# Patient Record
Sex: FEMALE | Race: WHITE | NOT HISPANIC OR LATINO | Employment: FULL TIME | URBAN - METROPOLITAN AREA
[De-identification: names, ages, dates, MRNs, and addresses within clinical notes are randomized per-mention and may not be internally consistent; named-entity substitution may affect disease eponyms.]

---

## 2017-08-15 ENCOUNTER — CONVERSION ENCOUNTER (OUTPATIENT)
Dept: MAMMOGRAPHY | Facility: HOSPITAL | Age: 57
End: 2017-08-15

## 2018-04-06 ENCOUNTER — OFFICE VISIT CONVERTED (OUTPATIENT)
Dept: FAMILY MEDICINE CLINIC | Facility: CLINIC | Age: 58
End: 2018-04-06
Attending: FAMILY MEDICINE

## 2018-04-06 ENCOUNTER — CONVERSION ENCOUNTER (OUTPATIENT)
Dept: FAMILY MEDICINE CLINIC | Facility: CLINIC | Age: 58
End: 2018-04-06

## 2018-05-14 ENCOUNTER — OFFICE VISIT CONVERTED (OUTPATIENT)
Dept: NEUROSURGERY | Facility: CLINIC | Age: 58
End: 2018-05-14
Attending: PHYSICIAN ASSISTANT

## 2018-06-01 ENCOUNTER — OFFICE VISIT CONVERTED (OUTPATIENT)
Dept: FAMILY MEDICINE CLINIC | Facility: CLINIC | Age: 58
End: 2018-06-01
Attending: FAMILY MEDICINE

## 2018-07-06 ENCOUNTER — CONVERSION ENCOUNTER (OUTPATIENT)
Dept: FAMILY MEDICINE CLINIC | Facility: CLINIC | Age: 58
End: 2018-07-06

## 2018-07-06 ENCOUNTER — OFFICE VISIT CONVERTED (OUTPATIENT)
Dept: FAMILY MEDICINE CLINIC | Facility: CLINIC | Age: 58
End: 2018-07-06
Attending: FAMILY MEDICINE

## 2018-10-11 ENCOUNTER — OFFICE VISIT CONVERTED (OUTPATIENT)
Dept: FAMILY MEDICINE CLINIC | Facility: CLINIC | Age: 58
End: 2018-10-11
Attending: FAMILY MEDICINE

## 2019-01-11 ENCOUNTER — OFFICE VISIT CONVERTED (OUTPATIENT)
Dept: FAMILY MEDICINE CLINIC | Facility: CLINIC | Age: 59
End: 2019-01-11
Attending: FAMILY MEDICINE

## 2019-01-11 ENCOUNTER — CONVERSION ENCOUNTER (OUTPATIENT)
Dept: FAMILY MEDICINE CLINIC | Facility: CLINIC | Age: 59
End: 2019-01-11

## 2019-05-23 ENCOUNTER — HOSPITAL ENCOUNTER (OUTPATIENT)
Dept: URGENT CARE | Facility: CLINIC | Age: 59
Discharge: HOME | End: 2019-05-23
Attending: FAMILY MEDICINE

## 2019-05-30 ENCOUNTER — OFFICE VISIT CONVERTED (OUTPATIENT)
Dept: FAMILY MEDICINE CLINIC | Facility: CLINIC | Age: 59
End: 2019-05-30
Attending: NURSE PRACTITIONER

## 2019-06-02 ENCOUNTER — HOSPITAL ENCOUNTER (OUTPATIENT)
Dept: URGENT CARE | Facility: CLINIC | Age: 59
Discharge: HOME | End: 2019-06-02

## 2019-06-19 ENCOUNTER — HOSPITAL ENCOUNTER (OUTPATIENT)
Dept: URGENT CARE | Facility: CLINIC | Age: 59
Discharge: HOME | End: 2019-06-19

## 2019-06-27 ENCOUNTER — HOSPITAL ENCOUNTER (OUTPATIENT)
Dept: GENERAL RADIOLOGY | Facility: HOSPITAL | Age: 59
Discharge: HOME | End: 2019-06-27
Attending: NURSE PRACTITIONER

## 2019-06-27 ENCOUNTER — OFFICE VISIT CONVERTED (OUTPATIENT)
Dept: FAMILY MEDICINE CLINIC | Facility: CLINIC | Age: 59
End: 2019-06-27
Attending: NURSE PRACTITIONER

## 2019-07-25 ENCOUNTER — HOSPITAL ENCOUNTER (OUTPATIENT)
Dept: GENERAL RADIOLOGY | Facility: HOSPITAL | Age: 59
Discharge: HOME | End: 2019-07-25
Attending: NURSE PRACTITIONER

## 2019-10-16 ENCOUNTER — HOSPITAL ENCOUNTER (OUTPATIENT)
Dept: OTHER | Facility: HOSPITAL | Age: 59
Discharge: HOME | End: 2019-10-16
Attending: EMERGENCY MEDICINE

## 2019-11-15 ENCOUNTER — OFFICE VISIT CONVERTED (OUTPATIENT)
Dept: FAMILY MEDICINE CLINIC | Facility: CLINIC | Age: 59
End: 2019-11-15
Attending: FAMILY MEDICINE

## 2021-04-25 ENCOUNTER — HOSPITAL ENCOUNTER (OUTPATIENT)
Dept: URGENT CARE | Facility: CLINIC | Age: 61
Discharge: HOME | End: 2021-04-25
Attending: NURSE PRACTITIONER

## 2021-04-26 LAB — SARS-COV-2 RNA SPEC QL NAA+PROBE: NOT DETECTED

## 2021-04-27 LAB
BACTERIA SPEC AEROBE CULT: NORMAL
BACTERIA UR CULT: NORMAL

## 2021-05-15 VITALS
TEMPERATURE: 97.5 F | OXYGEN SATURATION: 99 % | SYSTOLIC BLOOD PRESSURE: 138 MMHG | HEART RATE: 56 BPM | RESPIRATION RATE: 14 BRPM | WEIGHT: 136 LBS | HEIGHT: 58 IN | DIASTOLIC BLOOD PRESSURE: 82 MMHG | BODY MASS INDEX: 28.55 KG/M2

## 2021-05-15 VITALS
OXYGEN SATURATION: 96 % | HEART RATE: 104 BPM | TEMPERATURE: 98.3 F | HEIGHT: 58 IN | BODY MASS INDEX: 30.91 KG/M2 | SYSTOLIC BLOOD PRESSURE: 116 MMHG | WEIGHT: 147.25 LBS | DIASTOLIC BLOOD PRESSURE: 74 MMHG

## 2021-05-15 VITALS
DIASTOLIC BLOOD PRESSURE: 82 MMHG | SYSTOLIC BLOOD PRESSURE: 138 MMHG | HEART RATE: 76 BPM | TEMPERATURE: 97.5 F | WEIGHT: 149.37 LBS | RESPIRATION RATE: 14 BRPM | BODY MASS INDEX: 31.35 KG/M2 | OXYGEN SATURATION: 97 % | HEIGHT: 58 IN

## 2021-05-16 VITALS
RESPIRATION RATE: 16 BRPM | BODY MASS INDEX: 32.05 KG/M2 | TEMPERATURE: 97.8 F | HEART RATE: 75 BPM | DIASTOLIC BLOOD PRESSURE: 100 MMHG | OXYGEN SATURATION: 98 % | SYSTOLIC BLOOD PRESSURE: 168 MMHG | HEIGHT: 59 IN | WEIGHT: 159 LBS

## 2021-05-16 VITALS — BODY MASS INDEX: 33.17 KG/M2 | WEIGHT: 158 LBS | HEART RATE: 74 BPM | HEIGHT: 58 IN

## 2021-05-16 VITALS
BODY MASS INDEX: 33.82 KG/M2 | SYSTOLIC BLOOD PRESSURE: 142 MMHG | HEIGHT: 58 IN | DIASTOLIC BLOOD PRESSURE: 70 MMHG | RESPIRATION RATE: 14 BRPM | WEIGHT: 161.12 LBS | HEART RATE: 73 BPM | TEMPERATURE: 98.5 F | OXYGEN SATURATION: 95 %

## 2021-05-16 VITALS
HEIGHT: 58 IN | RESPIRATION RATE: 12 BRPM | DIASTOLIC BLOOD PRESSURE: 78 MMHG | HEART RATE: 60 BPM | BODY MASS INDEX: 33.45 KG/M2 | SYSTOLIC BLOOD PRESSURE: 126 MMHG | OXYGEN SATURATION: 98 % | TEMPERATURE: 98.6 F | WEIGHT: 159.37 LBS

## 2021-05-16 VITALS
HEIGHT: 58 IN | HEART RATE: 66 BPM | RESPIRATION RATE: 16 BRPM | SYSTOLIC BLOOD PRESSURE: 148 MMHG | DIASTOLIC BLOOD PRESSURE: 90 MMHG | TEMPERATURE: 97 F | WEIGHT: 156 LBS | BODY MASS INDEX: 32.75 KG/M2 | OXYGEN SATURATION: 98 %

## 2021-05-16 VITALS
WEIGHT: 142.12 LBS | OXYGEN SATURATION: 94 % | SYSTOLIC BLOOD PRESSURE: 178 MMHG | TEMPERATURE: 98.2 F | BODY MASS INDEX: 29.83 KG/M2 | DIASTOLIC BLOOD PRESSURE: 98 MMHG | HEART RATE: 80 BPM | HEIGHT: 58 IN

## 2021-05-18 ENCOUNTER — OFFICE VISIT CONVERTED (OUTPATIENT)
Dept: FAMILY MEDICINE CLINIC | Facility: CLINIC | Age: 61
End: 2021-05-18
Attending: FAMILY MEDICINE

## 2021-05-18 ENCOUNTER — CONVERSION ENCOUNTER (OUTPATIENT)
Dept: FAMILY MEDICINE CLINIC | Facility: CLINIC | Age: 61
End: 2021-05-18

## 2021-05-19 ENCOUNTER — HOSPITAL ENCOUNTER (OUTPATIENT)
Dept: GENERAL RADIOLOGY | Facility: HOSPITAL | Age: 61
Discharge: HOME | End: 2021-05-19
Attending: FAMILY MEDICINE

## 2021-06-05 NOTE — PROGRESS NOTES
Progress Note      Patient Name: Audra Montejo   Patient ID: [id number]   Sex: Female   YOB: 1960    Primary Care Provider: Anayeli Pina MD   Referring Provider: Anayeli Pina MD    Visit Date: May 18, 2021    Provider: Anayeli Pina MD   Location: [facility] [place]   Location Address: [address] [place] [address], [address]  [place], [place]  [id number]   Location Phone: [phone]          Chief Complaint  · Annual physical exam      History Of Present Illness  Audra Montejo is a 61 year old /White female who presents for evaluation and treatment of:      Pt is here for her annual physical exam.    Asthma exacerbation- pt reports she lost her nebulizer in her storage unit. Pt reports she was laid off last march and she didn't have money for about 3 months and lost her unit.  Pt went to [facility] and they gave her antibiotics.  On exam she is wheezing and requires a steroid shot and neb treatment in the office today.     Lumbar radiculopathy- pt reports she is starting to have more symptoms and she would like to get the epidural shots but she doesn't have a .     Trigger finger in both hands- pt reports she wakes up in the mornings and she can't even open her hands.    Tinea Pedis- Pt reports she has a rash on her feet which was itching and blistering.  Pt reports it got better but it is still there.    Right shoulder pain- pt has decread ROM and feels hse may have torn her rotator cuff and has right arm weakness. Pt will need x-ray then MRI to properly access.    Left shoulder is hurting to move around and has a history of bone spurs in her shoulder which she may need a steroid shot.     Urinary incontinence- patient reports that she is experiencing urge incontinence which is affecting her urine while she is at work she tolerated multiple bathroom breaks.  Patient is also having to wear panty protection to keep from wetting herself.  I will be giving her oxybutynin 10  mg once a day to help control her bladder.       Past Medical History  AC joint arthropathy; Allergy, Unspecified; Anxiety; Back pain; Bilateral Carpal Tunnel Syndrome; Bipolar disorder; Depression; Depression; Eczema; Gall Stones; Heart Murmur; Hypertension; Incomplete tear of left rotator cuff; Left Shoulder Bursitis; Left shoulder pain; Loss of appetite; Lumbago/low back pain; Memory loss/Forgetfulness; Migraine Headaches; Need for hepatitis C screening test; Palpitation; Psychiatric Care; Reflux Disease; Ringing in ears; Seasonal allergies; Shortness of breath; Sinus Trouble; unspecified; Stroke; Subacromial impingement, left; Superior glenoid labrum lesion of left shoulder, subsequent encounter; Trouble swallowing         Past Surgical History  Appendectomy; Arthroscopic Shoulder Surgery; Carpal Tunnel Release; Cholecystectomy; Colonoscopy; EGD; Hysterectomy         Allergy List  Lamictal         Family Medical History  Stroke; Heart Disease; Cancer, Unspecified; Diabetes, unspecified type; Hypertension; Rheumatoid arthritis; Blood Diseases; Family history of certain chronic disabling diseases; arthritis; Osteoporosis         Reproductive History   0 Para 0 0 0 0 & Postmenopausal       Social History  Alcohol (Light); Alcohol Use (Current some day); lives alone; Recreational Drug Use (Never); Single.; Tobacco (Current every day); Unemployed.         Immunizations  Name Date Admin   Influenza 11/15/2019   Influenza 10/11/2018   Influenza 10/06/2017   Influenza 10/11/2016   Tdap 2018         Review of Systems  · Constitutional  o Denies  o : fatigue, fever, weight loss, weight gain  · Eyes  o Denies  o : eye discomfort, eye pain  · HENT  o Denies  o : vertigo, nasal congestion  · Cardiovascular  o Denies  o : chest pain, irregular heart beats  · Respiratory  o Admits  o : wheezing  o Denies  o : shortness of breath  · Gastrointestinal  o Denies  o : nausea, vomiting  · Genitourinary  o Admits  o :  "frequency, incontinence  o Denies  o : dysuria  · Integument  o Admits  o : rash, itching  · Neurologic  o Denies  o : muscular weakness, memory difficulties  · Musculoskeletal  o Denies  o : joint swelling, muscle pain  · Psychiatric  o Denies  o : anxiety, depression  · Heme-Lymph  o Denies  o : lightheadedness, easy bleeding  · Allergic-Immunologic  o Admits  o : sinus allergy symptoms  o Denies  o : allergic dermatitis      Vitals  Date Time BP Position Site L\R Cuff Size HR RR TEMP (F) WT  HT  BMI kg/m2 BSA m2 O2 Sat FR L/min FiO2        05/18/2021 03:02 /86 Sitting    76 - R 16 97.6 153lbs 0oz 4'  10\" 31.98 1.69 97 %            Physical Examination  · Constitutional  o Appearance  o : well-nourished, in no acute distress  · Eyes  o Conjunctivae  o : conjunctivae normal  o Sclerae  o : sclerae white  o Pupils and Irises  o : pupils equal, round, and reactive to light and accommodation bilaterally  o Eyelids/Ocular Adnexae  o : eyelid appearance normal, no exudates present  · Ears, Nose, Mouth and Throat  o Ears  o :   § External Ears  § : external auditory canal appearance within normal limits, no discharge present  § Otoscopic Examination  § : tympanic membrane appearance within normal limits bilaterally  o Nose  o :   § External Nose  § : appearance normal  § Nasopharynx  § : no discharge present  o Oral Cavity  o :   § Oral Mucosa  § : oral mucosa normal  § Lips  § : lip appearance normal  o Throat  o :   § Oropharynx  § : no inflammation or lesions present, tonsils within normal limits  · Neck  o Inspection/Palpation  o : normal appearance, no masses or tenderness, trachea midline  o Range of Motion  o : cervical range of motion within normal limits  o Thyroid  o : gland size normal, nontender, no nodules or masses present on palpation  o Jugular Veins  o : JVP normal  · Respiratory  o Respiratory Effort  o : breathing unlabored  o Inspection of Chest  o : normal appearance  o Auscultation of " Lungs  o : normal breath sounds throughout  · Cardiovascular  o Heart  o :   § Auscultation of Heart  § : regular rate and rhythm, no murmurs, gallops or rubs  o Peripheral Vascular System  o :   § Extremities  § : no edema  · Gastrointestinal  o Abdominal Examination  o : abdomen nontender to palpation, tone normal without rigidity or guarding, no masses present, bowel sounds present in all four quadrants  o Liver and spleen  o : no hepatomegaly present, liver nontender to palpation, spleen not palpable  o Hernias  o : no hernias present  · Lymphatic  o Neck  o : no lymphadenopathy present  · Musculoskeletal  o Spine  o :   § Inspection/Palpation  § : no spinal tenderness, scoliosis or kyphosis present  § Stability  § : no subluxations present  § Range of Motion  § : spine range of motion normal  o Right Upper Extremity  o :   § Inspection/Palpation  § : no tenderness to palpation, ROM normal  o Left Upper Extremity  o :   § Inspection/Palpation  § : no tenderness to palpation, ROM normal  o Right Lower Extremity  o :   § Inspection/Palpation  § : no joint or limb tenderness to palpation, ROM normal  o Left Lower Extremity  o :   § Inspection/Palpation  § : no joint or limb tenderness to palpation, ROM normal  · Skin and Subcutaneous Tissue  o General Inspection  o : no rashes or lesions present, no areas of discoloration  o Body Hair  o : scalp palpation normal, hair normal for age, general body hair distribution normal for age  o Digits and Nails  o : no clubbing, cyanosis, deformities or edema present, normal appearing nails  · Neurologic  o Mental Status Examination  o :   § Orientation  § : grossly oriented to person, place and time  o Gait and Station  o : normal gait, able to stand without difficulty  · Psychiatric  o Judgement and Insight  o : judgment and insight intact  o Mood and Affect  o : mood normal, affect appropriate              Assessment  · Annual physical exam     V70.0/Z00.00  · Essential  hypertension     401.9/I10  · Lumbago     724.2/M54.5  · General Medical Exam, Adult (CPE)     V70.0/Z00.00  · Asthma exacerbation     493.92/J45.901  · Trigger finger of all digits of both hands       Trigger finger, right index finger     727.03/M65.321  Trigger thumb, right thumb     727.03/M65.311  Trigger thumb, left thumb     727.03/M65.312  Trigger finger, left index finger     727.03/M65.322  Trigger finger, right middle finger     727.03/M65.331  Trigger finger, left middle finger     727.03/M65.332  Trigger finger, right ring finger     727.03/M65.341  Trigger finger, left ring finger     727.03/M65.342  Trigger finger, right little finger     727.03/M65.351  Trigger finger, left little finger     727.03/M65.352  · Trigger finger     727.03/M65.30  · Shoulder pain, bilateral       Pain in right shoulder     719.41/M25.511  Pain in left shoulder     719.41/M25.512  · Tinea pedis     110.4/B35.3  · Urinary incontinence     788.30/R32      Plan  · Orders  o IM - Injection Fee [facility] ([id number]) - - 05/18/2021  o ACO-39: Current medications updated and reviewed (1159F, ) - - 05/18/2021  o ORTHOPEDIC CONSULTATION (ORTHO) - 727.03/M65.30, 719.41/M25.511, 719.41/M25.512 - 05/18/2021  o Shoulder (Left) 2 or more views X-Ray [facility] ([id number]) - 719.41/M25.512 - 05/18/2021  o Solumedrol 125 () - 493.92/J45.901 - 05/18/2021   Injection - Solu-Medrol 125 mg; Dose: 125 mg; Site: Right Hip; Route: intramuscular; Date: 05/18/2021 16:19:24; Exp: 09/01/2022; Lot: [id number]; Mfg: N/A; TradeName: Solu-Medrol; Location: [facility] [place]; Administered By: Nidhi Ortiz MA; Comment:   o Shoulder (Right) 2 or more views X-Ray [facility] ([id number]) - 719.41/M25.511 - 05/18/2021  · Medications  o ketoconazole 2 % topical cream   SIG: apply to the affected area(s) by topical route once daily for 2 weeks   DISP: (1) Tube with 0 refills  Prescribed on 05/18/2021     o albuterol sulfate 2.5 mg /3 mL  (0.083 %) inhalation solution for nebulization   SIG: inhale 3 milliliters (2.5 mg) by nebulization route 3 times per day as needed for 30 days   DISP: (1) Box with 2 refills  Prescribed on 05/18/2021     o albuterol sulfate 90 mcg/actuation inhalation HFA aerosol inhaler   SIG: inhale 1 - 2 puffs (90 - 180 mcg) by inhalation route every 6 hours as needed for 30 days   DISP: (1) Inhaler with 11 refills  Prescribed on 05/18/2021     o oxybutynin chloride 10 mg oral tablet extended release 24hr   SIG: take 1 tablet (10 mg) by oral route once daily for 90 days   DISP: (90) Tablet with 1 refills  Prescribed on 05/18/2021     o Singulair 10 mg oral tablet   SIG: take 1 tablet (10 mg) by oral route once daily in the evening for 90 days   DISP: (90) Tablet with 1 refills  Prescribed on 05/18/2021     o Medications have been Reconciled  o Transition of Care or Provider Policy  · Instructions  o Reviewed health maintenance flowsheet and updated information. Orders were placed and/or patient's response was documented.  o Patient was educated/instructed on their diagnosis, treatment and medications prior to discharge from the clinic today.  o Minutes spent with patient including greater than 50% in Education/Counseling/Care Coordination.  o Time spent with the patient was minutes, more than 50% face to face. 35 minutes            Electronically Signed by: Anayeli Pina MD -Author on June 4, 2021 01:32:07 PM

## 2021-06-18 RX ORDER — OXYBUTYNIN CHLORIDE 10 MG/1
10 TABLET, EXTENDED RELEASE ORAL DAILY
COMMUNITY
Start: 2021-05-18 | End: 2022-05-13 | Stop reason: SDUPTHER

## 2021-06-18 RX ORDER — ALBUTEROL SULFATE 90 UG/1
1-2 AEROSOL, METERED RESPIRATORY (INHALATION) EVERY 6 HOURS PRN
Status: ON HOLD | COMMUNITY
Start: 2021-05-18 | End: 2022-07-07

## 2021-06-18 RX ORDER — ALBUTEROL SULFATE 2.5 MG/3ML
SOLUTION RESPIRATORY (INHALATION)
Status: ON HOLD | COMMUNITY
Start: 2021-05-18 | End: 2022-07-07

## 2021-06-18 RX ORDER — DONEPEZIL HYDROCHLORIDE 5 MG/1
5 TABLET, FILM COATED ORAL EVERY EVENING
Status: ON HOLD | COMMUNITY
End: 2022-07-07

## 2021-06-18 RX ORDER — MONTELUKAST SODIUM 10 MG/1
10 TABLET ORAL EVERY EVENING
COMMUNITY
Start: 2021-05-18 | End: 2021-11-19

## 2021-06-18 RX ORDER — DOXYCYCLINE HYCLATE 100 MG/1
100 CAPSULE ORAL 2 TIMES DAILY
Status: ON HOLD | COMMUNITY
Start: 2021-04-26 | End: 2022-07-07

## 2021-06-18 RX ORDER — KETOCONAZOLE 20 MG/G
CREAM TOPICAL
Status: ON HOLD | COMMUNITY
Start: 2021-05-18 | End: 2022-07-07

## 2021-06-18 RX ORDER — TRAMADOL HYDROCHLORIDE 50 MG/1
50 TABLET ORAL EVERY 6 HOURS PRN
Status: ON HOLD | COMMUNITY
End: 2022-07-07

## 2021-06-18 RX ORDER — LISINOPRIL 20 MG/1
20 TABLET ORAL 2 TIMES DAILY
COMMUNITY
End: 2022-03-07

## 2021-06-18 RX ORDER — GABAPENTIN 600 MG/1
600 TABLET ORAL 3 TIMES DAILY
Status: ON HOLD | COMMUNITY
End: 2022-07-07

## 2021-06-18 RX ORDER — LAMOTRIGINE 25 MG/1
TABLET ORAL
Status: ON HOLD | COMMUNITY
End: 2022-07-07

## 2021-06-18 RX ORDER — QUETIAPINE FUMARATE 25 MG/1
25 TABLET, FILM COATED ORAL
Status: ON HOLD | COMMUNITY
End: 2022-07-07

## 2021-06-21 ENCOUNTER — OFFICE VISIT (OUTPATIENT)
Dept: FAMILY MEDICINE CLINIC | Facility: CLINIC | Age: 61
End: 2021-06-21

## 2021-06-21 ENCOUNTER — OFFICE VISIT (OUTPATIENT)
Dept: ORTHOPEDIC SURGERY | Facility: CLINIC | Age: 61
End: 2021-06-21

## 2021-06-21 VITALS — HEIGHT: 58 IN | BODY MASS INDEX: 33.29 KG/M2 | WEIGHT: 158.6 LBS | HEART RATE: 76 BPM | OXYGEN SATURATION: 96 %

## 2021-06-21 VITALS
WEIGHT: 155 LBS | DIASTOLIC BLOOD PRESSURE: 78 MMHG | HEART RATE: 66 BPM | SYSTOLIC BLOOD PRESSURE: 138 MMHG | BODY MASS INDEX: 32.54 KG/M2 | HEIGHT: 58 IN | OXYGEN SATURATION: 96 % | RESPIRATION RATE: 16 BRPM | TEMPERATURE: 97.4 F

## 2021-06-21 DIAGNOSIS — I10 ESSENTIAL HYPERTENSION: Primary | ICD-10-CM

## 2021-06-21 DIAGNOSIS — J45.20 MILD INTERMITTENT ASTHMA WITHOUT COMPLICATION: ICD-10-CM

## 2021-06-21 DIAGNOSIS — M25.512 LEFT SHOULDER PAIN, UNSPECIFIED CHRONICITY: ICD-10-CM

## 2021-06-21 DIAGNOSIS — M25.511 RIGHT SHOULDER PAIN, UNSPECIFIED CHRONICITY: Primary | ICD-10-CM

## 2021-06-21 DIAGNOSIS — M25.512 CHRONIC LEFT SHOULDER PAIN: ICD-10-CM

## 2021-06-21 DIAGNOSIS — Z12.31 BREAST CANCER SCREENING BY MAMMOGRAM: ICD-10-CM

## 2021-06-21 DIAGNOSIS — G89.29 CHRONIC LEFT SHOULDER PAIN: ICD-10-CM

## 2021-06-21 LAB
ALBUMIN SERPL-MCNC: 4.4 G/DL (ref 3.5–5.2)
ALBUMIN/GLOB SERPL: 1.7 G/DL
ALP SERPL-CCNC: 131 U/L (ref 39–117)
ALT SERPL W P-5'-P-CCNC: 12 U/L (ref 1–33)
ANION GAP SERPL CALCULATED.3IONS-SCNC: 10.3 MMOL/L (ref 5–15)
AST SERPL-CCNC: 19 U/L (ref 1–32)
BILIRUB SERPL-MCNC: 0.2 MG/DL (ref 0–1.2)
BUN SERPL-MCNC: 10 MG/DL (ref 6–20)
BUN/CREAT SERPL: 11 (ref 7–25)
CALCIUM SPEC-SCNC: 9.5 MG/DL (ref 8.6–10.5)
CHLORIDE SERPL-SCNC: 106 MMOL/L (ref 98–107)
CO2 SERPL-SCNC: 27.7 MMOL/L (ref 22–29)
CREAT SERPL-MCNC: 0.91 MG/DL (ref 0.57–1)
GFR SERPL CREATININE-BSD FRML MDRD: 63 ML/MIN/1.73
GLOBULIN UR ELPH-MCNC: 2.6 GM/DL
GLUCOSE SERPL-MCNC: 93 MG/DL (ref 65–99)
POTASSIUM SERPL-SCNC: 3.7 MMOL/L (ref 3.5–5.2)
PROT SERPL-MCNC: 7 G/DL (ref 6–8.5)
SODIUM SERPL-SCNC: 144 MMOL/L (ref 136–145)

## 2021-06-21 PROCEDURE — 99214 OFFICE O/P EST MOD 30 MIN: CPT | Performed by: FAMILY MEDICINE

## 2021-06-21 PROCEDURE — 80053 COMPREHEN METABOLIC PANEL: CPT | Performed by: FAMILY MEDICINE

## 2021-06-21 PROCEDURE — 99203 OFFICE O/P NEW LOW 30 MIN: CPT | Performed by: ORTHOPAEDIC SURGERY

## 2021-06-21 NOTE — PROGRESS NOTES
Chief Complaint  Chief Complaint   Patient presents with   • Asthma     FOLLOW_UP       HPI:  Audra Montejo presents to [facility]     Asthma -patient reports that she has only had to use her nebulizer machine once last week and that her asthma symptoms are pretty well controlled.  Patient does take her Singulair daily.    Hypertension-patient reports that she is not taking her lisinopril and has been working a lot which she feels is helping to lower her blood pressure.  Her blood pressure today is well controlled at 138/78.  There is no need to restart her lisinopril as long as her blood pressure remains controlled.    Left shoulder pain-patient will be going to see Dr. Ureña later on today for her left shoulder pain.  Patient has history of left shoulder surgery due to biceps tendon injury.    Medical History:  Past History:  Medical History: has a past medical history of AC joint arthropathy (10/13/2016), Acid reflux disease, Allergy, Anxiety, Back pain, Bilateral carpal tunnel syndrome (08/17/2017), Bipolar disorder (CMS/Formerly [facility]), Bursitis of left shoulder (08/09/2016), Depression, Eczema, Gall stones, H/O psychiatric care, Heart murmur, Hypertension, Incomplete tear of left rotator cuff (01/25/2017), Left shoulder pain, Loss of appetite, Lumbago (05/08/2015), Memory loss, Migraine headache, Need for hepatitis C screening test, Palpitation, Ringing in ears, Seasonal allergies, Shortness of breath, Sinus trouble, Stroke (cerebrum) (CMS/[facility]), Subacromial impingement, left (10/13/2016), Superior glenoid labrum lesion of left shoulder (10/13/2016), and Trouble swallowing.   Surgical History: has a past surgical history that includes Appendectomy; Shoulder surgery (Left, 10/03/2016); Carpal tunnel release; Cholecystectomy; Colonoscopy (2014); Esophagogastroduodenoscopy (2014); and Hysterectomy (1999).   Family History: family history includes Arthritis in her mother, sister, and son;  Cancer in her father, sister, and son; Diabetes in her brother, mother, and sister; Heart disease in her brother, father, sister, and son; Hypertension in an other family member; Osteoporosis in her son; Other in her son; Rheum arthritis in an other family member; Stroke in her mother and son.   Social History: reports that she has been smoking. She has a 15.00 pack-year smoking history. She has never used smokeless tobacco. She reports current alcohol use. She reports that she does not use drugs.  Immunization History   Administered Date(s) Administered   • Influenza, Unspecified 11/15/2019   • Tdap 06/01/2018         Allergies: Tape     Medications:  Current Outpatient Medications on File Prior to Visit   Medication Sig Dispense Refill   • albuterol (PROVENTIL) (2.5 MG/3ML) 0.083% nebulizer solution INHALE CONTENTS OF 1 VIAL IN NEBULIZER THREE TIMES DAILY AS NEEDED     • albuterol sulfate  (90 Base) MCG/ACT inhaler Inhale 1-2 puffs Every 6 (Six) Hours As Needed.     • montelukast (SINGULAIR) 10 MG tablet Take 10 mg by mouth Every Evening.     • oxybutynin XL (DITROPAN-XL) 10 MG 24 hr tablet Take 10 mg by mouth Daily.     • donepezil (ARICEPT) 5 MG tablet Take 5 mg by mouth Every Evening.     • doxycycline (VIBRAMYCIN) 100 MG capsule Take 100 mg by mouth 2 (Two) Times a Day.     • gabapentin (NEURONTIN) 600 MG tablet Take 600 mg by mouth 3 (Three) Times a Day.     • ketoconazole (NIZORAL) 2 % cream APPLY TOPICALLY TO THE AFFECTED AREA EVERY DAY FOR 2 WEEKS     • lamoTRIgine (LaMICtal) 25 MG tablet Lamictal 25 mg oral tablet Take 1 Tablet by mouth q day x 1 week   -  Take 2 Tablet by mouth q day x 2nd week x 1 week - Take 3 Tablet by mouth q day x 3rd week x 1 we   Suspended     • lisinopril (PRINIVIL,ZESTRIL) 20 MG tablet Take 20 mg by mouth 2 (Two) Times a Day.     • QUEtiapine (SEROquel) 25 MG tablet Take 25 mg by mouth every night at bedtime.     • traMADol (ULTRAM) 50 MG tablet Take 50 mg by mouth Every  "6 (Six) Hours As Needed.     • TRAZODONE HCL PO        No current facility-administered medications on file prior to visit.        Health Maintenance Due   Topic Date Due   • ANNUAL PHYSICAL  Never done   • Pneumococcal Vaccine 0-64 (1 of 1 - PPSV23) Never done   • COVID-19 Vaccine (1) Never done   • ZOSTER VACCINE (1 of 2) Never done   • MAMMOGRAM  08/15/2019   • HEPATITIS C SCREENING  Never done       Vital Signs:   Vitals:    06/21/21 0911   BP: 138/78   Pulse: 66   Resp: 16   Temp: 97.4 °F (36.3 °C)   SpO2: 96%   Weight: 70.3 kg (155 lb)   Height: 147.3 cm (58\")      Body mass index is 32.4 kg/m².     ROS:  Review of Systems   Constitutional: Negative for fatigue and fever.   HENT: Negative for congestion, ear pain and sinus pressure.    Respiratory: Negative for cough, chest tightness and shortness of breath.    Cardiovascular: Negative for chest pain, palpitations and leg swelling.   Gastrointestinal: Negative for abdominal pain and diarrhea.   Genitourinary: Negative for dysuria and frequency.   Neurological: Negative for speech difficulty, headache and confusion.   Psychiatric/Behavioral: Negative for agitation and behavioral problems.          Physical Exam  Constitutional:       Appearance: Normal appearance.   HENT:      Right Ear: Tympanic membrane normal.      Left Ear: Tympanic membrane normal.      Nose: Nose normal.   Eyes:      Extraocular Movements: Extraocular movements intact.      Conjunctiva/sclera: Conjunctivae normal.      Pupils: Pupils are equal, round, and reactive to light.   Cardiovascular:      Rate and Rhythm: Normal rate and regular rhythm.   Pulmonary:      Effort: Pulmonary effort is normal.      Breath sounds: Normal breath sounds.   Abdominal:      General: Bowel sounds are normal.   Musculoskeletal:      Cervical back: Normal range of motion.   Skin:     General: Skin is warm and dry.   Neurological:      General: No focal deficit present.      Mental Status: She is alert and " oriented to person, place, and time.   Psychiatric:         Mood and Affect: Mood normal.         Behavior: Behavior normal.          Result Review :        Diagnoses and all orders for this visit:    1. Essential hypertension (Primary)  -     Comprehensive Metabolic Panel    2. Mild intermittent asthma without complication    3. Chronic left shoulder pain               Follow Up   No follow-ups on file.  Patient was given instructions and counseling regarding her condition or for health maintenance advice. Please see specific information pulled into the AVS if appropriate.       Anayeli Pina MD

## 2021-06-21 NOTE — PROGRESS NOTES
"Chief Complaint  Pain of the Left Shoulder and Pain of the Right Shoulder     Subjective      Audra Montejo presents to [facility] for an evaluation of bilateral shoulders. She states she has a pinching sensation in her right shoulder that started a year ago after falling and landing on concrete. Patient works at [employer], she does a lot of heavy lifting and it only worsens and aggravates her right shoulder. This has made her decide to come in and get this evaluated.     Dr. Garcia did a scope on her left shoulder. She states surgery has done well but has begun to hurt her as well. She states this began shortly after her left shoulder. She believes she is compensating for her right shoulder. Patient denies any numbness and tingling in bilateral shoulders. She states right shoulder is worse than her left at this time.     Allergies   Allergen Reactions   • Tape Itching        Social History     Socioeconomic History   • Marital status: Single     Spouse name: Not on file   • Number of children: Not on file   • Years of education: Not on file   • Highest education level: Not on file   Tobacco Use   • Smoking status: Current Every Day Smoker     Packs/day: 0.50     Years: 30.00     Pack years: 15.00   • Smokeless tobacco: Never Used   • Tobacco comment: smoked 21-30 years    Vaping Use   • Vaping Use: Never used   Substance and Sexual Activity   • Alcohol use: Yes     Comment: rarely drinks   • Drug use: Never        Review of Systems     Objective   Vital Signs:   Pulse 76   Ht 147.3 cm (58\")   Wt 71.9 kg (158 lb 9.6 oz)   SpO2 96%   BMI 33.15 kg/m²       Physical Exam  Constitutional:       Appearance: Normal appearance. He is well-developed and normal weight.   HENT:      Head: Normocephalic.      Right Ear: Hearing and external ear normal.      Left Ear: Hearing and external ear normal.      Nose: Nose normal.   Eyes:      Conjunctiva/sclera: Conjunctivae normal. "   Cardiovascular:      Rate and Rhythm: Normal rate.   Pulmonary:      Effort: Pulmonary effort is normal.      Breath sounds: No wheezing or rales.   Abdominal:      Palpations: Abdomen is soft.      Tenderness: There is no abdominal tenderness.   Musculoskeletal:      Cervical back: Normal range of motion.   Skin:     Findings: No rash.   Neurological:      Mental Status: He is alert and oriented to person, place, and time.   Psychiatric:         Mood and Affect: Mood and affect normal.         Judgment: Judgment normal.       Ortho Exam      RIGHT SHOULDER: Tender biceps tendon. Tender subacromial bursa. Pain with passive forward elevation. Near full flexion. IR to L5. Full cross body adduction. Good strength. Good tone of deltoid, triceps, wrist extensors and wrist flexors. Full elbow range of motion. Positive Hawkin's. Positive Neer's. No swelling, skin discoloration or atrophy. Sensation grossly intact. Neurovascular intact. Radial pulse 2+, ulnar pulse 2+.     LEFT SHOULDER: No swelling, skin discoloration or atrophy. Radial pulse 2+, ulnar pulse 2+. Good tone of deltoid, triceps, wrist extensors and wrist flexors. Skin intact. Full elbow range of motion. Forward flexion is near full. Full cross body adduction. Positive impingement signs. Sensation grossly intact. Neurovascular intact. Full cervical range of motion.       Procedures        Imaging Results (Most Recent)     None           Result Review :     [facility]            PACS RADIOLOGY REPORT     Patient: NIDIA FELICIANO     Acct: [id number]     Report: #[id number]  UNIT #: [id number]     DOS: 2021 1417     Order #: [id number]  Location: [facility]     : 1960  ORDERING: RUI SANDOVAL  DICTATING: Dottie Sutton #: [id number]     EXAM: SHLT - SHOULDER 2V or more LEFT  REASON FOR EXAM: M25.512,M25.511  REASON FOR VISIT: M25.512,M25.511    CONCLUSION:     1. No evidence of  fracture or defects location.  There is mild widening of the acromioclavicular     joint measuring up to 1.3 cm likely related to age-indeterminate grade 2 sprain.  No evidence of     subluxation.      2. Mild to moderate osteoarthritic degenerative changes of the glenohumeral joint.  Mild     age-related changes of the a.c. joint are also noted.             [facility]            PACS RADIOLOGY REPORT     Patient: NIDIA FELICIANO     Acct: [id number]     Report: #[id number]  UNIT #: [id number]     DOS: 2021 1417     Order #: [id number]  Location: [facility]     : 1960  ORDERING: RUI SANDOVAL  DICTATING: Jonel Dumont #: [id number]     EXAM: SHRT - SHOULDER  2V or more RIGHT  REASON FOR EXAM: M25.512,M25.511  REASON FOR VISIT: M25.512,M25.511    CONCLUSION:     1. Mild degenerative changes at the glenohumeral and acromioclavicular joints.    2. No radiographic findings of acute osseous shoulder abnormality.         Assessment and Plan     DX: Bilateral shoulder pain    Discussed treatment plans and diagnosis with the patient. Discussed medications, therapy, injections and MRI. Patient wishes to proceed with a right shoulder MRI.     Call or return if worsening symptoms.    Follow Up     Follow-up after MRI.       Patient was given instructions and counseling regarding her condition or for health maintenance advice. Please see specific information pulled into the AVS if appropriate.     Scribed for Sancho Arreola MD by Shannan Hawkins.  21   14:49 EDT    I have personally performed the services described in this document as scribed by the above individual and it is both accurate and complete.  Sancho Arreola MD 21  14:49 EDT

## 2021-06-25 ENCOUNTER — OFFICE VISIT (OUTPATIENT)
Dept: FAMILY MEDICINE CLINIC | Facility: CLINIC | Age: 61
End: 2021-06-25

## 2021-06-25 VITALS
HEIGHT: 58 IN | TEMPERATURE: 98.6 F | HEART RATE: 53 BPM | WEIGHT: 155.2 LBS | OXYGEN SATURATION: 98 % | DIASTOLIC BLOOD PRESSURE: 80 MMHG | BODY MASS INDEX: 32.58 KG/M2 | SYSTOLIC BLOOD PRESSURE: 150 MMHG

## 2021-06-25 DIAGNOSIS — I10 ESSENTIAL HYPERTENSION: ICD-10-CM

## 2021-06-25 DIAGNOSIS — R07.9 CHEST PAIN, UNSPECIFIED TYPE: Primary | ICD-10-CM

## 2021-06-25 PROBLEM — R06.02 SHORTNESS OF BREATH: Status: ACTIVE | Noted: 2021-06-25

## 2021-06-25 PROBLEM — R63.0 LOSS OF APPETITE: Status: ACTIVE | Noted: 2021-06-25

## 2021-06-25 PROBLEM — R01.1 HEART MURMUR: Status: ACTIVE | Noted: 2021-06-25

## 2021-06-25 PROBLEM — R13.10 TROUBLE SWALLOWING: Status: ACTIVE | Noted: 2021-06-25

## 2021-06-25 PROBLEM — M75.100 NONTRAUMATIC TEAR OF ROTATOR CUFF: Status: ACTIVE | Noted: 2017-01-25

## 2021-06-25 PROBLEM — K80.20 GALL STONES: Status: ACTIVE | Noted: 2021-06-25

## 2021-06-25 PROBLEM — G43.909 MIGRAINE: Status: ACTIVE | Noted: 2021-06-25

## 2021-06-25 PROBLEM — H93.19 RINGING IN EARS: Status: ACTIVE | Noted: 2021-06-25

## 2021-06-25 PROBLEM — M54.9 BACK PAIN: Status: ACTIVE | Noted: 2021-06-25

## 2021-06-25 PROBLEM — F41.9 ANXIETY: Status: ACTIVE | Noted: 2021-06-25

## 2021-06-25 PROBLEM — J30.2 SEASONAL ALLERGIC RHINITIS: Status: ACTIVE | Noted: 2021-06-25

## 2021-06-25 PROBLEM — R00.2 PALPITATION: Status: ACTIVE | Noted: 2021-06-25

## 2021-06-25 PROBLEM — G56.03 BILATERAL CARPAL TUNNEL SYNDROME: Status: ACTIVE | Noted: 2017-08-17

## 2021-06-25 PROBLEM — F31.9 BIPOLAR DISORDER: Status: ACTIVE | Noted: 2021-06-25

## 2021-06-25 PROBLEM — I63.9 STROKE (HCC): Status: ACTIVE | Noted: 2021-06-25

## 2021-06-25 PROBLEM — L30.9 ECZEMA: Status: ACTIVE | Noted: 2021-06-25

## 2021-06-25 LAB
CK MB SERPL-CCNC: 2.59 NG/ML
CK SERPL-CCNC: 86 U/L (ref 20–180)
TROPONIN T SERPL-MCNC: <0.01 NG/ML (ref 0–0.03)

## 2021-06-25 PROCEDURE — 82550 ASSAY OF CK (CPK): CPT | Performed by: FAMILY MEDICINE

## 2021-06-25 PROCEDURE — 82553 CREATINE MB FRACTION: CPT | Performed by: FAMILY MEDICINE

## 2021-06-25 PROCEDURE — 84484 ASSAY OF TROPONIN QUANT: CPT | Performed by: FAMILY MEDICINE

## 2021-06-25 PROCEDURE — 99214 OFFICE O/P EST MOD 30 MIN: CPT | Performed by: FAMILY MEDICINE

## 2021-06-25 NOTE — ASSESSMENT & PLAN NOTE
Hypertension is worsening.  Continue current medications.  Blood pressure will be reassessed in 4 weeks.

## 2021-06-25 NOTE — ASSESSMENT & PLAN NOTE
Pt was strongly urged that she should go to the hospital if she experiences that pain again.  Pt reports she refuses to go to [place] ER and would rather die at home.  I will be sending her for a stress test and to see Cardiology.  Pt reports she is ok with getting tests done but will not go to the ER.

## 2021-06-25 NOTE — PROGRESS NOTES
"Chief Complaint  Chief Complaint   Patient presents with   • Chest Pain   • Cough   • Heartburn       HPI:  Audra Montejo presents to [facility]    Pt reports she thinks she had a heart attack last month. Pt reports she felt fatigued and she took about 3 steps out of the bathroom she had a pain that hit on the left side of her chest and she had sweating and could not breathe and at some point all her senses just went out of her and she hit the floor.  That pain was a constricting pain and she felt like she was in suspended animation.  At some point there was a \"squeshy gushy thing\" happened and the pain backed down a little more. Each time the \"gooshy\" thing happened the pain got weaker.     Medical History:  Past History:  Medical History: has a past medical history of AC joint arthropathy (10/13/2016), Acid reflux disease, Allergy, Anxiety, Back pain, Bilateral carpal tunnel syndrome (08/17/2017), Bipolar disorder (CMS/[facility]), Bursitis of left shoulder (08/09/2016), Depression, Eczema, Gall stones, H/O psychiatric care, Heart murmur, Hypertension, Incomplete tear of left rotator cuff (01/25/2017), Left shoulder pain, Loss of appetite, Lumbago (05/08/2015), Memory loss, Migraine headache, Need for hepatitis C screening test, Palpitation, Ringing in ears, Seasonal allergies, Shortness of breath, Sinus trouble, Stroke (cerebrum) (CMS/[facility]), Subacromial impingement, left (10/13/2016), Superior glenoid labrum lesion of left shoulder (10/13/2016), and Trouble swallowing.   Surgical History: has a past surgical history that includes Appendectomy; Shoulder surgery (Left, 10/03/2016); Carpal tunnel release; Cholecystectomy; Colonoscopy (2014); Esophagogastroduodenoscopy (2014); and Hysterectomy (1999).   Family History: family history includes Arthritis in her mother, sister, and son; Cancer in her father, sister, and son; Diabetes in her brother, mother, and sister; Heart disease in her " brother, father, sister, and son; Hypertension in an other family member; Osteoporosis in her son; Other in her son; Rheum arthritis in an other family member; Stroke in her mother and son.   Social History: reports that she has been smoking. She has a 15.00 pack-year smoking history. She has never used smokeless tobacco. She reports current alcohol use. She reports that she does not use drugs.  Immunization History   Administered Date(s) Administered   • Influenza, Unspecified 11/15/2019   • Tdap 06/01/2018         Allergies: Tape     Medications:  Current Outpatient Medications on File Prior to Visit   Medication Sig Dispense Refill   • albuterol (PROVENTIL) (2.5 MG/3ML) 0.083% nebulizer solution INHALE CONTENTS OF 1 VIAL IN NEBULIZER THREE TIMES DAILY AS NEEDED     • albuterol sulfate  (90 Base) MCG/ACT inhaler Inhale 1-2 puffs Every 6 (Six) Hours As Needed.     • montelukast (SINGULAIR) 10 MG tablet Take 10 mg by mouth Every Evening.     • oxybutynin XL (DITROPAN-XL) 10 MG 24 hr tablet Take 10 mg by mouth Daily.     • donepezil (ARICEPT) 5 MG tablet Take 5 mg by mouth Every Evening.     • doxycycline (VIBRAMYCIN) 100 MG capsule Take 100 mg by mouth 2 (Two) Times a Day.     • gabapentin (NEURONTIN) 600 MG tablet Take 600 mg by mouth 3 (Three) Times a Day.     • ketoconazole (NIZORAL) 2 % cream APPLY TOPICALLY TO THE AFFECTED AREA EVERY DAY FOR 2 WEEKS     • lamoTRIgine (LaMICtal) 25 MG tablet Lamictal 25 mg oral tablet Take 1 Tablet by mouth q day x 1 week   -  Take 2 Tablet by mouth q day x 2nd week x 1 week - Take 3 Tablet by mouth q day x 3rd week x 1 we   Suspended     • lisinopril (PRINIVIL,ZESTRIL) 20 MG tablet Take 20 mg by mouth 2 (Two) Times a Day.     • QUEtiapine (SEROquel) 25 MG tablet Take 25 mg by mouth every night at bedtime.     • traMADol (ULTRAM) 50 MG tablet Take 50 mg by mouth Every 6 (Six) Hours As Needed.     • TRAZODONE HCL PO        No current facility-administered medications on  "file prior to visit.        Health Maintenance Due   Topic Date Due   • ANNUAL PHYSICAL  Never done   • Pneumococcal Vaccine 0-64 (1 of 1 - PPSV23) Never done   • COVID-19 Vaccine (1) Never done   • ZOSTER VACCINE (1 of 2) Never done   • MAMMOGRAM  08/15/2019   • HEPATITIS C SCREENING  Never done       Vital Signs:   Vitals:    06/25/21 1357   BP: 150/80   BP Location: Left leg   Patient Position: Sitting   Cuff Size: Adult   Pulse: 53   Temp: 98.6 °F (37 °C)   SpO2: 98%   Weight: 70.4 kg (155 lb 3.2 oz)   Height: 147.3 cm (58\")      Body mass index is 32.44 kg/m².     ROS:  Review of Systems   Constitutional: Negative for fatigue and fever.   HENT: Negative for congestion, ear pain and sinus pressure.    Respiratory: Negative for cough, chest tightness and shortness of breath.    Cardiovascular: Negative for chest pain, palpitations and leg swelling.   Gastrointestinal: Negative for abdominal pain and diarrhea.   Genitourinary: Negative for dysuria and frequency.   Neurological: Negative for speech difficulty, headache and confusion.   Psychiatric/Behavioral: Negative for agitation and behavioral problems.          Physical Exam  Constitutional:       Appearance: Normal appearance.   HENT:      Right Ear: Tympanic membrane normal.      Left Ear: Tympanic membrane normal.      Nose: Nose normal.   Eyes:      Extraocular Movements: Extraocular movements intact.      Conjunctiva/sclera: Conjunctivae normal.      Pupils: Pupils are equal, round, and reactive to light.   Cardiovascular:      Rate and Rhythm: Normal rate and regular rhythm.   Pulmonary:      Effort: Pulmonary effort is normal.      Breath sounds: Normal breath sounds.   Abdominal:      General: Bowel sounds are normal.   Musculoskeletal:         General: Normal range of motion.      Cervical back: Normal range of motion.   Skin:     General: Skin is warm and dry.   Neurological:      General: No focal deficit present.      Mental Status: She is alert and " oriented to person, place, and time.   Psychiatric:         Mood and Affect: Mood normal.         Behavior: Behavior normal.          Result Review :        Diagnoses and all orders for this visit:    1. Chest pain, unspecified type (Primary)  Assessment & Plan:  Pt was strongly urged that she should go to the hospital if she experiences that pain again.  Pt reports she refuses to go to [place] ER and would rather die at home.  I will be sending her for a stress test and to see Cardiology.  Pt reports she is ok with getting tests done but will not go to the ER.    Orders:  -     Treadmill Stress Test; Future  -     Ambulatory Referral to Cardiology  -     Troponin  -     CK  -     CK MB    2. Essential hypertension  Assessment & Plan:  Hypertension is worsening.  Continue current medications.  Blood pressure will be reassessed in 4 weeks.          Smoking Cessation:    Audra Montejo  reports that she has been smoking. She has a 15.00 pack-year smoking history. She has never used smokeless tobacco..          Follow Up   No follow-ups on file.  Patient was given instructions and counseling regarding her condition or for health maintenance advice. Please see specific information pulled into the AVS if appropriate.       Anayeli Pina MD

## 2021-07-06 ENCOUNTER — APPOINTMENT (OUTPATIENT)
Dept: MRI IMAGING | Facility: HOSPITAL | Age: 61
End: 2021-07-06

## 2021-07-08 ENCOUNTER — TELEPHONE (OUTPATIENT)
Dept: FAMILY MEDICINE CLINIC | Facility: CLINIC | Age: 61
End: 2021-07-08

## 2021-07-08 ENCOUNTER — APPOINTMENT (OUTPATIENT)
Dept: CARDIOLOGY | Facility: HOSPITAL | Age: 61
End: 2021-07-08

## 2021-07-08 NOTE — TELEPHONE ENCOUNTER
Caller: Audra Montejo    Relationship to patient: Self    Best call back number: [phone]    Patient is needing: PATIENT CALLED IN AND WOULD LIKE HER LAB RESULTS FROM HER LAST VISIT. PLEASE CALL PATIENT AND ADVISE.

## 2021-07-15 VITALS
WEIGHT: 153 LBS | RESPIRATION RATE: 16 BRPM | DIASTOLIC BLOOD PRESSURE: 86 MMHG | SYSTOLIC BLOOD PRESSURE: 164 MMHG | HEART RATE: 76 BPM | TEMPERATURE: 97.6 F | HEIGHT: 58 IN | OXYGEN SATURATION: 97 % | BODY MASS INDEX: 32.12 KG/M2

## 2021-08-25 ENCOUNTER — APPOINTMENT (OUTPATIENT)
Dept: MAMMOGRAPHY | Facility: HOSPITAL | Age: 61
End: 2021-08-25

## 2021-11-19 RX ORDER — MONTELUKAST SODIUM 10 MG/1
TABLET ORAL
Qty: 90 TABLET | Refills: 0 | Status: SHIPPED | OUTPATIENT
Start: 2021-11-19 | End: 2022-05-13 | Stop reason: SDUPTHER

## 2022-02-15 ENCOUNTER — HOSPITAL ENCOUNTER (EMERGENCY)
Facility: HOSPITAL | Age: 62
Discharge: HOME | End: 2022-02-16
Attending: EMERGENCY MEDICINE | Admitting: EMERGENCY MEDICINE

## 2022-02-15 ENCOUNTER — APPOINTMENT (OUTPATIENT)
Dept: GENERAL RADIOLOGY | Facility: HOSPITAL | Age: 62
End: 2022-02-15

## 2022-02-15 DIAGNOSIS — R07.9 CHEST PAIN, UNSPECIFIED TYPE: Primary | ICD-10-CM

## 2022-02-15 LAB
BASOPHILS # BLD AUTO: 0.05 10*3/MM3 (ref 0–0.2)
BASOPHILS NFR BLD AUTO: 0.5 % (ref 0–1.5)
DEPRECATED RDW RBC AUTO: 42.4 FL (ref 37–54)
EOSINOPHIL # BLD AUTO: 0.25 10*3/MM3 (ref 0–0.4)
EOSINOPHIL NFR BLD AUTO: 2.4 % (ref 0.3–6.2)
ERYTHROCYTE [DISTWIDTH] IN BLOOD BY AUTOMATED COUNT: 13.2 % (ref 12.3–15.4)
HCT VFR BLD AUTO: 45.1 % (ref 34–46.6)
HGB BLD-MCNC: 15.5 G/DL (ref 12–15.9)
HOLD SPECIMEN: NORMAL
IMM GRANULOCYTES # BLD AUTO: 0.03 10*3/MM3 (ref 0–0.05)
IMM GRANULOCYTES NFR BLD AUTO: 0.3 % (ref 0–0.5)
LYMPHOCYTES # BLD AUTO: 3.46 10*3/MM3 (ref 0.7–3.1)
LYMPHOCYTES NFR BLD AUTO: 33.3 % (ref 19.6–45.3)
MCH RBC QN AUTO: 30.6 PG (ref 26.6–33)
MCHC RBC AUTO-ENTMCNC: 34.4 G/DL (ref 31.5–35.7)
MCV RBC AUTO: 89 FL (ref 79–97)
MONOCYTES # BLD AUTO: 0.78 10*3/MM3 (ref 0.1–0.9)
MONOCYTES NFR BLD AUTO: 7.5 % (ref 5–12)
NEUTROPHILS NFR BLD AUTO: 5.81 10*3/MM3 (ref 1.7–7)
NEUTROPHILS NFR BLD AUTO: 56 % (ref 42.7–76)
NRBC BLD AUTO-RTO: 0 /100 WBC (ref 0–0.2)
NT-PROBNP SERPL-MCNC: 140.7 PG/ML (ref 0–900)
PLATELET # BLD AUTO: 276 10*3/MM3 (ref 140–450)
PMV BLD AUTO: 11.4 FL (ref 6–12)
RBC # BLD AUTO: 5.07 10*6/MM3 (ref 3.77–5.28)
TROPONIN I SERPL-MCNC: 0.01 NG/ML (ref 0–0.6)
WBC NRBC COR # BLD: 10.38 10*3/MM3 (ref 3.4–10.8)
WHOLE BLOOD HOLD SPECIMEN: NORMAL
WHOLE BLOOD HOLD SPECIMEN: NORMAL

## 2022-02-15 PROCEDURE — 36415 COLL VENOUS BLD VENIPUNCTURE: CPT

## 2022-02-15 PROCEDURE — 85025 COMPLETE CBC W/AUTO DIFF WBC: CPT | Performed by: EMERGENCY MEDICINE

## 2022-02-15 PROCEDURE — 83880 ASSAY OF NATRIURETIC PEPTIDE: CPT | Performed by: EMERGENCY MEDICINE

## 2022-02-15 PROCEDURE — 83735 ASSAY OF MAGNESIUM: CPT

## 2022-02-15 PROCEDURE — 80053 COMPREHEN METABOLIC PANEL: CPT

## 2022-02-15 PROCEDURE — 83690 ASSAY OF LIPASE: CPT

## 2022-02-15 PROCEDURE — 82553 CREATINE MB FRACTION: CPT | Performed by: EMERGENCY MEDICINE

## 2022-02-15 PROCEDURE — 71045 X-RAY EXAM CHEST 1 VIEW: CPT

## 2022-02-15 PROCEDURE — 93010 ELECTROCARDIOGRAM REPORT: CPT | Performed by: INTERNAL MEDICINE

## 2022-02-15 PROCEDURE — 93005 ELECTROCARDIOGRAM TRACING: CPT | Performed by: EMERGENCY MEDICINE

## 2022-02-15 PROCEDURE — 36415 COLL VENOUS BLD VENIPUNCTURE: CPT | Performed by: EMERGENCY MEDICINE

## 2022-02-15 PROCEDURE — 84484 ASSAY OF TROPONIN QUANT: CPT

## 2022-02-15 PROCEDURE — 99283 EMERGENCY DEPT VISIT LOW MDM: CPT

## 2022-02-15 PROCEDURE — 93005 ELECTROCARDIOGRAM TRACING: CPT

## 2022-02-15 PROCEDURE — 82550 ASSAY OF CK (CPK): CPT | Performed by: EMERGENCY MEDICINE

## 2022-02-15 RX ORDER — SODIUM CHLORIDE 0.9 % (FLUSH) 0.9 %
10 SYRINGE (ML) INJECTION AS NEEDED
Status: DISCONTINUED | OUTPATIENT
Start: 2022-02-15 | End: 2022-02-16 | Stop reason: HOSPADM

## 2022-02-15 RX ORDER — ASPIRIN 81 MG/1
324 TABLET, CHEWABLE ORAL ONCE
Status: COMPLETED | OUTPATIENT
Start: 2022-02-15 | End: 2022-02-15

## 2022-02-15 RX ADMIN — ASPIRIN 81 MG CHEWABLE TABLET 324 MG: 81 TABLET CHEWABLE at 23:44

## 2022-02-16 ENCOUNTER — NURSE TRIAGE (OUTPATIENT)
Dept: CALL CENTER | Facility: HOSPITAL | Age: 62
End: 2022-02-16

## 2022-02-16 VITALS
BODY MASS INDEX: 33.09 KG/M2 | HEIGHT: 58 IN | DIASTOLIC BLOOD PRESSURE: 53 MMHG | RESPIRATION RATE: 16 BRPM | HEART RATE: 80 BPM | TEMPERATURE: 98.6 F | OXYGEN SATURATION: 96 % | SYSTOLIC BLOOD PRESSURE: 137 MMHG | WEIGHT: 157.63 LBS

## 2022-02-16 LAB
ALBUMIN SERPL-MCNC: 4.6 G/DL (ref 3.5–5.2)
ALBUMIN/GLOB SERPL: 1.6 G/DL
ALP SERPL-CCNC: 152 U/L (ref 39–117)
ALT SERPL W P-5'-P-CCNC: 17 U/L (ref 1–33)
ANION GAP SERPL CALCULATED.3IONS-SCNC: 9.9 MMOL/L (ref 5–15)
AST SERPL-CCNC: 19 U/L (ref 1–32)
BILIRUB SERPL-MCNC: 0.3 MG/DL (ref 0–1.2)
BUN SERPL-MCNC: 12 MG/DL (ref 6–20)
BUN/CREAT SERPL: 15 (ref 7–25)
CALCIUM SPEC-SCNC: 9.5 MG/DL (ref 8.6–10.5)
CHLORIDE SERPL-SCNC: 105 MMOL/L (ref 98–107)
CK MB SERPL-CCNC: 4.43 NG/ML
CK SERPL-CCNC: 112 U/L (ref 20–180)
CO2 SERPL-SCNC: 26.1 MMOL/L (ref 22–29)
CREAT SERPL-MCNC: 0.8 MG/DL (ref 0.57–1)
GFR SERPL CREATININE-BSD FRML MDRD: 73 ML/MIN/1.73
GLOBULIN UR ELPH-MCNC: 2.8 GM/DL
GLUCOSE SERPL-MCNC: 125 MG/DL (ref 65–99)
LIPASE SERPL-CCNC: 30 U/L (ref 13–60)
MAGNESIUM SERPL-MCNC: 2.1 MG/DL (ref 1.6–2.6)
POTASSIUM SERPL-SCNC: 4.3 MMOL/L (ref 3.5–5.2)
PROT SERPL-MCNC: 7.4 G/DL (ref 6–8.5)
QT INTERVAL: 429 MS
QT INTERVAL: 464 MS
SODIUM SERPL-SCNC: 141 MMOL/L (ref 136–145)
TROPONIN I SERPL-MCNC: 0 NG/ML (ref 0–0.6)

## 2022-02-16 PROCEDURE — 63710000001 ONDANSETRON ODT 4 MG TABLET DISPERSIBLE: Performed by: EMERGENCY MEDICINE

## 2022-02-16 PROCEDURE — 84484 ASSAY OF TROPONIN QUANT: CPT

## 2022-02-16 PROCEDURE — 93005 ELECTROCARDIOGRAM TRACING: CPT | Performed by: EMERGENCY MEDICINE

## 2022-02-16 PROCEDURE — 93010 ELECTROCARDIOGRAM REPORT: CPT | Performed by: INTERNAL MEDICINE

## 2022-02-16 RX ORDER — LIDOCAINE HYDROCHLORIDE 20 MG/ML
15 SOLUTION OROPHARYNGEAL ONCE
Status: COMPLETED | OUTPATIENT
Start: 2022-02-16 | End: 2022-02-16

## 2022-02-16 RX ORDER — ALUMINA, MAGNESIA, AND SIMETHICONE 2400; 2400; 240 MG/30ML; MG/30ML; MG/30ML
15 SUSPENSION ORAL ONCE
Status: COMPLETED | OUTPATIENT
Start: 2022-02-16 | End: 2022-02-16

## 2022-02-16 RX ORDER — ONDANSETRON 4 MG/1
4 TABLET, ORALLY DISINTEGRATING ORAL ONCE
Status: COMPLETED | OUTPATIENT
Start: 2022-02-16 | End: 2022-02-16

## 2022-02-16 RX ADMIN — ONDANSETRON 4 MG: 4 TABLET, ORALLY DISINTEGRATING ORAL at 00:38

## 2022-02-16 RX ADMIN — ALUMINUM HYDROXIDE, MAGNESIUM HYDROXIDE, AND DIMETHICONE 15 ML: 400; 400; 40 SUSPENSION ORAL at 00:38

## 2022-02-16 RX ADMIN — LIDOCAINE HYDROCHLORIDE 15 ML: 20 SOLUTION ORAL; TOPICAL at 00:38

## 2022-02-16 NOTE — ED PROVIDER NOTES
"Time: 12:14 AM EST  Arrived by: Private car  Chief Complaint: Chest pain    History of Present Illness:    Audra Montejo is a 61 y.o. female who presents to the emergency department today with complaints of moderate and constant chest pain. The patient reports that as she arrived at work today and was waiting for her assignment, she developed new pain and pressure to her substernal chest. She advises that it radiates to the left shoulder and left arm and does feel \"burning\" in these locations. Right now, the patient does still have some discomfort in her chest. She notes some associated nausea.    The patient states that she does have a history of myocardial infarction with similar symptoms. She is now concerned for a cardiac origin for her discomfort due to her prior history.    The patient also has a medical history of anxiety, heart murmur, hypertension, acid reflux, and CVA. She is a current smoker and does drink alcohol but denies drug use. There are no other acute complaints at this time.        History provided by:  Patient   used: No    Chest Pain  Pain location:  Substernal area  Pain quality: burning and pressure    Pain quality comment:  \"pain\"  Pain radiates to:  L arm and L shoulder  Pain severity:  Moderate  Onset quality:  Gradual  Duration:  1 day  Timing:  Constant  Progression:  Unchanged  Chronicity:  New  Context comment:  Patient has a history of MI. She developed chest pain as she arrived at work today.  Relieved by:  None tried  Worsened by:  Nothing  Ineffective treatments:  None tried  Associated symptoms: nausea    Associated symptoms: no back pain, no cough, no fever, no shortness of breath and no vomiting    Risk factors: hypertension and smoking    Risk factors: not male    Risk factors comment:  Patient has a history of MI.      Similar Symptoms Previously: Yes; history of MI.  Recently seen: Patient was seen in urgent care on 1/27/2022 with " hypertension.      Patient Care Team  Primary Care Provider: Anayeli Pina MD    Past Medical History:     Allergies   Allergen Reactions   • Tape Itching     Past Medical History:   Diagnosis Date   • 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   • Acid reflux disease    • Allergy     unspecified   • Anxiety    • Back pain    • Bilateral carpal tunnel syndrome 08/17/2017   • Bipolar disorder ([facility])    • Bursitis of left shoulder 08/09/2016   • Depression    • Eczema    • Gall stones    • H/O psychiatric care    • Heart attack ([facility])    • Heart murmur    • Hypertension    • Incomplete tear of left rotator cuff 01/25/2017   • Left shoulder pain    • Loss of appetite    • Lumbago 05/08/2015    low back pain    • Memory loss     forgetfulness   • Migraine headache    • Need for hepatitis C screening test    • Palpitation    • Ringing in ears    • Seasonal allergies    • Shortness of breath    • Sinus trouble     unspecified    • Stroke (cerebrum) ([facility])    • Subacromial impingement, left 10/13/2016   • Superior glenoid labrum lesion of left shoulder 10/13/2016    subsequent encounter   • Trouble swallowing      Past Surgical History:   Procedure Laterality Date   • APPENDECTOMY     • CARPAL TUNNEL RELEASE     • CHOLECYSTECTOMY     • COLONOSCOPY  2014   • ENDOSCOPY  2014   • HYSTERECTOMY  1999   • SHOULDER SURGERY Left 10/03/2016    arthroscopic, left shoulder scope, RTC repair- Dr. Flores     Family History   Problem Relation Age of Onset   • Stroke Mother    • Diabetes Mother    • Arthritis Mother    • Heart disease Father    • Cancer Father    • Heart disease Sister    • Cancer Sister    • Diabetes Sister    • Arthritis Sister    • Heart disease Brother    • Diabetes Brother    • Hypertension Other    • Rheum arthritis Other    • Stroke Son    • Heart disease Son    • Cancer Son    • Other Son         blood disease   • Arthritis Son    • Osteoporosis Son        Home Medications:  Prior to Admission medications    Medication  Sig Start Date End Date Taking? Authorizing Provider   albuterol (PROVENTIL) (2.5 MG/3ML) 0.083% nebulizer solution INHALE CONTENTS OF 1 VIAL IN NEBULIZER THREE TIMES DAILY AS NEEDED 5/18/21   Israel Tarango MD   albuterol sulfate  (90 Base) MCG/ACT inhaler Inhale 1-2 puffs Every 6 (Six) Hours As Needed. 5/18/21   Israel Tarango MD   donepezil (ARICEPT) 5 MG tablet Take 5 mg by mouth Every Evening.    Israel Tarango MD   doxycycline (VIBRAMYCIN) 100 MG capsule Take 100 mg by mouth 2 (Two) Times a Day. 4/26/21   Israel Tarango MD   gabapentin (NEURONTIN) 600 MG tablet Take 600 mg by mouth 3 (Three) Times a Day.    Israel Tarango MD   ketoconazole (NIZORAL) 2 % cream APPLY TOPICALLY TO THE AFFECTED AREA EVERY DAY FOR 2 WEEKS 5/18/21   Israel Tarango MD   lamoTRIgine (LaMICtal) 25 MG tablet Lamictal 25 mg oral tablet Take 1 Tablet by mouth q day x 1 week   -  Take 2 Tablet by mouth q day x 2nd week x 1 week - Take 3 Tablet by mouth q day x 3rd week x 1 we   Suspended    Israel Tarango MD   lisinopril (PRINIVIL,ZESTRIL) 20 MG tablet Take 20 mg by mouth 2 (Two) Times a Day.    Israel Tarango MD   montelukast (SINGULAIR) 10 MG tablet TAKE 1 TABLET(10 MG) BY MOUTH EVERY DAY IN THE EVENING 11/19/21   Anayeli Pina MD   oxybutynin XL (DITROPAN-XL) 10 MG 24 hr tablet Take 10 mg by mouth Daily. 5/18/21   Israel Tarango MD   QUEtiapine (SEROquel) 25 MG tablet Take 25 mg by mouth every night at bedtime.    Israel Tarango MD   traMADol (ULTRAM) 50 MG tablet Take 50 mg by mouth Every 6 (Six) Hours As Needed.    Israel Tarango MD   TRAZODONE HCL PO     Israel Tarango MD        Social History:   Social History     Tobacco Use   • Smoking status: Current Every Day Smoker     Packs/day: 0.50     Years: 30.00     Pack years: 15.00   • Smokeless tobacco: Never Used   • Tobacco comment: smoked 21-30 years    Vaping Use   • Vaping Use: Never  "used   Substance Use Topics   • Alcohol use: Yes     Comment: rarely drinks   • Drug use: Never       Record Review:  I have reviewed the patient's records in [facility].     Review of Systems:  Review of Systems   Constitutional: Negative for chills and fever.   HENT: Negative for nosebleeds.    Eyes: Negative for redness.   Respiratory: Negative for cough and shortness of breath.    Cardiovascular: Positive for chest pain.   Gastrointestinal: Positive for nausea. Negative for diarrhea and vomiting.   Genitourinary: Negative for dysuria and frequency.   Musculoskeletal: Positive for arthralgias (left shoulder/arm pain). Negative for back pain and neck pain.   Skin: Negative for rash.   Neurological: Negative for seizures and syncope.   All other systems reviewed and are negative.       Physical Exam:  /53   Pulse 80   Temp 98.6 °F (37 °C)   Resp 16   Ht 147.3 cm (58\")   Wt 71.5 kg (157 lb 10.1 oz)   SpO2 96%   Breastfeeding No   BMI 32.94 kg/m²     Physical Exam  Vitals and nursing note reviewed.   Constitutional:       General: She is not in acute distress.  HENT:      Head: Normocephalic and atraumatic.      Nose: Nose normal.      Mouth/Throat:      Mouth: Mucous membranes are moist.   Eyes:      General: No scleral icterus.  Cardiovascular:      Rate and Rhythm: Normal rate and regular rhythm.      Heart sounds: Normal heart sounds. No murmur heard.      Pulmonary:      Effort: No respiratory distress.      Breath sounds: Normal breath sounds.   Abdominal:      Palpations: Abdomen is soft.      Tenderness: There is no abdominal tenderness.   Musculoskeletal:         General: No tenderness. Normal range of motion.      Cervical back: Normal range of motion and neck supple.      Right lower leg: No edema.      Left lower leg: No edema.   Skin:     General: Skin is warm and dry.   Neurological:      Mental Status: She is alert. Mental status is at baseline.   Psychiatric:         Behavior: Behavior normal. "                Medications in the Emergency Department:  Medications   aspirin chewable tablet 324 mg (324 mg Oral Given 2/15/22 2344)   aluminum-magnesium hydroxide-simethicone (MAALOX MAX) 400-400-40 MG/5ML suspension 15 mL (15 mL Oral Given 2/16/22 0038)   Lidocaine Viscous HCl (XYLOCAINE) 2 % solution 15 mL (15 mL Mouth/Throat Given 2/16/22 0038)   ondansetron ODT (ZOFRAN-ODT) disintegrating tablet 4 mg (4 mg Oral Given 2/16/22 0038)        Labs  Lab Results (last 24 hours)     Procedure Component Value Units Date/Time    POC Troponin I with Hold Tube [[id number]] Collected: 02/15/22 2329    Specimen: Blood Updated: 02/15/22 2337    Narrative:      The following orders were created for panel order POC Troponin I with Hold Tube.  Procedure                               Abnormality         Status                     ---------                               -----------         ------                     POC Troponin I[[id number]]                                                              HOLD Troponin-I Tube[[id number]]                             Final result                 Please view results for these tests on the individual orders.    CBC & Differential [[id number]]  (Abnormal) Collected: 02/15/22 2329    Specimen: Blood Updated: 02/15/22 2340    Narrative:      The following orders were created for panel order CBC & Differential.  Procedure                               Abnormality         Status                     ---------                               -----------         ------                     CBC Auto Differential[[id number]]        Abnormal            Final result                 Please view results for these tests on the individual orders.    Comprehensive Metabolic Panel [[id number]]  (Abnormal) Collected: 02/15/22 2329    Specimen: Blood Updated: 02/16/22 0001     Glucose 125 mg/dL      BUN 12 mg/dL      Creatinine 0.80 mg/dL      Sodium 141 mmol/L      Potassium 4.3 mmol/L      Chloride 105 mmol/L       CO2 26.1 mmol/L      Calcium 9.5 mg/dL      Total Protein 7.4 g/dL      Albumin 4.60 g/dL      ALT (SGPT) 17 U/L      AST (SGOT) 19 U/L      Alkaline Phosphatase 152 U/L      Total Bilirubin 0.3 mg/dL      eGFR Non African Amer 73 mL/min/1.73      Globulin 2.8 gm/dL      A/G Ratio 1.6 g/dL      BUN/Creatinine Ratio 15.0     Anion Gap 9.9 mmol/L     Narrative:      GFR Normal >60  Chronic Kidney Disease <60  Kidney Failure <15      Lipase [[id number]]  (Normal) Collected: 02/15/22 2329    Specimen: Blood Updated: 02/16/22 0001     Lipase 30 U/L     BNP [[id number]]  (Normal) Collected: 02/15/22 2329    Specimen: Blood Updated: 02/15/22 2357     proBNP 140.7 pg/mL     Narrative:      Among patients with dyspnea, NT-proBNP is highly sensitive for the detection of acute congestive heart failure. In addition NT-proBNP of <300 pg/ml effectively rules out acute congestive heart failure with 99% negative predictive value.    Results may be falsely decreased if patient taking Biotin.      Magnesium [[id number]]  (Normal) Collected: 02/15/22 2329    Specimen: Blood Updated: 02/16/22 0001     Magnesium 2.1 mg/dL     CK Total & CKMB [[id number]]  (Normal) Collected: 02/15/22 2329    Specimen: Blood Updated: 02/16/22 0001     CKMB 4.43 ng/mL      Creatine Kinase 112 U/L     Narrative:      CKMB results may be falsely decreased if patient taking Biotin.    CBC Auto Differential [[id number]]  (Abnormal) Collected: 02/15/22 2329    Specimen: Blood Updated: 02/15/22 2340     WBC 10.38 10*3/mm3      RBC 5.07 10*6/mm3      Hemoglobin 15.5 g/dL      Hematocrit 45.1 %      MCV 89.0 fL      MCH 30.6 pg      MCHC 34.4 g/dL      RDW 13.2 %      RDW-SD 42.4 fl      MPV 11.4 fL      Platelets 276 10*3/mm3      Neutrophil % 56.0 %      Lymphocyte % 33.3 %      Monocyte % 7.5 %      Eosinophil % 2.4 %      Basophil % 0.5 %      Immature Grans % 0.3 %      Neutrophils, Absolute 5.81 10*3/mm3      Lymphocytes, Absolute 3.46 10*3/mm3       Monocytes, Absolute 0.78 10*3/mm3      Eosinophils, Absolute 0.25 10*3/mm3      Basophils, Absolute 0.05 10*3/mm3      Immature Grans, Absolute 0.03 10*3/mm3      nRBC 0.0 /100 WBC     POC Troponin I [[id number]]  (Normal) Collected: 02/15/22 2330    Specimen: Blood Updated: 02/15/22 2342     Troponin I 0.01 ng/mL      Comment: Serial Number: [id number]:  [id number]       POC Troponin I [[id number]]  (Normal) Collected: 02/16/22 0136    Specimen: Blood Updated: 02/16/22 0148     Troponin I 0.00 ng/mL      Comment: Serial Number: [id number]:  [id number]              Imaging:  XR Chest 1 View    Result Date: 2/15/2022  PROCEDURE: XR CHEST 1 VW  COMPARISON: [facility], , CHEST PA/AP & LAT 2V, 4/25/2021, 16:42.  INDICATIONS: Chest Pain  FINDINGS:  LUNGS: Normal.  No significant pulmonary parenchymal abnormalities.  VASCULATURE: Normal.  Unremarkable pulmonary vasculature.  CARDIAC: Normal.  No cardiac silhouette abnormality or cardiomegaly.  MEDIASTINUM: Normal.  No visible mass or adenopathy.  PLEURA: Normal.  No effusion or pleural thickening.  BONES: Normal.  No fracture or visible bony lesion.        No acute disease.  No significant change has occurred.   WILMAR CABRERA MD       Electronically Signed and Approved By: WILMAR CABRERA MD on 2/15/2022 at 23:51               Procedures:  Procedures     EKG:    Rhythm: Normal sinus rhythm.  Rate: 65.  Normal P waves.  Normal QRS.  T wave flattening in aVL.  Normal QT.    EKG Comparison: No comparison.    Interpreted by me.    Progress                        HEART SCORE  History: Moderately suspicious (1)  ECG: Normal (0)  Age: 45-64 years old (1)  Risk factors: 1-2 Risk Factors (1)  Troponin: normal (0)    This patient's HEART score is 3.    According to the HEART Score Study: Score (Risk of adverse cardiac event in the next 14 days)  Scores 0-3: (0.9-1.7%) In the HEART Score study, these patients were discharged home.  Scores 4-6: (12-16.6%)  In the HEART  Score study, these patients were admitted to the hospital.  Scores ?7: (50-65%) In the HEART Score study, these patients were candidates for early invasive measures.   Final disposition is based on individual provider judgement and current clinical situation.      Medical Decision Making:  MDM     Patient has low risk heart score.  Nonischemic ECG.  Chest x-ray shows no acute cardiopulmonary process.  Patient is pain-free in the emergency department.  The patient´s CBC was reviewed and shows no abnormalities of critical concern. Of note, there is no anemia requiring a blood transfusion and the platelet count is acceptable.  The patient´s CMP was reviewed and shows no abnormalities of critical concern. Of note, the patient´s sodium and potassium are acceptable. The patient´s liver enzymes are unremarkable. The patient´s renal function (creatinine) is preserved. The patient has a normal anion gap.  Patient provided with contact information for cardiology for close follow-up.  We discussed return precautions including worsening symptoms or any additional concerns.    I offered the patient admission for continued evaluation and treatment but she declined.  Additionally we offered to initiate H2 blockade possible GI etiology but the patient again declines.  I expressed the importance that she follow-up with her PCP and/or cardiology for continued work-up.      Final diagnoses:   Chest pain, unspecified type        Disposition:  ED Disposition     ED Disposition Condition Comment    Discharge Stable           Dictated Utilizing Dragon Dictation    Documentation assistance provided by Letitia Malagon acting as scribe for Juan White MD. Information recorded by the scribe was done at my direction and has been verified and validated by me.      Letitia Malagon  02/16/22 0028       Letitia Malagon  02/16/22 0032       Letitia Maalgon  02/16/22 0032       Juan White MD  02/16/22 0708

## 2022-02-16 NOTE — TELEPHONE ENCOUNTER
Was in the ED earlier tonight and wanted to ask question about meds given will there. She also wanted to go over and update her home med list. Read over the AVS with the caller and confirmed her home meds and updated the list

## 2022-03-07 ENCOUNTER — OFFICE VISIT (OUTPATIENT)
Dept: FAMILY MEDICINE CLINIC | Facility: CLINIC | Age: 62
End: 2022-03-07

## 2022-03-07 VITALS
TEMPERATURE: 97.4 F | HEIGHT: 58 IN | WEIGHT: 164 LBS | OXYGEN SATURATION: 97 % | DIASTOLIC BLOOD PRESSURE: 88 MMHG | SYSTOLIC BLOOD PRESSURE: 180 MMHG | BODY MASS INDEX: 34.43 KG/M2 | HEART RATE: 77 BPM | RESPIRATION RATE: 16 BRPM

## 2022-03-07 DIAGNOSIS — R19.7 DIARRHEA, UNSPECIFIED TYPE: ICD-10-CM

## 2022-03-07 DIAGNOSIS — I10 ESSENTIAL HYPERTENSION: ICD-10-CM

## 2022-03-07 DIAGNOSIS — Z12.31 ENCOUNTER FOR SCREENING MAMMOGRAM FOR MALIGNANT NEOPLASM OF BREAST: Primary | ICD-10-CM

## 2022-03-07 DIAGNOSIS — R07.9 CHEST PAIN, UNSPECIFIED TYPE: ICD-10-CM

## 2022-03-07 LAB
BILIRUB BLD-MCNC: NEGATIVE MG/DL
CLARITY, POC: CLEAR
COLOR UR: YELLOW
GLUCOSE UR STRIP-MCNC: NEGATIVE MG/DL
KETONES UR QL: NEGATIVE
LEUKOCYTE EST, POC: NEGATIVE
NITRITE UR-MCNC: NEGATIVE MG/ML
PH UR: 5.5 [PH] (ref 5–8)
PROT UR STRIP-MCNC: ABNORMAL MG/DL
RBC # UR STRIP: NEGATIVE /UL
SP GR UR: 1.03 (ref 1–1.03)
UROBILINOGEN UR QL: NORMAL

## 2022-03-07 PROCEDURE — 99214 OFFICE O/P EST MOD 30 MIN: CPT | Performed by: FAMILY MEDICINE

## 2022-03-07 RX ORDER — CIPROFLOXACIN 500 MG/1
500 TABLET, FILM COATED ORAL 2 TIMES DAILY
Qty: 14 TABLET | Refills: 0 | Status: SHIPPED | OUTPATIENT
Start: 2022-03-07 | End: 2022-03-14

## 2022-03-07 RX ORDER — METOPROLOL SUCCINATE 25 MG/1
25 TABLET, EXTENDED RELEASE ORAL DAILY
Qty: 30 TABLET | Refills: 1 | Status: SHIPPED | OUTPATIENT
Start: 2022-03-07 | End: 2022-04-07 | Stop reason: SDUPTHER

## 2022-03-07 RX ORDER — ONDANSETRON 8 MG/1
8 TABLET, ORALLY DISINTEGRATING ORAL EVERY 8 HOURS PRN
Qty: 30 TABLET | Refills: 0 | Status: SHIPPED | OUTPATIENT
Start: 2022-03-07 | End: 2022-03-17

## 2022-03-07 NOTE — PROGRESS NOTES
Chief Complaint  Chief Complaint   Patient presents with   • Hypertension   • Diarrhea   • Headache       HPI:  Audra Montejo presents to [facility]     HTN- Pt bp today is 180/88 which is not well controlled.  Pt is not compliant with their medication. Pt reports the lisinopril makes her feel sluggish so she stopped taking it.       Pt bp was 193/62 at [facility] on 1/27/2022. Pt reports she was feeling so bad she had to go to [facility] and her ear was so swollen she couldn't put her ear plug in.  Pt reports her foot felt like it was hurting and then it felt like she was going to have a headache but then it goes away.     Chest Pain- Pt reports on the night of the 14th she felt left sided chest pain that radiated to her left shoulder and down her left arm to her thumb and went numb.     Pt reports she was eating a [employer] [place] and kept eating it and she reports she only really eats at work and takes food home from work. Pt reports she got very sick.  Pt reports she had diarrhea and it was so bad she was putting toliet tissue in her underwear to keep from having accident in her clothes.     Medical History:  Past History:  Medical History: has a past medical history of AC joint arthropathy (10/13/2016), Acid reflux disease, Allergy, Anxiety, Back pain, Bilateral carpal tunnel syndrome (08/17/2017), Bipolar disorder ([facility]), Bursitis of left shoulder (08/09/2016), Depression, Eczema, Gall stones, H/O psychiatric care, Heart attack ([facility]), Heart murmur, Hypertension, Incomplete tear of left rotator cuff (01/25/2017), Left shoulder pain, Loss of appetite, Lumbago (05/08/2015), Memory loss, Migraine headache, Need for hepatitis C screening test, Palpitation, Ringing in ears, Seasonal allergies, Shortness of breath, Sinus trouble, Stroke (cerebrum) ([facility]), Subacromial impingement, left (10/13/2016), Superior glenoid labrum lesion of left shoulder (10/13/2016), and Trouble  swallowing.   Surgical History: has a past surgical history that includes Appendectomy; Shoulder surgery (Left, 10/03/2016); Carpal tunnel release; Cholecystectomy; Colonoscopy (2014); Esophagogastroduodenoscopy (2014); and Hysterectomy (1999).   Family History: family history includes Arthritis in her mother, sister, and son; Cancer in her father, sister, and son; Diabetes in her brother, mother, and sister; Heart disease in her brother, father, sister, and son; Hypertension in an other family member; Osteoporosis in her son; Other in her son; Rheum arthritis in an other family member; Stroke in her mother and son.   Social History: reports that she has been smoking cigarettes. She has a 15.00 pack-year smoking history. She has never used smokeless tobacco. She reports previous alcohol use. She reports that she does not use drugs.  Immunization History   Administered Date(s) Administered   • Flu Vaccine Quad PF >36MO 10/11/2016   • Influenza, Unspecified 11/15/2019   • Tdap 06/01/2018         Allergies: Tape     Medications:  Current Outpatient Medications on File Prior to Visit   Medication Sig Dispense Refill   • albuterol (PROVENTIL) (2.5 MG/3ML) 0.083% nebulizer solution INHALE CONTENTS OF 1 VIAL IN NEBULIZER THREE TIMES DAILY AS NEEDED     • albuterol sulfate  (90 Base) MCG/ACT inhaler Inhale 1-2 puffs Every 6 (Six) Hours As Needed.     • donepezil (ARICEPT) 5 MG tablet Take 5 mg by mouth Every Evening.     • oxybutynin XL (DITROPAN-XL) 10 MG 24 hr tablet Take 10 mg by mouth Daily.     • doxycycline (VIBRAMYCIN) 100 MG capsule Take 100 mg by mouth 2 (Two) Times a Day.     • gabapentin (NEURONTIN) 600 MG tablet Take 600 mg by mouth 3 (Three) Times a Day.     • ketoconazole (NIZORAL) 2 % cream APPLY TOPICALLY TO THE AFFECTED AREA EVERY DAY FOR 2 WEEKS     • lamoTRIgine (LaMICtal) 25 MG tablet Lamictal 25 mg oral tablet Take 1 Tablet by mouth q day x 1 week   -  Take 2 Tablet by mouth q day x 2nd week x 1  "week - Take 3 Tablet by mouth q day x 3rd week x 1 we   Suspended     • montelukast (SINGULAIR) 10 MG tablet TAKE 1 TABLET(10 MG) BY MOUTH EVERY DAY IN THE EVENING 90 tablet 0   • QUEtiapine (SEROquel) 25 MG tablet Take 25 mg by mouth every night at bedtime.     • traMADol (ULTRAM) 50 MG tablet Take 50 mg by mouth Every 6 (Six) Hours As Needed.     • TRAZODONE HCL PO        No current facility-administered medications on file prior to visit.        Health Maintenance Due   Topic Date Due   • ANNUAL PHYSICAL  Never done   • COVID-19 Vaccine (1) Never done   • Pneumococcal Vaccine 0-64 (1 of 2 - PPSV23) Never done   • ZOSTER VACCINE (1 of 2) Never done   • MAMMOGRAM  08/15/2019   • HEPATITIS C SCREENING  Never done   • INFLUENZA VACCINE  08/01/2021       Vital Signs:   Vitals:    03/07/22 0836   BP: 180/88   BP Location: Left arm   Pulse: 77   Resp: 16   Temp: 97.4 °F (36.3 °C)   SpO2: 97%   Weight: 74.4 kg (164 lb)   Height: 147.3 cm (58\")      Body mass index is 34.28 kg/m².     ROS:  Review of Systems   Constitutional: Negative for fatigue and fever.   HENT: Negative for congestion, ear pain and sinus pressure.    Respiratory: Negative for cough, chest tightness and shortness of breath.    Cardiovascular: Negative for chest pain, palpitations and leg swelling.   Gastrointestinal: Positive for diarrhea, nausea and vomiting. Negative for abdominal pain.   Genitourinary: Negative for dysuria and frequency.   Neurological: Negative for speech difficulty, headache and confusion.   Psychiatric/Behavioral: Negative for agitation and behavioral problems.          Physical Exam  Constitutional:       Appearance: Normal appearance.   HENT:      Right Ear: Tympanic membrane normal.      Left Ear: Tympanic membrane normal.      Nose: Nose normal.   Eyes:      Extraocular Movements: Extraocular movements intact.      Conjunctiva/sclera: Conjunctivae normal.      Pupils: Pupils are equal, round, and reactive to light. "   Cardiovascular:      Rate and Rhythm: Normal rate and regular rhythm.   Pulmonary:      Effort: Pulmonary effort is normal.      Breath sounds: Normal breath sounds.   Abdominal:      General: Bowel sounds are normal.   Musculoskeletal:         General: Normal range of motion.      Cervical back: Normal range of motion.   Skin:     General: Skin is warm and dry.   Neurological:      General: No focal deficit present.      Mental Status: She is alert and oriented to person, place, and time.   Psychiatric:         Mood and Affect: Mood normal.         Behavior: Behavior normal.          Result Review   UC with Aixa Hart APRN (01/27/2022)  ED with Juan White MD (02/15/2022)       Diagnoses and all orders for this visit:    1. Encounter for screening mammogram for malignant neoplasm of breast (Primary)  -     Mammo Screening Digital Tomosynthesis Bilateral With CAD    2. Essential hypertension  -     metoprolol succinate XL (Toprol XL) 25 MG 24 hr tablet; Take 1 tablet by mouth Daily for 30 days.  Dispense: 30 tablet; Refill: 1  -     Ambulatory Referral to Cardiology    3. Diarrhea, unspecified type  -     ciprofloxacin (Cipro) 500 MG tablet; Take 1 tablet by mouth 2 (Two) Times a Day for 7 days.  Dispense: 14 tablet; Refill: 0  -     ondansetron ODT (ZOFRAN-ODT) 8 MG disintegrating tablet; Place 1 tablet on the tongue Every 8 (Eight) Hours As Needed for Nausea or Vomiting for up to 10 days.  Dispense: 30 tablet; Refill: 0  -     POCT urinalysis dipstick, manual    4. Chest pain, unspecified type  Assessment & Plan:  Pt was evaluated in the ER and ruled out MI.    Orders:  -     Ambulatory Referral to Cardiology        Smoking Cessation:    Audra Montejo  reports that she has been smoking cigarettes. She has a 15.00 pack-year smoking history. She has never used smokeless tobacco.          Follow Up   Return in about 4 weeks (around 4/4/2022).  Patient was given instructions and counseling regarding  her condition or for health maintenance advice. Please see specific information pulled into the AVS if appropriate.       Anayeli Pina MD

## 2022-04-07 ENCOUNTER — OFFICE VISIT (OUTPATIENT)
Dept: FAMILY MEDICINE CLINIC | Facility: CLINIC | Age: 62
End: 2022-04-07

## 2022-04-07 VITALS
HEIGHT: 58 IN | OXYGEN SATURATION: 98 % | HEART RATE: 66 BPM | WEIGHT: 162 LBS | SYSTOLIC BLOOD PRESSURE: 220 MMHG | RESPIRATION RATE: 14 BRPM | BODY MASS INDEX: 34 KG/M2 | TEMPERATURE: 97.4 F | DIASTOLIC BLOOD PRESSURE: 98 MMHG

## 2022-04-07 DIAGNOSIS — G89.29 CHRONIC MIDLINE LOW BACK PAIN WITH SCIATICA, SCIATICA LATERALITY UNSPECIFIED: Primary | ICD-10-CM

## 2022-04-07 DIAGNOSIS — I10 POORLY-CONTROLLED HYPERTENSION: ICD-10-CM

## 2022-04-07 DIAGNOSIS — M54.40 CHRONIC MIDLINE LOW BACK PAIN WITH SCIATICA, SCIATICA LATERALITY UNSPECIFIED: Primary | ICD-10-CM

## 2022-04-07 PROCEDURE — 99213 OFFICE O/P EST LOW 20 MIN: CPT | Performed by: FAMILY MEDICINE

## 2022-04-07 RX ORDER — METOPROLOL SUCCINATE 25 MG/1
25 TABLET, EXTENDED RELEASE ORAL DAILY
Qty: 90 TABLET | Refills: 1 | Status: ON HOLD | OUTPATIENT
Start: 2022-04-07 | End: 2022-07-07

## 2022-04-07 NOTE — PROGRESS NOTES
"Chief Complaint  Chief Complaint   Patient presents with   • Hypertension       HPI:  Audra Montejo presents to [facility]     HTN- Pt BP today is 220/98 which is not well controlled.  Pt is not compliant with their medication and will continue their current medication regimen.  Patient reports that due to the cost of the medication being $20 a month with her current insurance she did not want to spend that much money on her blood pressure medicine.  I explained to her that the metoprolol is actually on the [facility] $4 list but she reports that she lives in [place] and does not often go to [place] so she would not be able to get her medication on a regular basis.  I explained to the patient that it is very important that she is able to get her medication and maintain good blood pressure control for 1 prevention of stroke and heart attack.  Patient was given a printed out prescription today along with a good Rx card and encouraged to go to [facility] or any of the pharmacy which might be the best cost.    Patient at first was very adamant that she did not care and her blood pressure was high because she is \"going to die 1 day anyway\".  When I kept addressing her bp she kept saying she does not care.  Pt reoprts she works very hard and if there is something wrong with her she does not want to know about it because she \"can't afford to fix it anyway\".    Chronic Back Pain- pt reports she has been having a flare up again of her back pain and will discuss having treatment for it since it worked well before.     Medical History:  Past History:  Medical History: has a past medical history of AC joint arthropathy (10/13/2016), Acid reflux disease, Allergy, Anxiety, Back pain, Bilateral carpal tunnel syndrome (08/17/2017), Bipolar disorder ([facility]), Bursitis of left shoulder (08/09/2016), Depression, Eczema, Gall stones, H/O psychiatric care, Heart attack ([facility]), Heart murmur, Hypertension, " Incomplete tear of left rotator cuff (01/25/2017), Left shoulder pain, Loss of appetite, Lumbago (05/08/2015), Memory loss, Migraine headache, Need for hepatitis C screening test, Palpitation, Ringing in ears, Seasonal allergies, Shortness of breath, Sinus trouble, Stroke (cerebrum) ([facility]), Subacromial impingement, left (10/13/2016), Superior glenoid labrum lesion of left shoulder (10/13/2016), and Trouble swallowing.   Surgical History: has a past surgical history that includes Appendectomy; Shoulder surgery (Left, 10/03/2016); Carpal tunnel release; Cholecystectomy; Colonoscopy (2014); Esophagogastroduodenoscopy (2014); and Hysterectomy (1999).   Family History: family history includes Arthritis in her mother, sister, and son; Cancer in her father, sister, and son; Diabetes in her brother, mother, and sister; Heart disease in her brother, father, sister, and son; Hypertension in an other family member; Osteoporosis in her son; Other in her son; Rheum arthritis in an other family member; Stroke in her mother and son.   Social History: reports that she has been smoking cigarettes. She has a 15.00 pack-year smoking history. She has never used smokeless tobacco. She reports previous alcohol use. She reports that she does not use drugs.  Immunization History   Administered Date(s) Administered   • Flu Vaccine Quad PF >36MO 10/11/2016   • Influenza, Unspecified 11/15/2019   • Tdap 06/01/2018         Allergies: Tape     Medications:  Current Outpatient Medications on File Prior to Visit   Medication Sig Dispense Refill   • albuterol (PROVENTIL) (2.5 MG/3ML) 0.083% nebulizer solution INHALE CONTENTS OF 1 VIAL IN NEBULIZER THREE TIMES DAILY AS NEEDED     • albuterol sulfate  (90 Base) MCG/ACT inhaler Inhale 1-2 puffs Every 6 (Six) Hours As Needed.     • donepezil (ARICEPT) 5 MG tablet Take 5 mg by mouth Every Evening.     • lamoTRIgine (LaMICtal) 25 MG tablet Lamictal 25 mg oral tablet Take 1 Tablet by mouth q day x  "1 week   -  Take 2 Tablet by mouth q day x 2nd week x 1 week - Take 3 Tablet by mouth q day x 3rd week x 1 we   Suspended     • doxycycline (VIBRAMYCIN) 100 MG capsule Take 100 mg by mouth 2 (Two) Times a Day.     • gabapentin (NEURONTIN) 600 MG tablet Take 600 mg by mouth 3 (Three) Times a Day.     • ketoconazole (NIZORAL) 2 % cream APPLY TOPICALLY TO THE AFFECTED AREA EVERY DAY FOR 2 WEEKS     • montelukast (SINGULAIR) 10 MG tablet TAKE 1 TABLET(10 MG) BY MOUTH EVERY DAY IN THE EVENING 90 tablet 0   • oxybutynin XL (DITROPAN-XL) 10 MG 24 hr tablet Take 10 mg by mouth Daily.     • QUEtiapine (SEROquel) 25 MG tablet Take 25 mg by mouth every night at bedtime.     • traMADol (ULTRAM) 50 MG tablet Take 50 mg by mouth Every 6 (Six) Hours As Needed.     • TRAZODONE HCL PO      • [DISCONTINUED] metoprolol succinate XL (Toprol XL) 25 MG 24 hr tablet Take 1 tablet by mouth Daily for 30 days. 30 tablet 1     No current facility-administered medications on file prior to visit.        Health Maintenance Due   Topic Date Due   • ANNUAL PHYSICAL  Never done   • COVID-19 Vaccine (1) Never done   • Pneumococcal Vaccine 0-64 (1 - PCV) Never done   • ZOSTER VACCINE (1 of 2) Never done   • MAMMOGRAM  08/15/2019   • HEPATITIS C SCREENING  Never done       Vital Signs:   Vitals:    04/07/22 0944   BP: (!) 220/98   BP Location: Left arm   Patient Position: Sitting   Pulse: 66   Resp: 14   Temp: 97.4 °F (36.3 °C)   SpO2: 98%   Weight: 73.5 kg (162 lb)   Height: 147.3 cm (58\")      Body mass index is 33.86 kg/m².     ROS:  Review of Systems   Constitutional: Negative for fatigue and fever.   HENT: Negative for congestion, ear pain and sinus pressure.    Respiratory: Negative for cough, chest tightness and shortness of breath.    Cardiovascular: Negative for chest pain, palpitations and leg swelling.   Gastrointestinal: Negative for abdominal pain and diarrhea.   Genitourinary: Negative for dysuria and frequency.   Neurological: Negative " for speech difficulty, headache and confusion.   Psychiatric/Behavioral: Negative for agitation and behavioral problems.          Physical Exam  Constitutional:       Appearance: Normal appearance.   HENT:      Right Ear: Tympanic membrane normal.      Left Ear: Tympanic membrane normal.      Nose: Nose normal.   Eyes:      Extraocular Movements: Extraocular movements intact.      Conjunctiva/sclera: Conjunctivae normal.      Pupils: Pupils are equal, round, and reactive to light.   Cardiovascular:      Rate and Rhythm: Normal rate and regular rhythm.   Pulmonary:      Effort: Pulmonary effort is normal.      Breath sounds: Normal breath sounds.   Abdominal:      General: Bowel sounds are normal.   Musculoskeletal:         General: Normal range of motion.      Cervical back: Normal range of motion.   Skin:     General: Skin is warm and dry.   Neurological:      General: No focal deficit present.      Mental Status: She is alert and oriented to person, place, and time.   Psychiatric:         Mood and Affect: Mood normal.         Behavior: Behavior normal.          Result Review   Comprehensive Metabolic Panel (02/15/2022 23:29)       Diagnoses and all orders for this visit:    1. Chronic midline low back pain with sciatica, sciatica laterality unspecified (Primary)    2. Poorly-controlled hypertension  -     metoprolol succinate XL (Toprol XL) 25 MG 24 hr tablet; Take 1 tablet by mouth Daily for 90 days.  Dispense: 90 tablet; Refill: 1          Smoking Cessation:    Audra Montejo  reports that she has been smoking cigarettes. She has a 15.00 pack-year smoking history. She has never used smokeless tobacco.          Follow Up   Return in about 4 weeks (around 5/5/2022).  Patient was given instructions and counseling regarding her condition or for health maintenance advice. Please see specific information pulled into the AVS if appropriate.       Anayeli Pina MD

## 2022-05-13 RX ORDER — OXYBUTYNIN CHLORIDE 10 MG/1
10 TABLET, EXTENDED RELEASE ORAL DAILY
Qty: 90 TABLET | Refills: 0 | Status: ON HOLD | OUTPATIENT
Start: 2022-05-13 | End: 2022-07-07

## 2022-05-13 RX ORDER — MONTELUKAST SODIUM 10 MG/1
10 TABLET ORAL NIGHTLY
Qty: 90 TABLET | Refills: 0 | Status: ON HOLD | OUTPATIENT
Start: 2022-05-13 | End: 2022-07-07

## 2022-07-06 ENCOUNTER — APPOINTMENT (OUTPATIENT)
Dept: MRI IMAGING | Facility: HOSPITAL | Age: 62
End: 2022-07-06

## 2022-07-06 ENCOUNTER — APPOINTMENT (OUTPATIENT)
Dept: GENERAL RADIOLOGY | Facility: HOSPITAL | Age: 62
End: 2022-07-06

## 2022-07-06 ENCOUNTER — APPOINTMENT (OUTPATIENT)
Dept: CT IMAGING | Facility: HOSPITAL | Age: 62
End: 2022-07-06

## 2022-07-06 ENCOUNTER — HOSPITAL ENCOUNTER (INPATIENT)
Facility: HOSPITAL | Age: 62
LOS: 2 days | Discharge: LEFT BEFORE DISCHARGE | End: 2022-07-08
Attending: EMERGENCY MEDICINE | Admitting: INTERNAL MEDICINE

## 2022-07-06 DIAGNOSIS — R26.2 DIFFICULTY WALKING: ICD-10-CM

## 2022-07-06 DIAGNOSIS — R13.12 DYSPHAGIA, OROPHARYNGEAL: ICD-10-CM

## 2022-07-06 DIAGNOSIS — I63.9 CEREBROVASCULAR ACCIDENT (CVA), UNSPECIFIED MECHANISM: ICD-10-CM

## 2022-07-06 DIAGNOSIS — I63.031 CEREBROVASCULAR ACCIDENT (CVA) DUE TO THROMBOSIS OF RIGHT CAROTID ARTERY: ICD-10-CM

## 2022-07-06 DIAGNOSIS — Z78.9 DECREASED ACTIVITIES OF DAILY LIVING (ADL): ICD-10-CM

## 2022-07-06 DIAGNOSIS — I63.533: Primary | ICD-10-CM

## 2022-07-06 LAB
ABO GROUP BLD: NORMAL
ABO GROUP BLD: NORMAL
ALBUMIN SERPL-MCNC: 4.7 G/DL (ref 3.5–5.2)
ALBUMIN/GLOB SERPL: 1.4 G/DL
ALP SERPL-CCNC: 158 U/L (ref 39–117)
ALT SERPL W P-5'-P-CCNC: 30 U/L (ref 1–33)
ANION GAP SERPL CALCULATED.3IONS-SCNC: 12.8 MMOL/L (ref 5–15)
AST SERPL-CCNC: 23 U/L (ref 1–32)
BASOPHILS # BLD AUTO: 0.05 10*3/MM3 (ref 0–0.2)
BASOPHILS NFR BLD AUTO: 0.4 % (ref 0–1.5)
BILIRUB SERPL-MCNC: 0.5 MG/DL (ref 0–1.2)
BLD GP AB SCN SERPL QL: NEGATIVE
BUN SERPL-MCNC: 19 MG/DL (ref 8–23)
BUN/CREAT SERPL: 22.1 (ref 7–25)
CALCIUM SPEC-SCNC: 10.5 MG/DL (ref 8.6–10.5)
CHLORIDE SERPL-SCNC: 104 MMOL/L (ref 98–107)
CO2 SERPL-SCNC: 25.2 MMOL/L (ref 22–29)
CREAT SERPL-MCNC: 0.86 MG/DL (ref 0.57–1)
DEPRECATED RDW RBC AUTO: 41.4 FL (ref 37–54)
EGFRCR SERPLBLD CKD-EPI 2021: 77.5 ML/MIN/1.73
EOSINOPHIL # BLD AUTO: 0.03 10*3/MM3 (ref 0–0.4)
EOSINOPHIL NFR BLD AUTO: 0.2 % (ref 0.3–6.2)
ERYTHROCYTE [DISTWIDTH] IN BLOOD BY AUTOMATED COUNT: 12.8 % (ref 12.3–15.4)
GLOBULIN UR ELPH-MCNC: 3.3 GM/DL
GLUCOSE SERPL-MCNC: 108 MG/DL (ref 65–99)
HCT VFR BLD AUTO: 50.6 % (ref 34–46.6)
HGB BLD-MCNC: 17 G/DL (ref 12–15.9)
HOLD SPECIMEN: NORMAL
HOLD SPECIMEN: NORMAL
IMM GRANULOCYTES # BLD AUTO: 0.04 10*3/MM3 (ref 0–0.05)
IMM GRANULOCYTES NFR BLD AUTO: 0.3 % (ref 0–0.5)
INR PPP: 1 (ref 0.86–1.15)
LYMPHOCYTES # BLD AUTO: 3.5 10*3/MM3 (ref 0.7–3.1)
LYMPHOCYTES NFR BLD AUTO: 26.6 % (ref 19.6–45.3)
MCH RBC QN AUTO: 29.8 PG (ref 26.6–33)
MCHC RBC AUTO-ENTMCNC: 33.6 G/DL (ref 31.5–35.7)
MCV RBC AUTO: 88.6 FL (ref 79–97)
MONOCYTES # BLD AUTO: 0.9 10*3/MM3 (ref 0.1–0.9)
MONOCYTES NFR BLD AUTO: 6.8 % (ref 5–12)
NEUTROPHILS NFR BLD AUTO: 65.7 % (ref 42.7–76)
NEUTROPHILS NFR BLD AUTO: 8.66 10*3/MM3 (ref 1.7–7)
NRBC BLD AUTO-RTO: 0 /100 WBC (ref 0–0.2)
PLATELET # BLD AUTO: 292 10*3/MM3 (ref 140–450)
PMV BLD AUTO: 11.7 FL (ref 6–12)
POTASSIUM SERPL-SCNC: 4.4 MMOL/L (ref 3.5–5.2)
PROT SERPL-MCNC: 8 G/DL (ref 6–8.5)
PROTHROMBIN TIME: 13.3 SECONDS (ref 11.8–14.9)
RBC # BLD AUTO: 5.71 10*6/MM3 (ref 3.77–5.28)
RH BLD: NEGATIVE
RH BLD: NEGATIVE
SODIUM SERPL-SCNC: 142 MMOL/L (ref 136–145)
T&S EXPIRATION DATE: NORMAL
WBC NRBC COR # BLD: 13.18 10*3/MM3 (ref 3.4–10.8)
WHOLE BLOOD HOLD COAG: NORMAL
WHOLE BLOOD HOLD SPECIMEN: NORMAL

## 2022-07-06 PROCEDURE — 86901 BLOOD TYPING SEROLOGIC RH(D): CPT

## 2022-07-06 PROCEDURE — 70496 CT ANGIOGRAPHY HEAD: CPT

## 2022-07-06 PROCEDURE — 71045 X-RAY EXAM CHEST 1 VIEW: CPT

## 2022-07-06 PROCEDURE — 86900 BLOOD TYPING SEROLOGIC ABO: CPT | Performed by: EMERGENCY MEDICINE

## 2022-07-06 PROCEDURE — 70450 CT HEAD/BRAIN W/O DYE: CPT

## 2022-07-06 PROCEDURE — 70551 MRI BRAIN STEM W/O DYE: CPT

## 2022-07-06 PROCEDURE — U0004 COV-19 TEST NON-CDC HGH THRU: HCPCS | Performed by: FAMILY MEDICINE

## 2022-07-06 PROCEDURE — 86900 BLOOD TYPING SEROLOGIC ABO: CPT

## 2022-07-06 PROCEDURE — 80053 COMPREHEN METABOLIC PANEL: CPT | Performed by: EMERGENCY MEDICINE

## 2022-07-06 PROCEDURE — 93010 ELECTROCARDIOGRAM REPORT: CPT | Performed by: INTERNAL MEDICINE

## 2022-07-06 PROCEDURE — 99284 EMERGENCY DEPT VISIT MOD MDM: CPT

## 2022-07-06 PROCEDURE — 36415 COLL VENOUS BLD VENIPUNCTURE: CPT | Performed by: EMERGENCY MEDICINE

## 2022-07-06 PROCEDURE — 70498 CT ANGIOGRAPHY NECK: CPT

## 2022-07-06 PROCEDURE — 99222 1ST HOSP IP/OBS MODERATE 55: CPT | Performed by: INTERNAL MEDICINE

## 2022-07-06 PROCEDURE — 86850 RBC ANTIBODY SCREEN: CPT | Performed by: EMERGENCY MEDICINE

## 2022-07-06 PROCEDURE — 86901 BLOOD TYPING SEROLOGIC RH(D): CPT | Performed by: EMERGENCY MEDICINE

## 2022-07-06 PROCEDURE — 25010000002 MORPHINE PER 10 MG: Performed by: EMERGENCY MEDICINE

## 2022-07-06 PROCEDURE — 85025 COMPLETE CBC W/AUTO DIFF WBC: CPT | Performed by: EMERGENCY MEDICINE

## 2022-07-06 PROCEDURE — 0 IOPAMIDOL PER 1 ML: Performed by: EMERGENCY MEDICINE

## 2022-07-06 PROCEDURE — 85610 PROTHROMBIN TIME: CPT | Performed by: EMERGENCY MEDICINE

## 2022-07-06 PROCEDURE — 93005 ELECTROCARDIOGRAM TRACING: CPT | Performed by: EMERGENCY MEDICINE

## 2022-07-06 RX ORDER — QUETIAPINE FUMARATE 25 MG/1
25 TABLET, FILM COATED ORAL DAILY
Status: DISCONTINUED | OUTPATIENT
Start: 2022-07-07 | End: 2022-07-07

## 2022-07-06 RX ORDER — HEPARIN SODIUM 5000 [USP'U]/ML
5000 INJECTION, SOLUTION INTRAVENOUS; SUBCUTANEOUS EVERY 8 HOURS SCHEDULED
Status: DISCONTINUED | OUTPATIENT
Start: 2022-07-06 | End: 2022-07-07

## 2022-07-06 RX ORDER — METOPROLOL SUCCINATE 25 MG/1
25 TABLET, EXTENDED RELEASE ORAL DAILY
Status: DISCONTINUED | OUTPATIENT
Start: 2022-07-07 | End: 2022-07-08

## 2022-07-06 RX ORDER — ASPIRIN 81 MG/1
81 TABLET ORAL DAILY
Status: DISCONTINUED | OUTPATIENT
Start: 2022-07-07 | End: 2022-07-08 | Stop reason: HOSPADM

## 2022-07-06 RX ORDER — TRAMADOL HYDROCHLORIDE 50 MG/1
50 TABLET ORAL EVERY 6 HOURS PRN
Status: DISCONTINUED | OUTPATIENT
Start: 2022-07-06 | End: 2022-07-08 | Stop reason: HOSPADM

## 2022-07-06 RX ORDER — CLOPIDOGREL BISULFATE 75 MG/1
75 TABLET ORAL DAILY
Status: DISCONTINUED | OUTPATIENT
Start: 2022-07-07 | End: 2022-07-08 | Stop reason: HOSPADM

## 2022-07-06 RX ORDER — NICOTINE 21 MG/24HR
1 PATCH, TRANSDERMAL 24 HOURS TRANSDERMAL
Status: DISCONTINUED | OUTPATIENT
Start: 2022-07-06 | End: 2022-07-07

## 2022-07-06 RX ORDER — DONEPEZIL HYDROCHLORIDE 5 MG/1
5 TABLET, FILM COATED ORAL EVERY EVENING
Status: DISCONTINUED | OUTPATIENT
Start: 2022-07-06 | End: 2022-07-08 | Stop reason: HOSPADM

## 2022-07-06 RX ORDER — MONTELUKAST SODIUM 10 MG/1
10 TABLET ORAL NIGHTLY
Status: DISCONTINUED | OUTPATIENT
Start: 2022-07-06 | End: 2022-07-07

## 2022-07-06 RX ORDER — CLOPIDOGREL BISULFATE 75 MG/1
75 TABLET ORAL ONCE
Status: DISCONTINUED | OUTPATIENT
Start: 2022-07-06 | End: 2022-07-08 | Stop reason: HOSPADM

## 2022-07-06 RX ORDER — AMLODIPINE BESYLATE 5 MG/1
5 TABLET ORAL DAILY
Status: DISCONTINUED | OUTPATIENT
Start: 2022-07-07 | End: 2022-07-08 | Stop reason: HOSPADM

## 2022-07-06 RX ORDER — GABAPENTIN 300 MG/1
600 CAPSULE ORAL 2 TIMES DAILY
Status: DISCONTINUED | OUTPATIENT
Start: 2022-07-06 | End: 2022-07-07

## 2022-07-06 RX ORDER — BUTALBITAL, ACETAMINOPHEN AND CAFFEINE 300; 40; 50 MG/1; MG/1; MG/1
1 CAPSULE ORAL EVERY 4 HOURS PRN
Status: DISCONTINUED | OUTPATIENT
Start: 2022-07-06 | End: 2022-07-08 | Stop reason: HOSPADM

## 2022-07-06 RX ORDER — ASPIRIN 81 MG/1
81 TABLET, CHEWABLE ORAL ONCE
Status: COMPLETED | OUTPATIENT
Start: 2022-07-06 | End: 2022-07-07

## 2022-07-06 RX ORDER — SODIUM CHLORIDE 0.9 % (FLUSH) 0.9 %
10 SYRINGE (ML) INJECTION AS NEEDED
Status: DISCONTINUED | OUTPATIENT
Start: 2022-07-06 | End: 2022-07-08 | Stop reason: HOSPADM

## 2022-07-06 RX ORDER — ALBUTEROL SULFATE 90 UG/1
2 AEROSOL, METERED RESPIRATORY (INHALATION) EVERY 6 HOURS PRN
Status: DISCONTINUED | OUTPATIENT
Start: 2022-07-06 | End: 2022-07-08 | Stop reason: HOSPADM

## 2022-07-06 RX ORDER — OXYBUTYNIN CHLORIDE 5 MG/1
10 TABLET, EXTENDED RELEASE ORAL DAILY
Status: DISCONTINUED | OUTPATIENT
Start: 2022-07-07 | End: 2022-07-08 | Stop reason: HOSPADM

## 2022-07-06 RX ORDER — TRAZODONE HYDROCHLORIDE 50 MG/1
50 TABLET ORAL NIGHTLY PRN
Status: DISCONTINUED | OUTPATIENT
Start: 2022-07-06 | End: 2022-07-08 | Stop reason: HOSPADM

## 2022-07-06 RX ADMIN — MORPHINE SULFATE 4 MG: 4 INJECTION INTRAVENOUS at 23:34

## 2022-07-06 RX ADMIN — IOPAMIDOL 100 ML: 755 INJECTION, SOLUTION INTRAVENOUS at 23:27

## 2022-07-06 RX ADMIN — SODIUM CHLORIDE 1000 ML: 9 INJECTION, SOLUTION INTRAVENOUS at 23:33

## 2022-07-07 ENCOUNTER — APPOINTMENT (OUTPATIENT)
Dept: CARDIOLOGY | Facility: HOSPITAL | Age: 62
End: 2022-07-07

## 2022-07-07 ENCOUNTER — TELEPHONE (OUTPATIENT)
Dept: URGENT CARE | Facility: CLINIC | Age: 62
End: 2022-07-07

## 2022-07-07 LAB
CHOLEST SERPL-MCNC: 160 MG/DL (ref 0–200)
HDLC SERPL-MCNC: 58 MG/DL (ref 40–60)
LDLC SERPL CALC-MCNC: 87 MG/DL (ref 0–100)
LDLC/HDLC SERPL: 1.49 {RATIO}
QT INTERVAL: 416 MS
TRIGL SERPL-MCNC: 77 MG/DL (ref 0–150)
VLDLC SERPL-MCNC: 15 MG/DL (ref 5–40)

## 2022-07-07 PROCEDURE — 97165 OT EVAL LOW COMPLEX 30 MIN: CPT

## 2022-07-07 PROCEDURE — 92610 EVALUATE SWALLOWING FUNCTION: CPT

## 2022-07-07 PROCEDURE — 97161 PT EVAL LOW COMPLEX 20 MIN: CPT

## 2022-07-07 PROCEDURE — 25010000002 HEPARIN (PORCINE) PER 1000 UNITS: Performed by: INTERNAL MEDICINE

## 2022-07-07 PROCEDURE — 80061 LIPID PANEL: CPT | Performed by: INTERNAL MEDICINE

## 2022-07-07 PROCEDURE — 99222 1ST HOSP IP/OBS MODERATE 55: CPT | Performed by: PSYCHIATRY & NEUROLOGY

## 2022-07-07 PROCEDURE — 99233 SBSQ HOSP IP/OBS HIGH 50: CPT | Performed by: INTERNAL MEDICINE

## 2022-07-07 RX ORDER — NICOTINE 21 MG/24HR
1 PATCH, TRANSDERMAL 24 HOURS TRANSDERMAL
Status: DISCONTINUED | OUTPATIENT
Start: 2022-07-07 | End: 2022-07-08 | Stop reason: HOSPADM

## 2022-07-07 RX ORDER — KETOROLAC TROMETHAMINE 30 MG/ML
30 INJECTION, SOLUTION INTRAMUSCULAR; INTRAVENOUS EVERY 6 HOURS PRN
Status: DISCONTINUED | OUTPATIENT
Start: 2022-07-07 | End: 2022-07-08 | Stop reason: HOSPADM

## 2022-07-07 RX ORDER — MONTELUKAST SODIUM 10 MG/1
10 TABLET ORAL NIGHTLY
COMMUNITY
End: 2022-11-14

## 2022-07-07 RX ORDER — GABAPENTIN 300 MG/1
600 CAPSULE ORAL 2 TIMES DAILY
Status: DISCONTINUED | OUTPATIENT
Start: 2022-07-07 | End: 2022-07-07

## 2022-07-07 RX ORDER — OXYBUTYNIN CHLORIDE 10 MG/1
10 TABLET, EXTENDED RELEASE ORAL DAILY
COMMUNITY
End: 2022-08-16

## 2022-07-07 RX ORDER — HYDRALAZINE HYDROCHLORIDE 20 MG/ML
10 INJECTION INTRAMUSCULAR; INTRAVENOUS EVERY 4 HOURS PRN
Status: DISCONTINUED | OUTPATIENT
Start: 2022-07-07 | End: 2022-07-08 | Stop reason: HOSPADM

## 2022-07-07 RX ORDER — METOPROLOL SUCCINATE 25 MG/1
25 TABLET, EXTENDED RELEASE ORAL DAILY
COMMUNITY
End: 2022-07-13 | Stop reason: HOSPADM

## 2022-07-07 RX ORDER — ATORVASTATIN CALCIUM 40 MG/1
80 TABLET, FILM COATED ORAL NIGHTLY
Status: DISCONTINUED | OUTPATIENT
Start: 2022-07-07 | End: 2022-07-08 | Stop reason: HOSPADM

## 2022-07-07 RX ORDER — HEPARIN SODIUM 5000 [USP'U]/ML
5000 INJECTION, SOLUTION INTRAVENOUS; SUBCUTANEOUS EVERY 8 HOURS SCHEDULED
Status: DISCONTINUED | OUTPATIENT
Start: 2022-07-07 | End: 2022-07-08 | Stop reason: HOSPADM

## 2022-07-07 RX ORDER — MONTELUKAST SODIUM 10 MG/1
10 TABLET ORAL NIGHTLY
Status: DISCONTINUED | OUTPATIENT
Start: 2022-07-07 | End: 2022-07-07

## 2022-07-07 RX ADMIN — QUETIAPINE FUMARATE 25 MG: 25 TABLET ORAL at 08:09

## 2022-07-07 RX ADMIN — ATORVASTATIN CALCIUM 80 MG: 40 TABLET, FILM COATED ORAL at 21:42

## 2022-07-07 RX ADMIN — ATORVASTATIN CALCIUM 80 MG: 40 TABLET, FILM COATED ORAL at 11:58

## 2022-07-07 RX ADMIN — CLOPIDOGREL BISULFATE 75 MG: 75 TABLET ORAL at 08:09

## 2022-07-07 RX ADMIN — OXYBUTYNIN CHLORIDE 10 MG: 5 TABLET, EXTENDED RELEASE ORAL at 09:37

## 2022-07-07 RX ADMIN — HEPARIN SODIUM 5000 UNITS: 5000 INJECTION, SOLUTION INTRAVENOUS; SUBCUTANEOUS at 21:42

## 2022-07-07 RX ADMIN — DONEPEZIL HYDROCHLORIDE 5 MG: 5 TABLET ORAL at 03:11

## 2022-07-07 RX ADMIN — MONTELUKAST 10 MG: 10 TABLET, FILM COATED ORAL at 03:11

## 2022-07-07 RX ADMIN — DONEPEZIL HYDROCHLORIDE 5 MG: 5 TABLET ORAL at 17:04

## 2022-07-07 RX ADMIN — GABAPENTIN 600 MG: 300 CAPSULE ORAL at 08:09

## 2022-07-07 RX ADMIN — HEPARIN SODIUM 5000 UNITS: 5000 INJECTION, SOLUTION INTRAVENOUS; SUBCUTANEOUS at 03:15

## 2022-07-07 RX ADMIN — NICOTINE 1 PATCH: 14 PATCH, EXTENDED RELEASE TRANSDERMAL at 03:09

## 2022-07-07 RX ADMIN — METOPROLOL SUCCINATE 25 MG: 25 TABLET, EXTENDED RELEASE ORAL at 08:09

## 2022-07-07 RX ADMIN — AMLODIPINE BESYLATE 5 MG: 5 TABLET ORAL at 08:09

## 2022-07-07 RX ADMIN — ASPIRIN 81 MG: 81 TABLET, COATED ORAL at 08:09

## 2022-07-07 RX ADMIN — GABAPENTIN 600 MG: 300 CAPSULE ORAL at 03:09

## 2022-07-07 RX ADMIN — HEPARIN SODIUM 5000 UNITS: 5000 INJECTION, SOLUTION INTRAVENOUS; SUBCUTANEOUS at 14:51

## 2022-07-07 RX ADMIN — ASPIRIN 81 MG CHEWABLE TABLET 81 MG: 81 TABLET CHEWABLE at 03:10

## 2022-07-07 NOTE — THERAPY EVALUATION
Patient Name: Audra Montejo  : 1960    MRN: [id number]                              Today's Date: 2022       Admit Date: 2022    Visit Dx:     ICD-10-CM ICD-9-CM   1. Cerebrovascular accident (CVA), unspecified mechanism ([facility])  I63.9 434.91   2. Difficulty walking  R26.2 719.7   3. Dysphagia, oropharyngeal  R13.12 787.22   4. Cerebrovascular accident (CVA) due to bilateral stenosis of posterior cerebral arteries ([facility])  I63.533 434.91   5. Decreased activities of daily living (ADL)  Z78.9 V49.89     Patient Active Problem List   Diagnosis   • Essential hypertension   • Mild intermittent asthma without complication   • Chronic left shoulder pain   • Anxiety   • Back pain   • Bilateral carpal tunnel syndrome   • Bipolar disorder ([facility])   • Eczema   • Gall stones   • Heart murmur   • Left shoulder pain   • Loss of appetite   • Migraine   • Nontraumatic tear of rotator cuff   • Palpitation   • Ringing in ears   • Seasonal allergic rhinitis   • Shortness of breath   • Stroke ([facility])   • Subacromial impingement, left   • Superior glenoid labrum lesion of left shoulder   • Trouble swallowing   • Chest pain     Past Medical History:   Diagnosis Date   • 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   • Acid reflux disease    • Allergy     unspecified   • Anxiety    • Back pain    • Bilateral carpal tunnel syndrome 2017   • Bipolar disorder ([facility])    • Bursitis of left shoulder 2016   • Depression    • Eczema    • Gall stones    • H/O psychiatric care    • Heart attack ([facility])    • Heart murmur    • Hypertension    • Incomplete tear of left rotator cuff 2017   • Left shoulder pain    • Loss of appetite    • Lumbago 2015    low back pain    • Memory loss     forgetfulness   • Migraine headache    • Need for hepatitis C screening test    • Palpitation    • Ringing in ears    • Seasonal allergies    • Shortness of breath    • Sinus trouble     unspecified    • Stroke (cerebrum) ([facility])    • Subacromial  impingement, left 10/13/2016   • Superior glenoid labrum lesion of left shoulder 10/13/2016    subsequent encounter   • Trouble swallowing      Past Surgical History:   Procedure Laterality Date   • APPENDECTOMY     • CARPAL TUNNEL RELEASE     • CHOLECYSTECTOMY     • COLONOSCOPY  2014   • ENDOSCOPY  2014   • HYSTERECTOMY  1999   • SHOULDER SURGERY Left 10/03/2016    arthroscopic, left shoulder scope, RTC repair- Dr. Flores      General Information     [facility] Name 07/07/22 1421          OT Time and Intention    Document Type evaluation  -     Mode of Treatment individual therapy;occupational therapy  -[facility] Name 07/07/22 142          General Information    Patient Profile Reviewed yes  -     Prior Level of Function --  Independent with ADLs, ambulated without a device, has a step over tub where she stands to shower, standard commode, stands to groom, drives, no home O2, and works in a parts factory.  -     Existing Precautions/Restrictions fall  -     Barriers to Rehab none identified  -[facility] Name 07/07/22 142          Occupational Profile    Reason for Services/Referral (Occupational Profile) Patient is a 62 year old female admitted to [facility] on July 6th, 2022 for intermittent horizontal diplopia, headache, and gait imbalance to the left, she is currently on the 4th floor/room. Occupational therapy consulted due to recent decline in ADLs/functional transfers. No previous occupational therapy services for current condition.  -     Row Name 07/07/22 1421          Living Environment    People in Home alone  -[facility] Name 07/07/22 1421          Home Main Entrance    Number of Stairs, Main Entrance none  -[facility] Name 07/07/22 1421          Stairs Within Home, Primary    Number of Stairs, Within Home, Primary none  -[facility] Name 07/07/22 1421          Cognition    Orientation Status (Cognition) oriented x 4  -           User Key  (r) = Recorded By, (t) = Taken By, (c) =  Cosigned By    Initials Name Provider Type     Mena Alvarez, OT Occupational Therapist                 Mobility/ADL's     Row Name 07/07/22 1424          Bed Mobility    Bed Mobility bed mobility (all) activities  -     All Activities, [place] (Bed Mobility) modified independence  -     Assistive Device (Bed Mobility) bed rails;head of bed elevated  -     Row Name 07/07/22 1424          Transfers    Transfers sit-stand transfer;stand-sit transfer  -     Sit-Stand [place] (Transfers) supervision  -     Stand-Sit [place] (Transfers) supervision  -     Row Name 07/07/22 1424          Sit-Stand Transfer    Assistive Device (Sit-Stand Transfers) walker, front-wheeled  -     Row Name 07/07/22 1424          Stand-Sit Transfer    Assistive Device (Stand-Sit Transfers) walker, front-wheeled  -     Row Name 07/07/22 1424          Functional Mobility    Functional Mobility- Ind. Level supervision required  -     Functional Mobility- Device walker, front-wheeled  -LF     Functional Mobility-Distance (Feet) 10  -     Row Name 07/07/22 1424          Activities of Daily Living    BADL Assessment/Intervention bathing;upper body dressing;lower body dressing;grooming;feeding;toileting  -     Row Name 07/07/22 1424          Bathing Assessment/Intervention    [place] Level (Bathing) bathing skills;upper body;set up;lower body;supervision  -     Row Name 07/07/22 1424          Upper Body Dressing Assessment/Training    [place] Level (Upper Body Dressing) upper body dressing skills;set up  -     Row Name 07/07/22 1424          Lower Body Dressing Assessment/Training    [place] Level (Lower Body Dressing) lower body dressing skills;set up  -     Row Name 07/07/22 1424          Grooming Assessment/Training    [place] Level (Grooming) grooming skills;set up  -     Row Name 07/07/22 1424          Self-Feeding Assessment/Training    [place] Level (Feeding) feeding  skills;set up  -[facility] Name 07/07/22 1424          Toileting Assessment/Training    [place] Level (Toileting) toileting skills;supervision  -           User Key  (r) = Recorded By, (t) = Taken By, (c) = Cosigned By    Initials Name Provider Type     Mena Alvarez OT Occupational Therapist               Obj/Interventions     Row Name 07/07/22 1426          Sensory Assessment (Somatosensory)    Sensory Assessment (Somatosensory) UE sensation intact  BUEs inctact to light touch discrimination 100%  -[facility] Name 07/07/22 1426          Vision Assessment/Intervention    Visual Impairment/Limitations WFL  Peripheral vision, tracking, and visual fields intact. Patient reports mild horizontal diplopia and right eye blurriness at time of evaluation, but appears within functional limits for ADLs.  -[facility] Name 07/07/22 1426          Range of Motion Comprehensive    General Range of Motion bilateral upper extremity ROM WFL  -LF     Row Name 07/07/22 1426          Strength Comprehensive (MMT)    Comment, General Manual Muscle Testing (MMT) Assessment 5/5 BUES  -[facility] Name 07/07/22 1426          Motor Skills    Motor Skills coordination;functional endurance  -LF     Coordination WFL  -     Functional Endurance Good for BADLs  -[facility] Name 07/07/22 1426          Balance    Balance Assessment sitting dynamic balance;standing dynamic balance  -     Dynamic Sitting Balance independent  -LF     Position, Sitting Balance unsupported;sitting edge of bed  -     Dynamic Standing Balance supervision  -     Position/Device Used, Standing Balance supported;walker, front-wheeled  -           User Key  (r) = Recorded By, (t) = Taken By, (c) = Cosigned By    Initials Name Provider Type     Mena Alvarez OT Occupational Therapist               Goals/Plan    No documentation.                Clinical Impression     [facility] Name 07/07/22 1429          Pain Assessment    Additional Documentation Pain  Scale: FACES Pre/Post-Treatment (Group)  -     Row Name 07/07/22 1429          Pain Scale: FACES Pre/Post-Treatment    Pain: FACES Scale, Pretreatment 0-->no hurt  -LF     Posttreatment Pain Rating 0-->no hurt  -     Row Name 07/07/22 1429          Plan of Care Review    Plan of Care Reviewed With patient  -     Progress no change  -     Outcome Evaluation Patient does not present with any significant decline in function and does not require inpatient occupational therapy, therefore they will be discharged from services at this time. She performs all BADLs/functional transfers with supervision  or better using rolling walker. It is recommended she return home when medically able to do so, may also benefit from home health or outpatient physical therapy referral to address minor dynamic balance/functional mobility deficits. Rolling walker also recommend. Patient is aware and agreeable with treatment plan at this time.  -     Row Name 07/07/22 1429          Therapy Assessment/Plan (OT)    Patient/Family Therapy Goal Statement (OT) To d/c home  -     Rehab Potential (OT) other (see comments)  N/A  -     Criteria for Skilled Therapeutic Interventions Met (OT) no problems identified which require skilled intervention  -     Therapy Frequency (OT) evaluation only  -     Row Name 07/07/22 1429          Therapy Plan Review/Discharge Plan (OT)    Equipment Needs Upon Discharge (OT) walker, rolling  -     Anticipated Discharge Disposition (OT) home;home with outpatient therapy services;home with home health  -     Row Name 07/07/22 1429          Vital Signs    O2 Delivery Pre Treatment room air  -LF     O2 Delivery Intra Treatment room air  -LF     O2 Delivery Post Treatment room air  -           User Key  (r) = Recorded By, (t) = Taken By, (c) = Cosigned By    Initials Name Provider Type    Mena Fang, OT Occupational Therapist               Outcome Measures     Row Name 07/07/22 2942           How much help from another is currently needed...    Putting on and taking off regular lower body clothing? 3  -LF     Bathing (including washing, rinsing, and drying) 3  -LF     Toileting (which includes using toilet bed pan or urinal) 3  -LF     Putting on and taking off regular upper body clothing 4  -LF     Taking care of personal grooming (such as brushing teeth) 4  -LF     Eating meals 4  -LF     AM-PAC 6 Clicks Score (OT) 21  -LF     Row Name 07/07/22 0900 07/07/22 0800       How much help from another person do you currently need...    Turning from your back to your side while in flat bed without using bedrails? 4  -DP (r) WM (t) DP (c) 4  -CG    Moving from lying on back to sitting on the side of a flat bed without bedrails? 4  -DP (r) WM (t) DP (c) 4  -CG    Moving to and from a bed to a chair (including a wheelchair)? 3  -DP (r) WM (t) DP (c) 4  -CG    Standing up from a chair using your arms (e.g., wheelchair, bedside chair)? 4  -DP (r) WM (t) DP (c) 4  -CG    Climbing 3-5 steps with a railing? 3  -DP (r) WM (t) DP (c) 4  -CG    To walk in hospital room? 3  -DP (r) WM (t) DP (c) 4  -CG    AM-PAC 6 Clicks Score (PT) 21  -DP (r) WM (t) 24  -CG    Highest level of mobility 6 --> Walked 10 steps or more  -DP (r) WM (t) 8 --> Walked 250 feet or more  -CG    Row Name 07/07/22 1432 07/07/22 0900       Functional Assessment    Outcome Measure Options AM-PAC 6 Clicks Daily Activity (OT);Optimal Instrument  -LF AM-PAC 6 Clicks Basic Mobility (PT)  -DP (r) WM (t) DP (c)    Row Name 07/07/22 1432          Optimal Instrument    Optimal Instrument Optimal - 3  -LF     Bending/Stooping 1  -LF     Standing 1  -LF     Reaching 1  -LF     From the list, choose the 3 activities you would most like to be able to do without any difficulty Bending/stooping;Standing;Reaching  -LF     Total Score Optimal - 3 3  -LF           User Key  (r) = Recorded By, (t) = Taken By, (c) = Cosigned By    Initials Name Provider Type    CG  Gabehart, Cassidy, RN Registered Nurse    Mena Fang, OT Occupational Therapist    Prema Pearson, PT Physical Therapist    WM Jose Angel Roberts, PT Student PT Student                Occupational Therapy Education                 Title: PT OT SLP Therapies (Done)     Topic: Occupational Therapy (Done)     Point: ADL training (Done)     Description:   Instruct learner(s) on proper safety adaptation and remediation techniques during self care or transfers.   Instruct in proper use of assistive devices.              Learning Progress Summary           Patient Acceptance, E,TB, VU by  at 7/7/2022 1432                   Point: Precautions (Done)     Description:   Instruct learner(s) on prescribed precautions during self-care and functional transfers.              Learning Progress Summary           Patient Acceptance, E,TB, VU by  at 7/7/2022 1432                   Point: Body mechanics (Done)     Description:   Instruct learner(s) on proper positioning and spine alignment during self-care, functional mobility activities and/or exercises.              Learning Progress Summary           Patient Acceptance, E,TB, VU by  at 7/7/2022 1432                               User Key     Initials Effective Dates Name Provider Type Discipline     06/16/21 -  Mena Alvarez, OT Occupational Therapist OT              OT Recommendation and Plan  Therapy Frequency (OT): evaluation only  Plan of Care Review  Plan of Care Reviewed With: patient  Progress: no change  Outcome Evaluation: Patient does not present with any significant decline in function and does not require inpatient occupational therapy, therefore they will be discharged from services at this time. She performs all BADLs/functional transfers with supervision  or better using rolling walker. It is recommended she return home when medically able to do so, may also benefit from home health or outpatient physical therapy referral to address minor dynamic  balance/functional mobility deficits. Rolling walker also recommend. Patient is aware and agreeable with treatment plan at this time.     Time Calculation:    Time Calculation- OT     Row Name 07/07/22 1435             Time Calculation- OT    OT Received On 07/07/22  -LF              Untimed Charges    OT Eval/Re-eval Minutes 32  -LF              Total Minutes    Untimed Charges Total Minutes 32  -LF       Total Minutes 32  -LF            User Key  (r) = Recorded By, (t) = Taken By, (c) = Cosigned By    Initials Name Provider Type    LF Mena Alvarez OT Occupational Therapist              Therapy Charges for Today     Code Description Service Date Service Provider Modifiers Qty    15669666414 HC OT EVAL LOW COMPLEXITY 2 7/7/2022 Mena Alvarez OT GO 1               Mena Alvarez OT  7/7/2022

## 2022-07-07 NOTE — PROGRESS NOTES
[facility]   Hospitalist Progress Note  Date: 2022  Patient Name: Audra Montejo  : 1960  MRN: [id number]  Date of admission: 2022      Subjective   Subjective     Chief Complaint: Intermittent horizontal double vision, headache and gait imbalance to the left since     Summary:     Audra Montejo is a right handed, bipolar and hypertensive 62F habitual smoker who had an ischemic CVA in  with consequent mild cognitive impairment. She presents from home with the following complaints and is not a migraineur.     Denies pain, bleeding from any orifice, dysphagia, constitutional/GI/pulmonary sxs.     Upon arrival, her SBP was in the 180's without ACS, sepsis, AMS, fever, or a surgical abdomen. This has improved without specific therapy.      CT imaging suggest a bilateral (R>L) cerebellar (PICA) ischemic CVA. Further imaging is pending and she does not take antiplatelet therapy.     Case d/w Neurology and dual antiplatelet therapy was initiated.  Patient has not taken any medication for few months.  States it is expensive     Interval Followup:   Blood pressure up.  Other vital signs stable.  On room air.  Still has double vision which resolves by closing either eye.  Patient is right-handed.  Gait imbalance is improving.  No headache.  Nausea vomiting better    Review of Systems   All systems were reviewed and negative except for: Summary and interval follow-up    Objective   Objective     Vitals:   Temp:  [97.8 °F (36.6 °C)-98.6 °F (37 °C)] 98.6 °F (37 °C)  Heart Rate:  [47-67] 55  Resp:  [14-20] 18  BP: (135-217)/(57-91) 159/63  Physical Exam    Constitutional: Awake, alert, no acute distress   Eyes: Pupils equal, sclerae anicteric, no conjunctival injection   HENT: NCAT, mucous membranes moist   Neck: Supple, no thyromegaly, no lymphadenopathy, trachea midline   Respiratory: Clear to auscultation bilaterally, nonlabored respirations    Cardiovascular: RRR, no murmurs, rubs, or  gallops, palpable pedal pulses bilaterally   Gastrointestinal: Positive bowel sounds, soft, nontender, nondistended   Musculoskeletal: No bilateral ankle edema, no clubbing or cyanosis to extremities   Psychiatric: Appropriate affect, cooperative   Neurologic: Oriented x 3, mild weakness in left , mildly impaired finger-nose testing bilaterally left more than right, positive dysdiadochokinesia left upper extremity, cranial Nerves grossly intact to confrontation, speech clear   Skin: No rashes     Result Review    Result Review:  I have personally reviewed the results from the time of this admission to 7/7/2022 17:46 EDT and agree with these findings:  [x]  Laboratory  []  Microbiology  [x]  Radiology  [x]  EKG/Telemetry sinus bradycardia rate of 59  []  Cardiology/Vascular   []  Pathology  [x]  Old records  [x]  Other: Medications    Assessment & Plan   Assessment / Plan     Assessment:  Diplopia, headache and gait imbalance  Right more than left cerebellar acute ischemic infarct.  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  Noncompliance has been off medications for many months.  Tobacco abuse disorder.  Hyperlipidemia.  LDL of 87 target less than 70.  History of previous stroke in 2013.  Hypertension uncontrolled.  Polycythemia likely secondary from smoking.   Bipolar disorder       Plan:  MRI noted and discussed with patient.  Discussed with cardiology for ARTHUR.  Await 2D echo.  CT of the head and neck noted.  Discussed with neurology.  Appreciate input.  Continue aspirin and Plavix.  High-dose statin.  Permissive hypertension up to systolic 180.  Resumed home blood pressure medications.  As needed IV hydralazine.  Speech therapy has cleared patient.  PT OT.  Nicotine patch.  Esgic, Toradol as needed for headache.  COVID pending continue isolation.  CMP CBC within normal range    Discussed plan with RN and .  Likely home with outpatient therapy    DVT prophylaxis:  Medical DVT prophylaxis orders are  present.    CODE STATUS:   Level Of Support Discussed With: Patient  Code Status (Patient has no pulse and is not breathing): CPR (Attempt to Resuscitate)  Medical Interventions (Patient has pulse or is breathing): Full Support      Part of this note may be an electronic transcription/translation of spoken language to printed text using the Dragon Dictation System.     Electronically signed by Varun Kincaid MD, 07/07/22, 5:46 PM EDT.

## 2022-07-07 NOTE — CONSULTS
[facility]   Neurology Consult Note    Patient Name: Audra Montejo  : 1960  MRN: [id number]  Primary Care Physician:  Anayeli Pina MD  Referring Physician: No ref. provider found  Date of admission: 2022    Subjective   Subjective     Reason for Consult/ Chief Complaint: Gait abnormalities and double vision.    HPI:  Audra Montejo is a 62 y.o. female evaluated for symptoms of incoordination and gait in which she was veering towards the left side and problems with double vision that started 5 days ago.  She states that she has been able to ambulate since 3 days ago.  She is also complaining of headache.  She states that she is able to to ambulate now using a walker.  MRI of the brain shows acute infarct involving the bilateral cerebellar hemispheres larger on the right than the left.  CT angiogram of the head shows an irregular appearance of the proximal right posterior inferior cerebellar artery (PICA).  There is also moderate to severe short segment stenosis of the proximal right posterior cerebral artery, especially centered at its P1 and P2 segment junctions.    The patient has a history of hypertension, smoker, migraine.  He has a 15 pack year smoking history.  Blood pressures are hypertensive on admission.      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   • Acid reflux disease    • Allergy     unspecified   • Anxiety    • Back pain    • Bilateral carpal tunnel syndrome 2017   • Bipolar disorder (HCC)    • Bursitis of left shoulder 2016   • Depression    • Eczema    • Gall stones    • H/O psychiatric care    • Heart attack (HCC)    • Heart murmur    • Hypertension    • Incomplete tear of left rotator cuff 2017   • Left shoulder pain    • Loss of appetite    • Lumbago 2015    low back pain    • Memory loss     forgetfulness   • Migraine headache    • Need for hepatitis C screening test    • Palpitation    •  Ringing in ears    • Seasonal allergies    • Shortness of breath    • Sinus trouble     unspecified    • Stroke (cerebrum) (HCC)    • Subacromial impingement, left 10/13/2016   • Superior glenoid labrum lesion of left shoulder 10/13/2016    subsequent encounter   • Trouble swallowing        Past Surgical History:   Procedure Laterality Date   • APPENDECTOMY     • CARPAL TUNNEL RELEASE     • CHOLECYSTECTOMY     • COLONOSCOPY  2014   • ENDOSCOPY  2014   • HYSTERECTOMY  1999   • SHOULDER SURGERY Left 10/03/2016    arthroscopic, left shoulder scope, RTC repair- Dr. Flores       Family History: family history includes Arthritis in her mother, sister, and son; Cancer in her father, sister, and son; Diabetes in her brother, mother, and sister; Heart disease in her brother, father, sister, and son; Hypertension in an other family member; Osteoporosis in her son; Other in her son; Rheum arthritis in an other family member; Stroke in her mother and son. Otherwise pertinent FHx was reviewed and not pertinent to current issue.    Social History:  reports that she has been smoking cigarettes. She has a 15.00 pack-year smoking history. She has never used smokeless tobacco. She reports previous alcohol use. She reports that she does not use drugs.    Home Medications:       Allergies:  Allergies   Allergen Reactions   • Tape Itching       Objective    Objective     Vitals:   Temp:  [97.8 °F (36.6 °C)-98.6 °F (37 °C)] 98.2 °F (36.8 °C)  Heart Rate:  [47-67] 54  Resp:  [14-20] 18  BP: (135-217)/(57-91) 135/57    Physical Exam: She is alert, fluent, aphasic, follows commands well.  Visual fields full confrontation, EOMs full all directions gaze, facial strength is full, soft palate elevation and tongue are normal.  There is no weakness of the upper or lower extremities individual muscle testing proximally and distally.  Fine finger movements are slightly impaired in the left hand and rapid altering movements is impaired in the left  hand.  There is no tremor noted or ataxia on finger-to-nose testing.  There is no heel-to-shin ataxia.  Reflexes are symmetric decreased in the biceps triceps, patellar's and absent in the ankles.  Heart is regular rhythm normal rate.      Result Review    Result Review:  I have personally reviewed the results from the time of this admission to 7/7/2022 13:25 EDT and agree with these findings:  [x]  Laboratory  []  Microbiology  [x]  Radiology  [x]  EKG/Telemetry   []  Cardiology/Vascular   []  Pathology  [x]  Old records  []  Other:      Assessment & Plan   Assessment / Plan   Active Hospital Problems:  Active Hospital Problems    Diagnosis    • Stroke (HCC)          Plan: She has posterior artery distribution stroke of undetermined etiology.  Basilar artery looks normal.  I will have cardiology do at transesophageal echo on her.  She was not taking any antiplatelet agents prior to this admission.  She will be on dual antiplatelet agents for the next 3 months.  She will be on high-dose atorvastatin.    Total time spent with the patient and coordinating patient care was 55 minutes.    electronically signed by Juan Diaz MD, 07/07/22, 1:25 PM EDT.

## 2022-07-07 NOTE — ED PROVIDER NOTES
Time: 9:34 PM EDT  Arrived by: private car  Chief Complaint: high blood pressure, double vision, headache   History provided by: patient   History is limited by: N/A     History of Present Illness:  Patient presented emergency department with difficulty ambulating, diplopia.  She also reports dizziness.  She states when she try to get up she falls towards her left.  Symptoms initially started on Saturday.  She states symptoms then improved and reoccurred Sunday.  She has been unable to ambulate at all since Sunday evening.  She also reports acute headache that has sudden onset.  She does have a history of headaches but this headache describes as more severe.  She has a history of previous stroke with minimal deficits.  She said she has some memory issues she reports nausea and vomiting as well. She reports diplopia is worse when objects are further away.  She will report blurry vision        Neurologic Problem  The patient's primary symptoms include a loss of balance and a visual change. This is a new problem. The current episode started in the past 7 days. The neurological problem developed suddenly. The last time the patient was known to be well was 7/2/2022 10:06 PM.  The problem has been waxing and waning since onset. Associated symptoms include dizziness, headaches, vertigo and vomiting. Pertinent negatives include no abdominal pain, back pain, chest pain, fever, nausea or shortness of breath. Past treatments include nothing. The treatment provided no relief. Her past medical history is significant for a CVA.       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   • Acid reflux disease    • Allergy     unspecified   • Anxiety    • Back pain    • Bilateral carpal tunnel syndrome 08/17/2017    • Bipolar disorder ([facility])    • Bursitis of left shoulder 08/09/2016   • Depression    • Eczema    • Gall stones    • H/O psychiatric care    • Heart attack ([facility])    • Heart murmur    • Hypertension    • Incomplete tear of left rotator cuff 01/25/2017   • Left shoulder pain    • Loss of appetite    • Lumbago 05/08/2015    low back pain    • Memory loss     forgetfulness   • Migraine headache    • Need for hepatitis C screening test    • Palpitation    • Ringing in ears    • Seasonal allergies    • Shortness of breath    • Sinus trouble     unspecified    • Stroke (cerebrum) (Former[facility])    • Subacromial impingement, left 10/13/2016   • Superior glenoid labrum lesion of left shoulder 10/13/2016    subsequent encounter   • Trouble swallowing      Past Surgical History:   Procedure Laterality Date   • APPENDECTOMY     • CARPAL TUNNEL RELEASE     • CHOLECYSTECTOMY     • COLONOSCOPY  2014   • ENDOSCOPY  2014   • HYSTERECTOMY  1999   • SHOULDER SURGERY Left 10/03/2016    arthroscopic, left shoulder scope, RTC repair- Dr. Flores     Family History   Problem Relation Age of Onset   • Stroke Mother    • Diabetes Mother    • Arthritis Mother    • Heart disease Father    • Cancer Father    • Heart disease Sister    • Cancer Sister    • Diabetes Sister    • Arthritis Sister    • Heart disease Brother    • Diabetes Brother    • Hypertension Other    • Rheum arthritis Other    • Stroke Son    • Heart disease Son    • Cancer Son    • Other Son         blood disease   • Arthritis Son    • Osteoporosis Son        Home Medications:  Prior to Admission medications    Medication Sig Start Date End Date Taking? Authorizing Provider   albuterol (PROVENTIL) (2.5 MG/3ML) 0.083% nebulizer solution INHALE CONTENTS OF 1 VIAL IN NEBULIZER THREE TIMES DAILY AS NEEDED 5/18/21   ProviderIsrael MD   albuterol sulfate  (90 Base) MCG/ACT inhaler Inhale 1-2 puffs Every 6 (Six) Hours As Needed. 5/18/21   ProviderIsrael MD   amLODIPine  (NORVASC) 5 MG tablet Take 1 tablet by mouth Daily. 7/6/22   Jt Spencer MD   donepezil (ARICEPT) 5 MG tablet Take 5 mg by mouth Every Evening.    Israel Tarango MD   doxycycline (VIBRAMYCIN) 100 MG capsule Take 100 mg by mouth 2 (Two) Times a Day. 4/26/21   Israel Tarango MD   gabapentin (NEURONTIN) 600 MG tablet Take 600 mg by mouth 3 (Three) Times a Day.    Israel Tarango MD   ketoconazole (NIZORAL) 2 % cream APPLY TOPICALLY TO THE AFFECTED AREA EVERY DAY FOR 2 WEEKS 5/18/21   Israel Tarango MD   lamoTRIgine (LaMICtal) 25 MG tablet Lamictal 25 mg oral tablet Take 1 Tablet by mouth q day x 1 week   -  Take 2 Tablet by mouth q day x 2nd week x 1 week - Take 3 Tablet by mouth q day x 3rd week x 1 we   Suspended    Israel Tarango MD   metoprolol succinate XL (Toprol XL) 25 MG 24 hr tablet Take 1 tablet by mouth Daily for 90 days. 4/7/22 7/6/22  Anayeli Pina MD   montelukast (SINGULAIR) 10 MG tablet Take 1 tablet by mouth Every Night. 5/13/22   Anayeli Pina MD   ondansetron ODT (ZOFRAN-ODT) 4 MG disintegrating tablet Place 1 tablet on the tongue 4 (Four) Times a Day As Needed for Nausea. 7/6/22   Jt Spencer MD   oxybutynin XL (DITROPAN-XL) 10 MG 24 hr tablet Take 1 tablet by mouth Daily. 5/13/22   Anayeli Pina MD   QUEtiapine (SEROquel) 25 MG tablet Take 25 mg by mouth every night at bedtime.    Israel Tarango MD   silver sulfadiazine (SILVADENE, SSD) 1 % cream Apply 1 application topically to the appropriate area as directed 2 (Two) Times a Day. 5/23/22   Roz Moulton MD   traMADol (ULTRAM) 50 MG tablet Take 50 mg by mouth Every 6 (Six) Hours As Needed.    Israel Tarango MD   TRAZODONE HCL PO     Israel Tarango MD        Social History:   Social History     Tobacco Use   • Smoking status: Current Every Day Smoker     Packs/day: 0.50     Years: 30.00     Pack years: 15.00     Types: Cigarettes   • Smokeless tobacco:  "Never Used   • Tobacco comment: smoked 21-30 years    Vaping Use   • Vaping Use: Never used   Substance Use Topics   • Alcohol use: Not Currently     Comment: rarely drinks   • Drug use: Never     Recent travel: not applicable     Review of Systems:  Review of Systems   Constitutional: Negative for chills and fever.   HENT: Negative for congestion, ear pain and sore throat.    Eyes: Positive for visual disturbance. Negative for pain.        Diploplia   Respiratory: Negative for cough, chest tightness and shortness of breath.    Cardiovascular: Negative for chest pain.   Gastrointestinal: Positive for vomiting. Negative for abdominal pain, diarrhea and nausea.   Genitourinary: Negative for flank pain and hematuria.   Musculoskeletal: Negative for back pain and joint swelling.   Skin: Negative for pallor.   Neurological: Positive for dizziness, vertigo, headaches and loss of balance. Negative for seizures.   All other systems reviewed and are negative.       Physical Exam:  /81   Pulse 57   Temp 98.6 °F (37 °C) (Oral)   Resp 14   Ht 147.3 cm (58\")   Wt 67.7 kg (149 lb 4 oz)   SpO2 98%   BMI 31.19 kg/m²     Physical Exam  Vitals and nursing note reviewed.   Constitutional:       General: She is not in acute distress.     Appearance: Normal appearance. She is not toxic-appearing.   HENT:      Head: Normocephalic and atraumatic.      Mouth/Throat:      Mouth: Mucous membranes are moist.   Eyes:      General: No scleral icterus.  Cardiovascular:      Rate and Rhythm: Normal rate and regular rhythm.      Pulses: Normal pulses.      Heart sounds: Normal heart sounds.   Pulmonary:      Effort: Pulmonary effort is normal. No respiratory distress.      Breath sounds: Normal breath sounds.   Abdominal:      General: Abdomen is flat.      Palpations: Abdomen is soft.      Tenderness: There is no abdominal tenderness.   Musculoskeletal:         General: Normal range of motion.      Cervical back: Normal range of " motion and neck supple.   Skin:     General: Skin is warm and dry.   Neurological:      Mental Status: She is alert and oriented to person, place, and time.      Cranial Nerves: Cranial nerves are intact.      Sensory: Sensation is intact.      Motor: Motor function is intact.      Coordination: Romberg sign positive. Finger-Nose-Finger Test abnormal.      Gait: Gait abnormal (unsteady ).      Comments:   No facial asymmetry   extraocular movements intact  No facial asymmetry   Unsteady gait, leans towards her left.'  Difficulty with finger to nose                 Medications in the Emergency Department:  Medications   sodium chloride 0.9 % flush 10 mL (has no administration in time range)   morphine injection 4 mg (has no administration in time range)   sodium chloride 0.9 % bolus 1,000 mL (has no administration in time range)   aspirin chewable tablet 81 mg (has no administration in time range)   clopidogrel (PLAVIX) tablet 75 mg (has no administration in time range)   heparin (porcine) 5000 UNIT/ML injection 5,000 Units (has no administration in time range)   butalbital-acetaminophen-caffeine (ORBIVAN) -40 MG capsule 1 capsule (has no administration in time range)   clopidogrel (PLAVIX) tablet 75 mg (has no administration in time range)   aspirin EC tablet 81 mg (has no administration in time range)   albuterol sulfate HFA (PROVENTIL HFA;VENTOLIN HFA;PROAIR HFA) inhaler 2 puff (has no administration in time range)   amLODIPine (NORVASC) tablet 5 mg (has no administration in time range)   donepezil (ARICEPT) tablet 5 mg (has no administration in time range)   gabapentin (NEURONTIN) capsule 600 mg (has no administration in time range)   metoprolol succinate XL (TOPROL-XL) 24 hr tablet 25 mg (has no administration in time range)   montelukast (SINGULAIR) tablet 10 mg (has no administration in time range)   oxybutynin XL (DITROPAN-XL) 24 hr tablet 10 mg (has no administration in time range)   QUEtiapine  (SEROquel) tablet 25 mg (has no administration in time range)   traMADol (ULTRAM) tablet 50 mg (has no administration in time range)   traZODone (DESYREL) tablet 50 mg (has no administration in time range)        Labs  Lab Results (last 24 hours)     Procedure Component Value Units Date/Time    POCT SARS-CoV-2 Antigen ERMA   ([facility]) [[id number]]  (Normal) Collected: 07/06/22 1958    Specimen: Swab Updated: 07/06/22 1959     SARS Antigen Not Detected     Internal Control Passed     Lot Number [id number]     Expiration Date 32,923    COVID-19,APTIMA PANTHER(STEFFEN),[facility]/ [facility]ISHA, NP/OP SWAB IN UTM/VTM/SALINE TRANSPORT MEDIA,24 HR TAT - Swab, Nasopharynx [[id number]] Collected: 07/06/22 2006    Specimen: Swab from Nasopharynx Updated: 07/06/22 2014    CBC & Differential [[id number]]  (Abnormal) Collected: 07/06/22 2058    Specimen: Blood Updated: 07/06/22 2108    Narrative:      The following orders were created for panel order CBC & Differential.  Procedure                               Abnormality         Status                     ---------                               -----------         ------                     CBC Auto Differential[[id number]]        Abnormal            Final result                 Please view results for these tests on the individual orders.    Comprehensive Metabolic Panel [[id number]]  (Abnormal) Collected: 07/06/22 2058    Specimen: Blood Updated: 07/06/22 2125     Glucose 108 mg/dL      BUN 19 mg/dL      Creatinine 0.86 mg/dL      Sodium 142 mmol/L      Potassium 4.4 mmol/L      Chloride 104 mmol/L      CO2 25.2 mmol/L      Calcium 10.5 mg/dL      Total Protein 8.0 g/dL      Albumin 4.70 g/dL      ALT (SGPT) 30 U/L      AST (SGOT) 23 U/L      Alkaline Phosphatase 158 U/L      Total Bilirubin 0.5 mg/dL      Globulin 3.3 gm/dL      A/G Ratio 1.4 g/dL      BUN/Creatinine Ratio 22.1     Anion Gap 12.8 mmol/L      eGFR 77.5 mL/min/1.73      Comment: National Kidney Foundation and  American Society of Nephrology (ASN) Task Force recommended calculation based on the Chronic Kidney Disease Epidemiology Collaboration (CKD-EPI) equation refit without adjustment for race.       Narrative:      GFR Normal >60  Chronic Kidney Disease <60  Kidney Failure <15      Protime-INR [[id number]]  (Normal) Collected: 07/06/22 2058    Specimen: Blood Updated: 07/06/22 2138     Protime 13.3 Seconds      INR 1.00    Narrative:      Suggested Therapeutic Ranges For Oral Anticoagulant Therapy:  Level of Therapy                      INR Target Range  Standard Dose                            2.0-3.0  High Dose                                2.5-3.5  Patients not receiving anticoagulant  Therapy Normal Range                     0.86-1.15    CBC Auto Differential [[id number]]  (Abnormal) Collected: 07/06/22 2058    Specimen: Blood Updated: 07/06/22 2108     WBC 13.18 10*3/mm3      RBC 5.71 10*6/mm3      Hemoglobin 17.0 g/dL      Hematocrit 50.6 %      MCV 88.6 fL      MCH 29.8 pg      MCHC 33.6 g/dL      RDW 12.8 %      RDW-SD 41.4 fl      MPV 11.7 fL      Platelets 292 10*3/mm3      Neutrophil % 65.7 %      Lymphocyte % 26.6 %      Monocyte % 6.8 %      Eosinophil % 0.2 %      Basophil % 0.4 %      Immature Grans % 0.3 %      Neutrophils, Absolute 8.66 10*3/mm3      Lymphocytes, Absolute 3.50 10*3/mm3      Monocytes, Absolute 0.90 10*3/mm3      Eosinophils, Absolute 0.03 10*3/mm3      Basophils, Absolute 0.05 10*3/mm3      Immature Grans, Absolute 0.04 10*3/mm3      nRBC 0.0 /100 WBC            Imaging:  CT Head Without Contrast Stroke Protocol    Result Date: 7/6/2022  PROCEDURE: CT HEAD WO CONTRAST STROKE PROTOCOL  COMPARISON: None.  INDICATIONS: Stroke, follow-up.  Hypertension.  Headache.  PROTOCOL:   Standard CT imaging protocol performed.    RADIATION:   Total DLP: 1,018.2mGy*cm.   MA and/or KV were/was adjusted to minimize radiation dose.    TECHNIQUE: After obtaining the patient's consent, 130 CT images were  obtained without non-ionic intravenous contrast material.  FINDINGS: Abnormal attenuation is seen in the bilateral cerebellar hemispheres, greater on the right the left, and may represent age-indeterminate ischemia/infarction, most likely acute to subacute in nature, possibly involving the right posterior inferior cerebellar artery (PICA).  There is slight deformity of the 4th ventricle with slight effacement and displacement in an anterior, leftward direction, probably related to the right-sided cerebellar infarcts.  No obstructive hydrocephalus is suggested.  There may be mild chronic small vessel ischemia/infarction, including involvement of the brainstem, especially the right aspect of the arnol with a chronic lacunar infarct suspected, measuring about 5 mm.  Arterial calcifications are seen.  No acute intracranial hemorrhage is appreciated.  There may be slight right-to-left midline shift involving the posterior fossa.  In the supratentorial regions, no midline shift or acute intracranial herniation syndrome is suggested.  No other acute or subacute infarcts are suggested.  Minimal if any cerebellar tonsillar ectopia or inferior tonsillar herniation is suggested.  No supratentorial cerebellar herniation is suggested.  There may be mild age-indeterminate sinus disease.  No air-fluid interfaces are appreciated within the imaged paranasal sinuses.  Streak artifact from dental amalgam obscures detail. The imaged middle ear clefts (that is, the bilateral mastoid air cell complexes, bilateral middle ear cavities, and bilateral external auditory canals) are well aerated.  No acute findings are seen within the partially imaged orbits.         1. There are age-indeterminate but probably acute to subacute bilateral cerebellar hemispheric infarcts, greater on the right than the left.  The findings may represent an acute to subacute right posterior inferior cerebellar artery (PICA) infarct.  No acute intracranial hemorrhage  is seen.   2. Mild chronic small vessel ischemia/infarction is suggested.  3. Please see above comments for further detail.     COMMENT:  Part of this note is an electronic transcription of spoken language to printed text. The electronic translation/transcription may permit erroneous, or at times, nonsensical (or even sensical) words or phrases to be inadvertently transcribed or omitted; this  has reviewed the note for such errors (as well as additional errors); however, some may still exist.  ONIEL VALDEZ JR, MD       Electronically Signed and Approved By: ONIEL VALDEZ JR, MD on 7/06/2022 at 21:23                Procedures:  Procedures    Progress  ED Course as of 07/06/22 2252 Wed Jul 06, 2022 2251 ECG 12 Lead  Normal sinus rhythm with rate of 59. QRS normal. VT interval normal. QTc interval is normal. No ST elevation or depression. Non specific T wave abnormalities. Change when compared to leroy. This EKG was interpreted by me.  [LD]      ED Course User Index  [LD] Lacy Morales MD                            Medical Decision Making:  MDM  Number of Diagnoses or Management Options  Diagnosis management comments: Patient presents emergency department with diplopia, unsteady gait and headache.  Symptoms started on Saturday.  CT was ordered that showed age-indeterminate for probably 2 to subacute bilateral cerebral hemispheric infarcts.  Since onset of symptoms was 4 days ago patient is not a candidate for tPA or endovascular intervention.  MRI as well as CTA was ordered.  Discussed patient with hospitalist and neurology and she will be admitted for further care.       Amount and/or Complexity of Data Reviewed  Clinical lab tests: ordered and reviewed  Tests in the radiology section of CPT®: ordered and reviewed    Risk of Complications, Morbidity, and/or Mortality  Presenting problems: moderate  Management options: moderate         Final diagnoses:   Cerebrovascular accident (CVA),  unspecified mechanism (HCC)        Disposition:  ED Disposition     ED Disposition   Decision to Admit    Condition   --    Comment   Level of Care: Telemetry [5]   Diagnosis: Stroke (HCC) [257611]   Admitting Physician: MILENA GREEN [[id number]]   Attending Physician: MILENA GREEN [[id number]]   Isolate for COVID?: No [0]   Certification: I Certify That Inpatient Hospital Services Are Medically Necessary For Greater Than 2 Midnights                    Carlitos Boyd  07/06/22 [id number]       Lacy Morales MD  07/06/22 [id number]

## 2022-07-07 NOTE — NURSING NOTE
Alert and oriented x4, vss, no complaints of pain. NIH 2: mild dysarthria, pt states she sees double vision. Possible ARTHUR in am per MD.

## 2022-07-07 NOTE — THERAPY EVALUATION
Acute Care - Physical Therapy Initial Evaluation  BRITTANY Chen     Patient Name: Audra Montejo  : 1960  MRN: [id number]  Today's Date: 2022     Admit date: 2022     Referring Physician: Varun Kincaid MD     Surgery Date:* No surgery found *              Visit Dx:     ICD-10-CM ICD-9-CM   1. Cerebrovascular accident (CVA), unspecified mechanism (Formerly [facility])  I63.9 434.91   2. Difficulty walking  R26.2 719.7     Patient Active Problem List   Diagnosis   • Essential hypertension   • Mild intermittent asthma without complication   • Chronic left shoulder pain   • Anxiety   • Back pain   • Bilateral carpal tunnel syndrome   • Bipolar disorder (Formerly [facility])   • Eczema   • Gall stones   • Heart murmur   • Left shoulder pain   • Loss of appetite   • Migraine   • Nontraumatic tear of rotator cuff   • Palpitation   • Ringing in ears   • Seasonal allergic rhinitis   • Shortness of breath   • Stroke (Formerly [facility])   • Subacromial impingement, left   • Superior glenoid labrum lesion of left shoulder   • Trouble swallowing   • Chest pain     Past Medical History:   Diagnosis Date   • 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   • Acid reflux disease    • Allergy     unspecified   • Anxiety    • Back pain    • Bilateral carpal tunnel syndrome 2017   • Bipolar disorder (Formerly [facility])    • Bursitis of left shoulder 2016   • Depression    • Eczema    • Gall stones    • H/O psychiatric care    • Heart attack (Formerly [facility])    • Heart murmur    • Hypertension    • Incomplete tear of left rotator cuff 2017   • Left shoulder pain    • Loss of appetite    • Lumbago 2015    low back pain    • Memory loss     forgetfulness   • Migraine headache    • Need for hepatitis C screening test    • Palpitation    • Ringing in ears    • Seasonal allergies    • Shortness of breath    • Sinus trouble     unspecified    • Stroke (cerebrum) (Formerly [facility])    • Subacromial impingement, left 10/13/2016   • Superior glenoid labrum lesion of left shoulder 10/13/2016     subsequent encounter   • Trouble swallowing      Past Surgical History:   Procedure Laterality Date   • APPENDECTOMY     • CARPAL TUNNEL RELEASE     • CHOLECYSTECTOMY     • COLONOSCOPY  2014   • ENDOSCOPY  2014   • HYSTERECTOMY  1999   • SHOULDER SURGERY Left 10/03/2016    arthroscopic, left shoulder scope, RTC repair- Dr. Flores     PT Assessment (last 12 hours)     PT Evaluation and Treatment     Row Name 07/07/22 0900          Physical Therapy Time and Intention    Subjective Information complains of;weakness;fatigue (P)   -WM     Document Type evaluation (P)   -WM     Mode of Treatment individual therapy;physical therapy (P)   -WM     Patient Effort good (P)   -WM     Symptoms Noted During/After Treatment fatigue (P)   -WM     Row Name 07/07/22 0900          General Information    Patient Profile Reviewed yes (P)   -WM     Patient Observations alert;cooperative;agree to therapy (P)   -     General Observations of Patient Pt alert and oriented x4, pleasant and cooperative with PT services on this date. (P)   -WM     Prior Level of Function independent:;all household mobility;community mobility;gait;transfer;bed mobility (P)   -     Equipment Currently Used at Home none (P)   -     Existing Precautions/Restrictions fall (P)   -WM     Barriers to Rehab none identified (P)   -     Row Name 07/07/22 0900          Living Environment    Current Living Arrangements home (P)   -     Home Accessibility wheelchair accessible (P)   -WM     People in Home alone (P)   -     Primary Care Provided by self (P)   -     Row Name 07/07/22 0900          Home Use of Assistive/Adaptive Equipment    Equipment Currently Used at Home glucometer (P)   -     Row Name 07/07/22 0900          Range of Motion (ROM)    Range of Motion bilateral lower extremities;ROM is WFL (P)   -     Row Name 07/07/22 0900          Strength (Manual Muscle Testing)    Strength (Manual Muscle Testing) other (see comments) (P)   R LE = 4-/5, L  LE = 4+/5  -WM     Row Name 07/07/22 0900          Bed Mobility    Bed Mobility bed mobility (all) activities (P)   -WM     All Activities, [place] (Bed Mobility) modified independence (P)   -     Assistive Device (Bed Mobility) bed rails (P)   -     Row Name 07/07/22 0900          Transfers    Transfers sit-stand transfer;stand-sit transfer (P)   -WM     Sit-Stand [place] (Transfers) standby assist;verbal cues (P)   -     Stand-Sit [place] (Transfers) standby assist;verbal cues (P)   -     Row Name 07/07/22 0900          Sit-Stand Transfer    Assistive Device (Sit-Stand Transfers) walker, front-wheeled (P)   -     Row Name 07/07/22 0900          Stand-Sit Transfer    Assistive Device (Stand-Sit Transfers) walker, front-wheeled (P)   -     Row Name 07/07/22 0900          Gait/Stairs (Locomotion)    Gait/Stairs Locomotion gait/ambulation assistive device (P)   -     [place] Level (Gait) contact guard;verbal cues (P)   -     Assistive Device (Gait) walker, front-wheeled (P)   -     Distance in Feet (Gait) 80 (P)   -WM     Pattern (Gait) 4-point;step-to (P)   -WM     Deviations/Abnormal Patterns (Gait) ataxic;kristyn decreased;gait speed decreased;stride length decreased;weight shifting decreased (P)   -WM     Right Sided Gait Deviations forward flexed posture;heel strike decreased;weight shift ability decreased;foot drop/toe drag (P)   -     Row Name 07/07/22 0900          Safety Issues, Functional Mobility    Impairments Affecting Function (Mobility) balance;coordination;endurance/activity tolerance;motor control;strength (P)   -     Row Name 07/07/22 0900          Balance    Balance Assessment standing dynamic balance (P)   -     Dynamic Standing Balance contact guard (P)   -     Position/Device Used, Standing Balance walker, front-wheeled (P)   -     Row Name 07/07/22 0900          Plan of Care Review    Plan of Care Reviewed With patient (P)   -WM     Outcome  Evaluation Pt presents with decreased muscular strength and endurance as well as impaired functional mobility/independence. She requires skilled PT services to address these defecits with PT recommending outpatient PT services upon discharge from acute setting. (P)   -WM     Row Name 07/07/22 0900          Positioning and Restraints    Pre-Treatment Position in bed (P)   -WM     Post Treatment Position bed (P)   -WM     In Bed supine;call light within reach (P)   -WM     Row Name 07/07/22 0900          Therapy Assessment/Plan (PT)    Rehab Potential (PT) good, to achieve stated therapy goals (P)   -WM     Criteria for Skilled Interventions Met (PT) yes;meets criteria;skilled treatment is necessary (P)   -WM     Therapy Frequency (PT) daily (P)   -WM     Predicted Duration of Therapy Intervention (PT) 10 (P)   -WM     Problem List (PT) problems related to;balance;coordination;mobility;motor control;strength (P)   -WM     Activity Limitations Related to Problem List (PT) unable to ambulate safely;unable to transfer safely (P)   -WM     Row Name 07/07/22 0900          PT Evaluation Complexity    History, PT Evaluation Complexity 1-2 personal factors and/or comorbidities (P)   -WM     Examination of Body Systems (PT Eval Complexity) 1-2 elements (P)   -WM     Clinical Presentation (PT Evaluation Complexity) stable (P)   -WM     Clinical Decision Making (PT Evaluation Complexity) low complexity (P)   -WM     Overall Complexity (PT Evaluation Complexity) low complexity (P)   -WM     Row Name 07/07/22 0900          Therapy Plan Review/Discharge Plan (PT)    Therapy Plan Review (PT) evaluation/treatment results reviewed;care plan/treatment goals reviewed;participants included;patient (P)   -WM     Row Name 07/07/22 0900          Physical Therapy Goals    Transfer Goal Selection (PT) transfer, PT goal 1 (P)   -WM     Gait Training Goal Selection (PT) gait training, PT goal 1 (P)   -     Row Name 07/07/22 0900           Transfer Goal 1 (PT)    Activity/Assistive Device (Transfer Goal 1, PT) transfers, all (P)   -WM     [place] Level/Cues Needed (Transfer Goal 1, PT) modified independence (P)   -WM     Time Frame (Transfer Goal 1, PT) long term goal (LTG);10 days (P)   -WM     Row Name 07/07/22 0900          Gait Training Goal 1 (PT)    Activity/Assistive Device (Gait Training Goal 1, PT) gait (walking locomotion);assistive device use;other (see comments) (P)   With most appropriate assistive device either RW or SPC  -WM     [place] Level (Gait Training Goal 1, PT) modified independence;other (see comments) (P)   with improved gait mechanics  -WM     Distance (Gait Training Goal 1, PT) 250 (P)   -WM     Time Frame (Gait Training Goal 1, PT) long term goal (LTG);10 days (P)   -           User Key  (r) = Recorded By, (t) = Taken By, (c) = Cosigned By    Initials Name Provider Type     Jose Angel Roberts, PT Student PT Student                Physical Therapy Education                 Title: PT OT SLP Therapies (Done)     Topic: Physical Therapy (Done)     Point: Mobility training (Done)     Learning Progress Summary           Patient Acceptance, E, VU by  at 7/7/2022 0914                   Point: Home exercise program (Done)     Learning Progress Summary           Patient Acceptance, E, VU by  at 7/7/2022 0914                   Point: Body mechanics (Done)     Learning Progress Summary           Patient Acceptance, E, VU by  at 7/7/2022 0914                   Point: Precautions (Done)     Learning Progress Summary           Patient Acceptance, E, VU by  at 7/7/2022 0914                               User Key     Initials Effective Dates Name Provider Type [facility] 06/06/22 -  Jose Angel Roberts, PT Student PT Student PT              PT Recommendation and Plan  Anticipated Discharge Disposition (PT): (P) home with outpatient therapy services  Planned Therapy Interventions (PT): (P) balance training, gait training,  home exercise program, neuromuscular re-education, motor coordination training, patient/family education, strengthening, stretching, transfer training  Therapy Frequency (PT): (P) daily  Plan of Care Reviewed With: (P) patient  Outcome Evaluation: (P) Pt presents with decreased muscular strength and endurance as well as impaired functional mobility/independence. She requires skilled PT services to address these defecits with PT recommending outpatient PT services upon discharge from acute setting.   Outcome Measures     Row Name 07/07/22 0900             How much help from another person do you currently need...    Turning from your back to your side while in flat bed without using bedrails? 4 (P)   -WM      Moving from lying on back to sitting on the side of a flat bed without bedrails? 4 (P)   -WM      Moving to and from a bed to a chair (including a wheelchair)? 3 (P)   -WM      Standing up from a chair using your arms (e.g., wheelchair, bedside chair)? 4 (P)   -WM      Climbing 3-5 steps with a railing? 3 (P)   -WM      To walk in hospital room? 3 (P)   -WM      AM-PAC 6 Clicks Score (PT) 21 (P)   -WM              Functional Assessment    Outcome Measure Options AM-PAC 6 Clicks Basic Mobility (PT) (P)   -WM            User Key  (r) = Recorded By, (t) = Taken By, (c) = Cosigned By    Initials Name Provider Type    Jose Angel Cueva, PT Student PT Student                 Time Calculation:    PT Charges     Row Name 07/07/22 0902             Time Calculation    PT Received On 07/07/22 (P)   -WM      PT Goal Re-Cert Due Date 07/16/22 (P)   -WM              Untimed Charges    PT Eval/Re-eval Minutes 45 (P)   -WM              Total Minutes    Untimed Charges Total Minutes 45 (P)   -WM       Total Minutes 45 (P)   -WM            User Key  (r) = Recorded By, (t) = Taken By, (c) = Cosigned By    Initials Name Provider Type    Jose Angel Cueva, PT Student PT Student              Therapy Charges for Today     Code  Description Service Date Service Provider Modifiers Qty    [id number] HC PT EVAL LOW COMPLEXITY 3 7/7/2022 Jose Angel Roberts, PT Student GP 1          PT G-Codes  Outcome Measure Options: (P) AM-PAC 6 Clicks Basic Mobility (PT)  AM-PAC 6 Clicks Score (PT): (P) 21    Alejandra Walter, PT Student  7/7/2022

## 2022-07-07 NOTE — PLAN OF CARE
Goal Outcome Evaluation:  Plan of Care Reviewed With: patient        Progress: no change  Outcome Evaluation: Patient does not present with any significant decline in function and does not require inpatient occupational therapy, therefore they will be discharged from services at this time. She performs all BADLs/functional transfers with supervision  or better using rolling walker. It is recommended she return home when medically able to do so, may also benefit from home health or outpatient physical therapy referral to address minor dynamic balance/functional mobility deficits. Rolling walker also recommend. Patient is aware and agreeable with treatment plan at this time.

## 2022-07-07 NOTE — PLAN OF CARE
Goal Outcome Evaluation:  Plan of Care Reviewed With: (P) patient           Outcome Evaluation: (P) Pt presents with decreased muscular strength and endurance as well as impaired functional mobility/independence. She requires skilled PT services to address these defecits with PT recommending outpatient PT services upon discharge from acute setting.

## 2022-07-07 NOTE — PLAN OF CARE
Goal Outcome Evaluation:  Plan of Care Reviewed With: patient           Outcome Evaluation: patient arrived to floor at 0230. Admission and assessment complete as charted.

## 2022-07-07 NOTE — CONSULTS
Cardiology Consult Note  [facility] 4TH FLOOR MEDICAL TELEMETRY UNIT          Patient Identification:  Audra Montejo      [id number]  62 y.o.        female  1960         Reason for Consultation: ARTHUR    PCP: Anayeli Pina MD    History of Present Illness:     Patient is a 62-year-old female came for evaluation of intermittent horizontal diplopia headache gait imbalance to the left since 2 July.  On evaluation she was found to have bilateral stroke right more than the left and CT angiography showed lesion in the PICA.  She also has a high blood pressure    Past History:  Past Medical History:   Diagnosis Date   • 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   • Acid reflux disease    • Allergy     unspecified   • Anxiety    • Back pain    • Bilateral carpal tunnel syndrome 08/17/2017   • Bipolar disorder (HCC)    • Bursitis of left shoulder 08/09/2016   • Depression    • Eczema    • Gall stones    • H/O psychiatric care    • Heart attack (HCC)    • Heart murmur    • Hypertension    • Incomplete tear of left rotator cuff 01/25/2017   • Left shoulder pain    • Loss of appetite    • Lumbago 05/08/2015    low back pain    • Memory loss     forgetfulness   • Migraine headache    • Need for hepatitis C screening test    • Palpitation    • Ringing in ears    • Seasonal allergies    • Shortness of breath    • Sinus trouble     unspecified    • Stroke (cerebrum) ([facility])    • Subacromial impingement, left 10/13/2016   • Superior glenoid labrum lesion of left shoulder 10/13/2016    subsequent encounter   • Trouble swallowing      Past Surgical History:   Procedure Laterality Date   • APPENDECTOMY     • CARPAL TUNNEL RELEASE     • CHOLECYSTECTOMY     • COLONOSCOPY  2014   • ENDOSCOPY  2014   • HYSTERECTOMY  1999   • SHOULDER SURGERY Left 10/03/2016    arthroscopic, left shoulder scope, RTC repair- Dr. Flores     Allergies   Allergen Reactions   • Tape Itching     Social History     Socioeconomic History   • Marital  status: Single   Tobacco Use   • Smoking status: Current Every Day Smoker     Packs/day: 0.50     Years: 30.00     Pack years: 15.00     Types: Cigarettes   • Smokeless tobacco: Never Used   • Tobacco comment: smoked 21-30 years    Vaping Use   • Vaping Use: Never used   Substance and Sexual Activity   • Alcohol use: Not Currently     Comment: rarely drinks   • Drug use: Never   • Sexual activity: Defer     Family History   Problem Relation Age of Onset   • Stroke Mother    • Diabetes Mother    • Arthritis Mother    • Heart disease Father    • Cancer Father    • Heart disease Sister    • Cancer Sister    • Diabetes Sister    • Arthritis Sister    • Heart disease Brother    • Diabetes Brother    • Hypertension Other    • Rheum arthritis Other    • Stroke Son    • Heart disease Son    • Cancer Son    • Other Son         blood disease   • Arthritis Son    • Osteoporosis Son          Medications:  Prior to Admission medications    Medication Sig Start Date End Date Taking? Authorizing Provider   metoprolol succinate XL (TOPROL-XL) 25 MG 24 hr tablet Take 25 mg by mouth Daily.   Yes Israel Tarango MD   montelukast (SINGULAIR) 10 MG tablet Take 10 mg by mouth Every Night.    Israel Tarango MD   oxybutynin XL (DITROPAN-XL) 10 MG 24 hr tablet Take 10 mg by mouth Daily.    Israel Tarango MD   albuterol (PROVENTIL) (2.5 MG/3ML) 0.083% nebulizer solution INHALE CONTENTS OF 1 VIAL IN NEBULIZER THREE TIMES DAILY AS NEEDED 5/18/21 7/7/22  Israel Tarango MD   albuterol sulfate  (90 Base) MCG/ACT inhaler Inhale 1-2 puffs Every 6 (Six) Hours As Needed. 5/18/21 7/7/22  Israel Tarango MD   amLODIPine (NORVASC) 5 MG tablet Take 1 tablet by mouth Daily. 7/6/22 7/7/22  Jt Spencer MD   donepezil (ARICEPT) 5 MG tablet Take 5 mg by mouth Every Evening.  7/7/22  Israel Tarango MD   doxycycline (VIBRAMYCIN) 100 MG capsule Take 100 mg by mouth 2 (Two) Times a Day. 4/26/21 7/7/22   Israel Tarango MD   gabapentin (NEURONTIN) 600 MG tablet Take 600 mg by mouth 3 (Three) Times a Day.  7/7/22  Israel Tarango MD   ketoconazole (NIZORAL) 2 % cream APPLY TOPICALLY TO THE AFFECTED AREA EVERY DAY FOR 2 WEEKS 5/18/21 7/7/22  Israel Tarango MD   lamoTRIgine (LaMICtal) 25 MG tablet Lamictal 25 mg oral tablet Take 1 Tablet by mouth q day x 1 week   -  Take 2 Tablet by mouth q day x 2nd week x 1 week - Take 3 Tablet by mouth q day x 3rd week x 1 we   Suspended  7/7/22  Israel Tarango MD   metoprolol succinate XL (Toprol XL) 25 MG 24 hr tablet Take 1 tablet by mouth Daily for 90 days. 4/7/22 7/7/22  Anayeli Pina MD   montelukast (SINGULAIR) 10 MG tablet Take 1 tablet by mouth Every Night. 5/13/22 7/7/22  Anayeli Pina MD   ondansetron ODT (ZOFRAN-ODT) 4 MG disintegrating tablet Place 1 tablet on the tongue 4 (Four) Times a Day As Needed for Nausea. 7/6/22 7/7/22  Jt Spencer MD   oxybutynin XL (DITROPAN-XL) 10 MG 24 hr tablet Take 1 tablet by mouth Daily. 5/13/22 7/7/22  Anayeli Pina MD   QUEtiapine (SEROquel) 25 MG tablet Take 25 mg by mouth every night at bedtime.  7/7/22  Israel Tarango MD   silver sulfadiazine (SILVADENE, SSD) 1 % cream Apply 1 application topically to the appropriate area as directed 2 (Two) Times a Day. 5/23/22 7/7/22  Roz Moluton MD   traMADol (ULTRAM) 50 MG tablet Take 50 mg by mouth Every 6 (Six) Hours As Needed.  7/7/22  Israel Tarango MD   TRAZODONE HCL PO   7/7/22  Israel Tarango MD      Current medications:  amLODIPine, 5 mg, Oral, Daily  aspirin, 81 mg, Oral, Daily  atorvastatin, 80 mg, Oral, Nightly  clopidogrel, 75 mg, Oral, Once  clopidogrel, 75 mg, Oral, Daily  donepezil, 5 mg, Oral, Q PM  heparin (porcine), 5,000 Units, Subcutaneous, Q8H  metoprolol succinate XL, 25 mg, Oral, Daily  nicotine, 1 patch, Transdermal, Q24H  oxybutynin XL, 10 mg, Oral, Daily      Current IV drips:         Review  "of Systems  Review of Systems   Constitutional: Negative for appetite change, fatigue and fever.   HENT: Negative for congestion.    Respiratory: Negative for apnea, choking, chest tightness, shortness of breath and wheezing.    Cardiovascular: Negative for chest pain, palpitations and leg swelling.   Gastrointestinal: Negative for diarrhea, nausea and vomiting.   Genitourinary: Negative for dysuria, frequency and hematuria.   Musculoskeletal: Positive for arthralgias. Negative for myalgias.   Skin: Negative for pallor.   Neurological: Positive for weakness, light-headedness and headaches. Negative for dizziness, tremors and syncope.   Psychiatric/Behavioral: Negative for agitation and confusion.         Physical Exam    /63 (BP Location: Right arm, Patient Position: Lying)   Pulse 55   Temp 98.6 °F (37 °C) (Oral)   Resp 18   Ht 147.3 cm (58\")   Wt 69.5 kg (153 lb 3.5 oz)   SpO2 99%   BMI 32.02 kg/m²  Body mass index is 32.02 kg/m².   Oxygen saturation   @FLOWAN(10::1)@ SpO2  Min: 96 %  Max: 100 %    General Appearance:   · no acute distress  · Alert and oriented x3  HENT:   · lips not cyanotic  · Atraumatic  Neck:  · thyroid not enlarged  · supple  Respiratory:  · no respiratory distress  · normal breath sounds  · no rales  Cardiovascular:  · no jugular venous distention  · regular rhythm  · apical impulse normal  · S1 normal, S2 normal  · no S3, no S4   · no murmur  · no rub, no thrill  · no carotid bruit  · pedal pulses normal  · lower extremity edema: none    Gastrointestinal:   · bowel sounds normal  · non-tender  · no hepatomegaly, no splenomegaly  Musculoskeletal:  · no clubbing of fingers.   · normocephalic, head atraumatic  Skin:   · warm, dry  · no rashes  Neuro/Psychiatric:  · normal mood and affect  · judgement and insight appropriate      Cardiographics:     ECG  (personally reviewed) EKG: Sinus bradycardia mild ST-T changes   Telemetry:  (personally reviewed)           No results found for " this or any previous visit.      Cardiolite (Tc-99m Sestamibi) stress test   Lab Review:       CBC    CBC 2/15/22 7/6/22   WBC 10.38 13.18 (A)   RBC 5.07 5.71 (A)   Hemoglobin 15.5 17.0 (A)   Hematocrit 45.1 50.6 (A)   MCV 89.0 88.6   MCH 30.6 29.8   MCHC 34.4 33.6   RDW 13.2 12.8   Platelets 276 292   (A) Abnormal value                CMP    CMP 2/15/22 7/6/22   Glucose 125 (A) 108 (A)   BUN 12 19   Creatinine 0.80 0.86   eGFR Non African Am 73    Sodium 141 142   Potassium 4.3 4.4   Chloride 105 104   Calcium 9.5 10.5   Albumin 4.60 4.70   Total Bilirubin 0.3 0.5   Alkaline Phosphatase 152 (A) 158 (A)   AST (SGOT) 19 23   ALT (SGPT) 17 30   (A) Abnormal value               CARDIAC LABS:      Lab 07/06/22 2058   PROTIME 13.3   INR 1.00      No results found for: DIGOXIN   No results found for: TSH  Results from last 7 days   Lab Units 07/07/22  0531   CHOLESTEROL mg/dL 160   HDL CHOL mg/dL 58     Lab Results   Component Value Date    POCTROP 0.00 02/16/2022     No components found for: DDIMERQUAN  Lab Results   Component Value Date    MG 2.1 02/15/2022                 Assessment:  Bilateral cerebellar infarcts right more than the left  Diplopia headache and gait imbalance from the stroke  Moderate to severe stenosis of the  PICA  Hypertension  Tobacco abuse  History of previous stroke      Plan:  Patient needs ARTHUR.  Splane the risks and benefits to the patient she understood and agreed.  We will schedule it for tomorrow      Thank you for allowing me to share in [facility].        Marco Gutierrez MD   7/7/2022    18:05 EDT

## 2022-07-07 NOTE — H&P
[facility]  Date: 2022   Patient Name: Audra Montejo  : 1960  MRN: [id number]    Primary Care Physician:  Anayeli Pina MD  Family Point of Contact:  Date of admission: 2022      Subjective     Chief Complaint: intermittent horizontal diplopia, headache, and gait imbalance (to the left) since .    HPI:  Audra Montejo is a right handed, bipolar and hypertensive 62F habitual smoker who had an ischemic CVA in  with consequent mild cognitive impairment. She presents from home with the following complaints and is not a migraineur.    Denies pain, bleeding from any orifice, dysphagia, constitutional/GI/pulmonary sxs.    Upon arrival, her SBP was in the 180's without ACS, sepsis, AMS, fever, or a surgical abdomen. This has improved without specific therapy.     CT imaging suggest a bilateral (R>L) cerebellar (PICA) ischemic CVA. Further imaging is pending and she does not take antiplatelet therapy.    Case d/w Neurology and dual antiplatelet therapy was initiated.    Personal History     Past Medical History:  Past Medical History:   Diagnosis Date   • 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   • Acid reflux disease    • Allergy     unspecified   • Anxiety    • Back pain    • Bilateral carpal tunnel syndrome 2017   • Bipolar disorder (HCC)    • Bursitis of left shoulder 2016   • Depression    • Eczema    • Gall stones    • H/O psychiatric care    • Heart attack (HCC)    • Heart murmur    • Hypertension    • Incomplete tear of left rotator cuff 2017   • Left shoulder pain    • Loss of appetite    • Lumbago 2015    low back pain    • Memory loss     forgetfulness   • Migraine headache    • Need for hepatitis C screening test    • Palpitation    • Ringing in ears    • Seasonal allergies    • Shortness of breath    • Sinus trouble     unspecified    • Stroke (cerebrum) (HCC)    • Subacromial impingement, left 10/13/2016   • Superior glenoid  labrum lesion of left shoulder 10/13/2016    subsequent encounter   • Trouble swallowing      Obesity without endocrinopathy  Ischemic CVA-induced MCI, 2013  Secondary (presumed) polycythemia    Past Surgical History:  Past Surgical History:   Procedure Laterality Date   • APPENDECTOMY     • CARPAL TUNNEL RELEASE     • CHOLECYSTECTOMY     • COLONOSCOPY  2014   • ENDOSCOPY  2014   • HYSTERECTOMY  1999   • SHOULDER SURGERY Left 10/03/2016    arthroscopic, left shoulder scope, RTC repair- Dr. Mark HERNANDEZ    Family History:   Family History   Problem Relation Age of Onset   • Stroke Mother    • Diabetes Mother    • Arthritis Mother    • Heart disease Father    • Cancer Father    • Heart disease Sister    • Cancer Sister    • Diabetes Sister    • Arthritis Sister    • Heart disease Brother    • Diabetes Brother    • Hypertension Other    • Rheum arthritis Other    • Stroke Son    • Heart disease Son    • Cancer Son    • Other Son         blood disease   • Arthritis Son    • Osteoporosis Son       Non-contributory    Social History:   Single, never  female without chidlren    Habitual smoker (1/2 - 1 ppd since 2007) without other deleterious habits    Home Medications:  QUEtiapine, albuterol, albuterol sulfate HFA, amLODIPine, donepezil, doxycycline, gabapentin, ketoconazole, lamoTRIgine, metoprolol succinate XL, montelukast, ondansetron ODT, oxybutynin XL, silver sulfadiazine, traMADol, and traZODone HCl    Allergies:  Allergies   Allergen Reactions   • Tape Itching     Review of Systems  Please see HPI. All else asked and negative.    Objective     Vitals:   Temp:  [97.8 °F (36.6 °C)-98.6 °F (37 °C)] 98.6 °F (37 °C)  Heart Rate:  [57-64] 57  Resp:  [14-20] 14  BP: (174-194)/(81-91) 174/81    Physical Exam    Constitutional: A&O x3, non-septic but uncomfortable appearing.   Eyes: PERRLA, EOMI, sclerae anicteric, no conjunctival injection   HENT: NC/AT, mucous membranes moist   Neck: Supple, no JVD,  thyromegaly, or lymphadenopathy; trachea midline; no carotid bruits   Respiratory: Clear to auscultation bilaterally without wheezes, rhonchi, or rales   Cardiovascular: bradycardic but regular without murmurs, rubs, or gallops   Gastrointestinal: Soft, NT/ND with NABS x4; no ascites or HSM   Musculoskeletal: No c/c/e; palpable pedal pulses bilaterally   Psychiatric: Appropriate affect, cooperative   Neurologic: Awake and alert; CN II-XII grossly intact; no tremor, moves all four extremities without sensory loss; finger to nose, heel to shin, and bilateral handgrip intact; not ambulated   Skin: No rashes, breakdown, or bleeding from any orifice.  Rectal: deferred    Result Review    Result Review:  I have personally reviewed the results from the time of this admission to 7/6/2022 22:41 EDT and agree with these findings:  [x]  Laboratory  [x]  Microbiology  [x]  Radiology  [x]  EKG/Telemetry   []  Cardiology/Vascular   []  Pathology  []  Old records  []  Other:      Assessment & Plan   Assessment / Plan     Assessment:  Presumed ischemic PICA CVA  Hypertensive smoker with polycythemia  Quiescent asthma    Plan:   Admit to medicine with telemetry for further imaging, dual antiplatelet therapy, specialty input, limited IVF, TTE, PT/OT therapy, fasting lipids, and tobacco cessation  Rx reconciliation +/- a new anti-HTN regimen; add NRT & fioricet prn HA  Cardiac diet   DVT prophylaxis via heparin  Medical DVT prophylaxis orders are present.    CODE STATUS:    Level Of Support Discussed With: Patient  Code Status (Patient has no pulse and is not breathing): CPR (Attempt to Resuscitate)  Medical Interventions (Patient has pulse or is breathing): Full Support    Admission Status:  I believe this patient meets inpatient status.    Electronically signed by Gopi Cardenas DO, 07/06/22, 10:41 PM EDT.

## 2022-07-07 NOTE — THERAPY EVALUATION
Acute Care - Speech Language Pathology   Swallow Initial Evaluation BRITTANY Chen     Patient Name: Audra Montejo  : 1960  MRN: [id number]  Today's Date: 2022               Admit Date: 2022    Visit Dx:     ICD-10-CM ICD-9-CM   1. Cerebrovascular accident (CVA), unspecified mechanism ([facility])  I63.9 434.91   2. Difficulty walking  R26.2 719.7   3. Dysphagia, oropharyngeal  R13.12 787.22     Patient Active Problem List   Diagnosis   • Essential hypertension   • Mild intermittent asthma without complication   • Chronic left shoulder pain   • Anxiety   • Back pain   • Bilateral carpal tunnel syndrome   • Bipolar disorder ([facility])   • Eczema   • Gall stones   • Heart murmur   • Left shoulder pain   • Loss of appetite   • Migraine   • Nontraumatic tear of rotator cuff   • Palpitation   • Ringing in ears   • Seasonal allergic rhinitis   • Shortness of breath   • Stroke ([facility])   • Subacromial impingement, left   • Superior glenoid labrum lesion of left shoulder   • Trouble swallowing   • Chest pain     Past Medical History:   Diagnosis Date   • 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   • Acid reflux disease    • Allergy     unspecified   • Anxiety    • Back pain    • Bilateral carpal tunnel syndrome 2017   • Bipolar disorder ([facility])    • Bursitis of left shoulder 2016   • Depression    • Eczema    • Gall stones    • H/O psychiatric care    • Heart attack ([facility])    • Heart murmur    • Hypertension    • Incomplete tear of left rotator cuff 2017   • Left shoulder pain    • Loss of appetite    • Lumbago 2015    low back pain    • Memory loss     forgetfulness   • Migraine headache    • Need for hepatitis C screening test    • Palpitation    • Ringing in ears    • Seasonal allergies    • Shortness of breath    • Sinus trouble     unspecified    • Stroke (cerebrum) ([facility])    • Subacromial impingement, left 10/13/2016   • Superior glenoid labrum lesion of left shoulder 10/13/2016    subsequent encounter   •  Trouble swallowing      Past Surgical History:   Procedure Laterality Date   • APPENDECTOMY     • CARPAL TUNNEL RELEASE     • CHOLECYSTECTOMY     • COLONOSCOPY  2014   • ENDOSCOPY  2014   • HYSTERECTOMY  1999   • SHOULDER SURGERY Left 10/03/2016    arthroscopic, left shoulder scope, RTC repair- Dr. Flores       PAIN SCALE: None noted    PRECAUTIONS/CONTRAINDICATIONS: Enhanced airborne    SUSPECTED ABUSE/NEGLECT/EXPLOITATION: Did not report    SOCIAL/PSYCHOLOGICAL NEEDS/BARRIERS: History of bipolar disorder depression    PAST SOCIAL HISTORY: Lives at home    PRIOR FUNCTION: Independent    PATIENT GOALS/EXPECTATIONS: Did not report    HISTORY: This patient is a 62-year-old admitted with acute CVA.  MRI:IMPRESSION:  Acute infarcts involve the bilateral cerebellar hemispheres, larger on the right than   the left, as detailed above.  No acute intracranial hemorrhage is seen.        CURRENT DIET LEVEL: Regular diet    OBJECTIVE:    TEST ADMINISTERED: Clinical dysphagia evaluation    COGNITION/SAFETY AWARENESS: Alert and oriented    BEHAVIORAL OBSERVATIONS: Pleasant and cooperative    ORAL MOTOR EXAM: Lingual and labial strength and range of motion appear to be within functional limits.  Some various mild left labial droop noted    VOICE QUALITY: Within normal limits    REFLEX EXAM: Deferred    POSTURE: 90 degrees upright    FEEDING/SWALLOWING FUNCTION: Assessed wit thin liquids, purée consistencies soft and hard solids.    CLINICAL OBSERVATIONS: Oral stage is characterized by good bolus preparation and control.  Timely oral transit.  Pharyngeal phase appears timely with good laryngeal elevation per palpation.  No clinical signs or symptoms of aspiration were noted.  Denies globus sensation after completion of swallow.  Vocal quality remained clear throughout.    DYSPHAGIA CRITERIA: N/A    FUNCTIONAL ASSESSMENT INSTRUMENT: Patient currently scored a level 70 of 7 on Functional Communication Measures for swallowing  indicating a 0% limitation in function.    ASSESSMENT/ PLAN OF CARE:  Pt presents with functional oropharyngeal swallow.  No clinical signs or symptoms of aspiration are noted.  If concern for silent aspiration, available for modified barium swallow.    PROBLEMS:  1.  N/A   LTG 1: N/A   STG 1a: N/A     FREQUENCY/DURATION: N/A    REHAB POTENTIAL:  Pt has good rehab potential.  The following limitations may influence improvement/ length of tx medical status.    RECOMMENDATIONS:   1.   DIET: Regular diet-thin liquids    2.  POSITION: 90 degrees upright for all intake    3.  COMPENSATORY STRATEGIES:   General swallow precautions    YES: Pt/responsible party agrees with plan of care and has been informed of all alternatives, risks and benefits.        EDUCATION  The patient has been educated in the following areas:   Dysphagia (Swallowing Impairment).       Donita Brar, SLP  7/7/2022

## 2022-07-08 ENCOUNTER — APPOINTMENT (OUTPATIENT)
Dept: CARDIOLOGY | Facility: HOSPITAL | Age: 62
End: 2022-07-08

## 2022-07-08 ENCOUNTER — TELEPHONE (OUTPATIENT)
Dept: URGENT CARE | Facility: CLINIC | Age: 62
End: 2022-07-08

## 2022-07-08 ENCOUNTER — TELEPHONE (OUTPATIENT)
Dept: FAMILY MEDICINE CLINIC | Facility: CLINIC | Age: 62
End: 2022-07-08

## 2022-07-08 VITALS
TEMPERATURE: 98.4 F | OXYGEN SATURATION: 93 % | SYSTOLIC BLOOD PRESSURE: 141 MMHG | HEIGHT: 58 IN | HEART RATE: 53 BPM | WEIGHT: 153.22 LBS | BODY MASS INDEX: 32.16 KG/M2 | RESPIRATION RATE: 14 BRPM | DIASTOLIC BLOOD PRESSURE: 67 MMHG

## 2022-07-08 LAB
ALBUMIN SERPL-MCNC: 4 G/DL (ref 3.5–5.2)
ANION GAP SERPL CALCULATED.3IONS-SCNC: 9.5 MMOL/L (ref 5–15)
BH CV ECHO MEAS - EF(MOD-BP): 65 %
BUN SERPL-MCNC: 15 MG/DL (ref 8–23)
BUN/CREAT SERPL: 19.2 (ref 7–25)
CALCIUM SPEC-SCNC: 9.5 MG/DL (ref 8.6–10.5)
CHLORIDE SERPL-SCNC: 105 MMOL/L (ref 98–107)
CO2 SERPL-SCNC: 26.5 MMOL/L (ref 22–29)
CREAT SERPL-MCNC: 0.78 MG/DL (ref 0.57–1)
EGFRCR SERPLBLD CKD-EPI 2021: 87.1 ML/MIN/1.73
GLUCOSE SERPL-MCNC: 90 MG/DL (ref 65–99)
MAXIMAL PREDICTED HEART RATE: 160 BPM
PHOSPHATE SERPL-MCNC: 3.9 MG/DL (ref 2.5–4.5)
POTASSIUM SERPL-SCNC: 4.1 MMOL/L (ref 3.5–5.2)
SODIUM SERPL-SCNC: 141 MMOL/L (ref 136–145)
STRESS TARGET HR: 136 BPM

## 2022-07-08 PROCEDURE — 25010000002 MIDAZOLAM PER 1 MG: Performed by: INTERNAL MEDICINE

## 2022-07-08 PROCEDURE — 0 MEPERIDINE PER 100 MG: Performed by: INTERNAL MEDICINE

## 2022-07-08 PROCEDURE — 93325 DOPPLER ECHO COLOR FLOW MAPG: CPT

## 2022-07-08 PROCEDURE — 80069 RENAL FUNCTION PANEL: CPT | Performed by: INTERNAL MEDICINE

## 2022-07-08 PROCEDURE — B245ZZ4 ULTRASONOGRAPHY OF LEFT HEART, TRANSESOPHAGEAL: ICD-10-PCS | Performed by: INTERNAL MEDICINE

## 2022-07-08 PROCEDURE — 93312 ECHO TRANSESOPHAGEAL: CPT

## 2022-07-08 PROCEDURE — 25010000002 HEPARIN (PORCINE) PER 1000 UNITS: Performed by: INTERNAL MEDICINE

## 2022-07-08 RX ORDER — MEPERIDINE HYDROCHLORIDE 25 MG/ML
INJECTION INTRAMUSCULAR; INTRAVENOUS; SUBCUTANEOUS
Status: COMPLETED | OUTPATIENT
Start: 2022-07-08 | End: 2022-07-08

## 2022-07-08 RX ORDER — MIDAZOLAM HYDROCHLORIDE 1 MG/ML
INJECTION INTRAMUSCULAR; INTRAVENOUS
Status: COMPLETED | OUTPATIENT
Start: 2022-07-08 | End: 2022-07-08

## 2022-07-08 RX ORDER — METOPROLOL SUCCINATE 25 MG/1
12.5 TABLET, EXTENDED RELEASE ORAL DAILY
Status: DISCONTINUED | OUTPATIENT
Start: 2022-07-09 | End: 2022-07-08 | Stop reason: HOSPADM

## 2022-07-08 RX ADMIN — AMLODIPINE BESYLATE 5 MG: 5 TABLET ORAL at 11:13

## 2022-07-08 RX ADMIN — MIDAZOLAM HYDROCHLORIDE 4 MG: 1 INJECTION, SOLUTION INTRAMUSCULAR; INTRAVENOUS at 09:05

## 2022-07-08 RX ADMIN — BENZOCAINE 1 SPRAY: 200 SPRAY DENTAL; ORAL; PERIODONTAL at 08:58

## 2022-07-08 RX ADMIN — OXYBUTYNIN CHLORIDE 10 MG: 5 TABLET, EXTENDED RELEASE ORAL at 11:14

## 2022-07-08 RX ADMIN — ASPIRIN 81 MG: 81 TABLET, COATED ORAL at 11:13

## 2022-07-08 RX ADMIN — MEPERIDINE HYDROCHLORIDE 25 MG: 25 INJECTION INTRAMUSCULAR; INTRAVENOUS; SUBCUTANEOUS at 09:09

## 2022-07-08 RX ADMIN — HEPARIN SODIUM 5000 UNITS: 5000 INJECTION, SOLUTION INTRAVENOUS; SUBCUTANEOUS at 05:11

## 2022-07-08 NOTE — TELEPHONE ENCOUNTER
----- Message from HERMAN Maldonado sent at 7/7/2022  6:18 PM EDT -----  Please notify patient of negative COVID-19 test result.

## 2022-07-08 NOTE — PROGRESS NOTES
CARDIOLOGY  INPATIENT PROGRESS NOTE                [facility] 4TH FLOOR MEDICAL TELEMETRY UNIT    7/8/2022      PATIENT IDENTIFICATION:   Name:  Audra Montejo      MRN:  [id number]     62 y.o.  female                 SUBJECTIVE:   Patient underwent ARTHUR today.  No evidence of thrombi in the left atrium left atrial appendage and the left ventricle.  Bubble study was negative.  No evidence of any shunts between the right and left cardiac chambers    OBJECTIVE:  Vitals:    07/08/22 0909 07/08/22 0914 07/08/22 0915 07/08/22 0920   BP: (!) 181/82 178/76 158/66 141/67   BP Location:       Patient Position:       Pulse: 66 62 (!) 49 53   Resp: 14 14 14 14   Temp:       TempSrc:       SpO2: 99% 99% 95% 93%   Weight:       Height:               Body mass index is 32.02 kg/m².    Intake/Output Summary (Last 24 hours) at 7/8/2022 1031  Last data filed at 7/7/2022 1700  Gross per 24 hour   Intake 0 ml   Output --   Net 0 ml       Telemetry:     Physical Exam  General Appearance:   · no acute distress  · Alert and oriented x3  HENT:   · lips not cyanotic  · Atraumatic  Neck:  · No jvd   · supple  Respiratory:  · no respiratory distress  · normal breath sounds  · no rales  Cardiovascular:    · regular rhythm  · no S3, no S4   · no murmur  · no rub  · lower extremity edema: none    Skin:   · warm, dry  · No rashes      Allergies   Allergen Reactions   • Tape Itching       Scheduled meds:  amLODIPine, 5 mg, Oral, Daily  aspirin, 81 mg, Oral, Daily  atorvastatin, 80 mg, Oral, Nightly  clopidogrel, 75 mg, Oral, Once  clopidogrel, 75 mg, Oral, Daily  donepezil, 5 mg, Oral, Q PM  heparin (porcine), 5,000 Units, Subcutaneous, Q8H  metoprolol succinate XL, 25 mg, Oral, Daily  nicotine, 1 patch, Transdermal, Q24H  oxybutynin XL, 10 mg, Oral, Daily      IV meds:                           Data Review:  CBC    CBC 2/15/22 7/6/22   WBC 10.38 13.18 (A)   RBC 5.07 5.71 (A)   Hemoglobin 15.5 17.0 (A)   Hematocrit 45.1 50.6 (A)   MCV  89.0 88.6   MCH 30.6 29.8   MCHC 34.4 33.6   RDW 13.2 12.8   Platelets 276 292   (A) Abnormal value            CMP    CMP 2/15/22 7/6/22 7/8/22   Glucose 125 (A) 108 (A) 90   BUN 12 19 15   Creatinine 0.80 0.86 0.78   eGFR Non African Am 73     Sodium 141 142 141   Potassium 4.3 4.4 4.1   Chloride 105 104 105   Calcium 9.5 10.5 9.5   Albumin 4.60 4.70 4.00   Total Bilirubin 0.3 0.5    Alkaline Phosphatase 152 (A) 158 (A)    AST (SGOT) 19 23    ALT (SGPT) 17 30    (A) Abnormal value             CARDIAC LABS:      Lab 07/06/22 2058   PROTIME 13.3   INR 1.00        No results found for: DIGOXIN   No results found for: TSH  Results from last 7 days   Lab Units 07/07/22  0531   CHOLESTEROL mg/dL 160   HDL CHOL mg/dL 58     Lab Results   Component Value Date    POCTROP 0.00 02/16/2022     Lab Results   Component Value Date    TROPONINT <0.010 06/25/2021   (  Lab Results   Component Value Date    MG 2.1 02/15/2022     Results for orders placed during the hospital encounter of 07/06/22    Adult Transesophageal Echo (ARTHUR) W/ Cont if Necessary Per Protocol    Interpretation Summary  · Calculated left ventricular EF = 65% Estimated left ventricular EF was in agreement with the calculated left ventricular EF. Left ventricular systolic function is normal.  · Mild mitral regurgitation  · No evidence of thrombi in the left atrium left atrial appendage and the left ventricle  · Bubble study negative           ASSESSMENT:  Bilateral cerebellar infarcts right more than the left  Diplopia headache and gait imbalance from the stroke  Moderate to severe stenosis of the  PICA  Hypertension  Tobacco abuse  History of previous stroke        PLAN:     No evidence of thrombi in the left atrium and left atrial appendage  Blood pressure fairly well controlled  Will sign off.  Call if needed          Marco Gutierrez MD  7/8/2022    10:31 EDT

## 2022-07-08 NOTE — TELEPHONE ENCOUNTER
"Patient called from hospital room. When asked if she was being discharged, she said, \"No, I am telling the  That I'm going home.\"  I offered to make a follow up appointment and she said no, I just want Dr Pina to start taking care of me\" Patient wants a call back from someone, please. [phone]  "

## 2022-07-08 NOTE — PLAN OF CARE
Goal Outcome Evaluation:  Plan of Care Reviewed With: patient        Progress: improving  Outcome Evaluation: patient arrived to floor at 0230. Admission and assessment complete as charted.

## 2022-07-08 NOTE — DISCHARGE SUMMARY
[facility]        HOSPITALIST  DISCHARGE SUMMARY    Patient Name: Audra Montejo  : 1960  MRN: [id number]    Date of Admission: 2022  Date of Discharge:  2022  Primary Care Physician: Anayeli Pina MD    Consults     Date and Time Order Name Status Description    2022 10:51 AM Inpatient Neurology Consult Stroke Completed           Active and Resolved Hospital Problems:  Active Hospital Problems    Diagnosis POA   • Stroke (HCC) [I63.9] Yes      Resolved Hospital Problems   No resolved problems to display.     Diplopia, headache and gait imbalance.  Improving  Right more than left cerebellar acute ischemic infarct.  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  Noncompliance has been off medications for many months.  Tobacco abuse disorder.  Patient smoked while in the hospital  Hyperlipidemia.  LDL of 87 target less than 70.  History of previous stroke in 2013.  Hypertension uncontrolled.  Polycythemia likely secondary from smoking.   Bipolar disorder     Hospital Course     Hospital Course:  Audra Montejo is a 62 y.o. female is a right handed, bipolar and hypertensive 62F habitual smoker who had an ischemic CVA in  with consequent mild cognitive impairment. She presents from home with the following complaints and is not a migraineur.     Denies pain, bleeding from any orifice, dysphagia, constitutional/GI/pulmonary sxs.     Upon arrival, her SBP was in the 180's without ACS, sepsis, AMS, fever, or a surgical abdomen. This has improved without specific therapy.      CT imaging suggest a bilateral (R>L) cerebellar (PICA) ischemic CVA. Further imaging is pending and she does not take antiplatelet therapy.     Case d/w Neurology and dual antiplatelet therapy was initiated.  Patient has not taken any medication for few months.  States it is expensive  ARTHUR negative.  MRI did confirm bilateral cerebellar infarcts right more than left with high-grade stenosis of PICA.    Interval  Followup:   Patient seen by neurology and recommended dual antiplatelet therapy for 3 months.  Was started on high-dose statin and nicotine patch.  Patient signed AMA today to go out for smoking.  Apparently patient did leave hospital last night without telling nursing staff to smoke.          DISCHARGE Follow Up Recommendations for labs and diagnostics: PCP and neurology.  Please note patient left AGAINST MEDICAL ADVICE and no medication was given to the patient as she wanted to smoke cigarettes.      Day of Discharge     Vital Signs:  Temp:  [97.3 °F (36.3 °C)-98.4 °F (36.9 °C)] 98.4 °F (36.9 °C)  Heart Rate:  [39-66] 53  Resp:  [14-18] 14  BP: (130-189)/(59-83) 141/67    Physical Exam: EMS      Discharge Details        Discharge Medications      ASK your doctor about these medications      Instructions Start Date   metoprolol succinate XL 25 MG 24 hr tablet  Commonly known as: TOPROL-XL  Ask about: Which instructions should I use?   25 mg, Oral, Daily      montelukast 10 MG tablet  Commonly known as: SINGULAIR  Ask about: Which instructions should I use?   10 mg, Oral, Nightly      oxybutynin XL 10 MG 24 hr tablet  Commonly known as: DITROPAN-XL  Ask about: Which instructions should I use?   10 mg, Oral, Daily             Allergies   Allergen Reactions   • Tape Itching       Discharge Disposition:  Left Against Medical Advice    Diet:      Discharge Activity:       CODE STATUS:  Code Status and Medical Interventions:   Ordered at: 07/06/22 2236     Level Of Support Discussed With:    Patient     Code Status (Patient has no pulse and is not breathing):    CPR (Attempt to Resuscitate)     Medical Interventions (Patient has pulse or is breathing):    Full Support         Future Appointments   Date Time Provider Department Center   7/21/2022 11:00 AM Jonnie Vieira OT [facility] [place] [facility]   7/26/2022 12:00 PM Gaviota Matthews, PT [facility]       Additional Instructions for the Follow-ups that You Need to Schedule      Ambulatory Referral to Physical Therapy Evaluate and treat   As directed      Specialty needed: Evaluate and treat         Referral to Occupational Therapy   As directed      Specialty needed: Evaluate and treat               Pertinent  and/or Most Recent Results     PROCEDURES:   * Cannot find OR case *     LAB RESULTS:      Lab 07/06/22 2058   WBC 13.18*   HEMOGLOBIN 17.0*   HEMATOCRIT 50.6*   PLATELETS 292   NEUTROS ABS 8.66*   IMMATURE GRANS (ABS) 0.04   LYMPHS ABS 3.50*   MONOS ABS 0.90   EOS ABS 0.03   MCV 88.6   PROTIME 13.3         Lab 07/08/22 0527 07/06/22 2058   SODIUM 141 142   POTASSIUM 4.1 4.4   CHLORIDE 105 104   CO2 26.5 25.2   ANION GAP 9.5 12.8   BUN 15 19   CREATININE 0.78 0.86   EGFR 87.1 77.5   GLUCOSE 90 108*   CALCIUM 9.5 10.5   PHOSPHORUS 3.9  --          Lab 07/08/22 0527 07/06/22 2058   TOTAL PROTEIN  --  8.0   ALBUMIN 4.00 4.70   GLOBULIN  --  3.3   ALT (SGPT)  --  30   AST (SGOT)  --  23   BILIRUBIN  --  0.5   ALK PHOS  --  158*         Lab 07/06/22 2058   PROTIME 13.3   INR 1.00         Lab 07/07/22  0531   CHOLESTEROL 160   LDL CHOL 87   HDL CHOL 58   TRIGLYCERIDES 77         Lab 07/06/22 2217 07/06/22 2058   ABO TYPING A A   RH TYPING Negative Negative   ANTIBODY SCREEN  --  Negative         Brief Urine Lab Results  (Last result in the past 365 days)      Color   Clarity   Blood   Leuk Est   Nitrite   Protein   CREAT   Urine HCG        03/07/22 0956 Yellow   Clear   Negative   Negative   Negative   Trace               Microbiology Results (last 10 days)     Procedure Component Value - Date/Time    COVID-19,APTIMA PANTHER(STEFFEN),[facility], NP/OP SWAB IN UTM/VTM/SALINE TRANSPORT MEDIA,24 HR TAT - Swab, Nasopharynx [[id number]]  (Normal) Collected: 07/06/22 2006    Lab Status: Final result Specimen: Swab from Nasopharynx Updated: 07/07/22 1742     COVID19 Not Detected    Narrative:      Fact sheet for providers: https://www.fda.gov/media/570480/download     Fact sheet for  patients: https://www.[employer].gov/media/885986/download    Test performed by RT PCR.          CT Angiogram Neck    Result Date: 7/7/2022  Impression:  No hemodynamically significant stenoses are seen.  No arterial dissection.       COMMENT:  Part of this note is an electronic transcription of spoken language to printed text. The electronic translation/transcription may permit erroneous, or at times, nonsensical (or even sensical) words or phrases to be inadvertently transcribed or omitted; this  has reviewed the note for such errors (as well as additional errors); however, some may still exist.  ONIEL VALDEZ JR, MD       Electronically Signed and Approved By: ONIEL VALDEZ JR, MD on 7/07/2022 at 1:54              MRI Brain Without Contrast    Result Date: 7/7/2022  Impression:  Acute infarcts involve the bilateral cerebellar hemispheres, larger on the right than the left, as detailed above.  No acute intracranial hemorrhage is seen.     COMMENT:  Part of this note is an electronic transcription of spoken language to printed text. The electronic translation/transcription may permit erroneous, or at times, nonsensical (or even sensical) words or phrases to be inadvertently transcribed or omitted; this  has reviewed the note for such errors (as well as additional errors); however, some may still exist.  ONIEL VALDEZ JR, MD       Electronically Signed and Approved By: ONIEL VALDEZ JR, MD on 7/07/2022 at 0:31              XR Chest 1 View    Result Date: 7/6/2022  Impression:   No acute infiltrate is appreciated.      COMMENT:  Part of this note is an electronic transcription of spoken language to printed text. The electronic translation/transcription may permit erroneous, or at times, nonsensical (or even sensical) words or phrases to be inadvertently transcribed or omitted; this  has reviewed the note for such errors (as well as additional errors); however, some may still exist.   ONIEL VALDEZ JR, MD       Electronically Signed and Approved By: ONIEL VALDEZ JR, MD on 7/06/2022 at 22:59              CT Angiogram Head    Addendum Date: 7/7/2022    ADDENDUM:  No cerebral aneurysms are appreciated.   ONIEL VALDEZ JR, MD       Electronically Signed and Approved By: ONIEL VALDEZ JR, MD on 7/07/2022 at 1:52              Result Date: 7/7/2022  Impression:    1. There is an irregular CTA appearance of the proximal right posterior inferior cerebellar artery (PICA), which may represent atherosclerotic change.  Vasospasm or arteritis would be in the differential diagnosis.  Arterial emboli are possible but probably less likely.  Other types of vasculopathies cannot be excluded.   2. There is also moderate-to-severe short-segment stenosis of the proximal right posterior cerebral artery (PCA), especially centered at its P1 and P2 segment junctions.  This finding may have similar etiologies as to that mentioned above for the findings in the right PICA.   3. Otherwise, no hemodynamically significant stenoses are seen.  No other areas of emergent large vessel occlusion are suggested.   4. Please see above comments for further detail.      COMMENT:  Part of this note is an electronic transcription of spoken language to printed text. The electronic translation/transcription may permit erroneous, or at times, nonsensical (or even sensical) words or phrases to be inadvertently transcribed or omitted; this  has reviewed the note for such errors (as well as additional errors); however, some may still exist.  ONIEL VALDEZ JR, MD       Electronically Signed and Approved By: ONIEL VALEDZ JR, MD on 7/07/2022 at 1:48              CT Head Without Contrast Stroke Protocol    Result Date: 7/6/2022  Impression:    1. There are age-indeterminate but probably acute to subacute bilateral cerebellar hemispheric infarcts, greater on the right than the left.  The findings may represent an acute to subacute  right posterior inferior cerebellar artery (PICA) infarct.  No acute intracranial hemorrhage is seen.   2. Mild chronic small vessel ischemia/infarction is suggested.  3. Please see above comments for further detail.     COMMENT:  Part of this note is an electronic transcription of spoken language to printed text. The electronic translation/transcription may permit erroneous, or at times, nonsensical (or even sensical) words or phrases to be inadvertently transcribed or omitted; this  has reviewed the note for such errors (as well as additional errors); however, some may still exist.  ONIEL VALDEZ JR, MD       Electronically Signed and Approved By: ONIEL VALDEZ JR, MD on 7/06/2022 at 21:23                        Results for orders placed during the hospital encounter of 07/06/22    Adult Transesophageal Echo (ARTHUR) W/ Cont if Necessary Per Protocol    Interpretation Summary  · Calculated left ventricular EF = 65% Estimated left ventricular EF was in agreement with the calculated left ventricular EF. Left ventricular systolic function is normal.  · Mild mitral regurgitation  · No evidence of thrombi in the left atrium left atrial appendage and the left ventricle  · Bubble study negative      Imaging Results (All)     Procedure Component Value Units Date/Time    CT Angiogram Neck [[id number]] Collected: 07/07/22 0151     Updated: 07/07/22 0157    Narrative:      PROCEDURE: CT ANGIOGRAM NECK     COMPARISONS: [facility], MR, MRI BRAIN WO CONTRAST, 7/06/2022, 22:47.     [facility], CT, CT HEAD WO CONTRAST STROKE PROTOCOL, 7/06/2022, 21:13.  [facility], CT, CT ANGIOGRAM HEAD, 7/06/2022, 23:29.     INDICATIONS: cva; hypertension; ha     PROTOCOL:   Standard imaging protocol performed      RADIATION:   Automated exposure control was utilized to minimize radiation dose.      TECHNIQUE: After obtaining the patient's consent, 617 CT/CTA images of the neck were obtained  with   intravenous contrast.  Multi-planar/3-D imaging was created and interpreted to optimize   visualization of vascular anatomy.  Unless otherwise stated in this report, all vascular stenoses   involving the internal carotid arteries, reported for this examination, are derived by dividing the   lesion diameter by the diameter of the normal internal carotid artery more distally (that is, using   NASCET criteria).     FINDINGS:   LEFT  INTERNAL CAROTID: No hemodynamically significant stenosis or dissection.  Mild calcified   atherosclerotic plaque involves the distal internal carotid arteries bilaterally.  EXTERNAL CAROTID: No hemodynamically significant stenosis or dissection.    COMMON CAROTID: Probably no hemodynamically significant stenosis or dissection.  There may be   mild-to-moderate origin narrowing of the left common carotid artery.  VERTEBRAL: No hemodynamically significant stenosis or dissection.  The vertebral arteries are   codominant.  There is thought to be artifact obscuring the origin of the left vertebral artery.     RIGHT  INTERNAL CAROTID: No hemodynamically significant stenosis or dissection.  Mild calcified   atherosclerotic plaque involves the distal internal carotid arteries bilaterally.  EXTERNAL CAROTID: No hemodynamically significant stenosis or dissection.    COMMON CAROTID: No hemodynamically significant stenosis or dissection.    VERTEBRAL: No hemodynamically significant stenosis or dissection.  The vertebral arteries are   codominant.     OTHER: There are degenerative changes of the imaged spine.  There may be mild origin narrowing of   the innominate artery.  There is incidental chronic calcified granulomatous disease of the chest.       Impression:       No hemodynamically significant stenoses are seen.  No arterial dissection.               COMMENT:  Part of this note is an electronic transcription of spoken language to printed text. The   electronic translation/transcription may  permit erroneous, or at times, nonsensical (or even   sensical) words or phrases to be inadvertently transcribed or omitted; this  has   reviewed the note for such errors (as well as additional errors); however, some may still exist.     ONIEL LEVY JR, MD         Electronically Signed and Approved By: ONIEL LEVY JR, MD on 7/07/2022 at 1:54                        CT Angiogram Head [[id number]] Collected: 07/07/22 0148     Updated: 07/07/22 0155    Addenda:        ADDENDUM:      No cerebral aneurysms are appreciated.        ONIEL LEVY JR, MD         Electronically Signed and Approved By: ONIEL LEVY JR, MD on 7/07/2022   at 1:52                      Signed: 07/07/22 0152 by Oniel Levy MD    Narrative:      PROCEDURE: CT ANGIOGRAM HEAD     COMPARISON: CT, CT ANGIOGRAM NECK, 7/06/2022, 23:29.     INDICATIONS: cva; history of hypertension and headache     PROTOCOL:   Standard CTA imaging protocol performed.      RADIATION:   DLP: 445.1mGy*cm    MA and/or KV was adjusted to minimize radiation dose.       CONTRAST: 100 mL Isovue 370 I.V.     TECHNIQUE: After obtaining the patient's consent, 777 CT/CTA images of the head were obtained with   non-ionic contrast, and multi-planar/3-D imaging were created and interpreted to optimize   visualization of vascular anatomy.       FINDINGS: There is an irregular CTA appearance of the proximal posterior inferior cerebellar artery   (PICA), as seen on image 61 of series 402 and adjacent images.  The finding is also seen on image   19 of series 1002.  It may be related to multifocal moderate-to-high-grade stenoses as from   atherosclerosis.  Vasospasm is possible.  Arteritis cannot be excluded.  Arterial emboli would be   in the differential diagnosis.  The left posterior inferior cerebellar artery origin is not well   seen.  It may be hypoplastic or absent.  There may be a dominant right-sided posterior inferior   cerebellar artery (PICA), supplying  portions of the left cerebellar hemisphere and the cerebellar   vermis.  The intradural vertebral arteries and the basilar artery are continuously patent without   hemodynamically significant stenoses.  The vertebral arteries are thought to be codominant.  There   may be persistent fetal origin of the left posterior cerebral artery.  There is a short-segment   moderate-to-high-grade stenosis involving the proximal right posterior cerebral artery at its P1   and P2 segment junctions, such as seen on image 227 of series 401 and image 5 of series 1001.  In   the differential diagnosis would be vasospasm, embolus, arteritis, and/or other vasculopathies.    The A1 segment on the left is thought to be hypoplastic.  Otherwise, no hemodynamically significant   stenoses are seen.  No emergent large vessel occlusion is suggested elsewhere.  Please see the   brain MRI exam report from 7/6/2022 at 2247 hours for further detail regarding important   intracranial findings.       Impression:            1. There is an irregular CTA appearance of the proximal right posterior inferior cerebellar artery   (PICA), which may represent atherosclerotic change.  Vasospasm or arteritis would be in the   differential diagnosis.  Arterial emboli are possible but probably less likely.  Other types of   vasculopathies cannot be excluded.       2. There is also moderate-to-severe short-segment stenosis of the proximal right posterior cerebral   artery (PCA), especially centered at its P1 and P2 segment junctions.  This finding may have   similar etiologies as to that mentioned above for the findings in the right PICA.       3. Otherwise, no hemodynamically significant stenoses are seen.  No other areas of emergent large   vessel occlusion are suggested.       4. Please see above comments for further detail.                COMMENT:  Part of this note is an electronic transcription of spoken language to printed text. The   electronic  translation/transcription may permit erroneous, or at times, nonsensical (or even   sensical) words or phrases to be inadvertently transcribed or omitted; this  has   reviewed the note for such errors (as well as additional errors); however, some may still exist.     ONIEL VALDEZ JR, MD         Electronically Signed and Approved By: ONIEL VALDEZ JR, MD on 7/07/2022 at 1:48                        MRI Brain Without Contrast [[id number]] Collected: 07/07/22 0031     Updated: 07/07/22 0035    Narrative:      PROCEDURE: MRI BRAIN WO CONTRAST     COMPARISONS: [facility], CT, CT ANGIOGRAM HEAD, 7/06/2022, 23:29.     [facility], CT, CT ANGIOGRAM NECK, 7/06/2022, 23:29.     [facility], CT, CT HEAD WO CONTRAST, 7/06/2022, 21:13.     [facility], MR, MRA NECK W/O CONTRAST, 1/03/2017, 12:22.     [facility], MR, BRAIN W/O CONTRAST, 1/03/2017, 12:22.     INDICATIONS: Dizziness, nonspecific.     TECHNIQUE: A variety of imaging planes and parameters were utilized for visualization of suspected   pathology.  Images were performed without contrast.     FINDINGS: The study is abnormal.  Restricted diffusion is seen in the bilateral cerebellar   hemispheres as well as the cerebellar vermis, much greater on the right than the left, most   suggestive of acute infarcts.  The right-sided cerebellar hemispheric acute infarct measures   approximately 4.9 x 2.9 x 5.1 cm in transverse, craniocaudal, and anteroposterior (AP) extent,   respectively, as seen on image 10 of series 12, image 38 of series 6, image 29 of series 3, and   adjacent images.  The findings suggest an acute infarct involving a dominant right-sided posterior   inferior cerebellar artery (PICA).  There is involvement of the cerebellar vermis.  Involvement of   the posterolateral right medulla oblongata is possible.  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   Chronic small vessel ischemia/infarction is also   suspected, including the right paramidline arnol with a 6 mm chronic lacunar infarct identified,   such as seen on image 8 of series 8.  There is also involvement of the subcortical and central   bilateral cerebral white matter as well as the periventricular cerebral white matter.  No definite   MRI evidence of acute intracranial hemorrhage.  There is associated slight deformity of the 4th   ventricle related to the above-mentioned cerebellar cytotoxic edema.  No obstructive hydrocephalus   is suggested.       Impression:       Acute infarcts involve the bilateral cerebellar hemispheres, larger on the right than   the left, as detailed above.  No acute intracranial hemorrhage is seen.              COMMENT:  Part of this note is an electronic transcription of spoken language to printed text. The   electronic translation/transcription may permit erroneous, or at times, nonsensical (or even   sensical) words or phrases to be inadvertently transcribed or omitted; this  has   reviewed the note for such errors (as well as additional errors); however, some may still exist.     ONIEL VALDEZ JR, MD         Electronically Signed and Approved By: ONIEL VALDEZ JR, MD on 7/07/2022 at 0:31                        XR Chest 1 View [[id number]] Collected: 07/06/22 2300     Updated: 07/06/22 2303    Narrative:      PROCEDURE: XR CHEST 1 VW     COMPARISON: [facility], , XR CHEST 1 VW, 2/15/2022, 23:48.     INDICATIONS: Stroke Protocol (Onset > 12 hrs).     FINDINGS:  A single AP upright portable chest radiograph was performed.  Borderline cardiac   enlargement is seen.  No acute infiltrate is appreciated.  No pleural effusion or pneumothorax is   identified.  External artifacts obscure detail.  Chronic calcified granulomatous disease involves   the chest.  The thoracic aorta is atherosclerotic.  No significant interval change is seen since   the prior  study.       Impression:        No acute infiltrate is appreciated.                 COMMENT:  Part of this note is an electronic transcription of spoken language to printed text. The   electronic translation/transcription may permit erroneous, or at times, nonsensical (or even   sensical) words or phrases to be inadvertently transcribed or omitted; this  has   reviewed the note for such errors (as well as additional errors); however, some may still exist.     ONIEL VALDEZ JR, MD         Electronically Signed and Approved By: ONIEL VALDEZ JR, MD on 7/06/2022 at 22:59                        CT Head Without Contrast Stroke Protocol [[id number]] Collected: 07/06/22 2124     Updated: 07/06/22 2127    Narrative:      PROCEDURE: CT HEAD WO CONTRAST STROKE PROTOCOL     COMPARISON: None.     INDICATIONS: Stroke, follow-up.  Hypertension.  Headache.     PROTOCOL:   Standard CT imaging protocol performed.      RADIATION:   Total DLP: 1,018.2mGy*cm.    MA and/or KV were/was adjusted to minimize radiation dose.       TECHNIQUE: After obtaining the patient's consent, 130 CT images were obtained without non-ionic   intravenous contrast material.      FINDINGS: Abnormal attenuation is seen in the bilateral cerebellar hemispheres, greater on the   right the left, and may represent age-indeterminate ischemia/infarction, most likely acute to   subacute in nature, possibly involving the right posterior inferior cerebellar artery (PICA).    There is slight deformity of the 4th ventricle with slight effacement and displacement in an   anterior, leftward direction, probably related to the right-sided cerebellar infarcts.  No   obstructive hydrocephalus is suggested.  There may be mild chronic small vessel   ischemia/infarction, including involvement of the brainstem, especially the right aspect of the   arnol with a chronic lacunar infarct suspected, measuring about 5 mm.  Arterial calcifications are   seen.  No acute  intracranial hemorrhage is appreciated.  There may be slight right-to-left midline   shift involving the posterior fossa.  In the supratentorial regions, no midline shift or acute   intracranial herniation syndrome is suggested.  No other acute or subacute infarcts are suggested.    Minimal if any cerebellar tonsillar ectopia or inferior tonsillar herniation is suggested.  No   supratentorial cerebellar herniation is suggested.  There may be mild age-indeterminate sinus   disease.  No air-fluid interfaces are appreciated within the imaged paranasal sinuses.  Streak   artifact from dental amalgam obscures detail. The imaged middle ear clefts (that is, the bilateral   mastoid air cell complexes, bilateral middle ear cavities, and bilateral external auditory canals)   are well aerated.  No acute findings are seen within the partially imaged orbits.       Impression:            1. There are age-indeterminate but probably acute to subacute bilateral cerebellar hemispheric   infarcts, greater on the right than the left.  The findings may represent an acute to subacute   right posterior inferior cerebellar artery (PICA) infarct.  No acute intracranial hemorrhage is   seen.       2. Mild chronic small vessel ischemia/infarction is suggested.     3. Please see above comments for further detail.             COMMENT:  Part of this note is an electronic transcription of spoken language to printed text. The   electronic translation/transcription may permit erroneous, or at times, nonsensical (or even   sensical) words or phrases to be inadvertently transcribed or omitted; this  has   reviewed the note for such errors (as well as additional errors); however, some may still exist.     ONIEL VALDEZ JR, MD         Electronically Signed and Approved By: ONIEL VALDEZ JR, MD on 7/06/2022 at 21:23                               Labs Pending at Discharge:          Time spent on Discharge including face to face service:  Minutes  Part of this note may be an electronic transcription/translation of spoken language to printed text using the Dragon Dictation System.     TElectronically signed by Varun Kincaid MD, 07/08/22, 4:22 PM EDT.

## 2022-07-10 ENCOUNTER — APPOINTMENT (OUTPATIENT)
Dept: CT IMAGING | Facility: HOSPITAL | Age: 62
End: 2022-07-10

## 2022-07-10 ENCOUNTER — APPOINTMENT (OUTPATIENT)
Dept: GENERAL RADIOLOGY | Facility: HOSPITAL | Age: 62
End: 2022-07-10

## 2022-07-10 ENCOUNTER — HOSPITAL ENCOUNTER (OUTPATIENT)
Facility: HOSPITAL | Age: 62
Setting detail: OBSERVATION
Discharge: HOME HEALTH | End: 2022-07-13
Attending: EMERGENCY MEDICINE | Admitting: FAMILY MEDICINE

## 2022-07-10 DIAGNOSIS — R13.12 DYSPHAGIA, OROPHARYNGEAL: ICD-10-CM

## 2022-07-10 DIAGNOSIS — I63.431 CEREBROVASCULAR ACCIDENT (CVA) DUE TO EMBOLISM OF RIGHT POSTERIOR CEREBRAL ARTERY: ICD-10-CM

## 2022-07-10 DIAGNOSIS — Z78.9 DECREASED ACTIVITIES OF DAILY LIVING (ADL): ICD-10-CM

## 2022-07-10 DIAGNOSIS — R26.2 DIFFICULTY WALKING: ICD-10-CM

## 2022-07-10 DIAGNOSIS — I63.9 CEREBELLAR STROKE: Primary | ICD-10-CM

## 2022-07-10 DIAGNOSIS — R42 DIZZINESS: ICD-10-CM

## 2022-07-10 LAB
ALBUMIN SERPL-MCNC: 4.2 G/DL (ref 3.5–5.2)
ALBUMIN/GLOB SERPL: 1.4 G/DL
ALP SERPL-CCNC: 139 U/L (ref 39–117)
ALT SERPL W P-5'-P-CCNC: 23 U/L (ref 1–33)
ANION GAP SERPL CALCULATED.3IONS-SCNC: 11.1 MMOL/L (ref 5–15)
AST SERPL-CCNC: 20 U/L (ref 1–32)
BASOPHILS # BLD AUTO: 0.04 10*3/MM3 (ref 0–0.2)
BASOPHILS NFR BLD AUTO: 0.3 % (ref 0–1.5)
BILIRUB SERPL-MCNC: 0.2 MG/DL (ref 0–1.2)
BUN SERPL-MCNC: 18 MG/DL (ref 8–23)
BUN/CREAT SERPL: 27.3 (ref 7–25)
CALCIUM SPEC-SCNC: 9.6 MG/DL (ref 8.6–10.5)
CHLORIDE SERPL-SCNC: 110 MMOL/L (ref 98–107)
CHOLEST SERPL-MCNC: 119 MG/DL (ref 0–200)
CO2 SERPL-SCNC: 22.9 MMOL/L (ref 22–29)
CREAT SERPL-MCNC: 0.66 MG/DL (ref 0.57–1)
DEPRECATED RDW RBC AUTO: 41.4 FL (ref 37–54)
EGFRCR SERPLBLD CKD-EPI 2021: 100.6 ML/MIN/1.73
EOSINOPHIL # BLD AUTO: 0.13 10*3/MM3 (ref 0–0.4)
EOSINOPHIL NFR BLD AUTO: 1.1 % (ref 0.3–6.2)
ERYTHROCYTE [DISTWIDTH] IN BLOOD BY AUTOMATED COUNT: 13 % (ref 12.3–15.4)
GLOBULIN UR ELPH-MCNC: 3.1 GM/DL
GLUCOSE BLDC GLUCOMTR-MCNC: 105 MG/DL (ref 70–99)
GLUCOSE SERPL-MCNC: 132 MG/DL (ref 65–99)
HCT VFR BLD AUTO: 47.7 % (ref 34–46.6)
HDLC SERPL-MCNC: 66 MG/DL (ref 40–60)
HGB BLD-MCNC: 16.2 G/DL (ref 12–15.9)
HOLD SPECIMEN: NORMAL
HOLD SPECIMEN: NORMAL
IMM GRANULOCYTES # BLD AUTO: 0.05 10*3/MM3 (ref 0–0.05)
IMM GRANULOCYTES NFR BLD AUTO: 0.4 % (ref 0–0.5)
LDLC SERPL CALC-MCNC: 40 MG/DL (ref 0–100)
LDLC/HDLC SERPL: 0.63 {RATIO}
LYMPHOCYTES # BLD AUTO: 4.08 10*3/MM3 (ref 0.7–3.1)
LYMPHOCYTES NFR BLD AUTO: 34 % (ref 19.6–45.3)
MAGNESIUM SERPL-MCNC: 1.9 MG/DL (ref 1.6–2.4)
MCH RBC QN AUTO: 29.8 PG (ref 26.6–33)
MCHC RBC AUTO-ENTMCNC: 34 G/DL (ref 31.5–35.7)
MCV RBC AUTO: 87.7 FL (ref 79–97)
MONOCYTES # BLD AUTO: 0.7 10*3/MM3 (ref 0.1–0.9)
MONOCYTES NFR BLD AUTO: 5.8 % (ref 5–12)
NEUTROPHILS NFR BLD AUTO: 58.4 % (ref 42.7–76)
NEUTROPHILS NFR BLD AUTO: 6.99 10*3/MM3 (ref 1.7–7)
NRBC BLD AUTO-RTO: 0 /100 WBC (ref 0–0.2)
PLATELET # BLD AUTO: 264 10*3/MM3 (ref 140–450)
PMV BLD AUTO: 12.3 FL (ref 6–12)
POTASSIUM SERPL-SCNC: 4.4 MMOL/L (ref 3.5–5.2)
PROT SERPL-MCNC: 7.3 G/DL (ref 6–8.5)
RBC # BLD AUTO: 5.44 10*6/MM3 (ref 3.77–5.28)
SODIUM SERPL-SCNC: 144 MMOL/L (ref 136–145)
TRIGL SERPL-MCNC: 58 MG/DL (ref 0–150)
TROPONIN T SERPL-MCNC: <0.01 NG/ML (ref 0–0.03)
VLDLC SERPL-MCNC: 13 MG/DL (ref 5–40)
WBC NRBC COR # BLD: 11.99 10*3/MM3 (ref 3.4–10.8)
WHOLE BLOOD HOLD COAG: NORMAL
WHOLE BLOOD HOLD SPECIMEN: NORMAL

## 2022-07-10 PROCEDURE — 80061 LIPID PANEL: CPT | Performed by: STUDENT IN AN ORGANIZED HEALTH CARE EDUCATION/TRAINING PROGRAM

## 2022-07-10 PROCEDURE — 85025 COMPLETE CBC W/AUTO DIFF WBC: CPT

## 2022-07-10 PROCEDURE — 93005 ELECTROCARDIOGRAM TRACING: CPT

## 2022-07-10 PROCEDURE — G0378 HOSPITAL OBSERVATION PER HR: HCPCS

## 2022-07-10 PROCEDURE — 71045 X-RAY EXAM CHEST 1 VIEW: CPT

## 2022-07-10 PROCEDURE — 93005 ELECTROCARDIOGRAM TRACING: CPT | Performed by: EMERGENCY MEDICINE

## 2022-07-10 PROCEDURE — 36415 COLL VENOUS BLD VENIPUNCTURE: CPT

## 2022-07-10 PROCEDURE — 82962 GLUCOSE BLOOD TEST: CPT

## 2022-07-10 PROCEDURE — 99285 EMERGENCY DEPT VISIT HI MDM: CPT

## 2022-07-10 PROCEDURE — 84484 ASSAY OF TROPONIN QUANT: CPT

## 2022-07-10 PROCEDURE — 83036 HEMOGLOBIN GLYCOSYLATED A1C: CPT | Performed by: STUDENT IN AN ORGANIZED HEALTH CARE EDUCATION/TRAINING PROGRAM

## 2022-07-10 PROCEDURE — 70450 CT HEAD/BRAIN W/O DYE: CPT

## 2022-07-10 PROCEDURE — 93010 ELECTROCARDIOGRAM REPORT: CPT | Performed by: INTERNAL MEDICINE

## 2022-07-10 PROCEDURE — 99219 PR INITIAL OBSERVATION CARE/DAY 50 MINUTES: CPT | Performed by: STUDENT IN AN ORGANIZED HEALTH CARE EDUCATION/TRAINING PROGRAM

## 2022-07-10 PROCEDURE — 80053 COMPREHEN METABOLIC PANEL: CPT

## 2022-07-10 PROCEDURE — 83735 ASSAY OF MAGNESIUM: CPT

## 2022-07-10 RX ORDER — KETOCONAZOLE 20 MG/G
1 CREAM TOPICAL DAILY
COMMUNITY
End: 2022-07-10

## 2022-07-10 RX ORDER — SODIUM CHLORIDE 0.9 % (FLUSH) 0.9 %
10 SYRINGE (ML) INJECTION EVERY 12 HOURS SCHEDULED
Status: DISCONTINUED | OUTPATIENT
Start: 2022-07-10 | End: 2022-07-13 | Stop reason: HOSPADM

## 2022-07-10 RX ORDER — ASPIRIN 300 MG/1
300 SUPPOSITORY RECTAL DAILY
Status: DISCONTINUED | OUTPATIENT
Start: 2022-07-11 | End: 2022-07-13 | Stop reason: HOSPADM

## 2022-07-10 RX ORDER — SODIUM CHLORIDE 9 MG/ML
40 INJECTION, SOLUTION INTRAVENOUS AS NEEDED
Status: DISCONTINUED | OUTPATIENT
Start: 2022-07-10 | End: 2022-07-13 | Stop reason: HOSPADM

## 2022-07-10 RX ORDER — ONDANSETRON 4 MG/1
4 TABLET, ORALLY DISINTEGRATING ORAL EVERY 6 HOURS PRN
COMMUNITY
Start: 2022-07-07 | End: 2022-11-02

## 2022-07-10 RX ORDER — AMLODIPINE BESYLATE 5 MG/1
5 TABLET ORAL DAILY
COMMUNITY
Start: 2022-07-07 | End: 2022-07-13 | Stop reason: HOSPADM

## 2022-07-10 RX ORDER — ALBUTEROL SULFATE 2.5 MG/3ML
2.5 SOLUTION RESPIRATORY (INHALATION) 3 TIMES DAILY
COMMUNITY
End: 2022-11-02

## 2022-07-10 RX ORDER — ASPIRIN 81 MG/1
81 TABLET, CHEWABLE ORAL DAILY
Status: DISCONTINUED | OUTPATIENT
Start: 2022-07-11 | End: 2022-07-13 | Stop reason: HOSPADM

## 2022-07-10 RX ORDER — ATORVASTATIN CALCIUM 40 MG/1
80 TABLET, FILM COATED ORAL NIGHTLY
Status: DISCONTINUED | OUTPATIENT
Start: 2022-07-10 | End: 2022-07-13 | Stop reason: HOSPADM

## 2022-07-10 RX ORDER — SODIUM CHLORIDE 0.9 % (FLUSH) 0.9 %
10 SYRINGE (ML) INJECTION AS NEEDED
Status: DISCONTINUED | OUTPATIENT
Start: 2022-07-10 | End: 2022-07-13 | Stop reason: HOSPADM

## 2022-07-10 RX ORDER — SODIUM CHLORIDE 0.9 % (FLUSH) 0.9 %
10 SYRINGE (ML) INJECTION AS NEEDED
Status: DISCONTINUED | OUTPATIENT
Start: 2022-07-10 | End: 2022-07-10

## 2022-07-10 RX ORDER — ALBUTEROL SULFATE 90 UG/1
2 AEROSOL, METERED RESPIRATORY (INHALATION) EVERY 6 HOURS PRN
COMMUNITY
End: 2023-01-16 | Stop reason: SDUPTHER

## 2022-07-10 RX ADMIN — Medication 10 ML: at 23:08

## 2022-07-10 RX ADMIN — ATORVASTATIN CALCIUM 80 MG: 40 TABLET, FILM COATED ORAL at 23:08

## 2022-07-11 ENCOUNTER — APPOINTMENT (OUTPATIENT)
Dept: GENERAL RADIOLOGY | Facility: HOSPITAL | Age: 62
End: 2022-07-11

## 2022-07-11 LAB
ANION GAP SERPL CALCULATED.3IONS-SCNC: 11.5 MMOL/L (ref 5–15)
BASOPHILS # BLD AUTO: 0.04 10*3/MM3 (ref 0–0.2)
BASOPHILS NFR BLD AUTO: 0.3 % (ref 0–1.5)
BILIRUB UR QL STRIP: NEGATIVE
BUN SERPL-MCNC: 16 MG/DL (ref 8–23)
BUN/CREAT SERPL: 26.7 (ref 7–25)
CALCIUM SPEC-SCNC: 9.6 MG/DL (ref 8.6–10.5)
CHLORIDE SERPL-SCNC: 108 MMOL/L (ref 98–107)
CLARITY UR: CLEAR
CO2 SERPL-SCNC: 23.5 MMOL/L (ref 22–29)
COLOR UR: YELLOW
CREAT SERPL-MCNC: 0.6 MG/DL (ref 0.57–1)
DEPRECATED RDW RBC AUTO: 41.3 FL (ref 37–54)
EGFRCR SERPLBLD CKD-EPI 2021: 102.9 ML/MIN/1.73
EOSINOPHIL # BLD AUTO: 0.16 10*3/MM3 (ref 0–0.4)
EOSINOPHIL NFR BLD AUTO: 1.2 % (ref 0.3–6.2)
ERYTHROCYTE [DISTWIDTH] IN BLOOD BY AUTOMATED COUNT: 12.7 % (ref 12.3–15.4)
GLUCOSE BLDC GLUCOMTR-MCNC: 98 MG/DL (ref 70–99)
GLUCOSE SERPL-MCNC: 90 MG/DL (ref 65–99)
GLUCOSE UR STRIP-MCNC: NEGATIVE MG/DL
HBA1C MFR BLD: 5.9 % (ref 4.8–5.6)
HCT VFR BLD AUTO: 46.6 % (ref 34–46.6)
HGB BLD-MCNC: 15.8 G/DL (ref 12–15.9)
HGB UR QL STRIP.AUTO: NEGATIVE
IMM GRANULOCYTES # BLD AUTO: 0.04 10*3/MM3 (ref 0–0.05)
IMM GRANULOCYTES NFR BLD AUTO: 0.3 % (ref 0–0.5)
KETONES UR QL STRIP: ABNORMAL
LEUKOCYTE ESTERASE UR QL STRIP.AUTO: NEGATIVE
LYMPHOCYTES # BLD AUTO: 4.96 10*3/MM3 (ref 0.7–3.1)
LYMPHOCYTES NFR BLD AUTO: 38.6 % (ref 19.6–45.3)
MCH RBC QN AUTO: 29.8 PG (ref 26.6–33)
MCHC RBC AUTO-ENTMCNC: 33.9 G/DL (ref 31.5–35.7)
MCV RBC AUTO: 87.9 FL (ref 79–97)
MONOCYTES # BLD AUTO: 0.85 10*3/MM3 (ref 0.1–0.9)
MONOCYTES NFR BLD AUTO: 6.6 % (ref 5–12)
NEUTROPHILS NFR BLD AUTO: 53 % (ref 42.7–76)
NEUTROPHILS NFR BLD AUTO: 6.81 10*3/MM3 (ref 1.7–7)
NITRITE UR QL STRIP: NEGATIVE
NRBC BLD AUTO-RTO: 0 /100 WBC (ref 0–0.2)
PH UR STRIP.AUTO: <=5 [PH] (ref 5–8)
PLATELET # BLD AUTO: 240 10*3/MM3 (ref 140–450)
PMV BLD AUTO: 12.6 FL (ref 6–12)
POTASSIUM SERPL-SCNC: 3.8 MMOL/L (ref 3.5–5.2)
PROT UR QL STRIP: ABNORMAL
RBC # BLD AUTO: 5.3 10*6/MM3 (ref 3.77–5.28)
SODIUM SERPL-SCNC: 143 MMOL/L (ref 136–145)
SP GR UR STRIP: 1.03 (ref 1–1.03)
UROBILINOGEN UR QL STRIP: ABNORMAL
WBC NRBC COR # BLD: 12.86 10*3/MM3 (ref 3.4–10.8)

## 2022-07-11 PROCEDURE — 96374 THER/PROPH/DIAG INJ IV PUSH: CPT

## 2022-07-11 PROCEDURE — 97165 OT EVAL LOW COMPLEX 30 MIN: CPT

## 2022-07-11 PROCEDURE — 94761 N-INVAS EAR/PLS OXIMETRY MLT: CPT

## 2022-07-11 PROCEDURE — G0378 HOSPITAL OBSERVATION PER HR: HCPCS

## 2022-07-11 PROCEDURE — 74230 X-RAY XM SWLNG FUNCJ C+: CPT

## 2022-07-11 PROCEDURE — 25010000002 HEPARIN (PORCINE) PER 1000 UNITS: Performed by: STUDENT IN AN ORGANIZED HEALTH CARE EDUCATION/TRAINING PROGRAM

## 2022-07-11 PROCEDURE — 94640 AIRWAY INHALATION TREATMENT: CPT

## 2022-07-11 PROCEDURE — 92611 MOTION FLUOROSCOPY/SWALLOW: CPT

## 2022-07-11 PROCEDURE — 82962 GLUCOSE BLOOD TEST: CPT

## 2022-07-11 PROCEDURE — 94799 UNLISTED PULMONARY SVC/PX: CPT

## 2022-07-11 PROCEDURE — 92610 EVALUATE SWALLOWING FUNCTION: CPT

## 2022-07-11 PROCEDURE — 25010000002 HYDRALAZINE PER 20 MG: Performed by: STUDENT IN AN ORGANIZED HEALTH CARE EDUCATION/TRAINING PROGRAM

## 2022-07-11 PROCEDURE — 80048 BASIC METABOLIC PNL TOTAL CA: CPT | Performed by: STUDENT IN AN ORGANIZED HEALTH CARE EDUCATION/TRAINING PROGRAM

## 2022-07-11 PROCEDURE — 99226 PR SBSQ OBSERVATION CARE/DAY 35 MINUTES: CPT | Performed by: FAMILY MEDICINE

## 2022-07-11 PROCEDURE — 25010000002 HYDRALAZINE PER 20 MG: Performed by: FAMILY MEDICINE

## 2022-07-11 PROCEDURE — 97161 PT EVAL LOW COMPLEX 20 MIN: CPT

## 2022-07-11 PROCEDURE — 96372 THER/PROPH/DIAG INJ SC/IM: CPT

## 2022-07-11 PROCEDURE — 85025 COMPLETE CBC W/AUTO DIFF WBC: CPT | Performed by: STUDENT IN AN ORGANIZED HEALTH CARE EDUCATION/TRAINING PROGRAM

## 2022-07-11 PROCEDURE — 81003 URINALYSIS AUTO W/O SCOPE: CPT | Performed by: EMERGENCY MEDICINE

## 2022-07-11 RX ORDER — ALBUTEROL SULFATE 2.5 MG/3ML
2.5 SOLUTION RESPIRATORY (INHALATION)
Status: DISCONTINUED | OUTPATIENT
Start: 2022-07-11 | End: 2022-07-13 | Stop reason: HOSPADM

## 2022-07-11 RX ORDER — ONDANSETRON 2 MG/ML
4 INJECTION INTRAMUSCULAR; INTRAVENOUS EVERY 6 HOURS PRN
Status: DISCONTINUED | OUTPATIENT
Start: 2022-07-11 | End: 2022-07-13 | Stop reason: HOSPADM

## 2022-07-11 RX ORDER — HYDRALAZINE HYDROCHLORIDE 20 MG/ML
10 INJECTION INTRAMUSCULAR; INTRAVENOUS ONCE
Status: COMPLETED | OUTPATIENT
Start: 2022-07-11 | End: 2022-07-11

## 2022-07-11 RX ORDER — ALBUTEROL SULFATE 2.5 MG/3ML
SOLUTION RESPIRATORY (INHALATION)
Status: DISPENSED
Start: 2022-07-11 | End: 2022-07-11

## 2022-07-11 RX ORDER — POLYETHYLENE GLYCOL 3350 17 G
2 POWDER IN PACKET (EA) ORAL
Status: DISCONTINUED | OUTPATIENT
Start: 2022-07-11 | End: 2022-07-13 | Stop reason: HOSPADM

## 2022-07-11 RX ORDER — NITROGLYCERIN 0.4 MG/1
0.4 TABLET SUBLINGUAL
Status: DISCONTINUED | OUTPATIENT
Start: 2022-07-11 | End: 2022-07-13 | Stop reason: HOSPADM

## 2022-07-11 RX ORDER — OXYBUTYNIN CHLORIDE 5 MG/1
10 TABLET, EXTENDED RELEASE ORAL DAILY
Status: DISCONTINUED | OUTPATIENT
Start: 2022-07-11 | End: 2022-07-13 | Stop reason: HOSPADM

## 2022-07-11 RX ORDER — SODIUM CHLORIDE 0.9 % (FLUSH) 0.9 %
10 SYRINGE (ML) INJECTION AS NEEDED
Status: DISCONTINUED | OUTPATIENT
Start: 2022-07-11 | End: 2022-07-13 | Stop reason: HOSPADM

## 2022-07-11 RX ORDER — MONTELUKAST SODIUM 10 MG/1
10 TABLET ORAL NIGHTLY
Status: DISCONTINUED | OUTPATIENT
Start: 2022-07-11 | End: 2022-07-13 | Stop reason: HOSPADM

## 2022-07-11 RX ORDER — ACETAMINOPHEN 325 MG/1
650 TABLET ORAL EVERY 6 HOURS PRN
Status: DISCONTINUED | OUTPATIENT
Start: 2022-07-11 | End: 2022-07-13 | Stop reason: HOSPADM

## 2022-07-11 RX ORDER — AMLODIPINE BESYLATE 5 MG/1
5 TABLET ORAL DAILY
Status: DISCONTINUED | OUTPATIENT
Start: 2022-07-11 | End: 2022-07-13 | Stop reason: HOSPADM

## 2022-07-11 RX ORDER — SODIUM CHLORIDE 0.9 % (FLUSH) 0.9 %
10 SYRINGE (ML) INJECTION EVERY 12 HOURS SCHEDULED
Status: DISCONTINUED | OUTPATIENT
Start: 2022-07-11 | End: 2022-07-13 | Stop reason: HOSPADM

## 2022-07-11 RX ORDER — HEPARIN SODIUM 5000 [USP'U]/ML
5000 INJECTION, SOLUTION INTRAVENOUS; SUBCUTANEOUS EVERY 8 HOURS SCHEDULED
Status: DISCONTINUED | OUTPATIENT
Start: 2022-07-11 | End: 2022-07-13 | Stop reason: HOSPADM

## 2022-07-11 RX ORDER — HYDRALAZINE HYDROCHLORIDE 20 MG/ML
10 INJECTION INTRAMUSCULAR; INTRAVENOUS EVERY 4 HOURS PRN
Status: DISCONTINUED | OUTPATIENT
Start: 2022-07-11 | End: 2022-07-13 | Stop reason: HOSPADM

## 2022-07-11 RX ORDER — SODIUM CHLORIDE 9 MG/ML
40 INJECTION, SOLUTION INTRAVENOUS AS NEEDED
Status: DISCONTINUED | OUTPATIENT
Start: 2022-07-11 | End: 2022-07-13 | Stop reason: HOSPADM

## 2022-07-11 RX ORDER — METOPROLOL SUCCINATE 25 MG/1
25 TABLET, EXTENDED RELEASE ORAL DAILY
Status: DISCONTINUED | OUTPATIENT
Start: 2022-07-11 | End: 2022-07-11

## 2022-07-11 RX ORDER — ONDANSETRON 4 MG/1
4 TABLET, ORALLY DISINTEGRATING ORAL EVERY 8 HOURS PRN
Status: DISCONTINUED | OUTPATIENT
Start: 2022-07-11 | End: 2022-07-11 | Stop reason: SDUPTHER

## 2022-07-11 RX ADMIN — OXYBUTYNIN CHLORIDE 10 MG: 5 TABLET, EXTENDED RELEASE ORAL at 09:27

## 2022-07-11 RX ADMIN — MONTELUKAST 10 MG: 10 TABLET, FILM COATED ORAL at 20:19

## 2022-07-11 RX ADMIN — HEPARIN SODIUM 5000 UNITS: 5000 INJECTION, SOLUTION INTRAVENOUS; SUBCUTANEOUS at 05:32

## 2022-07-11 RX ADMIN — ALBUTEROL SULFATE 2.5 MG: 2.5 SOLUTION RESPIRATORY (INHALATION) at 13:41

## 2022-07-11 RX ADMIN — HEPARIN SODIUM 5000 UNITS: 5000 INJECTION, SOLUTION INTRAVENOUS; SUBCUTANEOUS at 21:25

## 2022-07-11 RX ADMIN — ALBUTEROL SULFATE 2.5 MG: 2.5 SOLUTION RESPIRATORY (INHALATION) at 07:33

## 2022-07-11 RX ADMIN — ACETAMINOPHEN 650 MG: 325 TABLET ORAL at 09:26

## 2022-07-11 RX ADMIN — ASPIRIN 81 MG CHEWABLE TABLET 81 MG: 81 TABLET CHEWABLE at 09:26

## 2022-07-11 RX ADMIN — AMLODIPINE BESYLATE 5 MG: 5 TABLET ORAL at 09:25

## 2022-07-11 RX ADMIN — HEPARIN SODIUM 5000 UNITS: 5000 INJECTION, SOLUTION INTRAVENOUS; SUBCUTANEOUS at 15:00

## 2022-07-11 RX ADMIN — Medication 10 ML: at 20:19

## 2022-07-11 RX ADMIN — Medication 10 ML: at 00:30

## 2022-07-11 RX ADMIN — HYDRALAZINE HYDROCHLORIDE 10 MG: 20 INJECTION, SOLUTION INTRAMUSCULAR; INTRAVENOUS at 09:28

## 2022-07-11 RX ADMIN — ATORVASTATIN CALCIUM 80 MG: 40 TABLET, FILM COATED ORAL at 20:19

## 2022-07-11 RX ADMIN — Medication 10 ML: at 09:27

## 2022-07-11 NOTE — THERAPY EVALUATION
Acute Care - Speech Language Pathology   Swallow Initial Evaluation BRITTANY Chen     Patient Name: Audra Montejo  : 1960  MRN: [id number]  Today's Date: 2022               Admit Date: 7/10/2022    Visit Dx:     ICD-10-CM ICD-9-CM   1. Cerebellar stroke (HCC)  I63.9 434.91   2. Dizziness  R42 780.4   3. Dysphagia, oropharyngeal  R13.12 787.22     Patient Active Problem List   Diagnosis   • Essential hypertension   • Mild intermittent asthma without complication   • Chronic left shoulder pain   • Anxiety   • Back pain   • Bilateral carpal tunnel syndrome   • Bipolar disorder (HCC)   • Eczema   • Gall stones   • Heart murmur   • Left shoulder pain   • Loss of appetite   • Migraine   • Nontraumatic tear of rotator cuff   • Palpitation   • Ringing in ears   • Seasonal allergic rhinitis   • Shortness of breath   • Stroke (HCC)   • Subacromial impingement, left   • Superior glenoid labrum lesion of left shoulder   • Trouble swallowing   • Chest pain   • Cerebellar stroke (HCC)     Past Medical History:   Diagnosis Date   • 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   • Acid reflux disease    • Allergy     unspecified   • Anxiety    • Back pain    • Bilateral carpal tunnel syndrome 2017   • Bipolar disorder (HCC)    • Bursitis of left shoulder 2016   • Depression    • Eczema    • Gall stones    • H/O psychiatric care    • Heart attack (HCC)    • Heart murmur    • Hypertension    • Incomplete tear of left rotator cuff 2017   • Left shoulder pain    • Loss of appetite    • Lumbago 2015    low back pain    • Memory loss     forgetfulness   • Migraine headache    • Need for hepatitis C screening test    • Palpitation    • Ringing in ears    • Seasonal allergies    • Shortness of breath    • Sinus trouble     unspecified    • Stroke (cerebrum) (HCC)    • Subacromial impingement, left 10/13/2016   • Superior glenoid labrum lesion of left shoulder 10/13/2016    subsequent encounter   • Trouble swallowing       Past Surgical History:   Procedure Laterality Date   • APPENDECTOMY     • CARPAL TUNNEL RELEASE     • CHOLECYSTECTOMY     • COLONOSCOPY  2014   • ENDOSCOPY  2014   • HYSTERECTOMY  1999   • SHOULDER SURGERY Left 10/03/2016    arthroscopic, left shoulder scope, RTC repair- Dr. Flores     PAIN SCALE: None noted    PRECAUTIONS/CONTRAINDICATIONS: None noted    SUSPECTED ABUSE/NEGLECT/EXPLOITATION: None noted    SOCIAL/PSYCHOLOGICAL NEEDS/BARRIERS: Bipolar    PAST SOCIAL HISTORY: Lives at home    PRIOR FUNCTION: Independent    PATIENT GOALS/EXPECTATIONS: Did not report    HISTORY: This patient is a 62-year-old female status post recent CVA with bilateral cerebellar infarcts.  Patient left AMA but now readmitted due to difficulty walking.  Patient's repeat CT shows evolving subacute bilateral cerebellar infarcts and lateral infarct of the medulla oblongata.  Chest x-ray was negative.    CURRENT DIET LEVEL: Regular diet    OBJECTIVE:    TEST ADMINISTERED: Clinical dysphagia evaluation    COGNITION/SAFETY AWARENESS: Alert and oriented    BEHAVIORAL OBSERVATIONS: Pleasant and cooperative    ORAL MOTOR EXAM: Mild left labial droop however range of motion appears to be within functional limits.  Speech intelligibility is good.    VOICE QUALITY: Within normal limits    REFLEX EXAM: Deferred    POSTURE: 90 degrees upright    FEEDING/SWALLOWING FUNCTION: Assessed with full range of consistencies with thin liquids from spoon cup and straw, purée consistencies soft and hard solids.    CLINICAL OBSERVATIONS: Oral stage is characterized by good bolus preparation and control.  Timely oral transit.  No oral residue noted after the swallow.  Pharyngeal phase appears timely with good laryngeal elevation per palpation.  No overt clinical signs or symptoms of aspiration were noted.  Vocal quality remained clear to auscultation.  Patient denies globus sensation after completion of swallow.    DYSPHAGIA CRITERIA: N/A    FUNCTIONAL  ASSESSMENT INSTRUMENT: Patient currently scored a level 7 of 7 on Functional Communication Measures for swallowing indicating a 0% limitation in function.    ASSESSMENT/ PLAN OF CARE:  Pt presents with functional oropharyngeal swallow.  No clinical signs or symptoms of aspiration are noted.  Vocal quality remains clear to auscultation.  PROBLEMS:  1.  N/A   LTG 1: N/A   STG 1a: N/A       REHAB POTENTIAL:  Pt has good rehab potential.  The following limitations may influence improvement/ length of tx medical status.    RECOMMENDATIONS:   1.   DIET: Continue with regular diet-thin liquids    2.  POSITION: 90 degrees upright for all intake    3.  COMPENSATORY STRATEGIES: Monitor for any clinical signs of aspiration.    Available for modified barium swallow if MD is suspicious for silent aspiration.    YES: Pt/responsible party agrees with plan of care and has been informed of all alternatives, risks and benefits.        EDUCATION  The patient has been educated in the following areas:   Dysphagia (Swallowing Impairment).            Donita Brar, SLP  7/11/2022

## 2022-07-11 NOTE — THERAPY EVALUATION
Acute Care - Physical Therapy Initial Evaluation   [facility]     Patient Name: Audra Montejo  : 1960  MRN: [id number]  Today's Date: 2022      Visit Dx:     ICD-10-CM ICD-9-CM   1. Cerebellar stroke (HCC)  I63.9 434.91   2. Dizziness  R42 780.4   3. Dysphagia, oropharyngeal  R13.12 787.22   4. Difficulty walking  R26.2 719.7     Patient Active Problem List   Diagnosis   • Essential hypertension   • Mild intermittent asthma without complication   • Chronic left shoulder pain   • Anxiety   • Back pain   • Bilateral carpal tunnel syndrome   • Bipolar disorder (HCC)   • Eczema   • Gall stones   • Heart murmur   • Left shoulder pain   • Loss of appetite   • Migraine   • Nontraumatic tear of rotator cuff   • Palpitation   • Ringing in ears   • Seasonal allergic rhinitis   • Shortness of breath   • Stroke (HCC)   • Subacromial impingement, left   • Superior glenoid labrum lesion of left shoulder   • Trouble swallowing   • Chest pain   • Cerebellar stroke (HCC)     Past Medical History:   Diagnosis Date   • 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   • Acid reflux disease    • Allergy     unspecified   • Anxiety    • Back pain    • Bilateral carpal tunnel syndrome 2017   • Bipolar disorder ([facility])    • Bursitis of left shoulder 2016   • Depression    • Eczema    • Gall stones    • H/O psychiatric care    • Heart attack (HCC)    • Heart murmur    • Hypertension    • Incomplete tear of left rotator cuff 2017   • Left shoulder pain    • Loss of appetite    • Lumbago 2015    low back pain    • Memory loss     forgetfulness   • Migraine headache    • Need for hepatitis C screening test    • Palpitation    • Ringing in ears    • Seasonal allergies    • Shortness of breath    • Sinus trouble     unspecified    • Stroke (cerebrum) (HCC)    • Subacromial impingement, left 10/13/2016   • Superior glenoid labrum lesion of left shoulder 10/13/2016    subsequent encounter   • Trouble swallowing      Past  Surgical History:   Procedure Laterality Date   • APPENDECTOMY     • CARPAL TUNNEL RELEASE     • CHOLECYSTECTOMY     • COLONOSCOPY  2014   • ENDOSCOPY  2014   • HYSTERECTOMY  1999   • SHOULDER SURGERY Left 10/03/2016    arthroscopic, left shoulder scope, RTC repair- Dr. Flores     PT Assessment (last 12 hours)     PT Evaluation and Treatment     Row Name 07/11/22 1400          Physical Therapy Time and Intention    Subjective Information complains of  visual deficits  -DP     Document Type evaluation  -DP     Mode of Treatment individual therapy;physical therapy  -DP     Patient Effort adequate  -DP     Row Name 07/11/22 1400          General Information    Patient Profile Reviewed yes  -DP     Patient Observations alert;cooperative;agree to therapy  -DP     Prior Level of Function independent:;gait;transfer;ADL's;bed mobility  -DP     Equipment Currently Used at Home none  -DP     Existing Precautions/Restrictions fall  -DP     Row Name 07/11/22 1400          Living Environment    Current Living Arrangements home  -DP     Home Accessibility stairs to enter home  -DP     People in Home alone  -DP     Row Name 07/11/22 1400          Home Main Entrance    Number of Stairs, Main Entrance none  -DP     Row Name 07/11/22 1400          Range of Motion (ROM)    Range of Motion bilateral lower extremities;ROM is WFL  -DP     Row Name 07/11/22 1400          Strength (Manual Muscle Testing)    Strength (Manual Muscle Testing) bilateral lower extremities  4+/5  -DP     Row Name 07/11/22 1400          Vision Assessment/Intervention    Visual Impairment/Limitations blurry vision  in right eye  -DP     Row Name 07/11/22 1400          Bed Mobility    Bed Mobility supine-sit  -DP     Supine-Sit [place] (Bed Mobility) standby assist  -DP     Assistive Device (Bed Mobility) head of bed elevated  Pt was not able to tolerate the bed flat- complained of increased dizziness  -DP     Row Name 07/11/22 1400          Transfers     Transfers sit-stand transfer  -DP     Sit-Stand [place] (Transfers) contact guard  -DP     Row Name 07/11/22 1400          Gait/Stairs (Locomotion)    Gait/Stairs Locomotion gait/ambulation assistive device  -DP     [place] Level (Gait) minimum assist (75% patient effort)  -DP     Assistive Device (Gait) walker, front-wheeled  -DP     Distance in Feet (Gait) 14  -DP     Comment, (Gait/Stairs) Pt presents with balance and coordination deficits- tend to sway to the right  -DP     Row Name 07/11/22 1400          Balance    Balance Assessment standing dynamic balance  -DP     Dynamic Standing Balance minimal assist  -DP     Row Name 07/11/22 1400          Motor Skills    Motor Skills coordination  -DP     Coordination minimal impairment  -DP     Row Name 07/11/22 1400          Plan of Care Review    Plan of Care Reviewed With patient  -DP     Outcome Evaluation Pt presents with decreased strength, transfers, balance and coordination. She will benefit from inpatient PT services nad placement in a rehab facility upon DC from the hospital.  -DP     Row Name 07/11/22 1400          Therapy Assessment/Plan (PT)    Rehab Potential (PT) good, to achieve stated therapy goals  -DP     Criteria for Skilled Interventions Met (PT) yes;meets criteria  -DP     Therapy Frequency (PT) daily  -DP     Predicted Duration of Therapy Intervention (PT) 10 days  -DP     Problem List (PT) problems related to;balance;mobility  -DP     Activity Limitations Related to Problem List (PT) unable to transfer safely;unable to ambulate safely  -DP     Row Name 07/11/22 1400          PT Evaluation Complexity    History, PT Evaluation Complexity no personal factors and/or comorbidities  -DP     Examination of Body Systems (PT Eval Complexity) total of 4 or more elements  -DP     Clinical Presentation (PT Evaluation Complexity) stable  -DP     Clinical Decision Making (PT Evaluation Complexity) low complexity  -DP     Overall Complexity (PT  Evaluation Complexity) low complexity  -DP     Row Name 07/11/22 1400          Physical Therapy Goals    Transfer Goal Selection (PT) transfer, PT goal 1  -DP     Gait Training Goal Selection (PT) gait training, PT goal 1  -DP     Row Name 07/11/22 1400          Transfer Goal 1 (PT)    Activity/Assistive Device (Transfer Goal 1, PT) sit-to-stand/stand-to-sit  -DP     [place] Level/Cues Needed (Transfer Goal 1, PT) supervision required  -DP     Time Frame (Transfer Goal 1, PT) 10 days  -DP     Row Name 07/11/22 1400          Gait Training Goal 1 (PT)    Activity/Assistive Device (Gait Training Goal 1, PT) assistive device use;walker, rolling  -DP     [place] Level (Gait Training Goal 1, PT) supervision required  -DP     Distance (Gait Training Goal 1, PT) 200  -DP     Time Frame (Gait Training Goal 1, PT) 10 days  -DP           User Key  (r) = Recorded By, (t) = Taken By, (c) = Cosigned By    Initials Name Provider Type    Prema Pearson, PT Physical Therapist                Physical Therapy Education                 Title: PT OT SLP Therapies (Not Started)     Topic: Physical Therapy (Not Started)     Point: Mobility training (Not Started)     Learner Progress:  Not documented in this visit.          Point: Home exercise program (Not Started)     Learner Progress:  Not documented in this visit.          Point: Body mechanics (Not Started)     Learner Progress:  Not documented in this visit.          Point: Precautions (Not Started)     Learner Progress:  Not documented in this visit.                          PT Recommendation and Plan  Anticipated Discharge Disposition (PT): skilled nursing facility, inpatient rehabilitation facility, sub acute care setting  Planned Therapy Interventions (PT): balance training, bed mobility training, gait training, strengthening, transfer training  Therapy Frequency (PT): daily  Plan of Care Reviewed With: patient  Outcome Evaluation: Pt presents with decreased  strength, transfers, balance and coordination. She will benefit from inpatient PT services nad placement in a rehab facility upon DC from the hospital.   Outcome Measures     Row Name 07/11/22 1400             How much help from another person do you currently need...    Turning from your back to your side while in flat bed without using bedrails? 4  -DP      Moving from lying on back to sitting on the side of a flat bed without bedrails? 3  -DP      Moving to and from a bed to a chair (including a wheelchair)? 3  -DP      Standing up from a chair using your arms (e.g., wheelchair, bedside chair)? 3  -DP      Climbing 3-5 steps with a railing? 3  -DP              Functional Assessment    Outcome Measure Options AM-PAC 6 Clicks Basic Mobility (PT)  -DP            User Key  (r) = Recorded By, (t) = Taken By, (c) = Cosigned By    Initials Name Provider Type    Prema Pearson, PT Physical Therapist                 Time Calculation:    PT Charges     Row Name 07/11/22 1442             Time Calculation    PT Received On 07/11/22  -DP      PT Goal Re-Cert Due Date 07/20/22  -DP              Untimed Charges    PT Eval/Re-eval Minutes 40  -DP              Total Minutes    Untimed Charges Total Minutes 40  -DP       Total Minutes 40  -DP            User Key  (r) = Recorded By, (t) = Taken By, (c) = Cosigned By    Initials Name Provider Type    Prema Pearson, PT Physical Therapist              Therapy Charges for Today     Code Description Service Date Service Provider Modifiers Qty    13107809099 HC PT EVAL LOW COMPLEXITY 3 7/11/2022 Prema Price, PT GP 1          PT G-Codes  Outcome Measure Options: AM-PAC 6 Clicks Basic Mobility (PT)  AM-PAC 6 Clicks Score (PT): 19    Prema Price, PT  7/11/2022

## 2022-07-11 NOTE — PLAN OF CARE
Goal Outcome Evaluation:  Plan of Care Reviewed With: patient        Progress: no change (initial evaluation encounter)  Outcome Evaluation: Patient has experienced decline in function from baseline status, presenting w/ deficits related to coordination, vision and perception, safety awareness, strength, transfers and mobility that impede patient independence with activities of daily living.  Patient would benefit from skilled Occupational Therapy intervention to maxamize patient safety, and promote return to baseline independence. Patient will benefit from inpatient rehabilitation prior to return home.

## 2022-07-11 NOTE — PLAN OF CARE
Goal Outcome Evaluation:           Progress: no change       NIH 0. Pt c/o unchanged blurred vision. HA relieved with tylenol x1. Hydralazine IV once dose given for HTN. Continue to monitor.

## 2022-07-11 NOTE — PROGRESS NOTES
[facility]   Hospitalist Progress Note  Date: 2022  Patient Name: Audra Montejo  : 1960  MRN: [id number]  Date of admission: 7/10/2022      Subjective   Subjective     Chief complaint: Dizziness    Summary:  62 y.o. female with a history of bipolar disorder, CVA and hypertension presented with dizziness. Of note, she was recently hospitalized from  to 2022 and was found to have bilateral cerebellar infarcts right more than left with high-grade stenosis of PICA. She was evaluated by neurology, but she left AMA because she wanted to smoke. She presented again with presents with worsening dizziness, blurry vision mainly in her right eye and nausea difficulty ambulating due to imbalance and falls.  Hospitalized for further monitoring management. CT head reviewed showing evolving subacute bilateral cerebellar hemispheric infarcts and right medulla oblongata.  Uncontrolled hypertension being managed.    Interval follow-up: Patient seen and examined this morning, no acute distress, no acute major overnight events, patient continues to complain of dizziness and intermittent nausea, visual disturbance, headache.  Denies oral intolerance, denies fevers, chills, sweats.  Telemetry reviewed, bradycardic down to the 40s, holding beta-blocker.  Blood pressures remain elevated in the morning, systolics reaching the 190s, diastolics in the 60s, down to the 170s systolics, slowly correcting.  Heart rate slowly improving over the course of the day.  Denies chest pain or palpitations.  CT head reviewed showing evolving subacute bilateral cerebellar hemispheric infarcts and right medulla oblongata.     Review of systems:  All systems reviewed and negative except for dizziness and lightheadedness, generalized fatigue, blurred vision, imbalance  Objective   Objective     Vitals:   Temp:  [97.7 °F (36.5 °C)-98.1 °F (36.7 °C)] 98.1 °F (36.7 °C)  Heart Rate:  [42-69] 67  Resp:  [16-18] 16  BP: (150-192)/(46-85)  162/60  Physical Exam    Constitutional: Awake, alert, no acute distress   Eyes: Pupils equal, sclerae anicteric, no conjunctival injection   HENT: NCAT, mucous membranes moist   Neck: Supple, no palpable masses   Respiratory: Clear to auscultation bilaterally, nonlabored respirations    Cardiovascular: RRR, no murmurs, rubs, or gallops, palpable pedal pulses bilaterally   Gastrointestinal: Positive bowel sounds, soft, nontender, nondistended   Musculoskeletal: No bilateral ankle edema, no clubbing or cyanosis to extremities   Psychiatric: Appropriate affect, cooperative   Neurologic: Oriented x 3, strength symmetric in all extremities, Cranial Nerves grossly intact to confrontation, speech clear   Skin: No rashes       Result Review    Result Review:  I have personally reviewed the results from the time of this admission to 7/11/2022 17:43 EDT and agree with these findings:  [x]  Laboratory   CBC    CBC 7/6/22 7/10/22 7/11/22   WBC 13.18 (A) 11.99 (A) 12.86 (A)   RBC 5.71 (A) 5.44 (A) 5.30 (A)   Hemoglobin 17.0 (A) 16.2 (A) 15.8   Hematocrit 50.6 (A) 47.7 (A) 46.6   MCV 88.6 87.7 87.9   MCH 29.8 29.8 29.8   MCHC 33.6 34.0 33.9   RDW 12.8 13.0 12.7   Platelets 292 264 240   (A) Abnormal value            BMP    BMP 7/8/22 7/10/22 7/11/22   BUN 15 18 16   Creatinine 0.78 0.66 0.60   Sodium 141 144 143   Potassium 4.1 4.4 3.8   Chloride 105 110 (A) 108 (A)   CO2 26.5 22.9 23.5   Calcium 9.5 9.6 9.6   (A) Abnormal value            LIVER FUNCTION TESTS:      Lab 07/10/22  1833 07/08/22  0527 07/06/22  2058   TOTAL PROTEIN 7.3  --  8.0   ALBUMIN 4.20 4.00 4.70   GLOBULIN 3.1  --  3.3   ALT (SGPT) 23  --  30   AST (SGOT) 20  --  23   BILIRUBIN 0.2  --  0.5   ALK PHOS 139*  --  158*       [x]  Microbiology No results found for: ACANTHNAEG, AFBCX, BPERTUSSISCX, BLOODCX  No results found for: BCIDPCR, CXREFLEX, CSFCX, CULTURETIS  No results found for: CULTURES, HSVCX, URCX  No results found for: EYECULTURE, GCCX, HSVCULTURE,  LABHSV  No results found for: LEGIONELLA, MRSACX, MUMPSCX, MYCOPLASCX  No results found for: NOCARDIACX, STOOLCX  No results found for: THROATCX, UNSTIMCULT, URINECX, CULTURE, VZVCULTUR  No results found for: VIRALCULTU, WOUNDCX    [x]  Radiology Adult Transesophageal Echo (ARTHUR) W/ Cont if Necessary Per Protocol    Result Date: 7/8/2022  · Calculated left ventricular EF = 65% Estimated left ventricular EF was in agreement with the calculated left ventricular EF. Left ventricular systolic function is normal. · Mild mitral regurgitation · No evidence of thrombi in the left atrium left atrial appendage and the left ventricle · Bubble study negative      CT Head Without Contrast    Result Date: 7/10/2022  PROCEDURE: CT HEAD WO CONTRAST  COMPARISONS: 7/6/2022.  INDICATIONS: DIZZINESS; NAUSEA; H/O RECENT ACUTE CEREBELLAR HEMISPHERIC INFARCTS.  PROTOCOL:   Standard CT imaging protocol performed.    RADIATION:   Total DLP: 1,017.2 mGy*cm.   MA and/or KV were/was adjusted to minimize radiation dose.    TECHNIQUE: After obtaining the patient's consent, 130 CT images were obtained without non-ionic intravenous contrast material.  FINDINGS: Evolving subacute cerebellar hemispheric infarcts are noted.  Also, an evolving subacute right posterior, lateral infarct of the medulla oblongata is noted.  No acute intracranial hemorrhage is seen.  No definite new acute infarct is appreciated.  There may be chronic lacunar infarcts involving the right arnol, seen previously.  There is suspected mild effacement of the inferior 4th ventricle, related to the cytotoxic edema, greater on the right than the left, within the cerebellar hemispheres.  No acute skull fracture seen.  No acute subfalcine herniation.  There may be a tiny (3 mm) right-sided sublenticular cyst, as seen on image 30 of series 201.  Under-pneumatization of the bilateral mastoid temporal bones is suspected.  No air-fluid levels are seen in the imaged paranasal sinuses.  No  acute orbital findings are appreciated.       Evolving subacute bilateral cerebellar hemispheric infarcts and right medulla oblongata are seen, as discussed.  There is mild effacement of the inferior 4th ventricle.  No obstructive hydrocephalus is suggested.  No acute intracranial hemorrhage.  No definite new acute infarct is appreciated by nonenhanced head CT examination.  Please see above comments for further detail.    COMMENT:  Part of this note is an electronic transcription of spoken language to printed text. The electronic translation/transcription may permit erroneous, or at times, nonsensical (or even sensical) words or phrases to be inadvertently transcribed or omitted; this  has reviewed the note for such errors (as well as additional errors); however, some may still exist.  ONIEL VALDEZ JR, MD       Electronically Signed and Approved By: ONIEL VALDEZ JR, MD on 7/10/2022 at 22:39             CT Angiogram Neck    Result Date: 7/7/2022  PROCEDURE: CT ANGIOGRAM NECK  COMPARISONS: [facility], MR, MRI BRAIN WO CONTRAST, 7/06/2022, 22:47.   [facility], CT, CT HEAD WO CONTRAST STROKE PROTOCOL, 7/06/2022, 21:13.  [facility], CT, CT ANGIOGRAM HEAD, 7/06/2022, 23:29.  INDICATIONS: cva; hypertension; ha  PROTOCOL:   Standard imaging protocol performed    RADIATION:   Automated exposure control was utilized to minimize radiation dose.  TECHNIQUE: After obtaining the patient's consent, 617 CT/CTA images of the neck were obtained with intravenous contrast.  Multi-planar/3-D imaging was created and interpreted to optimize visualization of vascular anatomy.  Unless otherwise stated in this report, all vascular stenoses involving the internal carotid arteries, reported for this examination, are derived by dividing the lesion diameter by the diameter of the normal internal carotid artery more distally (that is, using NASCET criteria).  FINDINGS:  LEFT INTERNAL  CAROTID: No hemodynamically significant stenosis or dissection.  Mild calcified atherosclerotic plaque involves the distal internal carotid arteries bilaterally. EXTERNAL CAROTID: No hemodynamically significant stenosis or dissection.  COMMON CAROTID: Probably no hemodynamically significant stenosis or dissection.  There may be mild-to-moderate origin narrowing of the left common carotid artery. VERTEBRAL: No hemodynamically significant stenosis or dissection.  The vertebral arteries are codominant.  There is thought to be artifact obscuring the origin of the left vertebral artery.  RIGHT INTERNAL CAROTID: No hemodynamically significant stenosis or dissection.  Mild calcified atherosclerotic plaque involves the distal internal carotid arteries bilaterally. EXTERNAL CAROTID: No hemodynamically significant stenosis or dissection.  COMMON CAROTID: No hemodynamically significant stenosis or dissection.  VERTEBRAL: No hemodynamically significant stenosis or dissection.  The vertebral arteries are codominant.  OTHER: There are degenerative changes of the imaged spine.  There may be mild origin narrowing of the innominate artery.  There is incidental chronic calcified granulomatous disease of the chest.       No hemodynamically significant stenoses are seen.  No arterial dissection.       COMMENT:  Part of this note is an electronic transcription of spoken language to printed text. The electronic translation/transcription may permit erroneous, or at times, nonsensical (or even sensical) words or phrases to be inadvertently transcribed or omitted; this  has reviewed the note for such errors (as well as additional errors); however, some may still exist.  ONIEL VALDEZ JR, MD       Electronically Signed and Approved By: ONIEL VALDEZ JR, MD on 7/07/2022 at 1:54              MRI Brain Without Contrast    Result Date: 7/7/2022  PROCEDURE: MRI BRAIN WO CONTRAST  COMPARISONS: [facility], CT, CT  ANGIOGRAM HEAD, 7/06/2022, 23:29.   [facility], CT, CT ANGIOGRAM NECK, 7/06/2022, 23:29.   [facility], CT, CT HEAD WO CONTRAST, 7/06/2022, 21:13.   [facility], MR, MRA NECK W/O CONTRAST, 1/03/2017, 12:22.   [facility], MR, BRAIN W/O CONTRAST, 1/03/2017, 12:22.  INDICATIONS: Dizziness, nonspecific.  TECHNIQUE: A variety of imaging planes and parameters were utilized for visualization of suspected pathology.  Images were performed without contrast.  FINDINGS: The study is abnormal.  Restricted diffusion is seen in the bilateral cerebellar hemispheres as well as the cerebellar vermis, much greater on the right than the left, most suggestive of acute infarcts.  The right-sided cerebellar hemispheric acute infarct measures approximately 4.9 x 2.9 x 5.1 cm in transverse, craniocaudal, and anteroposterior (AP) extent, respectively, as seen on image 10 of series 12, image 38 of series 6, image 29 of series 3, and adjacent images.  The findings suggest an acute infarct involving a dominant right-sided posterior inferior cerebellar artery (PICA).  There is involvement of the cerebellar vermis.  Involvement of the posterolateral right medulla oblongata is possible.  Please correlate with history of lateral medullary syndrome (i.e., Wallenberg syndrome).  Chronic small vessel ischemia/infarction is also suspected, including the right paramidline arnol with a 6 mm chronic lacunar infarct identified, such as seen on image 8 of series 8.  There is also involvement of the subcortical and central bilateral cerebral white matter as well as the periventricular cerebral white matter.  No definite MRI evidence of acute intracranial hemorrhage.  There is associated slight deformity of the 4th ventricle related to the above-mentioned cerebellar cytotoxic edema.  No obstructive hydrocephalus is suggested.       Acute infarcts involve the bilateral cerebellar hemispheres, larger on the right than the  left, as detailed above.  No acute intracranial hemorrhage is seen.     COMMENT:  Part of this note is an electronic transcription of spoken language to printed text. The electronic translation/transcription may permit erroneous, or at times, nonsensical (or even sensical) words or phrases to be inadvertently transcribed or omitted; this  has reviewed the note for such errors (as well as additional errors); however, some may still exist.  ONIEL VALDEZ JR, MD       Electronically Signed and Approved By: ONIEL VALDEZ JR, MD on 7/07/2022 at 0:31              FL Video Swallow With Speech Single Contrast    Result Date: 7/11/2022  PROCEDURE: FL VIDEO SWALLOW W SPEECH SINGLE-CONTRAST  COMPARISON: None  INDICATIONS: rule out silent aspiration/ lateral medullary stroke/2.1 fluoro time/6.8mGy/0 images  TECHNIQUE: Examination was performed in conjunction with the speech pathologist. The patient was given both thin and thick liquid barium, applesauce with barium, contrast and guillermo cracker with Esophotrast. The patient's medication list was reviewed and documented in the medical record.  FINDINGS:  The patient's swallowing function was evaluated in the lateral imaging plane utilizing multiple phases of barium contrast.  No evidence of laryngeal penetration or aspiration during the study.  The oral phase was slow.        1. No evidence of laryngeal penetration or aspiration during the study     AAMIR MCCLOUD MD       Electronically Signed and Approved By: AAMIR MCCLOUD MD on 7/11/2022 at 10:54             XR Chest 1 View    Result Date: 7/10/2022  PROCEDURE: XR CHEST 1 VW  COMPARISON: 7/6/2022.  INDICATIONS: DIZZINESS, NAUSEA, & DIARRHEA.  FINDINGS:  A single AP upright portable chest radiograph was performed.  No cardiac enlargement is seen.  No acute infiltrate is appreciated.  No pleural effusion or pneumothorax is identified.  Chronic calcified granulomatous disease involves the chest.  The  thoracic aorta is atherosclerotic.  No significant interval change is seen since the prior study.        No acute infiltrate is appreciated.  No cardiac enlargement.      COMMENT:  Part of this note is an electronic transcription of spoken language to printed text. The electronic translation/transcription may permit erroneous, or at times, nonsensical (or even sensical) words or phrases to be inadvertently transcribed or omitted; this  has reviewed the note for such errors (as well as additional errors); however, some may still exist.  ONIEL VALDEZ JR, MD       Electronically Signed and Approved By: ONIEL VALDEZ JR, MD on 7/10/2022 at 19:20              XR Chest 1 View    Result Date: 7/6/2022  PROCEDURE: XR CHEST 1 VW  COMPARISON: [facility], CR, XR CHEST 1 VW, 2/15/2022, 23:48.  INDICATIONS: Stroke Protocol (Onset > 12 hrs).  FINDINGS:  A single AP upright portable chest radiograph was performed.  Borderline cardiac enlargement is seen.  No acute infiltrate is appreciated.  No pleural effusion or pneumothorax is identified.  External artifacts obscure detail.  Chronic calcified granulomatous disease involves the chest.  The thoracic aorta is atherosclerotic.  No significant interval change is seen since the prior study.        No acute infiltrate is appreciated.      COMMENT:  Part of this note is an electronic transcription of spoken language to printed text. The electronic translation/transcription may permit erroneous, or at times, nonsensical (or even sensical) words or phrases to be inadvertently transcribed or omitted; this  has reviewed the note for such errors (as well as additional errors); however, some may still exist.  ONIEL VALDEZ JR, MD       Electronically Signed and Approved By: ONIEL VALDEZ JR, MD on 7/06/2022 at 22:59              CT Angiogram Head    Addendum Date: 7/7/2022    ADDENDUM:  No cerebral aneurysms are appreciated.   ONIEL VALDEZ JR, MD        Electronically Signed and Approved By: ONIEL VALDEZ JR, MD on 7/07/2022 at 1:52              Result Date: 7/7/2022  PROCEDURE: CT ANGIOGRAM HEAD  COMPARISON: CT, CT ANGIOGRAM NECK, 7/06/2022, 23:29.  INDICATIONS: cva; history of hypertension and headache  PROTOCOL:   Standard CTA imaging protocol performed.    RADIATION:   DLP: 445.1mGy*cm   MA and/or KV was adjusted to minimize radiation dose.    CONTRAST: 100 mL Isovue 370 I.V.  TECHNIQUE: After obtaining the patient's consent, 777 CT/CTA images of the head were obtained with non-ionic contrast, and multi-planar/3-D imaging were created and interpreted to optimize visualization of vascular anatomy.   FINDINGS: There is an irregular CTA appearance of the proximal posterior inferior cerebellar artery (PICA), as seen on image 61 of series 402 and adjacent images.  The finding is also seen on image 19 of series 1002.  It may be related to multifocal moderate-to-high-grade stenoses as from atherosclerosis.  Vasospasm is possible.  Arteritis cannot be excluded.  Arterial emboli would be in the differential diagnosis.  The left posterior inferior cerebellar artery origin is not well seen.  It may be hypoplastic or absent.  There may be a dominant right-sided posterior inferior cerebellar artery (PICA), supplying portions of the left cerebellar hemisphere and the cerebellar vermis.  The intradural vertebral arteries and the basilar artery are continuously patent without hemodynamically significant stenoses.  The vertebral arteries are thought to be codominant.  There may be persistent fetal origin of the left posterior cerebral artery.  There is a short-segment moderate-to-high-grade stenosis involving the proximal right posterior cerebral artery at its P1 and P2 segment junctions, such as seen on image 227 of series 401 and image 5 of series 1001.  In the differential diagnosis would be vasospasm, embolus, arteritis, and/or other vasculopathies.  The A1 segment on  the left is thought to be hypoplastic.  Otherwise, no hemodynamically significant stenoses are seen.  No emergent large vessel occlusion is suggested elsewhere.  Please see the brain MRI exam report from 7/6/2022 at 2247 hours for further detail regarding important intracranial findings.         1. There is an irregular CTA appearance of the proximal right posterior inferior cerebellar artery (PICA), which may represent atherosclerotic change.  Vasospasm or arteritis would be in the differential diagnosis.  Arterial emboli are possible but probably less likely.  Other types of vasculopathies cannot be excluded.   2. There is also moderate-to-severe short-segment stenosis of the proximal right posterior cerebral artery (PCA), especially centered at its P1 and P2 segment junctions.  This finding may have similar etiologies as to that mentioned above for the findings in the right PICA.   3. Otherwise, no hemodynamically significant stenoses are seen.  No other areas of emergent large vessel occlusion are suggested.   4. Please see above comments for further detail.      COMMENT:  Part of this note is an electronic transcription of spoken language to printed text. The electronic translation/transcription may permit erroneous, or at times, nonsensical (or even sensical) words or phrases to be inadvertently transcribed or omitted; this  has reviewed the note for such errors (as well as additional errors); however, some may still exist.  ONIEL VALDZE JR, MD       Electronically Signed and Approved By: ONIEL VALDEZ JR, MD on 7/07/2022 at 1:48              CT Head Without Contrast Stroke Protocol    Result Date: 7/6/2022  PROCEDURE: CT HEAD WO CONTRAST STROKE PROTOCOL  COMPARISON: None.  INDICATIONS: Stroke, follow-up.  Hypertension.  Headache.  PROTOCOL:   Standard CT imaging protocol performed.    RADIATION:   Total DLP: 1,018.2mGy*cm.   MA and/or KV were/was adjusted to minimize radiation dose.     TECHNIQUE: After obtaining the patient's consent, 130 CT images were obtained without non-ionic intravenous contrast material.  FINDINGS: Abnormal attenuation is seen in the bilateral cerebellar hemispheres, greater on the right the left, and may represent age-indeterminate ischemia/infarction, most likely acute to subacute in nature, possibly involving the right posterior inferior cerebellar artery (PICA).  There is slight deformity of the 4th ventricle with slight effacement and displacement in an anterior, leftward direction, probably related to the right-sided cerebellar infarcts.  No obstructive hydrocephalus is suggested.  There may be mild chronic small vessel ischemia/infarction, including involvement of the brainstem, especially the right aspect of the arnol with a chronic lacunar infarct suspected, measuring about 5 mm.  Arterial calcifications are seen.  No acute intracranial hemorrhage is appreciated.  There may be slight right-to-left midline shift involving the posterior fossa.  In the supratentorial regions, no midline shift or acute intracranial herniation syndrome is suggested.  No other acute or subacute infarcts are suggested.  Minimal if any cerebellar tonsillar ectopia or inferior tonsillar herniation is suggested.  No supratentorial cerebellar herniation is suggested.  There may be mild age-indeterminate sinus disease.  No air-fluid interfaces are appreciated within the imaged paranasal sinuses.  Streak artifact from dental amalgam obscures detail. The imaged middle ear clefts (that is, the bilateral mastoid air cell complexes, bilateral middle ear cavities, and bilateral external auditory canals) are well aerated.  No acute findings are seen within the partially imaged orbits.         1. There are age-indeterminate but probably acute to subacute bilateral cerebellar hemispheric infarcts, greater on the right than the left.  The findings may represent an acute to subacute right posterior  inferior cerebellar artery (PICA) infarct.  No acute intracranial hemorrhage is seen.   2. Mild chronic small vessel ischemia/infarction is suggested.  3. Please see above comments for further detail.     COMMENT:  Part of this note is an electronic transcription of spoken language to printed text. The electronic translation/transcription may permit erroneous, or at times, nonsensical (or even sensical) words or phrases to be inadvertently transcribed or omitted; this  has reviewed the note for such errors (as well as additional errors); however, some may still exist.  ONIEL VALDEZ JR, MD       Electronically Signed and Approved By: ONIEL VALDEZ JR, MD on 7/06/2022 at 21:23                [x]  EKG/Telemetry   []  Cardiology/Vascular   []  Pathology  [x]  Old records  []  Other:    Assessment & Plan   Assessment / Plan     Assessment/Plan:  Assessment:  Subacute CVA involving bilateral cerebellum, right medulla oblongata  Uncontrolled hypertension  Reactive leukocytosis  Nausea and vomiting related to subacute CVA  Current tobacco smoker  Sinus bradycardia  Bipolar disorder  Mild intermittent asthma  Irregularity of PICA  Moderate to severe cervical segment stenosis right PCA    Plan:  Labs and imaging reviewed  Neurology consultation, follow-up recommendations  Continue amlodipine 5 mg daily  Slow correction of blood pressure  Aspirin 80 mg daily  Stroke protocol  Check ESR/CRP  High intensity statin atorvastatin 80 mg nightly  As needed hydralazine  Stroke work-up performed on previous hospitalization  PT/OT and some diet per speech  Hold beta-blocker  Monitor heart rate on telemetry  DVT prophylaxis with heparin  Clinical course to dictate further management  Zofran for nausea  Discussed with nurse at the bedside    DVT prophylaxis:  Medical and mechanical DVT prophylaxis orders are present.    CODE STATUS:   Code Status (Patient has no pulse and is not breathing): CPR (Attempt to  Resuscitate)  Medical Interventions (Patient has pulse or is breathing): Full Support        Electronically signed by Uziel Knutson MD, 07/11/22, 5:43 PM EDT.

## 2022-07-11 NOTE — H&P
[facility]    Patient Identification:  Name:  Audra Montejo  Age:  62 y.o.  Sex:  female  :  1960  MRN:  [id number]   Visit Number:  [id number]  Admit Date: 7/10/2022   Room number:    Primary Care Physician:  Anayeli Pina MD    Date of Admission: 7/10/2022     Subjective     Chief complaint:    Chief Complaint   Patient presents with   • Dizziness   • Nausea   • Diarrhea       History of presenting illness:    Audra Montejo is a 62 y.o. female with a history of bipolar disorder, CVA and hypertension presengint with dizziness. Of note, she was recently hospitalized from  to  and was found to have bilateral cerebellar infarcts right more than left with high-grade stenosis of PICA. She was evaluated by neurology, but she left AMA because she wanted to smoke. She now presents with worsening dizzyness, blurry vision mainly in her right eye and nausea. Pt is also having difficulty walking and has fallen a few times as well. In ED, blood pressure 176/70, heart rate 44, respiratory rate 16 and O2 saturation 90% on room air. Labs on arrival showed a sodium 144, potassium 4.4, BUN 18, creatinine 0.6, WBC 11.9, hemoglobin 16.2 and platelet count 264. At this time, she is complaining of dizzyness and nausea.     ---------------------------------------------------------------------------------------------------------------------   Review of Systems   Constitutional: Positive for activity change. Negative for chills and diaphoresis.   HENT: Negative for sneezing and tinnitus.    Eyes: Positive for visual disturbance.   Respiratory: Negative for choking and chest tightness.    Cardiovascular: Negative for chest pain.   Gastrointestinal: Positive for nausea and vomiting.   Neurological: Positive for dizziness.     ---------------------------------------------------------------------------------------------------------------------   Past Medical History:    Diagnosis Date   • 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   • Acid reflux disease    • Allergy     unspecified   • Anxiety    • Back pain    • Bilateral carpal tunnel syndrome 08/17/2017   • Bipolar disorder (HCC)    • Bursitis of left shoulder 08/09/2016   • Depression    • Eczema    • Gall stones    • H/O psychiatric care    • Heart attack ([facility])    • Heart murmur    • Hypertension    • Incomplete tear of left rotator cuff 01/25/2017   • Left shoulder pain    • Loss of appetite    • Lumbago 05/08/2015    low back pain    • Memory loss     forgetfulness   • Migraine headache    • Need for hepatitis C screening test    • Palpitation    • Ringing in ears    • Seasonal allergies    • Shortness of breath    • Sinus trouble     unspecified    • Stroke (cerebrum) ([facility])    • Subacromial impingement, left 10/13/2016   • Superior glenoid labrum lesion of left shoulder 10/13/2016    subsequent encounter   • Trouble swallowing      Past Surgical History:   Procedure Laterality Date   • APPENDECTOMY     • CARPAL TUNNEL RELEASE     • CHOLECYSTECTOMY     • COLONOSCOPY  2014   • ENDOSCOPY  2014   • HYSTERECTOMY  1999   • SHOULDER SURGERY Left 10/03/2016    arthroscopic, left shoulder scope, RTC repair- Dr. Flores     Family History   Problem Relation Age of Onset   • Stroke Mother    • Diabetes Mother    • Arthritis Mother    • Heart disease Father    • Cancer Father    • Heart disease Sister    • Cancer Sister    • Diabetes Sister    • Arthritis Sister    • Heart disease Brother    • Diabetes Brother    • Hypertension Other    • Rheum arthritis Other    • Stroke Son    • Heart disease Son    • Cancer Son    • Other Son         blood disease   • Arthritis Son    • Osteoporosis Son      Social History     Socioeconomic History   • Marital status: Single   Tobacco Use   • Smoking status: Current Every Day Smoker     Packs/day: 0.50     Years: 30.00     Pack years: 15.00     Types: Cigarettes   • Smokeless tobacco: Never Used   • Tobacco  comment: smoked 21-30 years    Vaping Use   • Vaping Use: Never used   Substance and Sexual Activity   • Alcohol use: Not Currently     Comment: rarely drinks   • Drug use: Never   • Sexual activity: Defer     ---------------------------------------------------------------------------------------------------------------------   Allergies:  Tape  ---------------------------------------------------------------------------------------------------------------------   Medications below are reported home medications pulling from within the system; at this time, these medications have not been reconciled unless otherwise specified and are in the verification process for further verifcation as current home medications.      Prior to Admission Medications     Prescriptions Last Dose Informant Patient Reported? Taking?    metoprolol succinate XL (TOPROL-XL) 25 MG 24 hr tablet 7/9/2022  Yes Yes    Take 25 mg by mouth Daily.    montelukast (SINGULAIR) 10 MG tablet 7/9/2022  Yes Yes    Take 10 mg by mouth Every Night.    oxybutynin XL (DITROPAN-XL) 10 MG 24 hr tablet 7/9/2022  Yes Yes    Take 10 mg by mouth Daily.    albuterol (PROVENTIL) (2.5 MG/3ML) 0.083% nebulizer solution Unknown Self Yes No    Take 2.5 mg by nebulization 3 (Three) Times a Day.    albuterol sulfate  (90 Base) MCG/ACT inhaler Unknown  Yes No    Inhale 2 puffs Every 6 (Six) Hours As Needed for Wheezing.    amLODIPine (NORVASC) 5 MG tablet   Yes No    Take 5 mg by mouth Daily. 14 day supply    ondansetron ODT (ZOFRAN-ODT) 4 MG disintegrating tablet   Yes No    Place 4 mg under the tongue Every 6 (Six) Hours As Needed.        Objective     Vital Signs:  Temp:  [98 °F (36.7 °C)] 98 °F (36.7 °C)  Heart Rate:  [43-55] 43  Resp:  [16] 16  BP: (161-176)/(62-76) 165/76    Mean Arterial Pressure (Non-Invasive) for the past 24 hrs (Last 3 readings):   Noninvasive MAP (mmHg)   07/10/22 2200 101   07/10/22 1939 100     SpO2:  [97 %-100 %] 97 %  on   ;   Device  (Oxygen Therapy): room air  Body mass index is 31.19 kg/m².    Wt Readings from Last 3 Encounters:   07/10/22 67.7 kg (149 lb 4 oz)   07/07/22 69.5 kg (153 lb 3.5 oz)   07/06/22 65.8 kg (145 lb)      ----------------------------------------------------------------------------------------------------------------------  PHYSICAL EXAMINATION:  GENERAL: The patient is well developed and nontoxic.  HEENT: Nonicteric sclerae, PERRLA, EOMI. Oropharynx clear. Moist mucous membranes. Conjunctivae appear well perfused.  CHEST: Chest wall is nontender.  HEART: Regular rate and rhythm without murmurs.  LUNGS: Clear to auscultation bilaterally.  ABDOMEN: Soft, positive bowel sounds, nontender, no organomegaly.  RECTAL: Deferred.  SKIN: No rash, no excessive bruising, petechiae, or purpura.  NEUROLOGIC: Cranial nerves II-XII intact without motor/sensory deficit.    ---------------------------------------------------------------------------------------------------------------------  --------------------------------------------------------------------------------------------------------------------  LABS:    CBC and coagulation:  Results from last 7 days   Lab Units 07/10/22  1833 07/06/22  2058   WBC 10*3/mm3 11.99* 13.18*   HEMOGLOBIN g/dL 16.2* 17.0*   HEMATOCRIT % 47.7* 50.6*   MCV fL 87.7 88.6   MCHC g/dL 34.0 33.6   PLATELETS 10*3/mm3 264 292   INR   --  1.00     Acid/base balance:      Renal and electrolytes:  Results from last 7 days   Lab Units 07/10/22  1833 07/08/22  0527 07/06/22  2058   SODIUM mmol/L 144 141 142   POTASSIUM mmol/L 4.4 4.1 4.4   MAGNESIUM mg/dL 1.9  --   --    CHLORIDE mmol/L 110* 105 104   CO2 mmol/L 22.9 26.5 25.2   BUN mg/dL 18 15 19   CREATININE mg/dL 0.66 0.78 0.86   CALCIUM mg/dL 9.6 9.5 10.5   PHOSPHORUS mg/dL  --  3.9  --    GLUCOSE mg/dL 132* 90 108*     Estimated Creatinine Clearance: 79.1 mL/min (by C-G formula based on SCr of 0.66 mg/dL).    Liver and pancreatic function:  Results from last  7 days   Lab Units 07/10/22  1833 07/08/22  0527 07/06/22  2058   ALBUMIN g/dL 4.20 4.00 4.70   BILIRUBIN mg/dL 0.2  --  0.5   ALK PHOS U/L 139*  --  158*   AST (SGOT) U/L 20  --  23   ALT (SGPT) U/L 23  --  30     Endocrine function:  No results found for: HGBA1C  Point of care bedside glucose levels:  Results from last 7 days   Lab Units 07/10/22  1948   GLUCOSE mg/dL 105*     No results found for: TSH, FREET4  Cardiac:  Results from last 7 days   Lab Units 07/10/22  1833   TROPONIN T ng/mL <0.010     Results from last 7 days   Lab Units 07/07/22  0531   CHOLESTEROL mg/dL 160   TRIGLYCERIDES mg/dL 77   HDL CHOL mg/dL 58   LDL CHOL mg/dL 87     Cultures:  Lab Results   Component Value Date    COLORU Yellow 03/07/2022    CLARITYU Clear 03/07/2022    SPECGRAV 1.030 03/07/2022    PHUR 5.5 03/07/2022    KETONESU Negative 03/07/2022    LEUKOCYTESUR Negative 03/07/2022    BILIRUBINUR Negative 03/07/2022    UROBILINOGEN Normal 03/07/2022     Microbiology Results (last 10 days)     Procedure Component Value - Date/Time    COVID-19,APTIMA PANTHER(STEFFEN),[facility], NP/OP SWAB IN UTM/VTM/SALINE TRANSPORT MEDIA,24 HR TAT - Swab, Nasopharynx [[id number]]  (Normal) Collected: 07/06/22 2006    Lab Status: Final result Specimen: Swab from Nasopharynx Updated: 07/07/22 1742     COVID19 Not Detected    Narrative:      Fact sheet for providers: https://www.fda.gov/media/490934/download     Fact sheet for patients: https://www.fda.gov/media/520237/download    Test performed by RT PCR.          No results found for: PREGTESTUR, PREGSERUM, HCG, HCGQUANT  Pain Management Panel    There is no flowsheet data to display.         I have personally looked at the labs and they are summarized above.  ----------------------------------------------------------------------------------------------------------------------  Detailed radiology reports for the last 24 hours:    Imaging Results (Last 24 Hours)     Procedure Component Value Units  Date/Time    CT Head Without Contrast [[id number]] Resulted: 07/10/22 2210     Updated: 07/10/22 2211    XR Chest 1 View [[id number]] Collected: 07/10/22 1920     Updated: 07/10/22 1923    Narrative:      PROCEDURE: XR CHEST 1 VW     COMPARISON: 7/6/2022.     INDICATIONS: DIZZINESS, NAUSEA, & DIARRHEA.     FINDINGS:  A single AP upright portable chest radiograph was performed.  No cardiac enlargement is   seen.  No acute infiltrate is appreciated.  No pleural effusion or pneumothorax is identified.    Chronic calcified granulomatous disease involves the chest.  The thoracic aorta is atherosclerotic.    No significant interval change is seen since the prior study.       Impression:        No acute infiltrate is appreciated.  No cardiac enlargement.                 COMMENT:  Part of this note is an electronic transcription of spoken language to printed text. The   electronic translation/transcription may permit erroneous, or at times, nonsensical (or even   sensical) words or phrases to be inadvertently transcribed or omitted; this  has   reviewed the note for such errors (as well as additional errors); however, some may still exist.     ONIEL VALDEZ JR, MD         Electronically Signed and Approved By: ONIEL VALDEZ JR, MD on 7/10/2022 at 19:20                            Final impressions for the last 30 days of radiology reports:    CT Angiogram Neck    Result Date: 7/7/2022   No hemodynamically significant stenoses are seen.  No arterial dissection.       COMMENT:  Part of this note is an electronic transcription of spoken language to printed text. The electronic translation/transcription may permit erroneous, or at times, nonsensical (or even sensical) words or phrases to be inadvertently transcribed or omitted; this  has reviewed the note for such errors (as well as additional errors); however, some may still exist.  ONIEL VALDEZ JR, MD       Electronically Signed and Approved By: ONIEL CONWAY  COURTNEY ROLON MD on 7/07/2022 at 1:54              MRI Brain Without Contrast    Result Date: 7/7/2022   Acute infarcts involve the bilateral cerebellar hemispheres, larger on the right than the left, as detailed above.  No acute intracranial hemorrhage is seen.     COMMENT:  Part of this note is an electronic transcription of spoken language to printed text. The electronic translation/transcription may permit erroneous, or at times, nonsensical (or even sensical) words or phrases to be inadvertently transcribed or omitted; this  has reviewed the note for such errors (as well as additional errors); however, some may still exist.  ONIEL VALDEZ JR, MD       Electronically Signed and Approved By: ONIEL VALDEZ JR, MD on 7/07/2022 at 0:31              XR Chest 1 View    Result Date: 7/10/2022    No acute infiltrate is appreciated.  No cardiac enlargement.      COMMENT:  Part of this note is an electronic transcription of spoken language to printed text. The electronic translation/transcription may permit erroneous, or at times, nonsensical (or even sensical) words or phrases to be inadvertently transcribed or omitted; this  has reviewed the note for such errors (as well as additional errors); however, some may still exist.  ONIEL VALDEZ JR, MD       Electronically Signed and Approved By: ONIEL VALDEZ JR, MD on 7/10/2022 at 19:20              XR Chest 1 View    Result Date: 7/6/2022    No acute infiltrate is appreciated.      COMMENT:  Part of this note is an electronic transcription of spoken language to printed text. The electronic translation/transcription may permit erroneous, or at times, nonsensical (or even sensical) words or phrases to be inadvertently transcribed or omitted; this  has reviewed the note for such errors (as well as additional errors); however, some may still exist.  ONIEL VALDEZ JR, MD       Electronically Signed and Approved By: ONIEL VALDEZ JR, MD on  7/06/2022 at 22:59              CT Angiogram Head    Addendum Date: 7/7/2022    ADDENDUM:  No cerebral aneurysms are appreciated.   ONIEL VALDEZ JR, MD       Electronically Signed and Approved By: ONIEL VALDEZ JR, MD on 7/07/2022 at 1:52              Result Date: 7/7/2022     1. There is an irregular CTA appearance of the proximal right posterior inferior cerebellar artery (PICA), which may represent atherosclerotic change.  Vasospasm or arteritis would be in the differential diagnosis.  Arterial emboli are possible but probably less likely.  Other types of vasculopathies cannot be excluded.   2. There is also moderate-to-severe short-segment stenosis of the proximal right posterior cerebral artery (PCA), especially centered at its P1 and P2 segment junctions.  This finding may have similar etiologies as to that mentioned above for the findings in the right PICA.   3. Otherwise, no hemodynamically significant stenoses are seen.  No other areas of emergent large vessel occlusion are suggested.   4. Please see above comments for further detail.      COMMENT:  Part of this note is an electronic transcription of spoken language to printed text. The electronic translation/transcription may permit erroneous, or at times, nonsensical (or even sensical) words or phrases to be inadvertently transcribed or omitted; this  has reviewed the note for such errors (as well as additional errors); however, some may still exist.  ONIEL VALDEZ JR, MD       Electronically Signed and Approved By: ONIEL VALDEZ JR, MD on 7/07/2022 at 1:48              CT Head Without Contrast Stroke Protocol    Result Date: 7/6/2022     1. There are age-indeterminate but probably acute to subacute bilateral cerebellar hemispheric infarcts, greater on the right than the left.  The findings may represent an acute to subacute right posterior inferior cerebellar artery (PICA) infarct.  No acute intracranial hemorrhage is seen.   2. Mild  chronic small vessel ischemia/infarction is suggested.  3. Please see above comments for further detail.     COMMENT:  Part of this note is an electronic transcription of spoken language to printed text. The electronic translation/transcription may permit erroneous, or at times, nonsensical (or even sensical) words or phrases to be inadvertently transcribed or omitted; this  has reviewed the note for such errors (as well as additional errors); however, some may still exist.  ONIEL VALDEZ JR, MD       Electronically Signed and Approved By: ONIEL VALDEZ JR, MD on 7/06/2022 at 21:23                  Assessment & Plan     Audra Montejo is a 62 y.o. female with a history of bipolar disorder, CVA and hypertension presengint with dizziness.     CVA:  -Telemetry  -Neuro checks every 4 hours  -Aspirin and Lipitor daily  -PT/OT  -Speech therapy  - Consult neurology    Leukocytosis:  -Possibly reactive  -Patient is afebrile  -No signs of infection at this time  -Will hold off on starting any antibiotics  -Follow-up blood cultures    Hypertension:  -Currently well controlled  -Hydralazine as needed  -Resume home meds    Nausea/vomiting:  -Zofran as needed       Daryl Hendrickson MD  [facility]  07/10/22  22:32 EDT

## 2022-07-11 NOTE — PLAN OF CARE
Goal Outcome Evaluation:  Plan of Care Reviewed With: patient        Progress: no change  Outcome Evaluation: Pt admitted from ER around 1 am, NIH 0 with main complaints being blurry vision, headache, and unstable gait. Is able to get up to bedside commode with 1-assist, is very unsteady on her feet, grabbing furniture to steady herself. No vomiting. HR remains in the 40s. Will monitor closely. J LUIS Watson RN

## 2022-07-11 NOTE — ED PROVIDER NOTES
Time: 9:42 PM EDT  Arrived by: private car  Chief Complaint: Dizziness  History provided by: Patient  History is limited by: N/A     History of Present Illness:      Patient is a 62 y.o. year old female who presents to the emergency department with dizziness, nausea, and diarrhea. Pt reports the symptoms got better and then worse again. Pt was here on 7/06/22 after a cerebellar stroke and left AMA because she wanted to smoke. Pt is a current everyday smoker. Pt has a hx of heart attack, memory loss, bipolar disorder, stroke.      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   • Acid reflux disease    • Allergy     unspecified   • Anxiety    • Back pain    • Bilateral carpal tunnel syndrome 08/17/2017   • Bipolar disorder (HCC)    • Bursitis of left shoulder 08/09/2016   • Depression    • Eczema    • Gall stones    • H/O psychiatric care    • Heart attack (HCC)    • Heart murmur    • Hypertension    • Incomplete tear of left rotator cuff 01/25/2017   • Left shoulder pain    • Loss of appetite    • Lumbago 05/08/2015    low back pain    • Memory loss     forgetfulness   • Migraine headache    • Need for hepatitis C screening test    • Palpitation    • Ringing in ears    • Seasonal allergies    • Shortness of breath    • Sinus trouble     unspecified    • Stroke (cerebrum) ([facility])    • Subacromial impingement, left 10/13/2016   • Superior glenoid labrum lesion of left shoulder 10/13/2016    subsequent encounter   • Trouble swallowing      Past Surgical History:   Procedure Laterality Date   • APPENDECTOMY     • CARPAL TUNNEL RELEASE     • CHOLECYSTECTOMY     • COLONOSCOPY  2014   • ENDOSCOPY  2014   •  HYSTERECTOMY  1999   • SHOULDER SURGERY Left 10/03/2016    arthroscopic, left shoulder scope, RTC repair- Dr. Flores     Family History   Problem Relation Age of Onset   • Stroke Mother    • Diabetes Mother    • Arthritis Mother    • Heart disease Father    • Cancer Father    • Heart disease Sister    • Cancer Sister    • Diabetes Sister    • Arthritis Sister    • Heart disease Brother    • Diabetes Brother    • Hypertension Other    • Rheum arthritis Other    • Stroke Son    • Heart disease Son    • Cancer Son    • Other Son         blood disease   • Arthritis Son    • Osteoporosis Son        Home Medications:  Prior to Admission medications    Medication Sig Start Date End Date Taking? Authorizing Provider   albuterol (PROVENTIL) (2.5 MG/3ML) 0.083% nebulizer solution Take 2.5 mg by nebulization 3 (Three) Times a Day.    Israel Tarango MD   albuterol sulfate  (90 Base) MCG/ACT inhaler Inhale 2 puffs Every 6 (Six) Hours As Needed for Wheezing.    Israel Tarango MD   ketoconazole (NIZORAL) 2 % cream Apply 1 application topically to the appropriate area as directed Daily.    Israel Tarango MD   metoprolol succinate XL (TOPROL-XL) 25 MG 24 hr tablet Take 25 mg by mouth Daily.    Israel Tarango MD   montelukast (SINGULAIR) 10 MG tablet Take 10 mg by mouth Every Night.    Israel Tarango MD   oxybutynin XL (DITROPAN-XL) 10 MG 24 hr tablet Take 10 mg by mouth Daily.    Israel Tarango MD   silver sulfadiazine (SILVADENE, SSD) 1 % cream Apply 1 application topically to the appropriate area as directed 2 (Two) Times a Day.    Israel Tarango MD        Social History:   Social History     Tobacco Use   • Smoking status: Current Every Day Smoker     Packs/day: 0.50     Years: 30.00     Pack years: 15.00     Types: Cigarettes   • Smokeless tobacco: Never Used   • Tobacco comment: smoked 21-30 years    Vaping Use   • Vaping Use: Never used   Substance Use Topics   • Alcohol  "use: Not Currently     Comment: rarely drinks   • Drug use: Never     Recent travel: not applicable     Review of Systems:  Review of Systems   Constitutional: Negative for chills and fever.   HENT: Negative for congestion, ear pain and sore throat.    Eyes: Negative for pain.   Respiratory: Negative for cough, chest tightness and shortness of breath.    Cardiovascular: Negative for chest pain.   Gastrointestinal: Positive for diarrhea, nausea and vomiting. Negative for abdominal pain.   Endocrine: Negative for cold intolerance.   Genitourinary: Negative for difficulty urinating, dysuria, flank pain, hematuria and urgency.   Musculoskeletal: Negative for joint swelling.   Skin: Negative for pallor.   Neurological: Positive for dizziness and weakness. Negative for seizures and headaches.   Hematological: Negative for adenopathy. Does not bruise/bleed easily.   Psychiatric/Behavioral: Negative for agitation, decreased concentration and suicidal ideas. The patient is not nervous/anxious and is not hyperactive.    All other systems reviewed and are negative.       Physical Exam:  /64 (BP Location: Right arm, Patient Position: Sitting)   Pulse 64   Temp 98.4 °F (36.9 °C) (Oral)   Resp 18   Ht 147.3 cm (58\")   Wt 67 kg (147 lb 11.3 oz)   SpO2 99%   BMI 30.87 kg/m²     Physical Exam  Vitals and nursing note reviewed.   Constitutional:       General: She is not in acute distress.     Appearance: Normal appearance. She is not toxic-appearing.   HENT:      Head: Normocephalic and atraumatic.      Jaw: There is normal jaw occlusion.   Eyes:      General: Lids are normal.      Extraocular Movements: Extraocular movements intact.      Conjunctiva/sclera: Conjunctivae normal.      Pupils: Pupils are equal, round, and reactive to light.   Cardiovascular:      Rate and Rhythm: Normal rate and regular rhythm.      Pulses: Normal pulses.      Heart sounds: Normal heart sounds.   Pulmonary:      Effort: Pulmonary effort " is normal. No respiratory distress.      Breath sounds: Normal breath sounds. No wheezing or rhonchi.   Abdominal:      General: Abdomen is flat.      Palpations: Abdomen is soft.      Tenderness: There is no abdominal tenderness. There is no guarding or rebound.   Musculoskeletal:         General: Normal range of motion.      Cervical back: Normal range of motion and neck supple.      Right lower leg: No edema.      Left lower leg: No edema.   Skin:     General: Skin is warm and dry.   Neurological:      Mental Status: She is alert and oriented to person, place, and time. Mental status is at baseline.   Psychiatric:         Mood and Affect: Mood normal.                Medications in the Emergency Department:  Medications   nitroglycerin (NITROSTAT) SL tablet 0.4 mg (has no administration in time range)   sodium chloride 0.9 % flush 10 mL (has no administration in time range)   sodium chloride 0.9 % flush 10 mL (10 mL Intravenous Not Given 7/13/22 0913)   sodium chloride 0.9 % infusion 40 mL (has no administration in time range)   heparin (porcine) 5000 UNIT/ML injection 5,000 Units (5,000 Units Subcutaneous Given 7/13/22 0536)   sodium chloride 0.9 % flush 10 mL (has no administration in time range)   sodium chloride 0.9 % flush 10 mL (10 mL Intravenous Not Given 7/13/22 0913)   sodium chloride 0.9 % infusion 40 mL (has no administration in time range)   atorvastatin (LIPITOR) tablet 80 mg (80 mg Oral Given 7/12/22 2145)   aspirin chewable tablet 81 mg (81 mg Oral Given 7/13/22 0909)     Or   aspirin suppository 300 mg ( Rectal Not Given:  See Alt 7/13/22 0909)   ondansetron (ZOFRAN) injection 4 mg (has no administration in time range)   hydrALAZINE (APRESOLINE) injection 10 mg (10 mg Intravenous Not Given 7/11/22 0926)   albuterol (PROVENTIL) nebulizer solution 0.083% 2.5 mg/3mL ( Nebulization Canceled Entry 7/13/22 0830)   amLODIPine (NORVASC) tablet 5 mg (5 mg Oral Given 7/13/22 0910)   montelukast (SINGULAIR)  tablet 10 mg (10 mg Oral Given 7/12/22 2146)   oxybutynin XL (DITROPAN-XL) 24 hr tablet 10 mg (10 mg Oral Given 7/13/22 0909)   acetaminophen (TYLENOL) tablet 650 mg (650 mg Oral Given 7/12/22 1522)   nicotine polacrilex (COMMIT) lozenge 2 mg (has no administration in time range)   clopidogrel (PLAVIX) tablet 75 mg (75 mg Oral Given 7/13/22 0909)   polyethylene glycol (MIRALAX) packet 17 g (17 g Oral Not Given 7/13/22 0800)   losartan (COZAAR) tablet 25 mg (25 mg Oral Given 7/13/22 0909)   hydrALAZINE (APRESOLINE) injection 10 mg (10 mg Intravenous Given 7/11/22 0928)        Labs  Lab Results (last 24 hours)     Procedure Component Value Units Date/Time    Basic Metabolic Panel [[id number]]  (Abnormal) Collected: 07/13/22 0441    Specimen: Blood from Arm, Right Updated: 07/13/22 0519     Glucose 102 mg/dL      BUN 10 mg/dL      Creatinine 0.67 mg/dL      Sodium 141 mmol/L      Potassium 3.7 mmol/L      Chloride 105 mmol/L      CO2 25.1 mmol/L      Calcium 9.6 mg/dL      BUN/Creatinine Ratio 14.9     Anion Gap 10.9 mmol/L      eGFR 100.2 mL/min/1.73      Comment: National Kidney Foundation and American Society of Nephrology (ASN) Task Force recommended calculation based on the Chronic Kidney Disease Epidemiology Collaboration (CKD-EPI) equation refit without adjustment for race.       Narrative:      GFR Normal >60  Chronic Kidney Disease <60  Kidney Failure <15      CBC & Differential [[id number]]  (Abnormal) Collected: 07/13/22 0441    Specimen: Blood from Arm, Right Updated: 07/13/22 0453    Narrative:      The following orders were created for panel order CBC & Differential.  Procedure                               Abnormality         Status                     ---------                               -----------         ------                     CBC Auto Differential[[id number]]        Abnormal            Final result                 Please view results for these tests on the individual orders.    Magnesium  [[id number]]  (Normal) Collected: 07/13/22 0441    Specimen: Blood from Arm, Right Updated: 07/13/22 0519     Magnesium 1.9 mg/dL     Phosphorus [[id number]]  (Abnormal) Collected: 07/13/22 0441    Specimen: Blood from Arm, Right Updated: 07/13/22 0519     Phosphorus 4.7 mg/dL     Hepatic Function Panel [[id number]]  (Abnormal) Collected: 07/13/22 0441    Specimen: Blood from Arm, Right Updated: 07/13/22 0519     Total Protein 7.0 g/dL      Albumin 4.20 g/dL      ALT (SGPT) 17 U/L      AST (SGOT) 17 U/L      Alkaline Phosphatase 136 U/L      Total Bilirubin 0.4 mg/dL      Bilirubin, Direct <0.2 mg/dL      Bilirubin, Indirect --     Comment: Unable to calculate       CBC Auto Differential [[id number]]  (Abnormal) Collected: 07/13/22 0441    Specimen: Blood from Arm, Right Updated: 07/13/22 0453     WBC 13.10 10*3/mm3      RBC 5.04 10*6/mm3      Hemoglobin 15.0 g/dL      Hematocrit 43.5 %      MCV 86.3 fL      MCH 29.8 pg      MCHC 34.5 g/dL      RDW 12.9 %      RDW-SD 40.2 fl      MPV 12.3 fL      Platelets 253 10*3/mm3      Neutrophil % 58.7 %      Lymphocyte % 32.6 %      Monocyte % 6.8 %      Eosinophil % 1.1 %      Basophil % 0.5 %      Immature Grans % 0.3 %      Neutrophils, Absolute 7.70 10*3/mm3      Lymphocytes, Absolute 4.27 10*3/mm3      Monocytes, Absolute 0.89 10*3/mm3      Eosinophils, Absolute 0.14 10*3/mm3      Basophils, Absolute 0.06 10*3/mm3      Immature Grans, Absolute 0.04 10*3/mm3      nRBC 0.0 /100 WBC            Imaging:  No Radiology Exams Resulted Within Past 24 Hours    Procedures:  Procedures    Progress                         NIHSS (NIH Stroke Scale/Score) reviewed and/or performed as part of the patient evaluation and treatment planning process.  The result associated with this review/performance is: 0        Medical Decision Making:  MDM  Number of Diagnoses or Management Options  Diagnosis management comments: Dr. Hendrickson was consulted and it was decided that pt would be admitted to the  hospital       Amount and/or Complexity of Data Reviewed  Clinical lab tests: reviewed  Tests in the radiology section of CPT®: reviewed  Tests in the medicine section of CPT®: reviewed         Final diagnoses:   Cerebellar stroke (HCC)   Dizziness        Disposition:  ED Disposition     ED Disposition   Decision to Admit    Condition   --    Comment   Level of Care: Telemetry [5]   Diagnosis: Cerebellar stroke (HCC) [[id number]]               This medical record created using voice recognition software.           Brittany Ortiz  07/10/22 2154       Brittany Ortiz  07/10/22 2159       Brittany Ortiz  07/10/22 2228       Rodger Nguyen,   07/13/22 1319

## 2022-07-11 NOTE — THERAPY EVALUATION
Patient Name: Audra Montejo  : 1960    MRN: [id number]                              Today's Date: 2022       Admit Date: 7/10/2022    Visit Dx:     ICD-10-CM ICD-9-CM   1. Cerebellar stroke ([facility])  I63.9 434.91   2. Dizziness  R42 780.4   3. Dysphagia, oropharyngeal  R13.12 787.22   4. Difficulty walking  R26.2 719.7   5. Decreased activities of daily living (ADL)  Z78.9 V49.89     Patient Active Problem List   Diagnosis   • Essential hypertension   • Mild intermittent asthma without complication   • Chronic left shoulder pain   • Anxiety   • Back pain   • Bilateral carpal tunnel syndrome   • Bipolar disorder ([facility])   • Eczema   • Gall stones   • Heart murmur   • Left shoulder pain   • Loss of appetite   • Migraine   • Nontraumatic tear of rotator cuff   • Palpitation   • Ringing in ears   • Seasonal allergic rhinitis   • Shortness of breath   • Stroke (HCC)   • Subacromial impingement, left   • Superior glenoid labrum lesion of left shoulder   • Trouble swallowing   • Chest pain   • Cerebellar stroke ([facility])     Past Medical History:   Diagnosis Date   • 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   • Acid reflux disease    • Allergy     unspecified   • Anxiety    • Back pain    • Bilateral carpal tunnel syndrome 2017   • Bipolar disorder ([facility])    • Bursitis of left shoulder 2016   • Depression    • Eczema    • Gall stones    • H/O psychiatric care    • Heart attack ([facility])    • Heart murmur    • Hypertension    • Incomplete tear of left rotator cuff 2017   • Left shoulder pain    • Loss of appetite    • Lumbago 2015    low back pain    • Memory loss     forgetfulness   • Migraine headache    • Need for hepatitis C screening test    • Palpitation    • Ringing in ears    • Seasonal allergies    • Shortness of breath    • Sinus trouble     unspecified    • Stroke (cerebrum) ([facility])    • Subacromial impingement, left 10/13/2016   • Superior glenoid labrum lesion of left shoulder 10/13/2016     subsequent encounter   • Trouble swallowing      Past Surgical History:   Procedure Laterality Date   • APPENDECTOMY     • CARPAL TUNNEL RELEASE     • CHOLECYSTECTOMY     • COLONOSCOPY  2014   • ENDOSCOPY  2014   • HYSTERECTOMY  1999   • SHOULDER SURGERY Left 10/03/2016    arthroscopic, left shoulder scope, RTC repair- Dr. Flores      General Information     Row Name 07/11/22 1513          OT Time and Intention    Document Type evaluation  -ES     Mode of Treatment individual therapy;occupational therapy  -ES     Row Name 07/11/22 1513          General Information    Patient Profile Reviewed yes  -ES     Prior Level of Function independent:  Patient reports independent with B and IADLs at baseline. Patient is employed at local [employer] in [employer]. No device for mobility, has a cane at home if needed. Tub/shower combo, standing shower completion. No home o2 use.  -ES     Existing Precautions/Restrictions fall  -ES     Barriers to Rehab none identified  -ES     Row Name 07/11/22 1513          Occupational Profile    Reason for Services/Referral (Occupational Profile) Patient is 62 yr old female admitted to [facility] on 7/10/2022 after leaving [place] 7/8. Patient with bilateral cerebellar infarcts, returns to hospital with reports of increased blurry vision, difficulty with mobility experiencing multiple falls at home. OT evaluation and treatment ordered d/t recent decline in ADLs/transfer ability. No previous OT services for current condition.  -ES     Row Name 07/11/22 1513          Living Environment    People in Home alone  -ES     Row Name 07/11/22 1513          Home Main Entrance    Number of Stairs, Main Entrance none  -ES     Row Name 07/11/22 1513          Stairs Within Home, Primary    Number of Stairs, Within Home, Primary none  -ES     Row Name 07/11/22 1513          Cognition    Orientation Status (Cognition) oriented x 3  Patient pleasant and cooperative, agreeable to therapy evaluation. Patient  reports increased feelings of lethargy and fatigue during evaluation.  -[facility] Name 07/11/22 1513          Safety Issues, Functional Mobility    Impairments Affecting Function (Mobility) balance;coordination;endurance/activity tolerance;strength;motor control;visual/perceptual  -           User Key  (r) = Recorded By, (t) = Taken By, (c) = Cosigned By    Initials Name Provider Type     Kalie Card OT Occupational Therapist                 Mobility/ADL's     Row Name 07/11/22 1520          Bed Mobility    Bed Mobility supine-sit;sit-supine  -ES     Supine-Sit [place] (Bed Mobility) standby assist  -     Sit-Supine [place] (Bed Mobility) standby assist  -ES     Row Name 07/11/22 1520          Transfers    Transfers sit-stand transfer  -     Sit-Stand [place] (Transfers) minimum assist (75% patient effort)  -ES     Row Name 07/11/22 1520          Sit-Stand Transfer    Assistive Device (Sit-Stand Transfers) other (see comments)  handheld assist  -ES     Row Name 07/11/22 1520          Activities of Daily Living    BADL Assessment/Intervention bathing;upper body dressing;lower body dressing;grooming;feeding;toileting  -[facility] Name 07/11/22 1520          Bathing Assessment/Intervention    [place] Level (Bathing) bathing skills;minimum assist (75% patient effort)  -[facility] Name 07/11/22 1520          Upper Body Dressing Assessment/Training    [place] Level (Upper Body Dressing) upper body dressing skills;minimum assist (75% patient effort)  -ES     Row Name 07/11/22 1520          Lower Body Dressing Assessment/Training    [place] Level (Lower Body Dressing) lower body dressing skills;moderate assist (50% patient effort)  -ES     Row Name 07/11/22 1520          Grooming Assessment/Training    [place] Level (Grooming) grooming skills;set up  -ES     Row Name 07/11/22 1520          Self-Feeding Assessment/Training    [place] Level (Feeding) feeding skills;set up   -ES     [facility] Name 07/11/22 1520          Toileting Assessment/Training    [place] Level (Toileting) toileting skills;minimum assist (75% patient effort)  -ES           User Key  (r) = Recorded By, (t) = Taken By, (c) = Cosigned By    Initials Name Provider Type    Kalie Salazar OT Occupational Therapist               Obj/Interventions     [facility] Name 07/11/22 1522          Sensory Assessment (Somatosensory)    Sensory Assessment (Somatosensory) bilateral UE  -ES     Bilateral UE Sensory Assessment general sensation;intact  -ES     [facility] Name 07/11/22 1522          Vision Assessment/Intervention    Visual Impairment/Limitations blurry vision  -ES     Vision Assessment Comment Patient reports blurry R eye. Patient compensates with vision by closing R eye with mobility and transfers. Patient endorses double vision prior admission, replaced by blurry vision this admission.  -[facility] Name 07/11/22 1522          Strength Comprehensive (MMT)    Comment, General Manual Muscle Testing (MMT) Assessment bilateral upper extremities 3+/5. Patient is able to hold against slight resistance.  -[facility] Name 07/11/22 1522          Motor Skills    Motor Skills coordination  -ES     Coordination minimal impairment;upper extremity;finger to nose  digit opposition testing  -[facility] Name 07/11/22 1522          Balance    Balance Assessment sitting dynamic balance;standing dynamic balance  -ES     Dynamic Sitting Balance independent  -ES     Position, Sitting Balance unsupported;sitting edge of bed  -ES     Dynamic Standing Balance minimal assist;1-person assist  -ES     Position/Device Used, Standing Balance supported;walker, front-wheeled  -ES           User Key  (r) = Recorded By, (t) = Taken By, (c) = Cosigned By    Initials Name Provider Type    Kalie Salazar OT Occupational Therapist               Goals/Plan     [facility] Name 07/11/22 1530          Transfer Goal 1 (OT)    Activity/Assistive Device (Transfer Goal 1, OT)  transfers, all  -ES     [place] Level/Cues Needed (Transfer Goal 1, OT) modified independence  -ES     Time Frame (Transfer Goal 1, OT) long term goal (LTG);10 days  -ES     Row Name 07/11/22 1530          Bathing Goal 1 (OT)    Activity/Device (Bathing Goal 1, OT) bathing skills, all  -ES     [place] Level/Cues Needed (Bathing Goal 1, OT) modified independence  -ES     Time Frame (Bathing Goal 1, OT) long term goal (LTG);10 days  -ES     Row Name 07/11/22 1530          Dressing Goal 1 (OT)    Activity/Device (Dressing Goal 1, OT) dressing skills, all  -ES     [place]/Cues Needed (Dressing Goal 1, OT) modified independence  -ES     Time Frame (Dressing Goal 1, OT) long term goal (LTG);10 days  -ES     Row Name 07/11/22 1530          Toileting Goal 1 (OT)    Activity/Device (Toileting Goal 1, OT) toileting skills, all  -ES     [place] Level/Cues Needed (Toileting Goal 1, OT) modified independence  -ES     Time Frame (Toileting Goal 1, OT) long term goal (LTG);10 days  -ES     Row Name 07/11/22 1530          Grooming Goal 1 (OT)    Activity/Device (Grooming Goal 1, OT) grooming skills, all  -ES     [place] (Grooming Goal 1, OT) modified independence  -ES     Time Frame (Grooming Goal 1, OT) long term goal (LTG);10 days  -ES     Row Name 07/11/22 1530          Strength Goal 1 (OT)    Strength Goal 1 (OT) Pt will increase BUE strength 1/2 muscle grade w/ HEP provided handouts and demo for increased UE strength required for ADL transfers and task completion  -ES     Time Frame (Strength Goal 1, OT) long term goal (LTG);10 days  -ES     Row Name 07/11/22 1530          Therapy Assessment/Plan (OT)    Planned Therapy Interventions (OT) activity tolerance training;BADL retraining;functional balance retraining;occupation/activity based interventions;patient/caregiver education/training;strengthening exercise;transfer/mobility retraining  -ES           User Key  (r) = Recorded By, (t) = Taken By, (c) =  Cosigned By    Initials Name Provider Type    Kalie Salazar OT Occupational Therapist               Clinical Impression     Row Name 07/11/22 1527          Plan of Care Review    Plan of Care Reviewed With patient  -ES     Progress no change  initial evaluation encounter  -ES     Outcome Evaluation Patient has experienced decline in function from baseline status, presenting w/ deficits related to coordination, vision and perception, safety awareness, strength, transfers and mobility that impede patient independence with activities of daily living.  Patient would benefit from skilled Occupational Therapy intervention to maxamize patient safety, and promote return to baseline independence. Patient will benefit from inpatient rehabilitation prior to return home.  -ES     Row Name 07/11/22 1527          Therapy Assessment/Plan (OT)    Rehab Potential (OT) good, to achieve stated therapy goals  -ES     Criteria for Skilled Therapeutic Interventions Met (OT) yes;meets criteria;skilled treatment is necessary  -ES     Therapy Frequency (OT) 5 times/wk  -ES     Row Name 07/11/22 1527          Therapy Plan Review/Discharge Plan (OT)    Equipment Needs Upon Discharge (OT) walker, rolling  -ES     Anticipated Discharge Disposition (OT) inpatient rehabilitation facility  -ES     Row Name 07/11/22 1527          Vital Signs    O2 Delivery Pre Treatment room air  -ES     O2 Delivery Intra Treatment room air  -ES     O2 Delivery Post Treatment room air  -ES           User Key  (r) = Recorded By, (t) = Taken By, (c) = Cosigned By    Initials Name Provider Type    Kalie Salazar OT Occupational Therapist               Outcome Measures     Row Name 07/11/22 1531          How much help from another is currently needed...    Putting on and taking off regular lower body clothing? 2  -ES     Bathing (including washing, rinsing, and drying) 2  -ES     Toileting (which includes using toilet bed pan or urinal) 2  -ES     Putting on and  taking off regular upper body clothing 3  -ES     Taking care of personal grooming (such as brushing teeth) 3  -ES     Eating meals 4  -ES     AM-PAC 6 Clicks Score (OT) 16  -ES     Row Name 07/11/22 1400 07/11/22 0742       How much help from another person do you currently need...    Turning from your back to your side while in flat bed without using bedrails? 4  -DP 4  -AR    Moving from lying on back to sitting on the side of a flat bed without bedrails? 3  -DP 4  -AR    Moving to and from a bed to a chair (including a wheelchair)? 3  -DP 3  -AR    Standing up from a chair using your arms (e.g., wheelchair, bedside chair)? 3  -DP 3  -AR    Climbing 3-5 steps with a railing? 3  -DP 2  -AR    To walk in hospital room? -- 3  -AR    AM-PAC 6 Clicks Score (PT) -- 19  -AR    Highest level of mobility -- 6 --> Walked 10 steps or more  -AR    Row Name 07/11/22 1531 07/11/22 1400       Functional Assessment    Outcome Measure Options AM-PAC 6 Clicks Daily Activity (OT);Optimal Instrument  -ES AM-PAC 6 Clicks Basic Mobility (PT)  -DP    Row Name 07/11/22 1531          Optimal Instrument    Optimal Instrument Optimal - 3  -ES     Bending/Stooping 3  -ES     Standing 2  -ES     Reaching 2  -ES     From the list, choose the 3 activities you would most like to be able to do without any difficulty Bending/stooping;Standing;Reaching  -ES     Total Score Optimal - 3 7  -ES           User Key  (r) = Recorded By, (t) = Taken By, (c) = Cosigned By    Initials Name Provider Type    DP Prema Price, PT Physical Therapist    Nadia Mary, RN Registered Nurse    Kalie Salazar OT Occupational Therapist                Occupational Therapy Education                 Title: PT OT SLP Therapies (In Progress)     Topic: Occupational Therapy (Done)     Point: ADL training (Done)     Description:   Instruct learner(s) on proper safety adaptation and remediation techniques during self care or transfers.   Instruct in proper use of  assistive devices.              Learning Progress Summary           Patient Acceptance, E,TB, VU by  at 7/11/2022 1535                   Point: Home exercise program (Done)     Description:   Instruct learner(s) on appropriate technique for monitoring, assisting and/or progressing therapeutic exercises/activities.              Learning Progress Summary           Patient Acceptance, E,TB, VU by ES at 7/11/2022 1535                   Point: Precautions (Done)     Description:   Instruct learner(s) on prescribed precautions during self-care and functional transfers.              Learning Progress Summary           Patient Acceptance, E,TB, VU by  at 7/11/2022 1535                   Point: Body mechanics (Done)     Description:   Instruct learner(s) on proper positioning and spine alignment during self-care, functional mobility activities and/or exercises.              Learning Progress Summary           Patient Acceptance, E,TB, VU by  at 7/11/2022 1535                               User Key     Initials Effective Dates Name Provider Type Discipline     09/13/21 -  Kalie Card, OT Occupational Therapist OT              OT Recommendation and Plan  Planned Therapy Interventions (OT): activity tolerance training, BADL retraining, functional balance retraining, occupation/activity based interventions, patient/caregiver education/training, strengthening exercise, transfer/mobility retraining  Therapy Frequency (OT): 5 times/wk  Plan of Care Review  Plan of Care Reviewed With: patient  Progress: no change (initial evaluation encounter)  Outcome Evaluation: Patient has experienced decline in function from baseline status, presenting w/ deficits related to coordination, vision and perception, safety awareness, strength, transfers and mobility that impede patient independence with activities of daily living.  Patient would benefit from skilled Occupational Therapy intervention to maxamize patient safety, and promote  return to baseline independence. Patient will benefit from inpatient rehabilitation prior to return home.     Time Calculation:    Time Calculation- OT     Row Name 07/11/22 1536             Time Calculation- OT    OT Received On 07/11/22  -ES      OT Goal Re-Cert Due Date 07/20/22  -ES              Untimed Charges    OT Eval/Re-eval Minutes 32  -ES              Total Minutes    Untimed Charges Total Minutes 32  -ES       Total Minutes 32  -ES            User Key  (r) = Recorded By, (t) = Taken By, (c) = Cosigned By    Initials Name Provider Type    Kalie Salazar OT Occupational Therapist              Therapy Charges for Today     Code Description Service Date Service Provider Modifiers Qty    [id number] HC OT EVAL LOW COMPLEXITY 3 7/11/2022 Kalie Card OT GO 1               Kalie Card OT  7/11/2022

## 2022-07-11 NOTE — MBS/VFSS/FEES
Acute Care - Speech Language Pathology   Swallow Modified Barium Swallow BRITTANY Chen     Patient Name: Audra Montejo  : 1960  MRN: [id number]  Today's Date: 2022               Admit Date: 7/10/2022    Visit Dx:     ICD-10-CM ICD-9-CM   1. Cerebellar stroke (HCC)  I63.9 434.91   2. Dizziness  R42 780.4   3. Dysphagia, oropharyngeal  R13.12 787.22     Patient Active Problem List   Diagnosis   • Essential hypertension   • Mild intermittent asthma without complication   • Chronic left shoulder pain   • Anxiety   • Back pain   • Bilateral carpal tunnel syndrome   • Bipolar disorder (HCC)   • Eczema   • Gall stones   • Heart murmur   • Left shoulder pain   • Loss of appetite   • Migraine   • Nontraumatic tear of rotator cuff   • Palpitation   • Ringing in ears   • Seasonal allergic rhinitis   • Shortness of breath   • Stroke (HCC)   • Subacromial impingement, left   • Superior glenoid labrum lesion of left shoulder   • Trouble swallowing   • Chest pain   • Cerebellar stroke (HCC)     Past Medical History:   Diagnosis Date   • 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   • Acid reflux disease    • Allergy     unspecified   • Anxiety    • Back pain    • Bilateral carpal tunnel syndrome 2017   • Bipolar disorder (HCC)    • Bursitis of left shoulder 2016   • Depression    • Eczema    • Gall stones    • H/O psychiatric care    • Heart attack (HCC)    • Heart murmur    • Hypertension    • Incomplete tear of left rotator cuff 2017   • Left shoulder pain    • Loss of appetite    • Lumbago 2015    low back pain    • Memory loss     forgetfulness   • Migraine headache    • Need for hepatitis C screening test    • Palpitation    • Ringing in ears    • Seasonal allergies    • Shortness of breath    • Sinus trouble     unspecified    • Stroke (cerebrum) (HCC)    • Subacromial impingement, left 10/13/2016   • Superior glenoid labrum lesion of left shoulder 10/13/2016    subsequent encounter   • Trouble  swallowing      Past Surgical History:   Procedure Laterality Date   • APPENDECTOMY     • CARPAL TUNNEL RELEASE     • CHOLECYSTECTOMY     • COLONOSCOPY  2014   • ENDOSCOPY  2014   • HYSTERECTOMY  1999   • SHOULDER SURGERY Left 10/03/2016    arthroscopic, left shoulder scope, RTC repair- Dr. Flores     PERTINENT INFORMATION:  This patient is a 62year old female admitted with recent history of bilateral cerebellar infarct in lateral medulla.  Patient seen for clinical bedside evaluation without any overt clinical signs of aspiration noted.  A modified barium swallow study was ordered to rule out silent aspiration..    Patient was referred for an MBSS by Dr. Mai to rule out aspiration as well as to determine appropriate treatment plan for this patient.      PROCEDURE:    Patient was alert and cooperative.  The patient was viewed in lateral projection.  The following Ba consistencies were administered: Full range of consistencies with thin liquids from spoon cup and straw, purée consistencies and regular solids.  The following compensatory swallowing strategies were performed: N/A    RESULTS:    1.  Oral stage is characterized by prolonged bolus preparation and mastication with solids.  Slow oral transit with difficulty initiating.  Pharyngeal phase appears timely with good laryngeal elevation, base of tongue retraction and epiglottic retroflexion.  No penetration or aspiration noted with any trial presented.  No significant pharyngeal residue noted after the swallow.      IMPRESSIONS:    Patient demonstrated moderate oral stage dysphagia characterized by prolonged bolus preparation and mastication with regular solids and delay in initiating oral transit.  No aspiration noted.     FUNCTIONAL DEFICIT: Patient scored level 5 of 7 on Functional Communication Measures for swallowing indicating a 20-39% limitation in function for current status, goal status, and discharge status.      RECOMMENDATIONS:   1.  Regular  diet-thin liquids  2.  Add extra sauce/gravy to each tray  3.  Cut solids into small bite-size pieces  4.  90 degrees upright for all intake        Yes patient/responsible party agrees with the plan of care and has been informed of all alternatives, risks and benefits.    Thank you for this referral.      EDUCATION  The patient has been educated in the following areas:   Dysphagia (Swallowing Impairment).          Donita Brar, SLP  7/11/2022

## 2022-07-11 NOTE — PLAN OF CARE
Goal Outcome Evaluation:  Plan of Care Reviewed With: patient           Outcome Evaluation: Pt presents with decreased strength, transfers, balance and coordination. She will benefit from inpatient PT services nad placement in a rehab facility upon DC from the hospital.

## 2022-07-12 LAB
ALBUMIN SERPL-MCNC: 4 G/DL (ref 3.5–5.2)
ALP SERPL-CCNC: 126 U/L (ref 39–117)
ALT SERPL W P-5'-P-CCNC: 19 U/L (ref 1–33)
ANION GAP SERPL CALCULATED.3IONS-SCNC: 8.1 MMOL/L (ref 5–15)
AST SERPL-CCNC: 18 U/L (ref 1–32)
BASOPHILS # BLD AUTO: 0.05 10*3/MM3 (ref 0–0.2)
BASOPHILS NFR BLD AUTO: 0.4 % (ref 0–1.5)
BILIRUB CONJ SERPL-MCNC: <0.2 MG/DL (ref 0–0.3)
BILIRUB INDIRECT SERPL-MCNC: ABNORMAL MG/DL
BILIRUB SERPL-MCNC: 0.4 MG/DL (ref 0–1.2)
BUN SERPL-MCNC: 12 MG/DL (ref 8–23)
BUN/CREAT SERPL: 20 (ref 7–25)
CALCIUM SPEC-SCNC: 9.6 MG/DL (ref 8.6–10.5)
CHLORIDE SERPL-SCNC: 105 MMOL/L (ref 98–107)
CO2 SERPL-SCNC: 25.9 MMOL/L (ref 22–29)
CREAT SERPL-MCNC: 0.6 MG/DL (ref 0.57–1)
CRP SERPL-MCNC: <0.3 MG/DL (ref 0–0.5)
DEPRECATED RDW RBC AUTO: 41.1 FL (ref 37–54)
EGFRCR SERPLBLD CKD-EPI 2021: 102.9 ML/MIN/1.73
EOSINOPHIL # BLD AUTO: 0.21 10*3/MM3 (ref 0–0.4)
EOSINOPHIL NFR BLD AUTO: 1.5 % (ref 0.3–6.2)
ERYTHROCYTE [DISTWIDTH] IN BLOOD BY AUTOMATED COUNT: 12.7 % (ref 12.3–15.4)
ERYTHROCYTE [SEDIMENTATION RATE] IN BLOOD: 3 MM/HR (ref 0–30)
GLUCOSE SERPL-MCNC: 109 MG/DL (ref 65–99)
HCT VFR BLD AUTO: 44.9 % (ref 34–46.6)
HGB BLD-MCNC: 15.1 G/DL (ref 12–15.9)
IMM GRANULOCYTES # BLD AUTO: 0.06 10*3/MM3 (ref 0–0.05)
IMM GRANULOCYTES NFR BLD AUTO: 0.4 % (ref 0–0.5)
LYMPHOCYTES # BLD AUTO: 4.26 10*3/MM3 (ref 0.7–3.1)
LYMPHOCYTES NFR BLD AUTO: 30.6 % (ref 19.6–45.3)
MAGNESIUM SERPL-MCNC: 1.9 MG/DL (ref 1.6–2.4)
MCH RBC QN AUTO: 29.6 PG (ref 26.6–33)
MCHC RBC AUTO-ENTMCNC: 33.6 G/DL (ref 31.5–35.7)
MCV RBC AUTO: 88 FL (ref 79–97)
MONOCYTES # BLD AUTO: 0.95 10*3/MM3 (ref 0.1–0.9)
MONOCYTES NFR BLD AUTO: 6.8 % (ref 5–12)
NEUTROPHILS NFR BLD AUTO: 60.3 % (ref 42.7–76)
NEUTROPHILS NFR BLD AUTO: 8.4 10*3/MM3 (ref 1.7–7)
NRBC BLD AUTO-RTO: 0 /100 WBC (ref 0–0.2)
PHOSPHATE SERPL-MCNC: 4.4 MG/DL (ref 2.5–4.5)
PLATELET # BLD AUTO: 240 10*3/MM3 (ref 140–450)
PMV BLD AUTO: 12.2 FL (ref 6–12)
POTASSIUM SERPL-SCNC: 3.6 MMOL/L (ref 3.5–5.2)
PROT SERPL-MCNC: 6.8 G/DL (ref 6–8.5)
RBC # BLD AUTO: 5.1 10*6/MM3 (ref 3.77–5.28)
SODIUM SERPL-SCNC: 139 MMOL/L (ref 136–145)
WBC NRBC COR # BLD: 13.93 10*3/MM3 (ref 3.4–10.8)

## 2022-07-12 PROCEDURE — 94799 UNLISTED PULMONARY SVC/PX: CPT

## 2022-07-12 PROCEDURE — 84100 ASSAY OF PHOSPHORUS: CPT | Performed by: FAMILY MEDICINE

## 2022-07-12 PROCEDURE — 85025 COMPLETE CBC W/AUTO DIFF WBC: CPT | Performed by: FAMILY MEDICINE

## 2022-07-12 PROCEDURE — 80076 HEPATIC FUNCTION PANEL: CPT | Performed by: FAMILY MEDICINE

## 2022-07-12 PROCEDURE — G0378 HOSPITAL OBSERVATION PER HR: HCPCS

## 2022-07-12 PROCEDURE — 99226 PR SBSQ OBSERVATION CARE/DAY 35 MINUTES: CPT | Performed by: FAMILY MEDICINE

## 2022-07-12 PROCEDURE — 96372 THER/PROPH/DIAG INJ SC/IM: CPT

## 2022-07-12 PROCEDURE — 25010000002 HEPARIN (PORCINE) PER 1000 UNITS: Performed by: STUDENT IN AN ORGANIZED HEALTH CARE EDUCATION/TRAINING PROGRAM

## 2022-07-12 PROCEDURE — 83735 ASSAY OF MAGNESIUM: CPT | Performed by: FAMILY MEDICINE

## 2022-07-12 PROCEDURE — 85652 RBC SED RATE AUTOMATED: CPT | Performed by: FAMILY MEDICINE

## 2022-07-12 PROCEDURE — 86140 C-REACTIVE PROTEIN: CPT | Performed by: FAMILY MEDICINE

## 2022-07-12 PROCEDURE — 80048 BASIC METABOLIC PNL TOTAL CA: CPT | Performed by: FAMILY MEDICINE

## 2022-07-12 RX ORDER — CLOPIDOGREL BISULFATE 75 MG/1
75 TABLET ORAL DAILY
Status: DISCONTINUED | OUTPATIENT
Start: 2022-07-12 | End: 2022-07-13 | Stop reason: HOSPADM

## 2022-07-12 RX ORDER — POLYETHYLENE GLYCOL 3350 17 G/17G
17 POWDER, FOR SOLUTION ORAL DAILY
Status: DISCONTINUED | OUTPATIENT
Start: 2022-07-12 | End: 2022-07-13 | Stop reason: HOSPADM

## 2022-07-12 RX ADMIN — ACETAMINOPHEN 650 MG: 325 TABLET ORAL at 15:22

## 2022-07-12 RX ADMIN — OXYBUTYNIN CHLORIDE 10 MG: 5 TABLET, EXTENDED RELEASE ORAL at 08:20

## 2022-07-12 RX ADMIN — ALBUTEROL SULFATE 2.5 MG: 2.5 SOLUTION RESPIRATORY (INHALATION) at 06:45

## 2022-07-12 RX ADMIN — ATORVASTATIN CALCIUM 80 MG: 40 TABLET, FILM COATED ORAL at 21:45

## 2022-07-12 RX ADMIN — ALBUTEROL SULFATE 2.5 MG: 2.5 SOLUTION RESPIRATORY (INHALATION) at 14:55

## 2022-07-12 RX ADMIN — ACETAMINOPHEN 650 MG: 325 TABLET ORAL at 08:20

## 2022-07-12 RX ADMIN — CLOPIDOGREL BISULFATE 75 MG: 75 TABLET ORAL at 10:28

## 2022-07-12 RX ADMIN — ALBUTEROL SULFATE 2.5 MG: 2.5 SOLUTION RESPIRATORY (INHALATION) at 22:20

## 2022-07-12 RX ADMIN — HEPARIN SODIUM 5000 UNITS: 5000 INJECTION, SOLUTION INTRAVENOUS; SUBCUTANEOUS at 21:45

## 2022-07-12 RX ADMIN — MONTELUKAST 10 MG: 10 TABLET, FILM COATED ORAL at 21:46

## 2022-07-12 RX ADMIN — AMLODIPINE BESYLATE 5 MG: 5 TABLET ORAL at 08:20

## 2022-07-12 RX ADMIN — HEPARIN SODIUM 5000 UNITS: 5000 INJECTION, SOLUTION INTRAVENOUS; SUBCUTANEOUS at 05:14

## 2022-07-12 RX ADMIN — HEPARIN SODIUM 5000 UNITS: 5000 INJECTION, SOLUTION INTRAVENOUS; SUBCUTANEOUS at 13:19

## 2022-07-12 RX ADMIN — ASPIRIN 81 MG CHEWABLE TABLET 81 MG: 81 TABLET CHEWABLE at 08:20

## 2022-07-12 RX ADMIN — Medication 10 ML: at 21:51

## 2022-07-12 RX ADMIN — Medication 10 ML: at 08:20

## 2022-07-12 RX ADMIN — POLYETHYLENE GLYCOL 3350 17 G: 17 POWDER, FOR SOLUTION ORAL at 10:28

## 2022-07-12 NOTE — PROGRESS NOTES
[facility]   Hospitalist Progress Note  Date: 2022  Patient Name: Audra Montejo  : 1960  MRN: [id number]  Date of admission: 7/10/2022      Subjective   Subjective     Chief complaint: Dizziness     Summary:  62 y.o. female with a history of bipolar disorder, CVA and hypertension presented with dizziness. Of note, she was recently hospitalized from  to 2022 and was found to have bilateral cerebellar infarcts right more than left with high-grade stenosis of PICA. She was evaluated by neurology, but she left AMA because she wanted to smoke. She presented again with presents with worsening dizziness, blurry vision mainly in her right eye and nausea difficulty ambulating due to imbalance and falls.  Hospitalized for further monitoring management. CT head reviewed showing evolving subacute bilateral cerebellar hemispheric infarcts and right medulla oblongata.  Uncontrolled hypertension being managed.  A1c 5.9, prediabetic range.     Interval follow-up:  Patient seen and examined this morning, no acute distress, no acute major overnight events, telemetry reviewed, bradycardic rate in the 40s at times, baseline sinus rhythm in the 60s.  Still has dizziness, lightheadedness intermittently, difficulty ambulating.  Blood pressure trend is better.  Still has balance issues.  Denies chest pain or palpitations.  Started Plavix today.  White blood cell count remains elevated at 13,000.  Sed rate at 3, CRP is 0.3, unlikely she has a vasculitis issue at this time.  Working with speech therapy.    Review of systems:  All systems reviewed and negative except for intermittent dizziness and lightheadedness, generalized fatigue, blurred vision, imbalance, difficulty ambulating    Objective   Objective     Vitals:   Temp:  [97.5 °F (36.4 °C)-97.9 °F (36.6 °C)] 97.7 °F (36.5 °C)  Heart Rate:  [46-73] 51  Resp:  [16-18] 18  BP: (140-154)/(54-74) 140/54  Physical Exam                  Constitutional: Awake,  alert, no acute distress              Eyes: Pupils equal, sclerae anicteric, no conjunctival injection              HENT: NCAT, mucous membranes moist              Neck: Supple, no palpable masses              Respiratory: Clear to auscultation bilaterally, nonlabored respirations               Cardiovascular: RRR, no murmurs, rubs, or gallops, palpable pedal pulses bilaterally              Gastrointestinal: Positive bowel sounds, soft, nontender, nondistended              Musculoskeletal: No bilateral ankle edema, no clubbing or cyanosis to extremities              Psychiatric: Flat affect, cooperative              Neurologic: Oriented x 3, strength symmetric in all extremities, Cranial Nerves grossly intact to confrontation, speech clear              Skin: No rashes       Result Review    Result Review:  I have personally reviewed the results from the time of this admission to 7/12/2022 17:37 EDT and agree with these findings:  [x]  Laboratory   CBC    CBC 7/10/22 7/11/22 7/12/22   WBC 11.99 (A) 12.86 (A) 13.93 (A)   RBC 5.44 (A) 5.30 (A) 5.10   Hemoglobin 16.2 (A) 15.8 15.1   Hematocrit 47.7 (A) 46.6 44.9   MCV 87.7 87.9 88.0   MCH 29.8 29.8 29.6   MCHC 34.0 33.9 33.6   RDW 13.0 12.7 12.7   Platelets 264 240 240   (A) Abnormal value            BMP    BMP 7/10/22 7/11/22 7/12/22   BUN 18 16 12   Creatinine 0.66 0.60 0.60   Sodium 144 143 139   Potassium 4.4 3.8 3.6   Chloride 110 (A) 108 (A) 105   CO2 22.9 23.5 25.9   Calcium 9.6 9.6 9.6   (A) Abnormal value       Comments are available for some flowsheets but are not being displayed.           LIVER FUNCTION TESTS:      Lab 07/12/22  0443 07/10/22  1833 07/08/22  0527 07/06/22  2058   TOTAL PROTEIN 6.8 7.3  --  8.0   ALBUMIN 4.00 4.20 4.00 4.70   GLOBULIN  --  3.1  --  3.3   ALT (SGPT) 19 23  --  30   AST (SGOT) 18 20  --  23   BILIRUBIN 0.4 0.2  --  0.5   BILIRUBIN DIRECT <0.2  --   --   --    ALK PHOS 126* 139*  --  158*       [x]  Microbiology No results found  for: ACANTHNAEG, AFBCX, BPERTUSSISCX, BLOODCX  No results found for: BCIDPCR, CXREFLEX, CSFCX, CULTURETIS  No results found for: CULTURES, HSVCX, URCX  No results found for: EYECULTURE, GCCX, HSVCULTURE, LABHSV  No results found for: LEGIONELLA, MRSACX, MUMPSCX, MYCOPLASCX  No results found for: NOCARDIACX, STOOLCX  No results found for: THROATCX, UNSTIMCULT, URINECX, CULTURE, VZVCULTUR  No results found for: VIRALCULTU, WOUNDCX    [x]  Radiology Adult Transesophageal Echo (ARTHUR) W/ Cont if Necessary Per Protocol    Result Date: 7/8/2022  · Calculated left ventricular EF = 65% Estimated left ventricular EF was in agreement with the calculated left ventricular EF. Left ventricular systolic function is normal. · Mild mitral regurgitation · No evidence of thrombi in the left atrium left atrial appendage and the left ventricle · Bubble study negative      CT Head Without Contrast    Result Date: 7/10/2022  PROCEDURE: CT HEAD WO CONTRAST  COMPARISONS: 7/6/2022.  INDICATIONS: DIZZINESS; NAUSEA; H/O RECENT ACUTE CEREBELLAR HEMISPHERIC INFARCTS.  PROTOCOL:   Standard CT imaging protocol performed.    RADIATION:   Total DLP: 1,017.2 mGy*cm.   MA and/or KV were/was adjusted to minimize radiation dose.    TECHNIQUE: After obtaining the patient's consent, 130 CT images were obtained without non-ionic intravenous contrast material.  FINDINGS: Evolving subacute cerebellar hemispheric infarcts are noted.  Also, an evolving subacute right posterior, lateral infarct of the medulla oblongata is noted.  No acute intracranial hemorrhage is seen.  No definite new acute infarct is appreciated.  There may be chronic lacunar infarcts involving the right arnol, seen previously.  There is suspected mild effacement of the inferior 4th ventricle, related to the cytotoxic edema, greater on the right than the left, within the cerebellar hemispheres.  No acute skull fracture seen.  No acute subfalcine herniation.  There may be a tiny (3 mm)  right-sided sublenticular cyst, as seen on image 30 of series 201.  Under-pneumatization of the bilateral mastoid temporal bones is suspected.  No air-fluid levels are seen in the imaged paranasal sinuses.  No acute orbital findings are appreciated.       Evolving subacute bilateral cerebellar hemispheric infarcts and right medulla oblongata are seen, as discussed.  There is mild effacement of the inferior 4th ventricle.  No obstructive hydrocephalus is suggested.  No acute intracranial hemorrhage.  No definite new acute infarct is appreciated by nonenhanced head CT examination.  Please see above comments for further detail.    COMMENT:  Part of this note is an electronic transcription of spoken language to printed text. The electronic translation/transcription may permit erroneous, or at times, nonsensical (or even sensical) words or phrases to be inadvertently transcribed or omitted; this  has reviewed the note for such errors (as well as additional errors); however, some may still exist.  ONIEL VALDEZ JR, MD       Electronically Signed and Approved By: ONIEL VALDEZ JR, MD on 7/10/2022 at 22:39             CT Angiogram Neck    Result Date: 7/7/2022  PROCEDURE: CT ANGIOGRAM NECK  COMPARISONS: [facility], MR, MRI BRAIN WO CONTRAST, 7/06/2022, 22:47.   [facility], CT, CT HEAD WO CONTRAST STROKE PROTOCOL, 7/06/2022, 21:13.  [facility], CT, CT ANGIOGRAM HEAD, 7/06/2022, 23:29.  INDICATIONS: cva; hypertension; ha  PROTOCOL:   Standard imaging protocol performed    RADIATION:   Automated exposure control was utilized to minimize radiation dose.  TECHNIQUE: After obtaining the patient's consent, 617 CT/CTA images of the neck were obtained with intravenous contrast.  Multi-planar/3-D imaging was created and interpreted to optimize visualization of vascular anatomy.  Unless otherwise stated in this report, all vascular stenoses involving the internal carotid arteries,  reported for this examination, are derived by dividing the lesion diameter by the diameter of the normal internal carotid artery more distally (that is, using NASCET criteria).  FINDINGS:  LEFT INTERNAL CAROTID: No hemodynamically significant stenosis or dissection.  Mild calcified atherosclerotic plaque involves the distal internal carotid arteries bilaterally. EXTERNAL CAROTID: No hemodynamically significant stenosis or dissection.  COMMON CAROTID: Probably no hemodynamically significant stenosis or dissection.  There may be mild-to-moderate origin narrowing of the left common carotid artery. VERTEBRAL: No hemodynamically significant stenosis or dissection.  The vertebral arteries are codominant.  There is thought to be artifact obscuring the origin of the left vertebral artery.  RIGHT INTERNAL CAROTID: No hemodynamically significant stenosis or dissection.  Mild calcified atherosclerotic plaque involves the distal internal carotid arteries bilaterally. EXTERNAL CAROTID: No hemodynamically significant stenosis or dissection.  COMMON CAROTID: No hemodynamically significant stenosis or dissection.  VERTEBRAL: No hemodynamically significant stenosis or dissection.  The vertebral arteries are codominant.  OTHER: There are degenerative changes of the imaged spine.  There may be mild origin narrowing of the innominate artery.  There is incidental chronic calcified granulomatous disease of the chest.       No hemodynamically significant stenoses are seen.  No arterial dissection.       COMMENT:  Part of this note is an electronic transcription of spoken language to printed text. The electronic translation/transcription may permit erroneous, or at times, nonsensical (or even sensical) words or phrases to be inadvertently transcribed or omitted; this  has reviewed the note for such errors (as well as additional errors); however, some may still exist.  ONIEL VALDEZ JR, MD       Electronically Signed and  Approved By: ONIEL VALDEZ JR, MD on 7/07/2022 at 1:54              MRI Brain Without Contrast    Result Date: 7/7/2022  PROCEDURE: MRI BRAIN WO CONTRAST  COMPARISONS: [facility], CT, CT ANGIOGRAM HEAD, 7/06/2022, 23:29.   [facility], CT, CT ANGIOGRAM NECK, 7/06/2022, 23:29.   [facility], CT, CT HEAD WO CONTRAST, 7/06/2022, 21:13.   [facility], MR, MRA NECK W/O CONTRAST, 1/03/2017, 12:22.   [facility], MR, BRAIN W/O CONTRAST, 1/03/2017, 12:22.  INDICATIONS: Dizziness, nonspecific.  TECHNIQUE: A variety of imaging planes and parameters were utilized for visualization of suspected pathology.  Images were performed without contrast.  FINDINGS: The study is abnormal.  Restricted diffusion is seen in the bilateral cerebellar hemispheres as well as the cerebellar vermis, much greater on the right than the left, most suggestive of acute infarcts.  The right-sided cerebellar hemispheric acute infarct measures approximately 4.9 x 2.9 x 5.1 cm in transverse, craniocaudal, and anteroposterior (AP) extent, respectively, as seen on image 10 of series 12, image 38 of series 6, image 29 of series 3, and adjacent images.  The findings suggest an acute infarct involving a dominant right-sided posterior inferior cerebellar artery (PICA).  There is involvement of the cerebellar vermis.  Involvement of the posterolateral right medulla oblongata is possible.  Please correlate with history of lateral medullary syndrome (i.e., Wallenberg syndrome).  Chronic small vessel ischemia/infarction is also suspected, including the right paramidline arnol with a 6 mm chronic lacunar infarct identified, such as seen on image 8 of series 8.  There is also involvement of the subcortical and central bilateral cerebral white matter as well as the periventricular cerebral white matter.  No definite MRI evidence of acute intracranial hemorrhage.  There is associated slight deformity of the 4th ventricle  related to the above-mentioned cerebellar cytotoxic edema.  No obstructive hydrocephalus is suggested.       Acute infarcts involve the bilateral cerebellar hemispheres, larger on the right than the left, as detailed above.  No acute intracranial hemorrhage is seen.     COMMENT:  Part of this note is an electronic transcription of spoken language to printed text. The electronic translation/transcription may permit erroneous, or at times, nonsensical (or even sensical) words or phrases to be inadvertently transcribed or omitted; this  has reviewed the note for such errors (as well as additional errors); however, some may still exist.  ONIEL VALDEZ JR, MD       Electronically Signed and Approved By: ONIEL VALDEZ JR, MD on 7/07/2022 at 0:31              FL Video Swallow With Speech Single Contrast    Result Date: 7/11/2022  PROCEDURE: FL VIDEO SWALLOW W SPEECH SINGLE-CONTRAST  COMPARISON: None  INDICATIONS: rule out silent aspiration/ lateral medullary stroke/2.1 fluoro time/6.8mGy/0 images  TECHNIQUE: Examination was performed in conjunction with the speech pathologist. The patient was given both thin and thick liquid barium, applesauce with barium, contrast and guillermo cracker with Esophotrast. The patient's medication list was reviewed and documented in the medical record.  FINDINGS:  The patient's swallowing function was evaluated in the lateral imaging plane utilizing multiple phases of barium contrast.  No evidence of laryngeal penetration or aspiration during the study.  The oral phase was slow.        1. No evidence of laryngeal penetration or aspiration during the study     AAMIR MCCLOUD MD       Electronically Signed and Approved By: AAMIR MCCLOUD MD on 7/11/2022 at 10:54             XR Chest 1 View    Result Date: 7/10/2022  PROCEDURE: XR CHEST 1 VW  COMPARISON: 7/6/2022.  INDICATIONS: DIZZINESS, NAUSEA, & DIARRHEA.  FINDINGS:  A single AP upright portable chest radiograph was  performed.  No cardiac enlargement is seen.  No acute infiltrate is appreciated.  No pleural effusion or pneumothorax is identified.  Chronic calcified granulomatous disease involves the chest.  The thoracic aorta is atherosclerotic.  No significant interval change is seen since the prior study.        No acute infiltrate is appreciated.  No cardiac enlargement.      COMMENT:  Part of this note is an electronic transcription of spoken language to printed text. The electronic translation/transcription may permit erroneous, or at times, nonsensical (or even sensical) words or phrases to be inadvertently transcribed or omitted; this  has reviewed the note for such errors (as well as additional errors); however, some may still exist.  ONIEL VALDEZ JR, MD       Electronically Signed and Approved By: ONIEL VALDEZ JR, MD on 7/10/2022 at 19:20              XR Chest 1 View    Result Date: 7/6/2022  PROCEDURE: XR CHEST 1 VW  COMPARISON: [facility], CR, XR CHEST 1 VW, 2/15/2022, 23:48.  INDICATIONS: Stroke Protocol (Onset > 12 hrs).  FINDINGS:  A single AP upright portable chest radiograph was performed.  Borderline cardiac enlargement is seen.  No acute infiltrate is appreciated.  No pleural effusion or pneumothorax is identified.  External artifacts obscure detail.  Chronic calcified granulomatous disease involves the chest.  The thoracic aorta is atherosclerotic.  No significant interval change is seen since the prior study.        No acute infiltrate is appreciated.      COMMENT:  Part of this note is an electronic transcription of spoken language to printed text. The electronic translation/transcription may permit erroneous, or at times, nonsensical (or even sensical) words or phrases to be inadvertently transcribed or omitted; this  has reviewed the note for such errors (as well as additional errors); however, some may still exist.  ONIEL VALDEZ JR, MD       Electronically Signed  and Approved By: ONIEL VALDEZ JR, MD on 7/06/2022 at 22:59              CT Angiogram Head    Addendum Date: 7/7/2022    ADDENDUM:  No cerebral aneurysms are appreciated.   ONIEL VALDEZ JR, MD       Electronically Signed and Approved By: ONIEL VALDEZ JR, MD on 7/07/2022 at 1:52              Result Date: 7/7/2022  PROCEDURE: CT ANGIOGRAM HEAD  COMPARISON: CT, CT ANGIOGRAM NECK, 7/06/2022, 23:29.  INDICATIONS: cva; history of hypertension and headache  PROTOCOL:   Standard CTA imaging protocol performed.    RADIATION:   DLP: 445.1mGy*cm   MA and/or KV was adjusted to minimize radiation dose.    CONTRAST: 100 mL Isovue 370 I.V.  TECHNIQUE: After obtaining the patient's consent, 777 CT/CTA images of the head were obtained with non-ionic contrast, and multi-planar/3-D imaging were created and interpreted to optimize visualization of vascular anatomy.   FINDINGS: There is an irregular CTA appearance of the proximal posterior inferior cerebellar artery (PICA), as seen on image 61 of series 402 and adjacent images.  The finding is also seen on image 19 of series 1002.  It may be related to multifocal moderate-to-high-grade stenoses as from atherosclerosis.  Vasospasm is possible.  Arteritis cannot be excluded.  Arterial emboli would be in the differential diagnosis.  The left posterior inferior cerebellar artery origin is not well seen.  It may be hypoplastic or absent.  There may be a dominant right-sided posterior inferior cerebellar artery (PICA), supplying portions of the left cerebellar hemisphere and the cerebellar vermis.  The intradural vertebral arteries and the basilar artery are continuously patent without hemodynamically significant stenoses.  The vertebral arteries are thought to be codominant.  There may be persistent fetal origin of the left posterior cerebral artery.  There is a short-segment moderate-to-high-grade stenosis involving the proximal right posterior cerebral artery at its P1 and P2 segment  junctions, such as seen on image 227 of series 401 and image 5 of series 1001.  In the differential diagnosis would be vasospasm, embolus, arteritis, and/or other vasculopathies.  The A1 segment on the left is thought to be hypoplastic.  Otherwise, no hemodynamically significant stenoses are seen.  No emergent large vessel occlusion is suggested elsewhere.  Please see the brain MRI exam report from 7/6/2022 at 2247 hours for further detail regarding important intracranial findings.         1. There is an irregular CTA appearance of the proximal right posterior inferior cerebellar artery (PICA), which may represent atherosclerotic change.  Vasospasm or arteritis would be in the differential diagnosis.  Arterial emboli are possible but probably less likely.  Other types of vasculopathies cannot be excluded.   2. There is also moderate-to-severe short-segment stenosis of the proximal right posterior cerebral artery (PCA), especially centered at its P1 and P2 segment junctions.  This finding may have similar etiologies as to that mentioned above for the findings in the right PICA.   3. Otherwise, no hemodynamically significant stenoses are seen.  No other areas of emergent large vessel occlusion are suggested.   4. Please see above comments for further detail.      COMMENT:  Part of this note is an electronic transcription of spoken language to printed text. The electronic translation/transcription may permit erroneous, or at times, nonsensical (or even sensical) words or phrases to be inadvertently transcribed or omitted; this  has reviewed the note for such errors (as well as additional errors); however, some may still exist.  ONIEL VALDEZ JR, MD       Electronically Signed and Approved By: ONIEL VALDEZ JR, MD on 7/07/2022 at 1:48              CT Head Without Contrast Stroke Protocol    Result Date: 7/6/2022  PROCEDURE: CT HEAD WO CONTRAST STROKE PROTOCOL  COMPARISON: None.  INDICATIONS: Stroke,  follow-up.  Hypertension.  Headache.  PROTOCOL:   Standard CT imaging protocol performed.    RADIATION:   Total DLP: 1,018.2mGy*cm.   MA and/or KV were/was adjusted to minimize radiation dose.    TECHNIQUE: After obtaining the patient's consent, 130 CT images were obtained without non-ionic intravenous contrast material.  FINDINGS: Abnormal attenuation is seen in the bilateral cerebellar hemispheres, greater on the right the left, and may represent age-indeterminate ischemia/infarction, most likely acute to subacute in nature, possibly involving the right posterior inferior cerebellar artery (PICA).  There is slight deformity of the 4th ventricle with slight effacement and displacement in an anterior, leftward direction, probably related to the right-sided cerebellar infarcts.  No obstructive hydrocephalus is suggested.  There may be mild chronic small vessel ischemia/infarction, including involvement of the brainstem, especially the right aspect of the arnol with a chronic lacunar infarct suspected, measuring about 5 mm.  Arterial calcifications are seen.  No acute intracranial hemorrhage is appreciated.  There may be slight right-to-left midline shift involving the posterior fossa.  In the supratentorial regions, no midline shift or acute intracranial herniation syndrome is suggested.  No other acute or subacute infarcts are suggested.  Minimal if any cerebellar tonsillar ectopia or inferior tonsillar herniation is suggested.  No supratentorial cerebellar herniation is suggested.  There may be mild age-indeterminate sinus disease.  No air-fluid interfaces are appreciated within the imaged paranasal sinuses.  Streak artifact from dental amalgam obscures detail. The imaged middle ear clefts (that is, the bilateral mastoid air cell complexes, bilateral middle ear cavities, and bilateral external auditory canals) are well aerated.  No acute findings are seen within the partially imaged orbits.         1. There are  age-indeterminate but probably acute to subacute bilateral cerebellar hemispheric infarcts, greater on the right than the left.  The findings may represent an acute to subacute right posterior inferior cerebellar artery (PICA) infarct.  No acute intracranial hemorrhage is seen.   2. Mild chronic small vessel ischemia/infarction is suggested.  3. Please see above comments for further detail.     COMMENT:  Part of this note is an electronic transcription of spoken language to printed text. The electronic translation/transcription may permit erroneous, or at times, nonsensical (or even sensical) words or phrases to be inadvertently transcribed or omitted; this  has reviewed the note for such errors (as well as additional errors); however, some may still exist.  ONIEL VALDEZ JR, MD       Electronically Signed and Approved By: ONIEL VALDEZ JR, MD on 7/06/2022 at 21:23                [x]  EKG/Telemetry   []  Cardiology/Vascular   []  Pathology  [x]  Old records  []  Other:    Assessment & Plan   Assessment / Plan     Assessment/Plan:  Assessment:  Subacute CVA involving bilateral cerebellum, right medulla oblongata  Uncontrolled hypertension  Reactive leukocytosis  Nausea and vomiting related to subacute CVA  Current tobacco smoker  Sinus bradycardia  Bipolar disorder  Mild intermittent asthma  Irregularity of PICA  Prediabetes  Moderate to severe cervical segment stenosis right PCA     Plan:  Labs and imaging reviewed  Plavix 75 mg daily started for 3 weeks  Neurology consultation, follow-up recommendations  Continue amlodipine 5 mg daily  Slow correction of blood pressure, will make adjustments over the next 24 hours  Aspirin 81mg daily continued  Stroke protocol  High intensity statin atorvastatin 80 mg nightly  As needed hydralazine  Stroke work-up performed on previous hospitalization  PT/OT and some diet per speech  Await neuro recommendations  Hold beta-blocker  Monitor heart rate on telemetry  DVT  prophylaxis with heparin  Clinical course to dictate further management  Zofran for nausea  Discussed with nurse at the bedside  Possible discharge home tomorrow if her symptoms are improved enough she can ambulate safely    DVT prophylaxis:  Medical and mechanical DVT prophylaxis orders are present.    CODE STATUS:   Code Status (Patient has no pulse and is not breathing): CPR (Attempt to Resuscitate)  Medical Interventions (Patient has pulse or is breathing): Full Support        Electronically signed by Uziel Knutson MD, 07/12/22, 5:37 PM EDT.

## 2022-07-12 NOTE — PLAN OF CARE
Problem: Adult Inpatient Plan of Care  Goal: Plan of Care Review  Outcome: Ongoing, Progressing  Goal: Patient-Specific Goal (Individualized)  Outcome: Ongoing, Progressing  Goal: Absence of Hospital-Acquired Illness or Injury  Outcome: Ongoing, Progressing  Intervention: Identify and Manage Fall Risk  Intervention: Prevent Skin Injury  Intervention: Prevent and Manage VTE (Venous Thromboembolism) Risk  Intervention: Prevent Infection  Goal: Optimal Comfort and Wellbeing  Outcome: Ongoing, Progressing  Intervention: Provide Person-Centered Care  Goal: Readiness for Transition of Care  Outcome: Ongoing, Progressing     Problem: Fall Injury Risk  Goal: Absence of Fall and Fall-Related Injury  Outcome: Ongoing, Progressing  Intervention: Identify and Manage Contributors  Intervention: Promote Injury-Free Environment     Problem: Adjustment to Illness (Stroke, Ischemic/Transient Ischemic Attack)  Goal: Optimal Coping  Outcome: Ongoing, Progressing  Intervention: Support Psychosocial Response to Stroke     Problem: Bowel Elimination Impaired (Stroke, Ischemic/Transient Ischemic Attack)  Goal: Effective Bowel Elimination  Outcome: Ongoing, Progressing     Problem: Cerebral Tissue Perfusion (Stroke, Ischemic/Transient Ischemic Attack)  Goal: Optimal Cerebral Tissue Perfusion  Outcome: Ongoing, Progressing  Intervention: Protect and Optimize Cerebral Perfusion     Problem: Cognitive Impairment (Stroke, Ischemic/Transient Ischemic Attack)  Goal: Optimal Cognitive Function  Outcome: Ongoing, Progressing     Problem: Communication Impairment (Stroke, Ischemic/Transient Ischemic Attack)  Goal: Improved Communication Skills  Outcome: Ongoing, Progressing     Problem: Functional Ability Impaired (Stroke, Ischemic/Transient Ischemic Attack)  Goal: Optimal Functional Ability  Outcome: Ongoing, Progressing  Intervention: Optimize Functional Ability     Problem: Respiratory Compromise (Stroke, Ischemic/Transient Ischemic  Attack)  Goal: Effective Oxygenation and Ventilation  Outcome: Ongoing, Progressing  Intervention: Optimize Oxygenation and Ventilation     Problem: Sensorimotor Impairment (Stroke, Ischemic/Transient Ischemic Attack)  Goal: Improved Sensorimotor Function  Outcome: Ongoing, Progressing  Intervention: Optimize Range of Motion, Motor Control and Function     Problem: Swallowing Impairment (Stroke, Ischemic/Transient Ischemic Attack)  Goal: Optimal Eating and Swallowing without Aspiration  Outcome: Ongoing, Progressing     Problem: Urinary Elimination Impaired (Stroke, Ischemic/Transient Ischemic Attack)  Goal: Effective Urinary Elimination  Outcome: Ongoing, Progressing   Goal Outcome Evaluation:

## 2022-07-12 NOTE — PLAN OF CARE
Goal Outcome Evaluation:  Plan of Care Reviewed With: patient        Progress: no change       NIH 0. Treated with tylenol x2 for c/o HA. VSS. Remains sinus bradycardia on the monitor.

## 2022-07-12 NOTE — SIGNIFICANT NOTE
07/12/22 1230   Coping/Psychosocial   Observed Emotional State calm;cooperative   Verbalized Emotional State hopefulness   Trust Relationship/Rapport empathic listening provided   Involvement in Care interacting with patient   Additional Documentation Spiritual Care (Group)   Spiritual Care   Use of Spiritual Resources non-Orthodoxy use of spiritual care   Spiritual Care Source  initiative   Spiritual Care Follow-Up follow-up, none required as presently assessed   Response to Spiritual Care engaged in conversation   Spiritual Care Interventions supportive conversation provided   Spiritual Care Visit Type initial   Receptivity to Spiritual Care visit welcomed

## 2022-07-12 NOTE — CONSULTS
Patient without a noted DM diagnosis, with a current HbA1c of 5.9%, and an estimated average glucose of 123 mg/dL.

## 2022-07-13 ENCOUNTER — READMISSION MANAGEMENT (OUTPATIENT)
Dept: CALL CENTER | Facility: HOSPITAL | Age: 62
End: 2022-07-13

## 2022-07-13 VITALS
WEIGHT: 147.71 LBS | BODY MASS INDEX: 31.01 KG/M2 | OXYGEN SATURATION: 99 % | SYSTOLIC BLOOD PRESSURE: 149 MMHG | HEIGHT: 58 IN | DIASTOLIC BLOOD PRESSURE: 64 MMHG | HEART RATE: 64 BPM | RESPIRATION RATE: 18 BRPM | TEMPERATURE: 98.4 F

## 2022-07-13 LAB
ALBUMIN SERPL-MCNC: 4.2 G/DL (ref 3.5–5.2)
ALP SERPL-CCNC: 136 U/L (ref 39–117)
ALT SERPL W P-5'-P-CCNC: 17 U/L (ref 1–33)
ANION GAP SERPL CALCULATED.3IONS-SCNC: 10.9 MMOL/L (ref 5–15)
AST SERPL-CCNC: 17 U/L (ref 1–32)
BASOPHILS # BLD AUTO: 0.06 10*3/MM3 (ref 0–0.2)
BASOPHILS NFR BLD AUTO: 0.5 % (ref 0–1.5)
BILIRUB CONJ SERPL-MCNC: <0.2 MG/DL (ref 0–0.3)
BILIRUB INDIRECT SERPL-MCNC: ABNORMAL MG/DL
BILIRUB SERPL-MCNC: 0.4 MG/DL (ref 0–1.2)
BUN SERPL-MCNC: 10 MG/DL (ref 8–23)
BUN/CREAT SERPL: 14.9 (ref 7–25)
CALCIUM SPEC-SCNC: 9.6 MG/DL (ref 8.6–10.5)
CHLORIDE SERPL-SCNC: 105 MMOL/L (ref 98–107)
CO2 SERPL-SCNC: 25.1 MMOL/L (ref 22–29)
CREAT SERPL-MCNC: 0.67 MG/DL (ref 0.57–1)
DEPRECATED RDW RBC AUTO: 40.2 FL (ref 37–54)
EGFRCR SERPLBLD CKD-EPI 2021: 100.2 ML/MIN/1.73
EOSINOPHIL # BLD AUTO: 0.14 10*3/MM3 (ref 0–0.4)
EOSINOPHIL NFR BLD AUTO: 1.1 % (ref 0.3–6.2)
ERYTHROCYTE [DISTWIDTH] IN BLOOD BY AUTOMATED COUNT: 12.9 % (ref 12.3–15.4)
GLUCOSE SERPL-MCNC: 102 MG/DL (ref 65–99)
HCT VFR BLD AUTO: 43.5 % (ref 34–46.6)
HGB BLD-MCNC: 15 G/DL (ref 12–15.9)
IMM GRANULOCYTES # BLD AUTO: 0.04 10*3/MM3 (ref 0–0.05)
IMM GRANULOCYTES NFR BLD AUTO: 0.3 % (ref 0–0.5)
LYMPHOCYTES # BLD AUTO: 4.27 10*3/MM3 (ref 0.7–3.1)
LYMPHOCYTES NFR BLD AUTO: 32.6 % (ref 19.6–45.3)
MAGNESIUM SERPL-MCNC: 1.9 MG/DL (ref 1.6–2.4)
MCH RBC QN AUTO: 29.8 PG (ref 26.6–33)
MCHC RBC AUTO-ENTMCNC: 34.5 G/DL (ref 31.5–35.7)
MCV RBC AUTO: 86.3 FL (ref 79–97)
MONOCYTES # BLD AUTO: 0.89 10*3/MM3 (ref 0.1–0.9)
MONOCYTES NFR BLD AUTO: 6.8 % (ref 5–12)
NEUTROPHILS NFR BLD AUTO: 58.7 % (ref 42.7–76)
NEUTROPHILS NFR BLD AUTO: 7.7 10*3/MM3 (ref 1.7–7)
NRBC BLD AUTO-RTO: 0 /100 WBC (ref 0–0.2)
PHOSPHATE SERPL-MCNC: 4.7 MG/DL (ref 2.5–4.5)
PLATELET # BLD AUTO: 253 10*3/MM3 (ref 140–450)
PMV BLD AUTO: 12.3 FL (ref 6–12)
POTASSIUM SERPL-SCNC: 3.7 MMOL/L (ref 3.5–5.2)
PROT SERPL-MCNC: 7 G/DL (ref 6–8.5)
RBC # BLD AUTO: 5.04 10*6/MM3 (ref 3.77–5.28)
SODIUM SERPL-SCNC: 141 MMOL/L (ref 136–145)
WBC NRBC COR # BLD: 13.1 10*3/MM3 (ref 3.4–10.8)

## 2022-07-13 PROCEDURE — 94799 UNLISTED PULMONARY SVC/PX: CPT

## 2022-07-13 PROCEDURE — G0378 HOSPITAL OBSERVATION PER HR: HCPCS

## 2022-07-13 PROCEDURE — 85025 COMPLETE CBC W/AUTO DIFF WBC: CPT | Performed by: FAMILY MEDICINE

## 2022-07-13 PROCEDURE — 96372 THER/PROPH/DIAG INJ SC/IM: CPT

## 2022-07-13 PROCEDURE — 84100 ASSAY OF PHOSPHORUS: CPT | Performed by: FAMILY MEDICINE

## 2022-07-13 PROCEDURE — 99217 PR OBSERVATION CARE DISCHARGE MANAGEMENT: CPT | Performed by: FAMILY MEDICINE

## 2022-07-13 PROCEDURE — 97110 THERAPEUTIC EXERCISES: CPT

## 2022-07-13 PROCEDURE — 80048 BASIC METABOLIC PNL TOTAL CA: CPT | Performed by: FAMILY MEDICINE

## 2022-07-13 PROCEDURE — 25010000002 HEPARIN (PORCINE) PER 1000 UNITS: Performed by: STUDENT IN AN ORGANIZED HEALTH CARE EDUCATION/TRAINING PROGRAM

## 2022-07-13 PROCEDURE — 83735 ASSAY OF MAGNESIUM: CPT | Performed by: FAMILY MEDICINE

## 2022-07-13 PROCEDURE — 80076 HEPATIC FUNCTION PANEL: CPT | Performed by: FAMILY MEDICINE

## 2022-07-13 RX ORDER — AMLODIPINE BESYLATE 5 MG/1
5 TABLET ORAL DAILY
Qty: 30 TABLET | Refills: 0 | Status: SHIPPED | OUTPATIENT
Start: 2022-07-14 | End: 2022-08-22

## 2022-07-13 RX ORDER — LOSARTAN POTASSIUM 25 MG/1
25 TABLET ORAL
Status: DISCONTINUED | OUTPATIENT
Start: 2022-07-13 | End: 2022-07-13 | Stop reason: HOSPADM

## 2022-07-13 RX ORDER — ATORVASTATIN CALCIUM 80 MG/1
80 TABLET, FILM COATED ORAL NIGHTLY
Qty: 30 TABLET | Refills: 0 | Status: SHIPPED | OUTPATIENT
Start: 2022-07-13 | End: 2022-08-09 | Stop reason: SDUPTHER

## 2022-07-13 RX ORDER — CLOPIDOGREL BISULFATE 75 MG/1
75 TABLET ORAL DAILY
Qty: 21 TABLET | Refills: 0 | Status: SHIPPED | OUTPATIENT
Start: 2022-07-14 | End: 2022-08-04 | Stop reason: SDUPTHER

## 2022-07-13 RX ORDER — ASPIRIN 81 MG/1
81 TABLET, CHEWABLE ORAL DAILY
Qty: 30 TABLET | Refills: 0 | Status: SHIPPED | OUTPATIENT
Start: 2022-07-14 | End: 2022-08-13

## 2022-07-13 RX ADMIN — OXYBUTYNIN CHLORIDE 10 MG: 5 TABLET, EXTENDED RELEASE ORAL at 09:09

## 2022-07-13 RX ADMIN — ASPIRIN 81 MG CHEWABLE TABLET 81 MG: 81 TABLET CHEWABLE at 09:09

## 2022-07-13 RX ADMIN — AMLODIPINE BESYLATE 5 MG: 5 TABLET ORAL at 09:10

## 2022-07-13 RX ADMIN — ALBUTEROL SULFATE 2.5 MG: 2.5 SOLUTION RESPIRATORY (INHALATION) at 06:56

## 2022-07-13 RX ADMIN — CLOPIDOGREL BISULFATE 75 MG: 75 TABLET ORAL at 09:09

## 2022-07-13 RX ADMIN — HEPARIN SODIUM 5000 UNITS: 5000 INJECTION, SOLUTION INTRAVENOUS; SUBCUTANEOUS at 05:36

## 2022-07-13 RX ADMIN — LOSARTAN POTASSIUM 25 MG: 25 TABLET, FILM COATED ORAL at 09:09

## 2022-07-13 NOTE — OUTREACH NOTE
Prep Survey    Flowsheet Row Responses   [facility] patient discharged from? [facility]   Is LACE score < 7 ? No   Emergency Room discharge w/ pulse ox? No   Eligibility [facility]   Date of Admission 07/10/22   Date of Discharge 07/13/22   Discharge Disposition Home or Self Care   Discharge diagnosis Subacute CVA    Does the patient have one of the following disease processes/diagnoses(primary or secondary)? Stroke (TIA)   Does the patient have Home health ordered? No   Is there a DME ordered? Yes   What DME was ordered? walker - [employer]   Prep survey completed? Yes          JAILYN SHETH - Registered Nurse

## 2022-07-13 NOTE — THERAPY TREATMENT NOTE
Patient Name: Audra Montejo  : 1960    MRN: [id number]                              Today's Date: 2022       Admit Date: 7/10/2022    Visit Dx:     ICD-10-CM ICD-9-CM   1. Cerebellar stroke ([facility])  I63.9 434.91   2. Dizziness  R42 780.4   3. Dysphagia, oropharyngeal  R13.12 787.22   4. Difficulty walking  R26.2 719.7   5. Decreased activities of daily living (ADL)  Z78.9 V49.89   6. Cerebrovascular accident (CVA) due to embolism of right posterior cerebral artery ([facility])  I63.431 434.11     Patient Active Problem List   Diagnosis   • Essential hypertension   • Mild intermittent asthma without complication   • Chronic left shoulder pain   • Anxiety   • Back pain   • Bilateral carpal tunnel syndrome   • Bipolar disorder ([facility])   • Eczema   • Gall stones   • Heart murmur   • Left shoulder pain   • Loss of appetite   • Migraine   • Nontraumatic tear of rotator cuff   • Palpitation   • Ringing in ears   • Seasonal allergic rhinitis   • Shortness of breath   • Stroke ([facility])   • Subacromial impingement, left   • Superior glenoid labrum lesion of left shoulder   • Trouble swallowing   • Chest pain   • Cerebellar stroke ([facility])     Past Medical History:   Diagnosis Date   • 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   • Acid reflux disease    • Allergy     unspecified   • Anxiety    • Back pain    • Bilateral carpal tunnel syndrome 2017   • Bipolar disorder ([facility])    • Bursitis of left shoulder 2016   • Depression    • Eczema    • Gall stones    • H/O psychiatric care    • Heart attack ([facility])    • Heart murmur    • Hypertension    • Incomplete tear of left rotator cuff 2017   • Left shoulder pain    • Loss of appetite    • Lumbago 2015    low back pain    • Memory loss     forgetfulness   • Migraine headache    • Need for hepatitis C screening test    • Palpitation    • Ringing in ears    • Seasonal allergies    • Shortness of breath    • Sinus trouble     unspecified    • Stroke (cerebrum) ([facility])    •  Subacromial impingement, left 10/13/2016   • Superior glenoid labrum lesion of left shoulder 10/13/2016    subsequent encounter   • Trouble swallowing      Past Surgical History:   Procedure Laterality Date   • APPENDECTOMY     • CARPAL TUNNEL RELEASE     • CHOLECYSTECTOMY     • COLONOSCOPY  2014   • ENDOSCOPY  2014   • HYSTERECTOMY  1999   • SHOULDER SURGERY Left 10/03/2016    arthroscopic, left shoulder scope, RTC repair- Dr. Flores      General Information     Row Name 07/13/22 1142          OT Time and Intention    Document Type therapy note (daily note)  -ES     Mode of Treatment individual therapy;occupational therapy  -ES     Row Name 07/13/22 1142          General Information    Existing Precautions/Restrictions fall  -ES     Row Name 07/13/22 1142          Cognition    Orientation Status (Cognition) oriented x 3  Patient pleasant and cooperative, agreeable to therapy treatment session  -ES     Row Name 07/13/22 1142          Safety Issues, Functional Mobility    Impairments Affecting Function (Mobility) balance;strength  -ES           User Key  (r) = Recorded By, (t) = Taken By, (c) = Cosigned By    Initials Name Provider Type    ES Kalie Card, RICARDO Occupational Therapist                 Mobility/ADL's     Row Name 07/13/22 1142          Bed Mobility    Bed Mobility supine-sit;sit-supine  -ES     Supine-Sit [place] (Bed Mobility) standby assist  -ES     Sit-Supine [place] (Bed Mobility) standby assist  -ES     Row Name 07/13/22 1142          Transfers    Transfers sit-stand transfer  -ES     Sit-Stand [place] (Transfers) contact guard;1 person assist  -ES     Row Name 07/13/22 1142          Sit-Stand Transfer    Assistive Device (Sit-Stand Transfers) other (see comments)  no assistive device  -ES     Row Name 07/13/22 1142          Functional Mobility    Functional Mobility- Ind. Level contact guard assist  -ES     Functional Mobility- Device other (see comments)  no assistive device  -ES      Functional Mobility- Comment Patient performs functional mobility from edge of bed to door and from door to window. Patient CGA with mobility without use of assistive device. Patient with incrased balance with mobility compared to evaluation.  -ES           User Key  (r) = Recorded By, (t) = Taken By, (c) = Cosigned By    Initials Name Provider Type    Kalie Salazar OT Occupational Therapist               Obj/Interventions     Row Name 07/13/22 1144          Shoulder (Therapeutic Exercise)    Shoulder (Therapeutic Exercise) strengthening exercise  -ES     Shoulder Strengthening (Therapeutic Exercise) resisted diagonal exercise;yellow;10 repetitions;2 sets  -ES     Row Name 07/13/22 1144          Elbow/Forearm (Therapeutic Exercise)    Elbow/Forearm (Therapeutic Exercise) strengthening exercise  -ES     Elbow/Forearm Strengthening (Therapeutic Exercise) bilateral;flexion;extension;resistance band;yellow;10 repetitions;2 sets  -ES     Row Name 07/13/22 1144          Motor Skills    Therapeutic Exercise shoulder;elbow/forearm  -ES     Row Name 07/13/22 1144          Balance    Balance Assessment sitting dynamic balance;standing dynamic balance  -ES     Dynamic Sitting Balance independent  -ES     Position, Sitting Balance unsupported;sitting edge of bed  -ES     Dynamic Standing Balance contact guard  -ES     Position/Device Used, Standing Balance unsupported  -ES     Comment, Balance patient with improved standing balance without use of assistive device from evaluation  -ES           User Key  (r) = Recorded By, (t) = Taken By, (c) = Cosigned By    Initials Name Provider Type    Kalie Salazar OT Occupational Therapist               Goals/Plan    No documentation.                Clinical Impression     Row Name 07/13/22 1153          Plan of Care Review    Plan of Care Reviewed With patient  -ES     Progress improving  -ES     Outcome Evaluation Patient with significant improvement noted with coordination  and balance this therapy session. Patient performs functional mobility CGA without use of assistive device, decreased LOBs noted from evaluation. Patient with symmetrical UE strength, completing UE therex with yellow, light resistance band: see note for specifics. Patient continues to demonstrate improvements and would benefit from continued skilled OT intervention to promote return to independent baseline. Patient would significantly benefit from outpatient therapy at time of discharge.  -ES     Row Name 07/13/22 1150          Therapy Plan Review/Discharge Plan (OT)    Anticipated Discharge Disposition (OT) inpatient rehabilitation facility;home with outpatient therapy services  OP services if patient not agreeable to IP therapy  -ES           User Key  (r) = Recorded By, (t) = Taken By, (c) = Cosigned By    Initials Name Provider Type    Kalie Salazar OT Occupational Therapist               Outcome Measures     Row Name 07/13/22 1152          How much help from another is currently needed...    Putting on and taking off regular lower body clothing? 3  -ES     Bathing (including washing, rinsing, and drying) 3  -ES     Toileting (which includes using toilet bed pan or urinal) 3  -ES     Putting on and taking off regular upper body clothing 4  -ES     Taking care of personal grooming (such as brushing teeth) 4  -ES     Eating meals 4  -ES     AM-PAC 6 Clicks Score (OT) 21  -ES     Row Name 07/13/22 1152          Functional Assessment    Outcome Measure Options AM-PAC 6 Clicks Daily Activity (OT);Optimal Instrument  -ES     Row Name 07/13/22 1152          Optimal Instrument    Optimal Instrument Optimal - 3  -ES     Bending/Stooping 1  -ES     Standing 2  -ES     Reaching 1  -ES           User Key  (r) = Recorded By, (t) = Taken By, (c) = Cosigned By    Initials Name Provider Type    Kalie Salazar OT Occupational Therapist                Occupational Therapy Education                 Title: PT OT SLP  Therapies (Resolved)     Topic: Occupational Therapy (Resolved)     Point: ADL training (Resolved)     Description:   Instruct learner(s) on proper safety adaptation and remediation techniques during self care or transfers.   Instruct in proper use of assistive devices.              Learning Progress Summary           Patient Acceptance, E,TB, VU by KW at 7/12/2022 2301    Acceptance, E,TB, VU by ES at 7/11/2022 1535                   Point: Home exercise program (Resolved)     Description:   Instruct learner(s) on appropriate technique for monitoring, assisting and/or progressing therapeutic exercises/activities.              Learning Progress Summary           Patient Acceptance, E,TB, VU by KW at 7/12/2022 2301    Acceptance, E,TB, VU by ES at 7/11/2022 1535                   Point: Precautions (Resolved)     Description:   Instruct learner(s) on prescribed precautions during self-care and functional transfers.              Learning Progress Summary           Patient Acceptance, E,TB, VU by KW at 7/12/2022 2301    Acceptance, E,TB, VU by ES at 7/11/2022 1535                   Point: Body mechanics (Resolved)     Description:   Instruct learner(s) on proper positioning and spine alignment during self-care, functional mobility activities and/or exercises.              Learning Progress Summary           Patient Acceptance, E,TB, VU by KW at 7/12/2022 2301    Acceptance, E,TB, VU by ES at 7/11/2022 1535                               User Key     Initials Effective Dates Name Provider Type Discipline     01/25/22 -  Gabby Rose RN Registered Nurse Nurse     09/13/21 -  Kalie Card OT Occupational Therapist OT              OT Recommendation and Plan  Planned Therapy Interventions (OT): activity tolerance training, BADL retraining, functional balance retraining, occupation/activity based interventions, patient/caregiver education/training, strengthening exercise, transfer/mobility retraining  Therapy  Frequency (OT): 5 times/wk  Plan of Care Review  Plan of Care Reviewed With: patient  Progress: improving  Outcome Evaluation: Patient with significant improvement noted with coordination and balance this therapy session. Patient performs functional mobility CGA without use of assistive device, decreased LOBs noted from evaluation. Patient with symmetrical UE strength, completing UE therex with yellow, light resistance band: see note for specifics. Patient continues to demonstrate improvements and would benefit from continued skilled OT intervention to promote return to independent baseline. Patient would significantly benefit from outpatient therapy at time of discharge.     Time Calculation:    Time Calculation- OT     Row Name 07/13/22 1152             Time Calculation- OT    OT Received On 07/13/22  -ES      OT Goal Re-Cert Due Date 07/20/22  -ES              Timed Charges    71126 - OT Therapeutic Exercise Minutes 15  -ES              Total Minutes    Timed Charges Total Minutes 15  -ES       Total Minutes 15  -ES            User Key  (r) = Recorded By, (t) = Taken By, (c) = Cosigned By    Initials Name Provider Type    ES Kalie Card OT Occupational Therapist              Therapy Charges for Today     Code Description Service Date Service Provider Modifiers Qty    75100103460 HC OT THER PROC EA 15 MIN 7/13/2022 Kalie Card OT GO 1               Kalie Card OT  7/13/2022

## 2022-07-13 NOTE — DISCHARGE SUMMARY
[facility]         HOSPITALIST  DISCHARGE SUMMARY    Patient Name: Audra Montejo  : 1960  MRN: [id number]    Date of Admission: 7/10/2022  Date of Discharge:  2022    Primary Care Physician: Anayeli Pina MD    Consults     Date and Time Order Name Status Description    2022 10:51 AM Inpatient Neurology Consult Stroke Completed           Active and Resolved Hospital Problems:  Subacute CVA involving bilateral cerebellum, right medulla oblongata  Uncontrolled hypertension  Reactive leukocytosis  Nausea and vomiting related to subacute CVA  Current tobacco smoker  Sinus bradycardia  Bipolar disorder  Mild intermittent asthma  Irregularity of PICA  Prediabetes  Moderate to severe cervical segment stenosis right PCA  Active Hospital Problems    Diagnosis POA   • Cerebellar stroke (HCC) [I63.9] Yes      Resolved Hospital Problems   No resolved problems to display.       Hospital Course     Hospital Course:  62 y.o. female with a history of bipolar disorder, CVA and hypertension presented with dizziness. Of note, she was recently hospitalized from  to 2022 and was found to have bilateral cerebellar infarcts right more than left with high-grade stenosis of PICA. She was evaluated by neurology, but she left AMA because she wanted to smoke. She presented again with presents with worsening dizziness, blurry vision mainly in her right eye and nausea difficulty ambulating due to imbalance and falls.  Hospitalized for further monitoring management. CT head reviewed showing evolving subacute bilateral cerebellar hemispheric infarcts and right medulla oblongata.  Uncontrolled hypertension being managed.  A1c 5.9, prediabetic range.  Patient's ability to mobilize and dizziness symptoms improved.  She was started on Plavix for 21 days, aspirin to continue, statin.  Blood pressures controlled with amlodipine and losartan.  Stroke work-up otherwise completed on previous hospitalization  which she left AMA from.  Once her symptoms improved, she was discharged in hemodynamically stable condition on 7/13/2022 to follow-up with neurology as outpatient.  She is counseled to quit smoking.  High risk for readmission and worsening stroke or recurrent stroke due to medical noncompliance.    Day of Discharge     Vital Signs:  Temp:  [98.1 °F (36.7 °C)-99 °F (37.2 °C)] 98.4 °F (36.9 °C)  Heart Rate:  [52-81] 64  Resp:  [16-18] 18  BP: (128-159)/(59-71) 149/64  Review of systems:  All systems reviewed and negative except for generalized fatigue    Physical Exam                                       Constitutional: Awake, alert, no acute distress              Eyes: Pupils equal, sclerae anicteric, no conjunctival injection              HENT: NCAT, mucous membranes moist              Neck: Supple, no palpable masses              Respiratory: Clear to auscultation bilaterally, nonlabored respirations               Cardiovascular: RRR, no murmurs, rubs, or gallops, palpable pedal pulses bilaterally              Gastrointestinal: Positive bowel sounds, soft, nontender, nondistended              Musculoskeletal: No bilateral ankle edema, no clubbing or cyanosis to extremities              Psychiatric: Flat affect, cooperative              Neurologic: Oriented x 3, strength symmetric in all extremities, Cranial Nerves grossly intact to confrontation, speech clear              Skin: No rashes      Discharge Details        Discharge Medications      New Medications      Instructions Start Date   aspirin 81 MG chewable tablet   81 mg, Oral, Daily   Start Date: July 14, 2022     atorvastatin 80 MG tablet  Commonly known as: LIPITOR   80 mg, Oral, Nightly      clopidogrel 75 MG tablet  Commonly known as: PLAVIX   75 mg, Oral, Daily   Start Date: July 14, 2022        Changes to Medications      Instructions Start Date   amLODIPine 5 MG tablet  Commonly known as: NORVASC  What changed: additional instructions   5 mg, Oral,  Daily   Start Date: July 14, 2022        Continue These Medications      Instructions Start Date   albuterol (2.5 MG/3ML) 0.083% nebulizer solution  Commonly known as: PROVENTIL   2.5 mg, Nebulization, 3 Times Daily      albuterol sulfate  (90 Base) MCG/ACT inhaler  Commonly known as: PROVENTIL HFA;VENTOLIN HFA;PROAIR HFA   2 puffs, Inhalation, Every 6 Hours PRN      montelukast 10 MG tablet  Commonly known as: SINGULAIR   10 mg, Oral, Nightly      ondansetron ODT 4 MG disintegrating tablet  Commonly known as: ZOFRAN-ODT   4 mg, Sublingual, Every 6 Hours PRN      oxybutynin XL 10 MG 24 hr tablet  Commonly known as: DITROPAN-XL   10 mg, Oral, Daily         Stop These Medications    metoprolol succinate XL 25 MG 24 hr tablet  Commonly known as: TOPROL-XL            Allergies   Allergen Reactions   • Tape Itching       Discharge Disposition:  Home-Health Care [facility]    Diet:  Hospital:No active diet order      Discharge Activity: as tolerates      CODE STATUS:  Code Status and Medical Interventions:   Ordered at: 07/10/22 2231     Code Status (Patient has no pulse and is not breathing):    CPR (Attempt to Resuscitate)     Medical Interventions (Patient has pulse or is breathing):    Full Support         Future Appointments   Date Time Provider Department Center   7/21/2022 11:00 AM Jonnie Vieira OT [facility]   7/22/2022 11:30 AM Kemar Santana APRN [facility]   7/26/2022 12:00 PM Gaviota Matthews PT [facility]       Additional Instructions for the Follow-ups that You Need to Schedule     Ambulatory Referral to Physical Therapy Evaluate and treat, Vestibular, Neuro; Stretching, ROM, Strengthening; Left, Right   As directed      Specialty needed: Evaluate and treat Vestibular Neuro    Exercises: Stretching ROM Strengthening    Gait Training: Left Right         Discharge Follow-up with PCP   As directed       Currently Documented PCP:    Anayeli Pina MD    PCP Phone Number:    [phone]      Follow Up Details: 3 to 7 days         Discharge Follow-up with Specified Provider: dr zelaya 2 weeks   As directed      To: dr zelaya 2 weeks               Pertinent  and/or Most Recent Results     PROCEDURES:   Adult Transesophageal Echo (ARTHUR) W/ Cont if Necessary Per Protocol    Result Date: 7/8/2022  · Calculated left ventricular EF = 65% Estimated left ventricular EF was in agreement with the calculated left ventricular EF. Left ventricular systolic function is normal. · Mild mitral regurgitation · No evidence of thrombi in the left atrium left atrial appendage and the left ventricle · Bubble study negative      CT Head Without Contrast    Result Date: 7/10/2022  PROCEDURE: CT HEAD WO CONTRAST  COMPARISONS: 7/6/2022.  INDICATIONS: DIZZINESS; NAUSEA; H/O RECENT ACUTE CEREBELLAR HEMISPHERIC INFARCTS.  PROTOCOL:   Standard CT imaging protocol performed.    RADIATION:   Total DLP: 1,017.2 mGy*cm.   MA and/or KV were/was adjusted to minimize radiation dose.    TECHNIQUE: After obtaining the patient's consent, 130 CT images were obtained without non-ionic intravenous contrast material.  FINDINGS: Evolving subacute cerebellar hemispheric infarcts are noted.  Also, an evolving subacute right posterior, lateral infarct of the medulla oblongata is noted.  No acute intracranial hemorrhage is seen.  No definite new acute infarct is appreciated.  There may be chronic lacunar infarcts involving the right arnol, seen previously.  There is suspected mild effacement of the inferior 4th ventricle, related to the cytotoxic edema, greater on the right than the left, within the cerebellar hemispheres.  No acute skull fracture seen.  No acute subfalcine herniation.  There may be a tiny (3 mm) right-sided sublenticular cyst, as seen on image 30 of series 201.  Under-pneumatization of the bilateral mastoid temporal bones is suspected.  No air-fluid levels are seen in the imaged paranasal sinuses.  No acute orbital findings are  appreciated.       Evolving subacute bilateral cerebellar hemispheric infarcts and right medulla oblongata are seen, as discussed.  There is mild effacement of the inferior 4th ventricle.  No obstructive hydrocephalus is suggested.  No acute intracranial hemorrhage.  No definite new acute infarct is appreciated by nonenhanced head CT examination.  Please see above comments for further detail.    COMMENT:  Part of this note is an electronic transcription of spoken language to printed text. The electronic translation/transcription may permit erroneous, or at times, nonsensical (or even sensical) words or phrases to be inadvertently transcribed or omitted; this  has reviewed the note for such errors (as well as additional errors); however, some may still exist.  ONIEL VALDEZ JR, MD       Electronically Signed and Approved By: ONIEL VALDEZ JR, MD on 7/10/2022 at 22:39             CT Angiogram Neck    Result Date: 7/7/2022  PROCEDURE: CT ANGIOGRAM NECK  COMPARISONS: [facility], MR, MRI BRAIN WO CONTRAST, 7/06/2022, 22:47.   [facility], CT, CT HEAD WO CONTRAST STROKE PROTOCOL, 7/06/2022, 21:13.  [facility], CT, CT ANGIOGRAM HEAD, 7/06/2022, 23:29.  INDICATIONS: cva; hypertension; ha  PROTOCOL:   Standard imaging protocol performed    RADIATION:   Automated exposure control was utilized to minimize radiation dose.  TECHNIQUE: After obtaining the patient's consent, 617 CT/CTA images of the neck were obtained with intravenous contrast.  Multi-planar/3-D imaging was created and interpreted to optimize visualization of vascular anatomy.  Unless otherwise stated in this report, all vascular stenoses involving the internal carotid arteries, reported for this examination, are derived by dividing the lesion diameter by the diameter of the normal internal carotid artery more distally (that is, using NASCET criteria).  FINDINGS:  LEFT INTERNAL CAROTID: No hemodynamically  significant stenosis or dissection.  Mild calcified atherosclerotic plaque involves the distal internal carotid arteries bilaterally. EXTERNAL CAROTID: No hemodynamically significant stenosis or dissection.  COMMON CAROTID: Probably no hemodynamically significant stenosis or dissection.  There may be mild-to-moderate origin narrowing of the left common carotid artery. VERTEBRAL: No hemodynamically significant stenosis or dissection.  The vertebral arteries are codominant.  There is thought to be artifact obscuring the origin of the left vertebral artery.  RIGHT INTERNAL CAROTID: No hemodynamically significant stenosis or dissection.  Mild calcified atherosclerotic plaque involves the distal internal carotid arteries bilaterally. EXTERNAL CAROTID: No hemodynamically significant stenosis or dissection.  COMMON CAROTID: No hemodynamically significant stenosis or dissection.  VERTEBRAL: No hemodynamically significant stenosis or dissection.  The vertebral arteries are codominant.  OTHER: There are degenerative changes of the imaged spine.  There may be mild origin narrowing of the innominate artery.  There is incidental chronic calcified granulomatous disease of the chest.       No hemodynamically significant stenoses are seen.  No arterial dissection.       COMMENT:  Part of this note is an electronic transcription of spoken language to printed text. The electronic translation/transcription may permit erroneous, or at times, nonsensical (or even sensical) words or phrases to be inadvertently transcribed or omitted; this  has reviewed the note for such errors (as well as additional errors); however, some may still exist.  ONIEL VALDEZ JR, MD       Electronically Signed and Approved By: ONIEL VALDEZ JR, MD on 7/07/2022 at 1:54              MRI Brain Without Contrast    Result Date: 7/7/2022  PROCEDURE: MRI BRAIN WO CONTRAST  COMPARISONS: [facility], CT, CT ANGIOGRAM HEAD, 7/06/2022, 23:29.    [facility], CT, CT ANGIOGRAM NECK, 7/06/2022, 23:29.   [facility], CT, CT HEAD WO CONTRAST, 7/06/2022, 21:13.   [facility], MR, MRA NECK W/O CONTRAST, 1/03/2017, 12:22.   [facility], MR, BRAIN W/O CONTRAST, 1/03/2017, 12:22.  INDICATIONS: Dizziness, nonspecific.  TECHNIQUE: A variety of imaging planes and parameters were utilized for visualization of suspected pathology.  Images were performed without contrast.  FINDINGS: The study is abnormal.  Restricted diffusion is seen in the bilateral cerebellar hemispheres as well as the cerebellar vermis, much greater on the right than the left, most suggestive of acute infarcts.  The right-sided cerebellar hemispheric acute infarct measures approximately 4.9 x 2.9 x 5.1 cm in transverse, craniocaudal, and anteroposterior (AP) extent, respectively, as seen on image 10 of series 12, image 38 of series 6, image 29 of series 3, and adjacent images.  The findings suggest an acute infarct involving a dominant right-sided posterior inferior cerebellar artery (PICA).  There is involvement of the cerebellar vermis.  Involvement of the posterolateral right medulla oblongata is possible.  Please correlate with history of lateral medullary syndrome (i.e., Wallenberg syndrome).  Chronic small vessel ischemia/infarction is also suspected, including the right paramidline arnol with a 6 mm chronic lacunar infarct identified, such as seen on image 8 of series 8.  There is also involvement of the subcortical and central bilateral cerebral white matter as well as the periventricular cerebral white matter.  No definite MRI evidence of acute intracranial hemorrhage.  There is associated slight deformity of the 4th ventricle related to the above-mentioned cerebellar cytotoxic edema.  No obstructive hydrocephalus is suggested.       Acute infarcts involve the bilateral cerebellar hemispheres, larger on the right than the left, as detailed above.  No acute  intracranial hemorrhage is seen.     COMMENT:  Part of this note is an electronic transcription of spoken language to printed text. The electronic translation/transcription may permit erroneous, or at times, nonsensical (or even sensical) words or phrases to be inadvertently transcribed or omitted; this  has reviewed the note for such errors (as well as additional errors); however, some may still exist.  ONIEL VALDEZ JR, MD       Electronically Signed and Approved By: ONIEL VALDEZ JR, MD on 7/07/2022 at 0:31              FL Video Swallow With Speech Single Contrast    Result Date: 7/11/2022  PROCEDURE: FL VIDEO SWALLOW W SPEECH SINGLE-CONTRAST  COMPARISON: None  INDICATIONS: rule out silent aspiration/ lateral medullary stroke/2.1 fluoro time/6.8mGy/0 images  TECHNIQUE: Examination was performed in conjunction with the speech pathologist. The patient was given both thin and thick liquid barium, applesauce with barium, contrast and guillermo cracker with Esophotrast. The patient's medication list was reviewed and documented in the medical record.  FINDINGS:  The patient's swallowing function was evaluated in the lateral imaging plane utilizing multiple phases of barium contrast.  No evidence of laryngeal penetration or aspiration during the study.  The oral phase was slow.        1. No evidence of laryngeal penetration or aspiration during the study     AAMIR MCCLOUD MD       Electronically Signed and Approved By: AAMIR MCCLOUD MD on 7/11/2022 at 10:54             XR Chest 1 View    Result Date: 7/10/2022  PROCEDURE: XR CHEST 1 VW  COMPARISON: 7/6/2022.  INDICATIONS: DIZZINESS, NAUSEA, & DIARRHEA.  FINDINGS:  A single AP upright portable chest radiograph was performed.  No cardiac enlargement is seen.  No acute infiltrate is appreciated.  No pleural effusion or pneumothorax is identified.  Chronic calcified granulomatous disease involves the chest.  The thoracic aorta is atherosclerotic.  No  significant interval change is seen since the prior study.        No acute infiltrate is appreciated.  No cardiac enlargement.      COMMENT:  Part of this note is an electronic transcription of spoken language to printed text. The electronic translation/transcription may permit erroneous, or at times, nonsensical (or even sensical) words or phrases to be inadvertently transcribed or omitted; this  has reviewed the note for such errors (as well as additional errors); however, some may still exist.  ONIEL VALDEZ JR, MD       Electronically Signed and Approved By: ONIEL VALDEZ JR, MD on 7/10/2022 at 19:20              XR Chest 1 View    Result Date: 7/6/2022  PROCEDURE: XR CHEST 1 VW  COMPARISON: [facility], CR, XR CHEST 1 VW, 2/15/2022, 23:48.  INDICATIONS: Stroke Protocol (Onset > 12 hrs).  FINDINGS:  A single AP upright portable chest radiograph was performed.  Borderline cardiac enlargement is seen.  No acute infiltrate is appreciated.  No pleural effusion or pneumothorax is identified.  External artifacts obscure detail.  Chronic calcified granulomatous disease involves the chest.  The thoracic aorta is atherosclerotic.  No significant interval change is seen since the prior study.        No acute infiltrate is appreciated.      COMMENT:  Part of this note is an electronic transcription of spoken language to printed text. The electronic translation/transcription may permit erroneous, or at times, nonsensical (or even sensical) words or phrases to be inadvertently transcribed or omitted; this  has reviewed the note for such errors (as well as additional errors); however, some may still exist.  ONIEL VALDEZ JR, MD       Electronically Signed and Approved By: ONIEL VALDEZ JR, MD on 7/06/2022 at 22:59              CT Angiogram Head    Addendum Date: 7/7/2022    ADDENDUM:  No cerebral aneurysms are appreciated.   ONIEL VALDEZ JR, MD       Electronically Signed and Approved  By: ONIEL VALDEZ JR, MD on 7/07/2022 at 1:52              Result Date: 7/7/2022  PROCEDURE: CT ANGIOGRAM HEAD  COMPARISON: CT, CT ANGIOGRAM NECK, 7/06/2022, 23:29.  INDICATIONS: cva; history of hypertension and headache  PROTOCOL:   Standard CTA imaging protocol performed.    RADIATION:   DLP: 445.1mGy*cm   MA and/or KV was adjusted to minimize radiation dose.    CONTRAST: 100 mL Isovue 370 I.V.  TECHNIQUE: After obtaining the patient's consent, 777 CT/CTA images of the head were obtained with non-ionic contrast, and multi-planar/3-D imaging were created and interpreted to optimize visualization of vascular anatomy.   FINDINGS: There is an irregular CTA appearance of the proximal posterior inferior cerebellar artery (PICA), as seen on image 61 of series 402 and adjacent images.  The finding is also seen on image 19 of series 1002.  It may be related to multifocal moderate-to-high-grade stenoses as from atherosclerosis.  Vasospasm is possible.  Arteritis cannot be excluded.  Arterial emboli would be in the differential diagnosis.  The left posterior inferior cerebellar artery origin is not well seen.  It may be hypoplastic or absent.  There may be a dominant right-sided posterior inferior cerebellar artery (PICA), supplying portions of the left cerebellar hemisphere and the cerebellar vermis.  The intradural vertebral arteries and the basilar artery are continuously patent without hemodynamically significant stenoses.  The vertebral arteries are thought to be codominant.  There may be persistent fetal origin of the left posterior cerebral artery.  There is a short-segment moderate-to-high-grade stenosis involving the proximal right posterior cerebral artery at its P1 and P2 segment junctions, such as seen on image 227 of series 401 and image 5 of series 1001.  In the differential diagnosis would be vasospasm, embolus, arteritis, and/or other vasculopathies.  The A1 segment on the left is thought to be hypoplastic.   Otherwise, no hemodynamically significant stenoses are seen.  No emergent large vessel occlusion is suggested elsewhere.  Please see the brain MRI exam report from 7/6/2022 at 2247 hours for further detail regarding important intracranial findings.         1. There is an irregular CTA appearance of the proximal right posterior inferior cerebellar artery (PICA), which may represent atherosclerotic change.  Vasospasm or arteritis would be in the differential diagnosis.  Arterial emboli are possible but probably less likely.  Other types of vasculopathies cannot be excluded.   2. There is also moderate-to-severe short-segment stenosis of the proximal right posterior cerebral artery (PCA), especially centered at its P1 and P2 segment junctions.  This finding may have similar etiologies as to that mentioned above for the findings in the right PICA.   3. Otherwise, no hemodynamically significant stenoses are seen.  No other areas of emergent large vessel occlusion are suggested.   4. Please see above comments for further detail.      COMMENT:  Part of this note is an electronic transcription of spoken language to printed text. The electronic translation/transcription may permit erroneous, or at times, nonsensical (or even sensical) words or phrases to be inadvertently transcribed or omitted; this  has reviewed the note for such errors (as well as additional errors); however, some may still exist.  ONIEL VALDEZ JR, MD       Electronically Signed and Approved By: ONIEL VALDEZ JR, MD on 7/07/2022 at 1:48              CT Head Without Contrast Stroke Protocol    Result Date: 7/6/2022  PROCEDURE: CT HEAD WO CONTRAST STROKE PROTOCOL  COMPARISON: None.  INDICATIONS: Stroke, follow-up.  Hypertension.  Headache.  PROTOCOL:   Standard CT imaging protocol performed.    RADIATION:   Total DLP: 1,018.2mGy*cm.   MA and/or KV were/was adjusted to minimize radiation dose.    TECHNIQUE: After obtaining the patient's consent,  130 CT images were obtained without non-ionic intravenous contrast material.  FINDINGS: Abnormal attenuation is seen in the bilateral cerebellar hemispheres, greater on the right the left, and may represent age-indeterminate ischemia/infarction, most likely acute to subacute in nature, possibly involving the right posterior inferior cerebellar artery (PICA).  There is slight deformity of the 4th ventricle with slight effacement and displacement in an anterior, leftward direction, probably related to the right-sided cerebellar infarcts.  No obstructive hydrocephalus is suggested.  There may be mild chronic small vessel ischemia/infarction, including involvement of the brainstem, especially the right aspect of the arnol with a chronic lacunar infarct suspected, measuring about 5 mm.  Arterial calcifications are seen.  No acute intracranial hemorrhage is appreciated.  There may be slight right-to-left midline shift involving the posterior fossa.  In the supratentorial regions, no midline shift or acute intracranial herniation syndrome is suggested.  No other acute or subacute infarcts are suggested.  Minimal if any cerebellar tonsillar ectopia or inferior tonsillar herniation is suggested.  No supratentorial cerebellar herniation is suggested.  There may be mild age-indeterminate sinus disease.  No air-fluid interfaces are appreciated within the imaged paranasal sinuses.  Streak artifact from dental amalgam obscures detail. The imaged middle ear clefts (that is, the bilateral mastoid air cell complexes, bilateral middle ear cavities, and bilateral external auditory canals) are well aerated.  No acute findings are seen within the partially imaged orbits.         1. There are age-indeterminate but probably acute to subacute bilateral cerebellar hemispheric infarcts, greater on the right than the left.  The findings may represent an acute to subacute right posterior inferior cerebellar artery (PICA) infarct.  No acute  intracranial hemorrhage is seen.   2. Mild chronic small vessel ischemia/infarction is suggested.  3. Please see above comments for further detail.     COMMENT:  Part of this note is an electronic transcription of spoken language to printed text. The electronic translation/transcription may permit erroneous, or at times, nonsensical (or even sensical) words or phrases to be inadvertently transcribed or omitted; this  has reviewed the note for such errors (as well as additional errors); however, some may still exist.  ONIEL VALDEZ JR, MD       Electronically Signed and Approved By: ONIEL VALDEZ JR, MD on 7/06/2022 at 21:23                LAB RESULTS:      Lab 07/13/22  0441 07/12/22  0443 07/11/22  0432 07/10/22  1833 07/06/22  2058   WBC 13.10* 13.93* 12.86* 11.99* 13.18*   HEMOGLOBIN 15.0 15.1 15.8 16.2* 17.0*   HEMATOCRIT 43.5 44.9 46.6 47.7* 50.6*   PLATELETS 253 240 240 264 292   NEUTROS ABS 7.70* 8.40* 6.81 6.99 8.66*   IMMATURE GRANS (ABS) 0.04 0.06* 0.04 0.05 0.04   LYMPHS ABS 4.27* 4.26* 4.96* 4.08* 3.50*   MONOS ABS 0.89 0.95* 0.85 0.70 0.90   EOS ABS 0.14 0.21 0.16 0.13 0.03   MCV 86.3 88.0 87.9 87.7 88.6   SED RATE  --  3  --   --   --    CRP  --  <0.30  --   --   --    PROTIME  --   --   --   --  13.3         Lab 07/13/22  0441 07/12/22  0443 07/11/22  0432 07/10/22  1833 07/08/22  0527   SODIUM 141 139 143 144 141   POTASSIUM 3.7 3.6 3.8 4.4 4.1   CHLORIDE 105 105 108* 110* 105   CO2 25.1 25.9 23.5 22.9 26.5   ANION GAP 10.9 8.1 11.5 11.1 9.5   BUN 10 12 16 18 15   CREATININE 0.67 0.60 0.60 0.66 0.78   EGFR 100.2 102.9 102.9 100.6 87.1   GLUCOSE 102* 109* 90 132* 90   CALCIUM 9.6 9.6 9.6 9.6 9.5   MAGNESIUM 1.9 1.9  --  1.9  --    PHOSPHORUS 4.7* 4.4  --   --  3.9   HEMOGLOBIN A1C  --   --   --  5.90*  --          Lab 07/13/22  0441 07/12/22  0443 07/10/22  1833 07/08/22  0527 07/06/22  2058   TOTAL PROTEIN 7.0 6.8 7.3  --  8.0   ALBUMIN 4.20 4.00 4.20 4.00 4.70   GLOBULIN  --   --  3.1   --  3.3   ALT (SGPT) 17 19 23  --  30   AST (SGOT) 17 18 20  --  23   BILIRUBIN 0.4 0.4 0.2  --  0.5   BILIRUBIN DIRECT <0.2 <0.2  --   --   --    ALK PHOS 136* 126* 139*  --  158*         Lab 07/10/22  1833 07/06/22  2058   TROPONIN T <0.010  --    PROTIME  --  13.3   INR  --  1.00         Lab 07/10/22  1833 07/07/22  0531   CHOLESTEROL 119 160   LDL CHOL 40 87   HDL CHOL 66* 58   TRIGLYCERIDES 58 77         Lab 07/06/22 2217 07/06/22 2058   ABO TYPING A A   RH TYPING Negative Negative   ANTIBODY SCREEN  --  Negative         Brief Urine Lab Results  (Last result in the past 365 days)      Color   Clarity   Blood   Leuk Est   Nitrite   Protein   CREAT   Urine HCG        07/11/22 0339 Yellow   Clear   Negative   Negative   Negative   Trace               Microbiology Results (last 10 days)     Procedure Component Value - Date/Time    COVID-19,APTIMA PANTHER(STEFFEN),[facility]/ [facility], NP/OP SWAB IN UTM/VTM/[facility] TRANSPORT MEDIA,24 HR TAT - Swab, Nasopharynx [[id number]]  (Normal) Collected: 07/06/22 2006    Lab Status: Final result Specimen: Swab from Nasopharynx Updated: 07/07/22 1742     COVID19 Not Detected    Narrative:      Fact sheet for providers: https://www.fda.gov/media/257333/download     Fact sheet for patients: https://www.fda.gov/media/284864/download    Test performed by RT PCR.          CT Head Without Contrast    Result Date: 7/10/2022  Impression:  Evolving subacute bilateral cerebellar hemispheric infarcts and right medulla oblongata are seen, as discussed.  There is mild effacement of the inferior 4th ventricle.  No obstructive hydrocephalus is suggested.  No acute intracranial hemorrhage.  No definite new acute infarct is appreciated by nonenhanced head CT examination.  Please see above comments for further detail.    COMMENT:  Part of this note is an electronic transcription of spoken language to printed text. The electronic translation/transcription may permit erroneous, or at times, nonsensical (or  even sensical) words or phrases to be inadvertently transcribed or omitted; this  has reviewed the note for such errors (as well as additional errors); however, some may still exist.  ONIEL VALDEZ JR, MD       Electronically Signed and Approved By: ONIEL VALDEZ JR, MD on 7/10/2022 at 22:39             CT Angiogram Neck    Result Date: 7/7/2022  Impression:  No hemodynamically significant stenoses are seen.  No arterial dissection.       COMMENT:  Part of this note is an electronic transcription of spoken language to printed text. The electronic translation/transcription may permit erroneous, or at times, nonsensical (or even sensical) words or phrases to be inadvertently transcribed or omitted; this  has reviewed the note for such errors (as well as additional errors); however, some may still exist.  ONIEL VALDEZ JR, MD       Electronically Signed and Approved By: ONIEL VALDEZ JR, MD on 7/07/2022 at 1:54              MRI Brain Without Contrast    Result Date: 7/7/2022  Impression:  Acute infarcts involve the bilateral cerebellar hemispheres, larger on the right than the left, as detailed above.  No acute intracranial hemorrhage is seen.     COMMENT:  Part of this note is an electronic transcription of spoken language to printed text. The electronic translation/transcription may permit erroneous, or at times, nonsensical (or even sensical) words or phrases to be inadvertently transcribed or omitted; this  has reviewed the note for such errors (as well as additional errors); however, some may still exist.  ONIEL VALDEZ JR, MD       Electronically Signed and Approved By: ONIEL VALDEZ JR, MD on 7/07/2022 at 0:31              FL Video Swallow With Speech Single Contrast    Result Date: 7/11/2022  Impression:   1. No evidence of laryngeal penetration or aspiration during the study     AAMIR MCCLOUD MD       Electronically Signed and Approved By: AAMIR MCCLOUD MD  on 7/11/2022 at 10:54             XR Chest 1 View    Result Date: 7/10/2022  Impression:   No acute infiltrate is appreciated.  No cardiac enlargement.      COMMENT:  Part of this note is an electronic transcription of spoken language to printed text. The electronic translation/transcription may permit erroneous, or at times, nonsensical (or even sensical) words or phrases to be inadvertently transcribed or omitted; this  has reviewed the note for such errors (as well as additional errors); however, some may still exist.  ONIEL VALDEZ JR, MD       Electronically Signed and Approved By: ONIEL VALDEZ JR, MD on 7/10/2022 at 19:20              XR Chest 1 View    Result Date: 7/6/2022  Impression:   No acute infiltrate is appreciated.      COMMENT:  Part of this note is an electronic transcription of spoken language to printed text. The electronic translation/transcription may permit erroneous, or at times, nonsensical (or even sensical) words or phrases to be inadvertently transcribed or omitted; this  has reviewed the note for such errors (as well as additional errors); however, some may still exist.  ONIEL VALDEZ JR, MD       Electronically Signed and Approved By: ONIEL VALDEZ JR, MD on 7/06/2022 at 22:59              CT Angiogram Head    Addendum Date: 7/7/2022    ADDENDUM:  No cerebral aneurysms are appreciated.   ONIEL VALDEZ JR, MD       Electronically Signed and Approved By: ONIEL VALDEZ JR, MD on 7/07/2022 at 1:52              Result Date: 7/7/2022  Impression:    1. There is an irregular CTA appearance of the proximal right posterior inferior cerebellar artery (PICA), which may represent atherosclerotic change.  Vasospasm or arteritis would be in the differential diagnosis.  Arterial emboli are possible but probably less likely.  Other types of vasculopathies cannot be excluded.   2. There is also moderate-to-severe short-segment stenosis of the proximal right posterior  cerebral artery (PCA), especially centered at its P1 and P2 segment junctions.  This finding may have similar etiologies as to that mentioned above for the findings in the right PICA.   3. Otherwise, no hemodynamically significant stenoses are seen.  No other areas of emergent large vessel occlusion are suggested.   4. Please see above comments for further detail.      COMMENT:  Part of this note is an electronic transcription of spoken language to printed text. The electronic translation/transcription may permit erroneous, or at times, nonsensical (or even sensical) words or phrases to be inadvertently transcribed or omitted; this  has reviewed the note for such errors (as well as additional errors); however, some may still exist.  ONIEL VALDEZ JR, MD       Electronically Signed and Approved By: ONIEL VALDEZ JR, MD on 7/07/2022 at 1:48              CT Head Without Contrast Stroke Protocol    Result Date: 7/6/2022  Impression:    1. There are age-indeterminate but probably acute to subacute bilateral cerebellar hemispheric infarcts, greater on the right than the left.  The findings may represent an acute to subacute right posterior inferior cerebellar artery (PICA) infarct.  No acute intracranial hemorrhage is seen.   2. Mild chronic small vessel ischemia/infarction is suggested.  3. Please see above comments for further detail.     COMMENT:  Part of this note is an electronic transcription of spoken language to printed text. The electronic translation/transcription may permit erroneous, or at times, nonsensical (or even sensical) words or phrases to be inadvertently transcribed or omitted; this  has reviewed the note for such errors (as well as additional errors); however, some may still exist.  ONIEL VALDEZ JR, MD       Electronically Signed and Approved By: ONIEL VALDEZ JR, MD on 7/06/2022 at 21:23                        Results for orders placed during the hospital encounter of  07/06/22    Adult Transesophageal Echo (ARTHUR) W/ Cont if Necessary Per Protocol    Interpretation Summary  · Calculated left ventricular EF = 65% Estimated left ventricular EF was in agreement with the calculated left ventricular EF. Left ventricular systolic function is normal.  · Mild mitral regurgitation  · No evidence of thrombi in the left atrium left atrial appendage and the left ventricle  · Bubble study negative      Labs Pending at Discharge: none        Time spent on Discharge including face to face service:  38 minutes    Electronically signed by Uziel Knutson MD, 07/13/22, 6:24 PM EDT.

## 2022-07-14 ENCOUNTER — TRANSITIONAL CARE MANAGEMENT TELEPHONE ENCOUNTER (OUTPATIENT)
Dept: CALL CENTER | Facility: HOSPITAL | Age: 62
End: 2022-07-14

## 2022-07-14 NOTE — OUTREACH NOTE
Call Center TCM Note    Flowsheet Row Responses   [facility] facility patient discharged from? [facility]   Does the patient have one of the following disease processes/diagnoses(primary or secondary)? Stroke (TIA)   TCM attempt successful? No   Unsuccessful attempts Attempt 1          Traci Sales MA    7/14/2022, 10:28 EDT

## 2022-07-14 NOTE — OUTREACH NOTE
Call Center TCM Note    Flowsheet Row Responses   [facility] facility patient discharged from? [facility]   Does the patient have one of the following disease processes/diagnoses(primary or secondary)? Stroke (TIA)   TCM attempt successful? No   Unsuccessful attempts Attempt 2          Traci Sales MA    7/14/2022, 15:48 EDT

## 2022-07-15 ENCOUNTER — NURSE TRIAGE (OUTPATIENT)
Dept: CALL CENTER | Facility: HOSPITAL | Age: 62
End: 2022-07-15

## 2022-07-15 ENCOUNTER — TRANSITIONAL CARE MANAGEMENT TELEPHONE ENCOUNTER (OUTPATIENT)
Dept: CALL CENTER | Facility: HOSPITAL | Age: 62
End: 2022-07-15

## 2022-07-15 NOTE — TELEPHONE ENCOUNTER
"Will be returning to the ED    Reason for Disposition  • Patient sounds very sick or weak to the triager    Additional Information  • Negative: Shock suspected (e.g., cold/pale/clammy skin, too weak to stand, low BP, rapid pulse)  • Negative: [1] Similar pain previously AND [2] it was from \"heart attack\"  • Negative: [1] Similar pain previously AND [2] it was from \"angina\" AND [3] not relieved by nitroglycerin  • Negative: Sounds like a life-threatening emergency to the triager  • Negative: Chest pain  • Negative: Toothache followed tooth injury  • Negative: Toothache or mouth pain after tooth extraction  • Negative: Canker sore (i.e., aphthous ulcer)  • Negative: Tongue is very swollen and tender  • Negative: [1] Face is swollen AND [2] fever    Answer Assessment - Initial Assessment Questions  1. LOCATION: \"Which tooth is hurting?\"  (e.g., right-side/left-side, upper/lower, front/back)      Right sied  2. ONSET: \"When did the toothache start?\"  (e.g., hours, days)       Wednesday  3. SEVERITY: \"How bad is the toothache?\"  (Scale 1-10; mild, moderate or severe)    - MILD (1-3): doesn't interfere with chewing     - MODERATE (4-7): interferes with chewing, interferes with normal activities, awakens from sleep      - SEVERE (8-10): unable to eat, unable to do any normal activities, excruciating pain         severe  4. SWELLING: \"Is there any visible swelling of your face?\"      Right and neck swollen  5. OTHER SYMPTOMS: \"Do you have any other symptoms?\" (e.g., fever)       Sore throat and not able to eat  6. PREGNANCY: \"Is there any chance you are pregnant?\" \"When was your last menstrual period?\"     na    Protocols used: TOOTHACHE-ADULT-      "

## 2022-07-15 NOTE — OUTREACH NOTE
Call Center TCM Note    Flowsheet Row Responses   [facility] patient discharged from? [facility]   Does the patient have one of the following disease processes/diagnoses(primary or secondary)? Stroke (TIA)   TCM attempt successful? Yes   Call start time 1253   Call end time 1308   Discharge diagnosis Subacute CVA    Medication alerts for this patient PLAVIX   Meds reviewed with patient/caregiver? Yes   Does the patient have all medications ordered at discharge? N/A   Prescription comments Encouraged to bring list to f/u to update   Is the patient taking all medications as directed (includes completed medication regime)? Yes   Comments regarding appointments Patient reprots on her discharge paperwork it indicates to f/u with Dr. Chambers, neurology in 2 weeks--encouraged to call today to schedule appt as she is concerned about return to work   Does the patient have a primary care provider?  Yes   Does the patient have an appointment with their PCP within 7 days of discharge? Greater than 7 days   Comments regarding PCP Hospital PCP FOLLOW UP APPOINTMENT IS 7/22/22@1130am   Nursing Interventions Verified appointment date/time/provider   Has the patient kept scheduled appointments due by today? N/A   Comments Reviewed PT/OT appts on AVS   Has home health visited the patient within 72 hours of discharge? N/A   What DME was ordered? walker - [employer]   Has all DME been delivered? Yes   Psychosocial issues? No   Does the patient require any assistance with activities of daily living such as eating, bathing, dressing, walking, etc.? No   ADL comments Takes her two hours to dress herself   Does the patient have any residual symptoms from stroke/TIA? Yes   Residual symptoms comments Still has right sided weakness, double vision   Does the patient understand the diet ordered at discharge? No  [Encouraged to follow healthy diet]   Did the patient receive a copy of their discharge instructions? Yes   Nursing interventions  Reviewed instructions with patient   What is the patient's perception of their health status since discharge? Same  [Patient still with issues w/balance, using a walker to prevent falls. ]   Nursing interventions Nurse provided patient education  [Provided information on risk factor modificaiton and also discussed that patient should not be driving until cleared by neurology after a stroke--she v/u]   Is the patient able to teach back FAST for Stroke? Yes  [Provided information]   Is the patient/caregiver able to teach back the risk factors for a stroke? History of TIAs, Smoking, High Cholesterol, High blood pressure-goal below 120/80   Is the patient/caregiver able to teach back signs and symptoms related to disease process for when to call PCP? Yes   Is the patient/caregiver able to teach back signs and symptoms related to disease process for when to call 911? Yes   If the patient is a current smoker, are they able to teach back resources for cessation? Smoking cessation medications  [disinterested in cessation]   Is the patient/caregiver able to teach back the hierarchy of who to call/visit for symptoms/problems? PCP, Specialist, Home health nurse, Urgent Care, ED, 911 Yes   TCM call completed? Yes          Genet Roberts RN    7/15/2022, 13:13 EDT

## 2022-07-21 ENCOUNTER — TREATMENT (OUTPATIENT)
Dept: PHYSICAL THERAPY | Facility: CLINIC | Age: 62
End: 2022-07-21

## 2022-07-21 DIAGNOSIS — I63.533: Primary | ICD-10-CM

## 2022-07-21 PROCEDURE — 97165 OT EVAL LOW COMPLEX 30 MIN: CPT | Performed by: OCCUPATIONAL THERAPIST

## 2022-07-21 NOTE — PROGRESS NOTES
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 Pt reports the first week of July she began falling a lot and being sick at her stomach. She went to the hospital on . She left A on . She returned to the hospital on 7/10 for recurrent symptoms. Pt reports she has had a CVA before and multiple TIAs. She reports dropping items along with tingling and numbness in B hands before this CVA due to back problems. She reports vision and balance are her biggest issues at this time. Pt has a tub/shower with grab bars to help her enter. She uses a chair in the tub. Pt is able to complete bathing/dressing independently but does require increased time. Pt works at [employer] but is currently off work.    Pain  Current pain ratin  At best pain ratin  At worst pain ratin  Location: headache  Quality: sharp  Relieving factors: medications  Aggravating factors: movement  Progression: no change    Social Support  Lives with: alone    Hand dominance: right    Treatments  Previous treatment: physical therapy and speech therapy  Discharged from (in last 30 days): inpatient hospitalization  Patient Goals  Patient goals for therapy: return to work and improved balance  Patient goal: \"I want to work on my balance\"           Objective          Neurological Testing     Additional Neurological Details  Pt reports occasional numbness and tingling but scored WNL on monofilament testing.    Active Range of Motion   Left " Shoulder   Normal active range of motion    Right Shoulder   Normal active range of motion    Strength/Myotome Testing     Left Shoulder     Planes of Motion   Flexion: WFL   Extension: WFL   Abduction: WFL   Adduction: WFL   External rotation at 0°: WFL   Internal rotation at 0°: WFL     Isolated Muscles   Biceps: WFL   Triceps: WFL     Right Shoulder     Planes of Motion   Flexion: WFL   Extension: WFL   Abduction: WFL   Adduction: WFL   External rotation at 0°: WFL   Internal rotation at 0°: WFL     Isolated Muscles   Biceps: WFL   Triceps: WFL     Left Wrist/Hand      (2nd hand position)   Left  strength (2nd hand position) 40 lbs    Right Wrist/Hand      (2nd hand position)   Right  strength (2nd hand position) 45 lbs    Tests     Additional Tests Details  Pt scored 30s with each hand on 9 hole peg test.          Assessment & Plan     Assessment    Assessment details: Pt reports her difficulty with ADLs/IADLs comes from balance difficulties. Pt also reports problems with memory. OT encouraged pt to discuss the possibilities of a speech therapy referral with her PCP. Pt displays strength/coordination/sensation WFL this date. Pt will be eval only.      Pt is not indicated for skilled occupational therapy services.     Un-Timed:  Low Eval     40     Mins  08080    Timed Treatment:   0   mins   Total Treatment:     40   mins    OT SIGNATURE: Jonnie Vieira OT   [place] License: [id number]  DATE TREATMENT INITIATED: 7/21/2022    Initial Certification  Certification Period: 10/18/2022  I certify that the therapy services are furnished while this patient is under my care.  The services outlined above are required by this patient, and will be reviewed every 90 days.     PHYSICIAN: Varun Kincaid MD      DATE:   MD NPI: [id number]  Please sign and return via fax to [phone] . Thank you, [facility].

## 2022-07-22 ENCOUNTER — OFFICE VISIT (OUTPATIENT)
Dept: FAMILY MEDICINE CLINIC | Facility: CLINIC | Age: 62
End: 2022-07-22

## 2022-07-22 DIAGNOSIS — F31.9 BIPOLAR AFFECTIVE DISORDER, REMISSION STATUS UNSPECIFIED: ICD-10-CM

## 2022-07-22 DIAGNOSIS — I10 ESSENTIAL HYPERTENSION: ICD-10-CM

## 2022-07-22 DIAGNOSIS — F17.210 CIGARETTE NICOTINE DEPENDENCE WITHOUT COMPLICATION: ICD-10-CM

## 2022-07-22 DIAGNOSIS — H53.9 CHANGE IN VISION: ICD-10-CM

## 2022-07-22 DIAGNOSIS — I63.533: Primary | ICD-10-CM

## 2022-07-22 PROCEDURE — 99213 OFFICE O/P EST LOW 20 MIN: CPT | Performed by: NURSE PRACTITIONER

## 2022-07-22 RX ORDER — AMOXICILLIN 500 MG/1
CAPSULE ORAL
COMMUNITY
Start: 2022-07-14 | End: 2022-07-22

## 2022-07-22 NOTE — PROGRESS NOTES
Audra Montejo is a 62 y.o. female admitted to [facility] and  is seen for follow up.  No chief complaint on file.      Date of TCM Phone Call 7/13/2022   Psychiatric   Date of Admission 7/10/2022   Date of Discharge 7/13/2022   Discharge Disposition Home or Self Care     Discharge summary is available.  Current outpatient and discharge medications have been reconciled for the patient.  Reviewed by: HERMAN Burgos    She was admitted for the following reason(s):  Primary admitting diagnosis at discharge was   Subacute CVA involving bilateral cerebellum, right medulla oblongata  Uncontrolled hypertension  Reactive leukocytosis  Nausea and vomiting related to subacute CVA  Current tobacco smoker  Sinus bradycardia  Bipolar disorder  Mild intermittent asthma  Irregularity of PICA  Prediabetes  Moderate to severe cervical segment stenosis right PCA    She was hospitalized for 2 days but then left AMA.  She returned with worsening symptoms.  Rehospitalized from July 10 through the 13th.  He started occupational therapy yesterday.  She was prescribed Plavix for 21 days then to stop and then continue aspirin daily thereafter.  Patient reports she still having issues with her balance and vision.  Her eye doctor she seen in the past is at [facility].    Procedures performed while hospitalized:Procedures Performed in Hospital: please see EPIC record for further details of results and finding    Pending results:  Pending tests: none    Pain Control:  well controlled    Diet: health heart    Activity after Discharge: activity as tolerated    1. Medication changes:     Discharge Medications            New Medications      Instructions Start Date   aspirin 81 MG chewable tablet    81 mg, Oral, Daily    Start Date: July 14, 2022      atorvastatin 80 MG tablet  Commonly known as: LIPITOR    80 mg, Oral, Nightly        clopidogrel 75 MG tablet  Commonly known as: PLAVIX    75 mg, Oral, Daily     "Start Date: July 14, 2022                    Changes to Medications      Instructions Start Date   amLODIPine 5 MG tablet  Commonly known as: NORVASC  What changed: additional instructions    5 mg, Oral, Daily    Start Date: July 14, 2022                    Continue These Medications      Instructions Start Date   albuterol (2.5 MG/3ML) 0.083% nebulizer solution  Commonly known as: PROVENTIL    2.5 mg, Nebulization, 3 Times Daily        albuterol sulfate  (90 Base) MCG/ACT inhaler  Commonly known as: PROVENTIL HFA;VENTOLIN HFA;PROAIR HFA    2 puffs, Inhalation, Every 6 Hours PRN        montelukast 10 MG tablet  Commonly known as: SINGULAIR    10 mg, Oral, Nightly        ondansetron ODT 4 MG disintegrating tablet  Commonly known as: ZOFRAN-ODT    4 mg, Sublingual, Every 6 Hours PRN        oxybutynin XL 10 MG 24 hr tablet  Commonly known as: DITROPAN-XL    10 mg, Oral, Daily            Stop These Medications    metoprolol succinate XL 25 MG 24 hr tablet  Commonly known as: TOPROL-XL         She reports her overall condition is are improving since discharge.  Specific complaints: off balance and vision changes.  Social support is said to be inadequate to meet her needs. Has a friend that lives next door.     Objective   Vitals:    07/22/22 1130   BP: 120/78   Pulse: 64   Temp: 98.4 °F (36.9 °C)   SpO2: 98%   Weight: 69.8 kg (153 lb 12.8 oz)   Height: 147.3 cm (58\")     Body mass index is 32.14 kg/m².  Physical Exam  Vitals reviewed.   Constitutional:       Appearance: Normal appearance. She is well-developed. She is obese.   HENT:      Head: Normocephalic and atraumatic.      Right Ear: External ear normal.      Left Ear: External ear normal.      Mouth/Throat:      Pharynx: No oropharyngeal exudate.   Eyes:      Conjunctiva/sclera: Conjunctivae normal.      Pupils: Pupils are equal, round, and reactive to light.   Cardiovascular:      Rate and Rhythm: Normal rate and regular rhythm.      Heart sounds: No murmur " heard.    No friction rub. No gallop.   Pulmonary:      Effort: Pulmonary effort is normal.      Breath sounds: Normal breath sounds. No wheezing or rhonchi.   Abdominal:      General: Bowel sounds are normal. There is no distension.      Palpations: Abdomen is soft.      Tenderness: There is no abdominal tenderness.   Skin:     General: Skin is warm and dry.   Neurological:      Mental Status: She is alert and oriented to person, place, and time.      Gait: Gait abnormal.      Comments: Uses a romo   Psychiatric:         Mood and Affect: Mood and affect normal.         Behavior: Behavior normal.         Thought Content: Thought content normal.         Judgment: Judgment normal.         Assessment & Plan   Problem List Items Addressed This Visit        Cardiac and Vasculature    Essential hypertension       Mental Health    Bipolar disorder (HCC)       Tobacco    Cigarette nicotine dependence without complication    Current Assessment & Plan     Tobacco use is unchanged.  Smoking cessation counseling was provided.  Tobacco use will be reassessed at the next regular appointment.    Audra Montejo  reports that she has been smoking cigarettes and cigarettes. She has a 30.00 pack-year smoking history. She has never used smokeless tobacco.. I have educated her on the risk of diseases from using tobacco products such as cancer, COPD and heart disease.     I advised her to quit and she is not willing to quit.    I spent 3.5 minutes counseling the patient.                    Other Visit Diagnoses     Cerebral infarction due to bilateral stenosis of posterior cerebral arteries (HCC)    -  Primary    Relevant Orders    Ambulatory Referral to Ophthalmology (Completed)    Change in vision        Relevant Orders    Ambulatory Referral to Ophthalmology (Completed)           Subacute CVA involving bilateral cerebellum and right medulla oblongata.  She has moderate to severe cervical segment stenosis of the right PCA.  She is to  complete for 21 days of Plavix then stop.  She is instructed to continue aspirin 81 mg indefinitely.  Started occupational therapy yesterday.  She is very concerned about returning back to work.  At this time she has difficulty with her balance and eyesight.  Will consult ophthalmology for further evaluation.  Eyesight was affected with her CVA.  She is to complete occupational therapy to assist with her balance seen.  She is in need of [facility] paperwork to be completed.  She will bring paperwork to the office.  She has an appointment on the 26th with physical therapy.  She is currently on amlodipine 5 mg daily for hypertension.  Pressure is well controlled today.  I have a BP of 120/78.  Follow-up with Dr. Morrell PCP in the next 2 to 3 weeks.

## 2022-07-23 VITALS
SYSTOLIC BLOOD PRESSURE: 120 MMHG | BODY MASS INDEX: 32.28 KG/M2 | DIASTOLIC BLOOD PRESSURE: 78 MMHG | HEIGHT: 58 IN | OXYGEN SATURATION: 98 % | WEIGHT: 153.8 LBS | HEART RATE: 64 BPM | TEMPERATURE: 98.4 F

## 2022-07-23 PROBLEM — F17.210 CIGARETTE NICOTINE DEPENDENCE WITHOUT COMPLICATION: Status: ACTIVE | Noted: 2022-07-23

## 2022-07-23 PROBLEM — R73.03 PREDIABETES: Status: ACTIVE | Noted: 2022-07-23

## 2022-07-24 NOTE — ASSESSMENT & PLAN NOTE
Tobacco use is unchanged.  Smoking cessation counseling was provided.  Tobacco use will be reassessed at the next regular appointment.    Audra Montejo  reports that she has been smoking cigarettes and cigarettes. She has a 30.00 pack-year smoking history. She has never used smokeless tobacco.. I have educated her on the risk of diseases from using tobacco products such as cancer, COPD and heart disease.     I advised her to quit and she is not willing to quit.    I spent 3.5 minutes counseling the patient.

## 2022-07-26 ENCOUNTER — TREATMENT (OUTPATIENT)
Dept: PHYSICAL THERAPY | Facility: CLINIC | Age: 62
End: 2022-07-26

## 2022-07-26 DIAGNOSIS — R26.9 GAIT DISTURBANCE: Primary | ICD-10-CM

## 2022-07-26 DIAGNOSIS — R26.89 IMBALANCE: ICD-10-CM

## 2022-07-26 DIAGNOSIS — R29.898 WEAKNESS OF BOTH LOWER EXTREMITIES: ICD-10-CM

## 2022-07-26 PROCEDURE — 97110 THERAPEUTIC EXERCISES: CPT | Performed by: PHYSICAL THERAPIST

## 2022-07-26 PROCEDURE — 97161 PT EVAL LOW COMPLEX 20 MIN: CPT | Performed by: PHYSICAL THERAPIST

## 2022-07-26 NOTE — PROGRESS NOTES
Physical Therapy Initial Evaluation and Plan of Care    Patient: Audra Montejo   : 1960  Diagnosis/ICD-10 Code:  Gait disturbance [R26.9]  Referring practitioner: Varun Kincaid MD  Date of Initial Visit: 2022  Today's Date: 2022  Patient seen for 1 sessions             Subjective Evaluation    History of Present Illness  Mechanism of injury: Pt was sick for three days and couldn't get up to go to the bathroom.  She went to ED on 22 and was admitted.  Pt left 22 AMA.  Pt returned 7/10/22 and was kept until 22.  Pt had a cerebellar CVA.  Pt has been having trouble with her balance since the stroke.  She states due to previous back issues, she already had weakness and numbness on her R LE.  Pt was not using an AD prior to surgery but is now using an AD for ambulation.  Pt reports she is falling to the L side.  Pt reports multiple falls since returning home.  Pt lives by herself but has a neighbor that checks on her.  Pt has a one level home with no steps to enter.  Pt can't do stairs because she can't lift her R leg enough (this was happening prior to the stroke).  Pt was working at [employer] prior to the CVA and would like to return to that job as soon as possible.        Medical history: HTN, pre diabetic, MI, L shoulder surgery, jordyn, appendectomy    Pain  No pain reported    Social Support  Lives in: one-story house  Lives with: alone    Hand dominance: right    Treatments  Discharged from (in last 30 days): inpatient hospitalization  Patient Goals  Patient goals for therapy: improved balance, increased strength, independence with ADLs/IADLs, return to sport/leisure activities and return to work             Objective          Neurological Testing     Sensation     Hip   Left Hip   Intact: light touch    Right Hip   Intact: light touch    Additional Neurological Details  B LE sensation intact    Active Range of Motion     Additional Active Range of Motion Details  B LE ROM  WNL    Strength/Myotome Testing     Left Hip   Planes of Motion   Flexion: 4+  Extension: 4-  Abduction: 4-    Right Hip   Planes of Motion   Flexion: 4+  Extension: 4-  Abduction: 4-    Left Knee   Flexion: 5  Extension: 4+    Right Knee   Flexion: 5  Extension: 4+    Left Ankle/Foot   Dorsiflexion: 5  Plantar flexion: 4+  Inversion: 4-  Eversion: 4-    Right Ankle/Foot   Dorsiflexion: 5  Plantar flexion: 5  Inversion: 5  Eversion: 5    Left Hip Flexibility Comments:   WNL    Right Hip Flexibility Comments:   WNL    Ambulation     Comments   Ambulating with STC on L side; L lateral trunk lean; decreased gait speed; antalgic gait pattern  Pt loses balance to the L when ambulating    Functional Assessment     Comments  TUG (without AD): 14 seconds or 1-19% limited  Dynamic Gait Index:   Romber seconds  Romberg with eyes closed: 30 seconds  Sharpened romber seconds  Sharpened romberg with eyes closed: 30 seconds  Single leg balance on R: 14 seconds  Single leg balance on L: 7 seconds    Heel to shin test: WNL B  Alternating finger to nose test: WNL B          See Exercise, Manual, and Modality Logs for complete treatment.       Assessment & Plan     Assessment  Impairments: abnormal gait, activity intolerance, impaired balance, impaired physical strength, lacks appropriate home exercise program and safety issue  Functional Limitations: lifting, walking, standing, stooping and unable to perform repetitive tasks  Assessment details: Pt presents with limitations, noted below, that impede her ability to walk, go up/down stairs, and perform ADLs.  The patient presents with a diagnosis of cerebellar CVA and has B LE weakness, impaired balance, and gait dysfunction and will benefit from therapeutic exercises, neuromuscular re-education, gait training, manual therapy, and modalities to improve tolerance to functional activities. The skills of a therapist will be required to safely and effectively implement the  following treatment plan to restore maximal level of function.  Prognosis: good    Goals  Plan Goals: 1. Mobility: Walking/Moving Around Functional Limitation     LTG 1: 12 weeks:  The patient will demonstrate 0% limitation by achieving a score of 10 seconds on the Timed Up and Go Test (without AD)   STATUS:  New   STG 1a: 6 weeks:  The patient will demonstrate 1-19% limitation by achieving a score of 12 seconds on the Timed Up and Go Test (without AD)   STATUS:  New     2. The patient demonstrates weakness of the B lower extremity.    LTG 2: 12 weeks:  The patient will demonstrate 4+/5 hip strength and 5/5 knee strength in order to improve balance and safety with ambulation.    STATUS:  New   STG 2a: 6 weeks: The patient will demonstrate 4/5 hip strength in order to improve balance and safety with ambulation.    STATUS:  New     3. The patient has decreased ability to ambulate   LTG 3: 12 weeks:  The patient will ambulate with stand by assistance without AD for 300 feet without loss of balance in order to safely ambulate in the community.     STATUS: New   STG 3a: 6 weeks: The patient will ambulate with SBA assistance without AD for 100 feet.     STATUS:  New     4. The patient has difficulty with walking..  LTG 4: 12 weeks: The patient will improve score on the Dynamic Gait Index to 20/24 in order to improve safety with ambulation at home and in the community.  STATUS: New  STG 4a: 6 weeks:  The patient will improve score on the Dynamic Gait Index to 18/24 in order to improve safety with ambulation at home and in the community.  STATUS: New    TREATMENT: Therapeutic exercises, neuromuscular re-education, gait training, manual therapy, aquatic therapy, home exercise instruction, and modalities as needed for pain to include:  electrical stimulation.      Plan  Therapy options: will be seen for skilled therapy services  Planned modality interventions: cryotherapy, dry needling, electrical stimulation/Russian  stimulation and hydrotherapy  Planned therapy interventions: abdominal trunk stabilization, ADL retraining, balance/weight-bearing training, body mechanics training, fine motor coordination training, flexibility, functional ROM exercises, gait training, home exercise program, joint mobilization, manual therapy, motor coordination training, neuromuscular re-education, soft tissue mobilization, spinal/joint mobilization, strengthening, stretching, therapeutic activities and transfer training  Frequency: 3x week  Duration in weeks: 12  Treatment plan discussed with: patient        History # of Personal Factors and/or Comorbidities: MODERATE (1-2)  Examination of Body System(s): # of elements: LOW (1-2)  Clinical Presentation: STABLE   Clinical Decision Making: LOW       Timed:         Manual Therapy:         mins  07045;     Therapeutic Exercise:    8     mins  16284;     Neuromuscular Jaison:        mins  78205;    Therapeutic Activity:          mins  11663;     Gait Training:           mins  12091;     Ultrasound:          mins  63937;    Ionto                                   mins   57462  Self Care                            mins   02756  Aquatic Therapy                 mins   86038      Un-Timed:  Electrical Stimulation:         mins  19555 ( );  Dry Needling          mins self-pay  Traction          mins 59539  Low Eval     25     Mins  08582  Mod Eval          Mins  88119  High Eval                            Mins  60318  Re-Eval                               mins  33624  Canalith Repos         mins 72774      Timed Treatment:   8   mins   Total Treatment:     33   mins    PT SIGNATURE: Electronically signed by Gaviota Merritt PT   [place] License: [id number]      Initial Certification  Certification Period: 7/26/2022 thru 10/23/2022  I certify that the therapy services are furnished while this patient is under my care.  The services outlined above are required by this patient, and will be reviewed every 90  days.     PHYSICIAN: Varun Kincaid MD  NPI: [id number]                                      DATE:        Please sign and return via fax to [phone].Thank you, [facility].

## 2022-07-28 ENCOUNTER — TREATMENT (OUTPATIENT)
Dept: PHYSICAL THERAPY | Facility: CLINIC | Age: 62
End: 2022-07-28

## 2022-07-28 DIAGNOSIS — R29.898 WEAKNESS OF BOTH LOWER EXTREMITIES: Primary | ICD-10-CM

## 2022-07-28 DIAGNOSIS — R26.9 GAIT DISTURBANCE: ICD-10-CM

## 2022-07-28 DIAGNOSIS — I63.533: ICD-10-CM

## 2022-07-28 DIAGNOSIS — R26.89 IMBALANCE: ICD-10-CM

## 2022-07-28 PROCEDURE — 97110 THERAPEUTIC EXERCISES: CPT | Performed by: PHYSICAL THERAPIST

## 2022-07-28 PROCEDURE — 97112 NEUROMUSCULAR REEDUCATION: CPT | Performed by: PHYSICAL THERAPIST

## 2022-07-28 PROCEDURE — 97116 GAIT TRAINING THERAPY: CPT | Performed by: PHYSICAL THERAPIST

## 2022-07-28 NOTE — PROGRESS NOTES
Physical Therapy Daily Treatment Note    VISIT#: 2    Subjective   Audra Montejo reports no new complaints.    Objective     See Exercise, Manual, and Modality Logs for complete treatment.     Assessment/Plan  Pt has more difficulty with balance on her L LE than R.   Pt's STC is higher than it needs to be, however pt does not with for PT to adjust it to appropriate height.  Pt is very unsteady when ambulating with head turns and without AD.      Progress per Plan of Care and Progress strengthening /stabilization /functional activity            Timed:         Manual Therapy:         mins  34418;     Therapeutic Exercise:    8     mins  64666;     Neuromuscular Jaison:    31    mins  89146;    Therapeutic Activity:          mins  39283;     Gait Trainin     mins  41714;     Ultrasound:          mins  39710;    Ionto                                   mins   25349  Self Care                            mins   12313  Aquatic Therapy                 mins   00864    Un-Timed:  Electrical Stimulation:         mins  65311 ( );  Dry Needling          mins self-pay  Traction          mins 37939  Low Eval          Mins  78192  Mod Eval          Mins  08285  High Eval                            Mins  92537  Re-Eval                               mins  48117  Canalith Repos                   mins  43604    Timed Treatment:   46   mins   Total Treatment:     46   mins    Gaviota Merritt PT, DPT  License Number [id number]

## 2022-07-29 ENCOUNTER — READMISSION MANAGEMENT (OUTPATIENT)
Dept: CALL CENTER | Facility: HOSPITAL | Age: 62
End: 2022-07-29

## 2022-07-29 NOTE — OUTREACH NOTE
Stroke Week 3 Survey    Flowsheet Row Responses   [facility] facility patient discharged from? [facility]   Does the patient have one of the following disease processes/diagnoses(primary or secondary)? Stroke (TIA)   Week 3 attempt successful? No   Unsuccessful attempts Attempt 1          GAYE CONWAY - Registered Nurse

## 2022-08-02 ENCOUNTER — READMISSION MANAGEMENT (OUTPATIENT)
Dept: CALL CENTER | Facility: HOSPITAL | Age: 62
End: 2022-08-02

## 2022-08-02 NOTE — OUTREACH NOTE
Stroke Week 3 Survey    Flowsheet Row Responses   [facility] facility patient discharged from? [facility]   Does the patient have one of the following disease processes/diagnoses(primary or secondary)? Stroke (TIA)   Week 3 attempt successful? No   Unsuccessful attempts Attempt 2          MIRNA ORTIZ - Licensed Nurse

## 2022-08-04 ENCOUNTER — OFFICE VISIT (OUTPATIENT)
Dept: FAMILY MEDICINE CLINIC | Facility: CLINIC | Age: 62
End: 2022-08-04

## 2022-08-04 VITALS
TEMPERATURE: 97.8 F | SYSTOLIC BLOOD PRESSURE: 144 MMHG | OXYGEN SATURATION: 95 % | HEIGHT: 58 IN | HEART RATE: 74 BPM | RESPIRATION RATE: 14 BRPM | BODY MASS INDEX: 32.75 KG/M2 | DIASTOLIC BLOOD PRESSURE: 78 MMHG | WEIGHT: 156 LBS

## 2022-08-04 DIAGNOSIS — Z12.31 ENCOUNTER FOR SCREENING MAMMOGRAM FOR MALIGNANT NEOPLASM OF BREAST: Primary | ICD-10-CM

## 2022-08-04 DIAGNOSIS — I63.431 CEREBROVASCULAR ACCIDENT (CVA) DUE TO EMBOLISM OF RIGHT POSTERIOR CEREBRAL ARTERY: ICD-10-CM

## 2022-08-04 DIAGNOSIS — Z23 NEED FOR PNEUMOCOCCAL VACCINE: ICD-10-CM

## 2022-08-04 DIAGNOSIS — I10 ESSENTIAL HYPERTENSION: ICD-10-CM

## 2022-08-04 PROCEDURE — 99214 OFFICE O/P EST MOD 30 MIN: CPT | Performed by: FAMILY MEDICINE

## 2022-08-04 RX ORDER — CLOPIDOGREL BISULFATE 75 MG/1
75 TABLET ORAL DAILY
Qty: 90 TABLET | Refills: 0 | Status: SHIPPED | OUTPATIENT
Start: 2022-08-04 | End: 2022-11-02 | Stop reason: SDUPTHER

## 2022-08-04 NOTE — PROGRESS NOTES
Chief Complaint  Chief Complaint   Patient presents with   • Cerebrovascular Accident     Follow-up       HPI:  Audra Montejo presents to [facility]     CVA- Pt reports her right leg was affected by the stroke.  Pt reports her right eye wants to close and she reading everything with her left eye and she is focusing everything with her left.  Pt is having a hard time getting her physical therapy because her insurance card is at an old address.    HTN- Pt BP today is 144/78 which is well controlled.  Pt is compliant with their medication and will continue their current medication regimen. No side effects to the medication.    Past History:  Medical History: has a past medical history of AC joint arthropathy (10/13/2016), Acid reflux disease, Allergic (Unknown), Allergy, Anxiety, Asthma (Unknown), Back pain, Bilateral carpal tunnel syndrome (08/17/2017), Bipolar disorder ([facility]), Bursitis of left shoulder (08/09/2016), Cataract (Unknown), Depression, Eczema, Gall stones, H/O psychiatric care, Heart attack ([facility]), Heart murmur, Hypertension, Incomplete tear of left rotator cuff (01/25/2017), Left shoulder pain, Loss of appetite, Lumbago (05/08/2015), Memory loss, Migraine headache, Need for hepatitis C screening test, Palpitation, Ringing in ears, Seasonal allergies, Shortness of breath, Sinus trouble, Stroke (cerebrum) ([facility]), Subacromial impingement, left (10/13/2016), Superior glenoid labrum lesion of left shoulder (10/13/2016), and Trouble swallowing.   Surgical History: has a past surgical history that includes Appendectomy; Shoulder surgery (Left, 10/03/2016); Carpal tunnel release; Cholecystectomy; Colonoscopy (2014); Esophagogastroduodenoscopy (2014); and Hysterectomy (1999).   Family History: family history includes Arthritis in her mother, sister, and son; Cancer in her father, sister, and son; Diabetes in her brother, mother, and sister; Heart disease in her brother, father,  "sister, and son; Hypertension in an other family member; Osteoporosis in her son; Other in her son; Rheum arthritis in an other family member; Stroke in her mother and son.   Social History: reports that she has been smoking cigarettes and cigarettes. She has a 30.00 pack-year smoking history. She has never used smokeless tobacco. She reports previous alcohol use. She reports that she does not use drugs.  Immunization History   Administered Date(s) Administered   • Flu Vaccine Quad PF >36MO 10/11/2016   • Influenza, Unspecified 11/15/2019   • Tdap 06/01/2018         Allergies: Tape     Medications:  Current Outpatient Medications on File Prior to Visit   Medication Sig Dispense Refill   • albuterol (PROVENTIL) (2.5 MG/3ML) 0.083% nebulizer solution Take 2.5 mg by nebulization 3 (Three) Times a Day.     • albuterol sulfate  (90 Base) MCG/ACT inhaler Inhale 2 puffs Every 6 (Six) Hours As Needed for Wheezing.     • montelukast (SINGULAIR) 10 MG tablet Take 10 mg by mouth Every Night.     • ondansetron ODT (ZOFRAN-ODT) 4 MG disintegrating tablet Place 4 mg under the tongue Every 6 (Six) Hours As Needed.       No current facility-administered medications on file prior to visit.        Health Maintenance Due   Topic Date Due   • COVID-19 Vaccine (1) Never done   • ANNUAL PHYSICAL  Never done   • Pneumococcal Vaccine 0-64 (1 - PCV) Never done   • ZOSTER VACCINE (1 of 2) Never done   • LUNG CANCER SCREENING  03/29/2017   • MAMMOGRAM  08/15/2019   • HEPATITIS C SCREENING  Never done       Vital Signs:   Vitals:    08/04/22 1102   BP: 144/78   BP Location: Left arm   Patient Position: Sitting   Pulse: 74   Resp: 14   Temp: 97.8 °F (36.6 °C)   SpO2: 95%   Weight: 70.8 kg (156 lb)   Height: 147.3 cm (58\")      Body mass index is 32.6 kg/m².     ROS:  Review of Systems   Constitutional: Negative for fatigue and fever.   HENT: Negative for congestion, ear pain and sinus pressure.    Respiratory: Negative for cough, chest " tightness and shortness of breath.    Cardiovascular: Negative for chest pain, palpitations and leg swelling.   Gastrointestinal: Negative for abdominal pain and diarrhea.   Genitourinary: Negative for dysuria and frequency.   Neurological: Negative for speech difficulty, headache and confusion.   Psychiatric/Behavioral: Negative for agitation and behavioral problems.          Physical Exam  Vitals reviewed.   Constitutional:       Appearance: Normal appearance.   HENT:      Right Ear: Tympanic membrane normal.      Left Ear: Tympanic membrane normal.      Nose: Nose normal.   Eyes:      Extraocular Movements: Extraocular movements intact.      Conjunctiva/sclera: Conjunctivae normal.      Pupils: Pupils are equal, round, and reactive to light.   Cardiovascular:      Rate and Rhythm: Normal rate and regular rhythm.   Pulmonary:      Effort: Pulmonary effort is normal.      Breath sounds: Normal breath sounds.   Abdominal:      General: Bowel sounds are normal.   Musculoskeletal:         General: Normal range of motion.      Cervical back: Normal range of motion.   Skin:     General: Skin is warm and dry.   Neurological:      General: No focal deficit present.      Mental Status: She is alert and oriented to person, place, and time.   Psychiatric:         Mood and Affect: Mood normal.         Behavior: Behavior normal.          Result Review   FL Video Swallow With Speech Single Contrast (07/11/2022 10:07)  CT Head Without Contrast (07/10/2022 22:10)  ED to Hosp-Admission (Discharged) with Uziel Knutson MD; Rodger Nguyen DO (07/10/2022)       Diagnoses and all orders for this visit:    1. Encounter for screening mammogram for malignant neoplasm of breast (Primary)    2. Need for pneumococcal vaccine    3. Cerebrovascular accident (CVA) due to embolism of right posterior cerebral artery (HCC)  -     clopidogrel (PLAVIX) 75 MG tablet; Take 1 tablet by mouth Daily for 90 days.  Dispense: 90 tablet; Refill:  0    4. Essential hypertension          Smoking Cessation:    Audra Montejo  reports that she has been smoking cigarettes and cigarettes. She has a 30.00 pack-year smoking history. She has never used smokeless tobacco.          Follow Up   Return in about 4 weeks (around 9/1/2022).  Patient was given instructions and counseling regarding her condition or for health maintenance advice. Please see specific information pulled into the AVS if appropriate.       Anayeli Pina MD  Answers for HPI/ROS submitted by the patient on 8/2/2022  Please describe your symptoms.: Said to have had a stroke  Have you had these symptoms before?: Yes  How long have you been having these symptoms?: Greater than 2 weeks  Please list any medications you are currently taking for this condition.: Amlodipine 5mg, atorvastatin 80mg, montelukast 10mg,oxybutynin 10mg, aspirin 81mg, clopidogrel 75mg finished  What is the primary reason for your visit?: Other

## 2022-08-07 LAB — QT INTERVAL: 464 MS

## 2022-08-08 ENCOUNTER — TELEPHONE (OUTPATIENT)
Dept: FAMILY MEDICINE CLINIC | Facility: CLINIC | Age: 62
End: 2022-08-08

## 2022-08-08 RX ORDER — ATORVASTATIN CALCIUM 80 MG/1
80 TABLET, FILM COATED ORAL NIGHTLY
Qty: 30 TABLET | Refills: 0 | Status: CANCELLED | OUTPATIENT
Start: 2022-08-08 | End: 2022-09-07

## 2022-08-09 RX ORDER — ATORVASTATIN CALCIUM 80 MG/1
80 TABLET, FILM COATED ORAL NIGHTLY
Qty: 30 TABLET | Refills: 0 | Status: SHIPPED | OUTPATIENT
Start: 2022-08-09 | End: 2022-08-09 | Stop reason: SDUPTHER

## 2022-08-10 ENCOUNTER — TELEPHONE (OUTPATIENT)
Dept: FAMILY MEDICINE CLINIC | Facility: CLINIC | Age: 62
End: 2022-08-10

## 2022-08-10 NOTE — TELEPHONE ENCOUNTER
Pt called and states she had an allergic reaction to something she thinks was her cholesterol medication she took last night she itched all night and has rash this morning. PT states she has been on this medication now for about a month with no issues. I informed pt due to Dr Pina out of the office and no openings here she will need to go to Urgent Care center.

## 2022-08-11 RX ORDER — ATORVASTATIN CALCIUM 80 MG/1
80 TABLET, FILM COATED ORAL NIGHTLY
Qty: 30 TABLET | Refills: 0 | Status: SHIPPED | OUTPATIENT
Start: 2022-08-11 | End: 2022-09-12

## 2022-08-16 RX ORDER — OXYBUTYNIN CHLORIDE 10 MG/1
TABLET, EXTENDED RELEASE ORAL
Qty: 90 TABLET | Refills: 1 | Status: SHIPPED | OUTPATIENT
Start: 2022-08-16

## 2022-08-22 ENCOUNTER — TELEPHONE (OUTPATIENT)
Dept: FAMILY MEDICINE CLINIC | Facility: CLINIC | Age: 62
End: 2022-08-22

## 2022-08-22 RX ORDER — AMLODIPINE BESYLATE 5 MG/1
TABLET ORAL
Qty: 30 TABLET | Refills: 0 | Status: SHIPPED | OUTPATIENT
Start: 2022-08-22 | End: 2022-08-28 | Stop reason: SDUPTHER

## 2022-08-22 NOTE — TELEPHONE ENCOUNTER
Caller: Audra Montejo    Relationship: Self    Best call back number: [phone]    Requested Prescriptions:   Requested Prescriptions      No prescriptions requested or ordered in this encounter    AMLODIPINE    Pharmacy where request should be sent: [id number] - [place], [place] - [address] AT [facility] - [phone] [facility] [phone] FX     Additional details provided by patient: PATIENT IS COMPLETELY OUT OF THIS MEDICATION. WHEN SHE LEFT THE HOSPITAL SHE WAS NOT GIVEN A NEW PRESCRIPTION FOR THIS MEDICATION. PLEASE CALL INTO PHARMACY ASAP.    THIS IS THE BETA BLOCKER FOR HER HEART SHE IS UNDER THE IMPRESSION THIS ONE IS THE ONE THAT SHE IS TO TAKE.    Does the patient have less than a 3 day supply:  [x] Yes  [] No    Kiel Vega Rep   08/22/22 12:48 EDT

## 2022-08-23 ENCOUNTER — TREATMENT (OUTPATIENT)
Dept: PHYSICAL THERAPY | Facility: CLINIC | Age: 62
End: 2022-08-23

## 2022-08-23 DIAGNOSIS — R26.89 IMBALANCE: ICD-10-CM

## 2022-08-23 DIAGNOSIS — R29.898 WEAKNESS OF BOTH LOWER EXTREMITIES: Primary | ICD-10-CM

## 2022-08-23 DIAGNOSIS — R26.9 GAIT DISTURBANCE: ICD-10-CM

## 2022-08-23 DIAGNOSIS — I63.533: ICD-10-CM

## 2022-08-23 PROCEDURE — 97110 THERAPEUTIC EXERCISES: CPT | Performed by: PHYSICAL THERAPIST

## 2022-08-23 PROCEDURE — 97112 NEUROMUSCULAR REEDUCATION: CPT | Performed by: PHYSICAL THERAPIST

## 2022-08-23 NOTE — PROGRESS NOTES
Progress Assessment        Patient: Audra Montejo   : 1960  Diagnosis/ICD-10 Code:  Weakness of both lower extremities [R29.898]  Referring practitioner: Varun Kincaid MD  Date of Initial Visit: Type: THERAPY  Noted: 2022  Today's Date: 2022  Patient seen for 3 sessions      Subjective:   Audra Montejo reports: her balance has improved and she doesn't feel like she is going to fall down every time she stands up.  She states her R leg still feels a little weaker.  Pt states she is using her cane less to walk around the house but is using it when walking on uneven surfaces.  Pt has had a couple of recent falls with the last one being about three days ago.      Clinical Progress: improved  Home Program Compliance: Yes  Treatment has included: therapeutic exercise and neuromuscular re-education    Subjective     Objective   Strength/Myotome Testing      Left Hip   Planes of Motion   Flexion: 5  Extension: 4  Abduction: 4+     Right Hip   Planes of Motion   Flexion: 4+  Extension: 4  Abduction: 4     Left Knee   Flexion: 5  Extension: 5     Right Knee   Flexion: 4+  Extension: 4+     Left Ankle/Foot   Dorsiflexion: 5  Plantar flexion: 5  Inversion: 5  Eversion: 5     Right Ankle/Foot   Dorsiflexion: 5  Plantar flexion: 4+  Inversion: 4+  Eversion: 4    Comments  TUG (without AD): 13 seconds or 1-19% limited  Dynamic Gait Index:   Romber seconds  Romberg with eyes closed: 30 seconds  Sharpened romber seconds  Sharpened romberg with eyes closed: 30 seconds  Single leg balance on R: 10 seconds  Single leg balance on L: 4 seconds    Assessment/Plan   Pt has had an improvement in her B LE strength, her score on the Dynamic Gait Index, and her score on the TUG.  She is continuing to ambulate with a STC with significant lateral trunk lean and evidence of imbalance.  She has only had one treatment session of PT due to insurance which has limited more progress made towards goals.  She will  benefit from continuing with PT to increase B LE strength, improve balance, and decrease risk of falling.    Goals  Plan Goals: 1. Mobility: Walking/Moving Around Functional Limitation                       LTG 1: 12 weeks:  The patient will demonstrate 0% limitation by achieving a score of 10 seconds on the Timed Up and Go Test (without AD)              STATUS:  progressing  STG 1a: 6 weeks:  The patient will demonstrate 1-19% limitation by achieving a score of 12 seconds on the Timed Up and Go Test (without AD)              STATUS:  progressing    2. The patient demonstrates weakness of the B lower extremity.    LTG 2: 12 weeks:  The patient will demonstrate 4+/5 hip strength and 5/5 knee strength in order to improve balance and safety with ambulation.               STATUS:  progressing  STG 2a: 6 weeks: The patient will demonstrate 4/5 hip strength in order to improve balance and safety with ambulation.               STATUS:  met    3. The patient has decreased ability to ambulate   LTG 3: 12 weeks:  The patient will ambulate with stand by assistance without AD for 300 feet without loss of balance in order to safely ambulate in the community.                           STATUS: progressing  STG 3a: 6 weeks: The patient will ambulate with SBA assistance without AD for 100 feet.                           STATUS:  progressing    4. The patient has difficulty with walking..  LTG 4: 12 weeks: The patient will improve score on the Dynamic Gait Index to 20/24 in order to improve safety with ambulation at home and in the community.  STATUS:progressing  STG 4a: 6 weeks:  The patient will improve score on the Dynamic Gait Index to 18/24 in order to improve safety with ambulation at home and in the community.  STATUS: progressing    Progress toward previous goals: Partially Met    See Exercise, Manual, and Modality Logs for complete treatment.         Recommendations: Continue as planned  Timeframe: 3 months  Prognosis to  achieve goals: good    PT Signature: Gaviota Merritt PT, DPT  License Number: [id number]    Based upon review of the patient's progress and continued therapy plan, it is my medical opinion that Audra Montejo should continue physical therapy treatment at [facility]  [address] [place] [place] [address] [phone].      Timed:         Manual Therapy:         mins  79324;     Therapeutic Exercise:    10     mins  53258;     Neuromuscular Jaison:    15    mins  52441;    Therapeutic Activity:          mins  67092;     Gait Training:           mins  90889;     Ultrasound:          mins  66298;    Ionto                                   mins   79803  Self Care                            mins   24895  Aquatic                               mins 43188      Un-Timed:  Electrical Stimulation:         mins  39373 ( );  Dry Needling          mins self-pay  Traction          mins 72939  Low Eval          Mins  36459  Mod Eval          Mins  41843  High Eval                            Mins  73623  Re-Eval                               mins  06891  Canalith Repos         mins 09059    Timed Treatment:   25   mins   Total Treatment:     25   mins      I certify that the therapy services are furnished while this patient is under my care.  The services outlined above are required by this patient, and will be reviewed every 90 days.

## 2022-08-28 DIAGNOSIS — I10 ESSENTIAL HYPERTENSION: Primary | ICD-10-CM

## 2022-08-28 RX ORDER — AMLODIPINE BESYLATE 5 MG/1
5 TABLET ORAL DAILY
Qty: 90 TABLET | Refills: 3 | Status: SHIPPED | OUTPATIENT
Start: 2022-08-28 | End: 2022-11-05 | Stop reason: SDUPTHER

## 2022-08-30 ENCOUNTER — TREATMENT (OUTPATIENT)
Dept: PHYSICAL THERAPY | Facility: CLINIC | Age: 62
End: 2022-08-30

## 2022-08-30 DIAGNOSIS — R26.9 GAIT DISTURBANCE: ICD-10-CM

## 2022-08-30 DIAGNOSIS — R26.89 IMBALANCE: ICD-10-CM

## 2022-08-30 DIAGNOSIS — R29.898 WEAKNESS OF BOTH LOWER EXTREMITIES: Primary | ICD-10-CM

## 2022-08-30 DIAGNOSIS — I63.533: ICD-10-CM

## 2022-08-30 PROCEDURE — 97112 NEUROMUSCULAR REEDUCATION: CPT | Performed by: PHYSICAL THERAPIST

## 2022-08-30 PROCEDURE — 97530 THERAPEUTIC ACTIVITIES: CPT | Performed by: PHYSICAL THERAPIST

## 2022-08-30 PROCEDURE — 97110 THERAPEUTIC EXERCISES: CPT | Performed by: PHYSICAL THERAPIST

## 2022-08-30 NOTE — PROGRESS NOTES
Physical Therapy Daily Treatment Note      Patient: Audra Montejo   : 1960  Referring practitioner: Varun Kincaid MD  Date of Initial Visit: Type: THERAPY  Noted: 2022  Today's Date: 2022  Patient seen for 4 sessions           Subjective  Audra Montejo reports: she is having vision difficulty that impairs her ability to look forward during gait. Audra also requested that she stop performing gait with head turns due to getting dizzy.       Objective   Adjusted height of STC to better fit pt. Audra noted that it felt better, commenting about reduced L shoulder discomfort with STC lowered.    See Exercise, Manual, and Modality Logs for complete treatment.       Assessment/Plan   Time today included Audra explaining to PTA how she recovered from past strokes, learning to walk again with watching her legs move. Audra is to return to her neurologist tomorrow. Audra requires further skilled care to address strength and balance deficits to allow return to best possible functional ability.      Visit Diagnoses:    ICD-10-CM ICD-9-CM   1. Weakness of both lower extremities  R29.898 729.89   2. Gait disturbance  R26.9 781.2   3. Imbalance  R26.89 781.2   4. Cerebral infarction due to bilateral stenosis of posterior cerebral arteries (HCC)  I63.533 434.91       Progress per Plan of Care and Progress strengthening /stabilization /functional activity           Timed:  Manual Therapy:         mins  76820;  Therapeutic Exercise:    12     mins  31786;     Neuromuscular Jaison:    9    mins  64933;    Therapeutic Activity:     10     mins  49772;     Gait Training:           mins  51777;     Ultrasound:          mins  50508;    Electrical Stimulation:         mins  82629 ( );  Aquatics  __   mins   61182    Untimed:  Electrical Stimulation:         mins  17109 ( );  Mechanical Traction:         mins  96751;     Timed Treatment:   31   mins   Total Treatment:     31   mins    Electronically  Signed:  Jennifer Trujillo PTA  Physical Therapist Assistant    [place] PTA license [id number]

## 2022-09-06 ENCOUNTER — TREATMENT (OUTPATIENT)
Dept: PHYSICAL THERAPY | Facility: CLINIC | Age: 62
End: 2022-09-06

## 2022-09-06 DIAGNOSIS — R29.898 WEAKNESS OF BOTH LOWER EXTREMITIES: Primary | ICD-10-CM

## 2022-09-06 DIAGNOSIS — R26.9 GAIT DISTURBANCE: ICD-10-CM

## 2022-09-06 DIAGNOSIS — R26.89 IMBALANCE: ICD-10-CM

## 2022-09-06 PROCEDURE — 97112 NEUROMUSCULAR REEDUCATION: CPT | Performed by: PHYSICAL THERAPIST

## 2022-09-06 PROCEDURE — 97110 THERAPEUTIC EXERCISES: CPT | Performed by: PHYSICAL THERAPIST

## 2022-09-06 PROCEDURE — 97530 THERAPEUTIC ACTIVITIES: CPT | Performed by: PHYSICAL THERAPIST

## 2022-09-06 NOTE — PROGRESS NOTES
Physical Therapy Daily Treatment Note        Patient: Audra Montejo   : 1960  Diagnosis/ICD-10 Code:  Weakness of both lower extremities [R29.898]  Referring practitioner: Varun Kincaid MD  Date of Initial Visit: Type: THERAPY  Noted: 2022  Today's Date: 2022  Patient seen for 5 sessions             Subjective   Audra Montejo reports: still having quite a few loss of balances but she usually has her cane to help her not fall. States that she has an appt with her Neurologist tomorrow because she has been sleeping a lot; as in friends trying to contact her for days and she's sleeping what feels like hours to her.     Objective   Min/Mod A for balance with stepping over black foams.     See Exercise, Manual, and Modality Logs for complete treatment.       Assessment/Plan  Pt still experiencing loss of balance while at home, tolerated exercises well although requiring Min/Mod A. Pt would benefit from skilled PT to address strength and endurance deficits, transfer and gait training and any concerns with ADLs.     Progress per Plan of Care           Timed:  Manual Therapy:         mins  78235;  Therapeutic Exercise:    12     mins  36523;     Neuromuscular Jaison:    10    mins  47983;    Therapeutic Activity:     8     mins  16544;     Gait Training:           mins  31835;    Aquatic Therapy:          mins  35420;       Untimed:  Electrical Stimulation:         mins  56542 ( );  Mechanical Traction:         mins  39033;       Timed Treatment:   30   mins   Total Treatment:     30   mins      Electronically signed:   Ida Smith PTA  Physical Therapist Assistant  [place] License #: [id number]

## 2022-09-08 ENCOUNTER — TREATMENT (OUTPATIENT)
Dept: PHYSICAL THERAPY | Facility: CLINIC | Age: 62
End: 2022-09-08

## 2022-09-08 DIAGNOSIS — R26.9 GAIT DISTURBANCE: ICD-10-CM

## 2022-09-08 DIAGNOSIS — R26.89 IMBALANCE: ICD-10-CM

## 2022-09-08 DIAGNOSIS — I63.533: ICD-10-CM

## 2022-09-08 DIAGNOSIS — R29.898 WEAKNESS OF BOTH LOWER EXTREMITIES: Primary | ICD-10-CM

## 2022-09-08 PROCEDURE — 97110 THERAPEUTIC EXERCISES: CPT | Performed by: PHYSICAL THERAPIST

## 2022-09-08 PROCEDURE — 97530 THERAPEUTIC ACTIVITIES: CPT | Performed by: PHYSICAL THERAPIST

## 2022-09-08 NOTE — PROGRESS NOTES
"Physical Therapy Daily Treatment Note      Patient: Audra Montejo   : 1960  Referring practitioner: Varun Kincaid MD  Date of Initial Visit: Type: THERAPY  Noted: 2022  Today's Date: 2022  Patient seen for 6 sessions           Subjective  Audra Montejo reports: the neurologist told her recovery would take another 6 months. \"he showed me the old stroke and this new one.\"       Objective   Ida provided pt with written HEP.    See Exercise, Manual, and Modality Logs for complete treatment.       Assessment/Plan  Audra is still wearing an eye patch, at time of care over the R eye. Audra reported that she alternates eyes to aid her vision and reduced dizziness. Therapist directed program to further her gains in gross strength, balance and function required.     Visit Diagnoses:    ICD-10-CM ICD-9-CM   1. Weakness of both lower extremities  R29.898 729.89   2. Gait disturbance  R26.9 781.2   3. Imbalance  R26.89 781.2   4. Cerebral infarction due to bilateral stenosis of posterior cerebral arteries (HCC)  I63.533 434.91       Progress per Plan of Care and Progress strengthening /stabilization /functional activity           Timed:  Manual Therapy:         mins  26306;  Therapeutic Exercise:    15     mins  01400;     Neuromuscular Jaison:        mins  13193;    Therapeutic Activity:     10     mins  69402;     Gait Training:           mins  41954;     Ultrasound:          mins  88579;    Electrical Stimulation:         mins  62495 ( );  Aquatics  __   mins   78231    Untimed:  Electrical Stimulation:         mins  27362 ( );  Mechanical Traction:         mins  52609;     Timed Treatment:  25    mins   Total Treatment:     25   mins    Electronically Signed:  Jennifer Trujillo PTA  Physical Therapist Assistant    [place] PTA license [id number]            "

## 2022-09-12 RX ORDER — ATORVASTATIN CALCIUM 80 MG/1
TABLET, FILM COATED ORAL
Qty: 30 TABLET | Refills: 0 | OUTPATIENT
Start: 2022-09-12 | End: 2022-10-23

## 2022-09-13 ENCOUNTER — TREATMENT (OUTPATIENT)
Dept: PHYSICAL THERAPY | Facility: CLINIC | Age: 62
End: 2022-09-13

## 2022-09-13 DIAGNOSIS — R26.89 IMBALANCE: ICD-10-CM

## 2022-09-13 DIAGNOSIS — R29.898 WEAKNESS OF BOTH LOWER EXTREMITIES: Primary | ICD-10-CM

## 2022-09-13 DIAGNOSIS — R26.9 GAIT DISTURBANCE: ICD-10-CM

## 2022-09-13 PROCEDURE — 97110 THERAPEUTIC EXERCISES: CPT | Performed by: PHYSICAL THERAPIST

## 2022-09-13 PROCEDURE — 97112 NEUROMUSCULAR REEDUCATION: CPT | Performed by: PHYSICAL THERAPIST

## 2022-09-13 NOTE — PROGRESS NOTES
Physical Therapy Daily Treatment Note      Patient: Audra Montejo   : 1960  Referring practitioner: Varun Kincaid MD  Date of Initial Visit: Type: THERAPY  Noted: 2022  Today's Date: 2022  Patient seen for 7 sessions           Subjective Questionnaire:       Subjective Evaluation    History of Present Illness    Subjective comment: Pt presents to clinic with STC and wearing patch over R eye.       Objective   See Exercise, Manual, and Modality Logs for complete treatment.       Assessment & Plan     Assessment    Assessment details: Pt ambulates with STC and balance is good-.  Pt had difficulty with dynamic and static balance activities.  Pt inquired about balance activities and was advised to perform tandem and reverse walking and Rhomberg on solid surface.        Visit Diagnoses:    ICD-10-CM ICD-9-CM   1. Weakness of both lower extremities  R29.898 729.89   2. Gait disturbance  R26.9 781.2   3. Imbalance  R26.89 781.2       Progress per Plan of Care and Progress strengthening /stabilization /functional activity           Timed:  Manual Therapy:         mins  23447;  Therapeutic Exercise:    14     mins  83771;     Neuromuscular Jaison:    12    mins  35684;    Therapeutic Activity:     4     mins  93184;     Gait Training:           mins  40780;     Ultrasound:          mins  06559;    Electrical Stimulation:         mins  14830 ( );  Aquatic Therapy          mins  53846    Untimed:  Electrical Stimulation:         mins  43074 ( );  Mechanical Traction:         mins  47500;     Timed Treatment:   30   mins   Total Treatment:     30   mins    Electronically signed    Taylor Medina PTA  Physical Therapist Assistant    [place] license: [id number]

## 2022-09-16 ENCOUNTER — TREATMENT (OUTPATIENT)
Dept: PHYSICAL THERAPY | Facility: CLINIC | Age: 62
End: 2022-09-16

## 2022-09-16 DIAGNOSIS — I63.533: ICD-10-CM

## 2022-09-16 DIAGNOSIS — R26.89 IMBALANCE: ICD-10-CM

## 2022-09-16 DIAGNOSIS — R29.898 WEAKNESS OF BOTH LOWER EXTREMITIES: Primary | ICD-10-CM

## 2022-09-16 DIAGNOSIS — R26.9 GAIT DISTURBANCE: ICD-10-CM

## 2022-09-16 PROCEDURE — 97112 NEUROMUSCULAR REEDUCATION: CPT | Performed by: PHYSICAL THERAPIST

## 2022-09-16 PROCEDURE — 97110 THERAPEUTIC EXERCISES: CPT | Performed by: PHYSICAL THERAPIST

## 2022-09-16 NOTE — PROGRESS NOTES
Physical Therapy Daily Treatment Note      Patient: Audra Montejo   : 1960  Referring practitioner: Varun Kincaid MD  Date of Initial Visit: Type: THERAPY  Noted: 2022  Today's Date: 2022  Patient seen for 8 sessions           Subjective Questionnaire:       Subjective     Objective   See Exercise, Manual, and Modality Logs for complete treatment.       Assessment/Plan    Visit Diagnoses:    ICD-10-CM ICD-9-CM   1. Weakness of both lower extremities  R29.898 729.89   2. Gait disturbance  R26.9 781.2   3. Imbalance  R26.89 781.2   4. Cerebral infarction due to bilateral stenosis of posterior cerebral arteries (HCC)  I63.533 434.91       Progress per Plan of Care and Progress strengthening /stabilization /functional activity           Timed:  Manual Therapy:         mins  51178;  Therapeutic Exercise:    12     mins  06852;     Neuromuscular Jaison:    16    mins  74193;    Therapeutic Activity:          mins  19470;     Gait Training:           mins  73238;     Ultrasound:          mins  08466;    Electrical Stimulation:         mins  73433 ( );  Aquatic Therapy          mins  06563    Untimed:  Electrical Stimulation:         mins  00575 ( );  Mechanical Traction:         mins  38485;     Timed Treatment:   28   mins   Total Treatment:     28   mins    Electronically signed    Taylor Medina PTA  Physical Therapist Assistant    [place] license: [id number]

## 2022-09-19 RX ORDER — METOPROLOL SUCCINATE 25 MG/1
25 TABLET, EXTENDED RELEASE ORAL DAILY
COMMUNITY
Start: 2022-09-01 | End: 2022-10-23

## 2022-09-21 ENCOUNTER — TREATMENT (OUTPATIENT)
Dept: PHYSICAL THERAPY | Facility: CLINIC | Age: 62
End: 2022-09-21

## 2022-09-21 ENCOUNTER — HOSPITAL ENCOUNTER (EMERGENCY)
Facility: HOSPITAL | Age: 62
Discharge: LEFT BEFORE DISCHARGE | End: 2022-09-21
Attending: EMERGENCY MEDICINE | Admitting: EMERGENCY MEDICINE

## 2022-09-21 ENCOUNTER — APPOINTMENT (OUTPATIENT)
Dept: GENERAL RADIOLOGY | Facility: HOSPITAL | Age: 62
End: 2022-09-21

## 2022-09-21 ENCOUNTER — APPOINTMENT (OUTPATIENT)
Dept: CT IMAGING | Facility: HOSPITAL | Age: 62
End: 2022-09-21

## 2022-09-21 ENCOUNTER — APPOINTMENT (OUTPATIENT)
Dept: MRI IMAGING | Facility: HOSPITAL | Age: 62
End: 2022-09-21

## 2022-09-21 ENCOUNTER — TELEPHONE (OUTPATIENT)
Dept: FAMILY MEDICINE CLINIC | Facility: CLINIC | Age: 62
End: 2022-09-21

## 2022-09-21 VITALS
OXYGEN SATURATION: 99 % | SYSTOLIC BLOOD PRESSURE: 155 MMHG | HEIGHT: 58 IN | BODY MASS INDEX: 34.43 KG/M2 | TEMPERATURE: 98.6 F | RESPIRATION RATE: 16 BRPM | DIASTOLIC BLOOD PRESSURE: 72 MMHG | HEART RATE: 56 BPM | WEIGHT: 164 LBS

## 2022-09-21 DIAGNOSIS — R26.9 GAIT DISTURBANCE: ICD-10-CM

## 2022-09-21 DIAGNOSIS — R29.810 FACIAL DROOP: ICD-10-CM

## 2022-09-21 DIAGNOSIS — R20.0 LEFT FACIAL NUMBNESS: ICD-10-CM

## 2022-09-21 DIAGNOSIS — R29.898 WEAKNESS OF BOTH LOWER EXTREMITIES: Primary | ICD-10-CM

## 2022-09-21 DIAGNOSIS — I63.9 ACUTE ISCHEMIC STROKE: Primary | ICD-10-CM

## 2022-09-21 DIAGNOSIS — R26.89 IMBALANCE: ICD-10-CM

## 2022-09-21 DIAGNOSIS — Z53.29 LEFT AGAINST MEDICAL ADVICE: ICD-10-CM

## 2022-09-21 LAB
ABO GROUP BLD: NORMAL
ALBUMIN SERPL-MCNC: 4.4 G/DL (ref 3.5–5.2)
ALBUMIN/GLOB SERPL: 1.4 G/DL
ALP SERPL-CCNC: 173 U/L (ref 39–117)
ALT SERPL W P-5'-P-CCNC: 11 U/L (ref 1–33)
ANION GAP SERPL CALCULATED.3IONS-SCNC: 9.3 MMOL/L (ref 5–15)
AST SERPL-CCNC: 14 U/L (ref 1–32)
BASOPHILS # BLD AUTO: 0.06 10*3/MM3 (ref 0–0.2)
BASOPHILS NFR BLD AUTO: 0.6 % (ref 0–1.5)
BILIRUB SERPL-MCNC: 0.6 MG/DL (ref 0–1.2)
BLD GP AB SCN SERPL QL: NEGATIVE
BUN SERPL-MCNC: 11 MG/DL (ref 8–23)
BUN/CREAT SERPL: 15.9 (ref 7–25)
CALCIUM SPEC-SCNC: 9.5 MG/DL (ref 8.6–10.5)
CHLORIDE SERPL-SCNC: 104 MMOL/L (ref 98–107)
CO2 SERPL-SCNC: 26.7 MMOL/L (ref 22–29)
CREAT SERPL-MCNC: 0.69 MG/DL (ref 0.57–1)
DEPRECATED RDW RBC AUTO: 40.6 FL (ref 37–54)
EGFRCR SERPLBLD CKD-EPI 2021: 99.5 ML/MIN/1.73
EOSINOPHIL # BLD AUTO: 0.17 10*3/MM3 (ref 0–0.4)
EOSINOPHIL NFR BLD AUTO: 1.8 % (ref 0.3–6.2)
ERYTHROCYTE [DISTWIDTH] IN BLOOD BY AUTOMATED COUNT: 12.7 % (ref 12.3–15.4)
GLOBULIN UR ELPH-MCNC: 3.1 GM/DL
GLUCOSE BLDC GLUCOMTR-MCNC: 124 MG/DL (ref 70–99)
GLUCOSE SERPL-MCNC: 127 MG/DL (ref 65–99)
HCT VFR BLD AUTO: 45.9 % (ref 34–46.6)
HGB BLD-MCNC: 15.6 G/DL (ref 12–15.9)
HOLD SPECIMEN: NORMAL
HOLD SPECIMEN: NORMAL
IMM GRANULOCYTES # BLD AUTO: 0.03 10*3/MM3 (ref 0–0.05)
IMM GRANULOCYTES NFR BLD AUTO: 0.3 % (ref 0–0.5)
INR PPP: 1.02 (ref 0.86–1.15)
LYMPHOCYTES # BLD AUTO: 3.32 10*3/MM3 (ref 0.7–3.1)
LYMPHOCYTES NFR BLD AUTO: 34.3 % (ref 19.6–45.3)
MCH RBC QN AUTO: 29.8 PG (ref 26.6–33)
MCHC RBC AUTO-ENTMCNC: 34 G/DL (ref 31.5–35.7)
MCV RBC AUTO: 87.8 FL (ref 79–97)
MONOCYTES # BLD AUTO: 0.56 10*3/MM3 (ref 0.1–0.9)
MONOCYTES NFR BLD AUTO: 5.8 % (ref 5–12)
NEUTROPHILS NFR BLD AUTO: 5.55 10*3/MM3 (ref 1.7–7)
NEUTROPHILS NFR BLD AUTO: 57.2 % (ref 42.7–76)
NRBC BLD AUTO-RTO: 0 /100 WBC (ref 0–0.2)
PLATELET # BLD AUTO: 300 10*3/MM3 (ref 140–450)
PMV BLD AUTO: 10.9 FL (ref 6–12)
POTASSIUM SERPL-SCNC: 3.5 MMOL/L (ref 3.5–5.2)
PROT SERPL-MCNC: 7.5 G/DL (ref 6–8.5)
PROTHROMBIN TIME: 13.5 SECONDS (ref 11.8–14.9)
QT INTERVAL: 434 MS
RBC # BLD AUTO: 5.23 10*6/MM3 (ref 3.77–5.28)
RH BLD: NEGATIVE
SODIUM SERPL-SCNC: 140 MMOL/L (ref 136–145)
T&S EXPIRATION DATE: NORMAL
WBC NRBC COR # BLD: 9.69 10*3/MM3 (ref 3.4–10.8)
WHOLE BLOOD HOLD COAG: NORMAL
WHOLE BLOOD HOLD SPECIMEN: NORMAL

## 2022-09-21 PROCEDURE — 70450 CT HEAD/BRAIN W/O DYE: CPT

## 2022-09-21 PROCEDURE — 93005 ELECTROCARDIOGRAM TRACING: CPT | Performed by: EMERGENCY MEDICINE

## 2022-09-21 PROCEDURE — 86900 BLOOD TYPING SEROLOGIC ABO: CPT | Performed by: EMERGENCY MEDICINE

## 2022-09-21 PROCEDURE — 99283 EMERGENCY DEPT VISIT LOW MDM: CPT

## 2022-09-21 PROCEDURE — 86901 BLOOD TYPING SEROLOGIC RH(D): CPT | Performed by: EMERGENCY MEDICINE

## 2022-09-21 PROCEDURE — 85610 PROTHROMBIN TIME: CPT

## 2022-09-21 PROCEDURE — 82962 GLUCOSE BLOOD TEST: CPT

## 2022-09-21 PROCEDURE — 99284 EMERGENCY DEPT VISIT MOD MDM: CPT

## 2022-09-21 PROCEDURE — 93010 ELECTROCARDIOGRAM REPORT: CPT | Performed by: INTERNAL MEDICINE

## 2022-09-21 PROCEDURE — 36415 COLL VENOUS BLD VENIPUNCTURE: CPT

## 2022-09-21 PROCEDURE — 80053 COMPREHEN METABOLIC PANEL: CPT

## 2022-09-21 PROCEDURE — 85025 COMPLETE CBC W/AUTO DIFF WBC: CPT

## 2022-09-21 PROCEDURE — 71045 X-RAY EXAM CHEST 1 VIEW: CPT

## 2022-09-21 PROCEDURE — 97164 PT RE-EVAL EST PLAN CARE: CPT | Performed by: PHYSICAL THERAPIST

## 2022-09-21 PROCEDURE — 70551 MRI BRAIN STEM W/O DYE: CPT

## 2022-09-21 PROCEDURE — 86850 RBC ANTIBODY SCREEN: CPT | Performed by: EMERGENCY MEDICINE

## 2022-09-21 RX ORDER — SODIUM CHLORIDE 0.9 % (FLUSH) 0.9 %
10 SYRINGE (ML) INJECTION AS NEEDED
Status: DISCONTINUED | OUTPATIENT
Start: 2022-09-21 | End: 2022-09-21 | Stop reason: HOSPADM

## 2022-09-21 NOTE — TELEPHONE ENCOUNTER
Physical  therapist called and stated pt has been having slurred speech and  numbness on left side > I informed her that Dr Pina is not here and pt will need to go to ER . Therapist states she will have pt go to ER

## 2022-09-21 NOTE — PROGRESS NOTES
Re-Assessment / Re-Certification        Patient: Audra Montejo   : 1960  Diagnosis/ICD-10 Code:  Weakness of both lower extremities [R29.898]  Referring practitioner: Varun Kincaid MD  Date of Initial Visit: Type: THERAPY  Noted: 2022  Today's Date: 2022  Patient seen for 9 sessions      Subjective:   Audra Montejo reports sometime last week she noticed she lost taste and the left side of her tongue was numb.  She states she also had left thumb numbness.  She went back to bed at that point and when she got up she sat on the bed and tried to figure out what was going on.  She states her mouth was numb and it feels like she had been to the dentist.  She states it still feels like that.  When she went to get up her entire body was weak and trembling.  When she walks with the cane it wants to move a lot.  Pt reports now she is having problems sleeping.  Pt went to the neurologist yesterday and he thinks she had another stroke and wants her to have an MRI.  Pt reports they tried to check her BP at the neurologist yesterday but couldn't get a reading.  Pt reports she drove herself here today.      Clinical Progress: worse  Home Program Compliance: Yes  Treatment has included: therapeutic exercise, neuromuscular re-education, therapeutic activity and gait training    Subjective   Objective     BP in L /93      Objective measurements below were not assessed today due to pt experiencing new stroke like symptoms.  Strength/Myotome Testing      Left Hip   Planes of Motion   Flexion: 5  Extension: 4  Abduction: 4+     Right Hip   Planes of Motion   Flexion: 4+  Extension: 4  Abduction: 4     Left Knee   Flexion: 5  Extension: 5     Right Knee   Flexion: 4+  Extension: 4+     Left Ankle/Foot   Dorsiflexion: 5  Plantar flexion: 5  Inversion: 5  Eversion: 5     Right Ankle/Foot   Dorsiflexion: 5  Plantar flexion: 4+  Inversion: 4+  Eversion: 4     Comments  TUG (without AD): 13 seconds or 1-19%  limited  Dynamic Gait Index:   Romber seconds  Romberg with eyes closed: 30 seconds  Sharpened romber seconds  Sharpened romberg with eyes closed: 30 seconds  Single leg balance on R: 10 seconds  Single leg balance on L: 4 seconds    Assessment/Plan   Pt presents to therapy today with subjective reports of new stroke like symptoms, such as loss of taste, tongue numbness, thumb numbness, weakness, and fatigue.  She states she went to the Neurologist yesterday but is unable to provide accurate information as to what the neurologist told her regarding her symptoms.  She states the neurologist ordered an MRI but nothing was scheduled yesterday.  Her systolic BP is significantly elevated (180/93) today in the clinic and pt has also taken her BP meds this morning.  Pt is oriented to person, place, and time.  She does have some confusion, seems to have difficulty word finding, and slowed speech.  PCP was called and they advised for pt to go to the ER.  Pt was encouraged to leave PT and go to the ER.  She states she feels comfortable driving herself there and does not wish for me to call an ambulance.      Pt's objective measurements were not reassessed today due to above concerns.  She continues to have LE weakness, imbalance, and gait dysfunction.  She will benefit from continuing with PT to address these deficits and improve safety and independence with functional activities.    Goals  Plan Goals: 1. Mobility: Walking/Moving Around Functional Limitation                       LTG 1: 12 weeks:  The patient will demonstrate 0% limitation by achieving a score of 10 seconds on the Timed Up and Go Test (without AD)              STATUS:  ongoing  STG 1a: 6 weeks:  The patient will demonstrate 1-19% limitation by achieving a score of 12 seconds on the Timed Up and Go Test (without AD)              STATUS: ongoing    2. The patient demonstrates weakness of the B lower extremity.    LTG 2: 12 weeks:  The patient  will demonstrate 4+/5 hip strength and 5/5 knee strength in order to improve balance and safety with ambulation.               STATUS: ongoing  STG 2a: 6 weeks: The patient will demonstrate 4/5 hip strength in order to improve balance and safety with ambulation.               STATUS:  met    3. The patient has decreased ability to ambulate   LTG 3: 12 weeks:  The patient will ambulate with stand by assistance without AD for 300 feet without loss of balance in order to safely ambulate in the community.                           STATUS: ongoing  STG 3a: 6 weeks: The patient will ambulate with SBA assistance without AD for 100 feet.                           STATUS: ongoing    4. The patient has difficulty with walking..  LTG 4: 12 weeks: The patient will improve score on the Dynamic Gait Index to 20/24 in order to improve safety with ambulation at home and in the community.  STATUS:ongoing  STG 4a: 6 weeks:  The patient will improve score on the Dynamic Gait Index to 18/24 in order to improve safety with ambulation at home and in the community.  STATUS: ongoing  Progress toward previous goals: Not Met    See Exercise, Manual, and Modality Logs for complete treatment.       Recommendations: Continue as planned  Timeframe: 3 months  Prognosis to achieve goals: good    PT Signature: Electronically signed by SATURNINO Peña License: [id number]    Certification Period: 9/21/2022 thru 12/19/2022      Based upon review of the patient's progress and continued therapy plan, it is my medical opinion that Audra Montejo should continue physical therapy treatment at [facility]  [address]   [place] [id number] [phone].    Signature: __________________________________  Varun Kincaid MD      PHYSICIAN: Varun Kincaid MD  NPI: [id number]                                      DATE:        Timed:         Manual Therapy:         mins  46949;     Therapeutic Exercise:          mins  07417;     Neuromuscular Jaison:        mins  83482;    Therapeutic Activity:          mins  18820;     Gait Training:           mins  61820;     Ultrasound:          mins  28384;    Ionto                                   mins   06784  Self Care                            mins   78374  Aquatic                               mins  68776      Un-Timed:  Electrical Stimulation:         mins  55918 ( );  Dry Needling          mins self-pay  Traction          mins 16468  Low Eval          Mins  09976  Mod Eval          Mins  13539  High Eval                            Mins  27133  Re-Eval                           40    mins  44653  Canalith Repos         mins 15226    Timed Treatment:   0   mins   Total Treatment:     40   mins

## 2022-09-21 NOTE — DISCHARGE INSTRUCTIONS
Please call Dr. Chambers's neurology office for urgent follow-up appointment to discuss any further things you can do to prevent future strokes, as the MRI of your brain today did show you have a new 5 mm right thalamus stroke.      Make sure you are taking your aspirin and Plavix as directed, and return to the ER for any worsening symptoms.

## 2022-09-21 NOTE — ED PROVIDER NOTES
Time: 1:21 PM EDT  Arrived by: private car  Chief Complaint: facial numbness, facial droop  History provided by: Pt  History is limited by: N/A     History of Present Illness:  Patient is a 62 y.o.  female that presents to the emergency department with left sided facial droop and numbness for the past 6 days. Pt denies any recent injuries to her face. Last known well time was 9/16/22. Pt has a hx of CVA which resulted in residual right sided lower extremity weakness. Pt is on Plavix currently. She states that the facial droop and numbness are new symptoms she is having. She denies any fever, chills, chest pain, SOB, or N/V/D. Dr. Chambers is her neurologist.       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   • Acid reflux disease    • Allergic Unknown   • Allergy     unspecified   • Anxiety    • Asthma Unknown   • Back pain    • Bilateral carpal tunnel syndrome 08/17/2017   • Bipolar disorder (HCC)    • Bursitis of left shoulder 08/09/2016   • Cataract Unknown   • Depression    • Eczema    • Gall stones    • H/O psychiatric care    • Heart attack (HCC)    • Heart murmur    • Hypertension    • Incomplete tear of left rotator cuff 01/25/2017   • Left shoulder pain    • Loss of appetite    • Lumbago 05/08/2015    low back pain    • Memory loss     forgetfulness   • Migraine headache    • Need for hepatitis C screening test    • Palpitation    • Ringing in ears    • Seasonal allergies    • Shortness of breath    • Sinus trouble     unspecified    • Stroke (cerebrum) ([facility])    • Subacromial impingement, left 10/13/2016   • Superior glenoid labrum lesion of left shoulder 10/13/2016    subsequent encounter   • Trouble swallowing      Past Surgical History:   Procedure Laterality Date   • APPENDECTOMY     • CARPAL TUNNEL RELEASE     •  CHOLECYSTECTOMY     • COLONOSCOPY  2014   • ENDOSCOPY  2014   • HYSTERECTOMY  1999   • SHOULDER SURGERY Left 10/03/2016    arthroscopic, left shoulder scope, RTC repair- Dr. Flores     Family History   Problem Relation Age of Onset   • Stroke Mother    • Diabetes Mother    • Arthritis Mother    • Heart disease Father    • Cancer Father    • Heart disease Sister    • Cancer Sister    • Diabetes Sister    • Arthritis Sister    • Heart disease Brother    • Diabetes Brother    • Hypertension Other    • Rheum arthritis Other    • Stroke Son    • Heart disease Son    • Cancer Son    • Other Son         blood disease   • Arthritis Son    • Osteoporosis Son        Home Medications:  Prior to Admission medications    Medication Sig Start Date End Date Taking? Authorizing Provider   albuterol (PROVENTIL) (2.5 MG/3ML) 0.083% nebulizer solution Take 2.5 mg by nebulization 3 (Three) Times a Day.    ProviderIsrael MD   albuterol sulfate  (90 Base) MCG/ACT inhaler Inhale 2 puffs Every 6 (Six) Hours As Needed for Wheezing.    Israel Tarango MD   amLODIPine (NORVASC) 5 MG tablet Take 1 tablet by mouth Daily for 90 days. 8/28/22 11/26/22  Anayeli Pina MD   atorvastatin (LIPITOR) 80 MG tablet TAKE 1 TABLET BY MOUTH EVERY NIGHT 9/12/22   Anayeli Pina MD   clopidogrel (PLAVIX) 75 MG tablet Take 1 tablet by mouth Daily for 90 days. 8/4/22 11/2/22  Anayeli Pina MD   metoprolol succinate XL (TOPROL-XL) 25 MG 24 hr tablet Take 25 mg by mouth Daily. 9/1/22   Israel Tarango MD   montelukast (SINGULAIR) 10 MG tablet Take 10 mg by mouth Every Night.    Israel Tarango MD   ondansetron ODT (ZOFRAN-ODT) 4 MG disintegrating tablet Place 4 mg under the tongue Every 6 (Six) Hours As Needed. 7/7/22   Israel Tarango MD   oxybutynin XL (DITROPAN-XL) 10 MG 24 hr tablet TAKE 1 TABLET BY MOUTH DAILY 8/16/22   Anayeli Pina MD        Social History:   Social History     Tobacco Use   • Smoking  "status: Current Every Day Smoker     Packs/day: 1.00     Years: 30.00     Pack years: 30.00     Types: Cigarettes, Cigarettes   • Smokeless tobacco: Never Used   • Tobacco comment: Not your business   Vaping Use   • Vaping Use: Never used   Substance Use Topics   • Alcohol use: Not Currently     Comment: rarely drinks   • Drug use: Never       Review of Systems:  Review of Systems   Constitutional: Negative for chills and fever.   HENT: Negative for congestion, ear pain and sore throat.    Eyes: Negative for pain.   Respiratory: Negative for cough, chest tightness and shortness of breath.    Cardiovascular: Negative for chest pain.   Gastrointestinal: Negative for abdominal pain, diarrhea, nausea and vomiting.   Genitourinary: Negative for flank pain and hematuria.   Musculoskeletal: Negative for joint swelling.   Skin: Negative for pallor.   Neurological: Positive for facial asymmetry. Negative for seizures and headaches.        Facial numbness   All other systems reviewed and are negative.         Physical Exam:  /72   Pulse 56   Temp 98.6 °F (37 °C) (Oral)   Resp 16   Ht 147.3 cm (58\")   Wt 74.4 kg (164 lb)   LMP  (LMP Unknown)   SpO2 99%   BMI 34.28 kg/m²     Physical Exam  Vitals and nursing note reviewed.   Constitutional:       General: She is not in acute distress.     Appearance: Normal appearance. She is not toxic-appearing.   HENT:      Head: Normocephalic and atraumatic.      Mouth/Throat:      Mouth: Mucous membranes are moist.   Eyes:      General: No scleral icterus.  Cardiovascular:      Rate and Rhythm: Normal rate and regular rhythm.      Pulses: Normal pulses.      Heart sounds: Normal heart sounds.   Pulmonary:      Effort: Pulmonary effort is normal. No respiratory distress.      Breath sounds: Normal breath sounds.   Abdominal:      General: Abdomen is flat.      Palpations: Abdomen is soft.      Tenderness: There is no abdominal tenderness.   Musculoskeletal:         General: " Normal range of motion.      Cervical back: Normal range of motion and neck supple.   Skin:     General: Skin is warm and dry.   Neurological:      Mental Status: She is alert and oriented to person, place, and time. Mental status is at baseline.      Comments: Mild left facial droop, slurred speech, decreased sensation to left side of face.    Normal strength in both arms, no pronator drift, hesitant finger nose finger of left hand    Mild weakness of right leg (chronic from previous CVA)                Medications in the Emergency Department:  Medications - No data to display     Labs  Lab Results (last 24 hours)     Procedure Component Value Units Date/Time    POC Glucose Once [[id number]]  (Abnormal) Collected: 09/21/22 1207    Specimen: Blood Updated: 09/21/22 1210     Glucose 124 mg/dL      Comment: Serial Number: [id number]:  [id number]       CBC & Differential [[id number]]  (Abnormal) Collected: 09/21/22 1227    Specimen: Blood Updated: 09/21/22 1237    Narrative:      The following orders were created for panel order CBC & Differential.  Procedure                               Abnormality         Status                     ---------                               -----------         ------                     CBC Auto Differential[[id number]]        Abnormal            Final result                 Please view results for these tests on the individual orders.    Comprehensive Metabolic Panel [[id number]]  (Abnormal) Collected: 09/21/22 1227    Specimen: Blood Updated: 09/21/22 1253     Glucose 127 mg/dL      BUN 11 mg/dL      Creatinine 0.69 mg/dL      Sodium 140 mmol/L      Potassium 3.5 mmol/L      Chloride 104 mmol/L      CO2 26.7 mmol/L      Calcium 9.5 mg/dL      Total Protein 7.5 g/dL      Albumin 4.40 g/dL      ALT (SGPT) 11 U/L      AST (SGOT) 14 U/L      Alkaline Phosphatase 173 U/L      Total Bilirubin 0.6 mg/dL      Globulin 3.1 gm/dL      A/G Ratio 1.4 g/dL      BUN/Creatinine Ratio 15.9      Anion Gap 9.3 mmol/L      eGFR 99.5 mL/min/1.73      Comment: National Kidney Foundation and American Society of Nephrology (ASN) Task Force recommended calculation based on the Chronic Kidney Disease Epidemiology Collaboration (CKD-EPI) equation refit without adjustment for race.       Narrative:      GFR Normal >60  Chronic Kidney Disease <60  Kidney Failure <15      Protime-INR [[id number]]  (Normal) Collected: 09/21/22 1227    Specimen: Blood Updated: 09/21/22 1247     Protime 13.5 Seconds      INR 1.02    Narrative:      Suggested Therapeutic Ranges For Oral Anticoagulant Therapy:  Level of Therapy                      INR Target Range  Standard Dose                            2.0-3.0  High Dose                                2.5-3.5  Patients not receiving anticoagulant  Therapy Normal Range                     0.86-1.15    CBC Auto Differential [[id number]]  (Abnormal) Collected: 09/21/22 1227    Specimen: Blood Updated: 09/21/22 1237     WBC 9.69 10*3/mm3      RBC 5.23 10*6/mm3      Hemoglobin 15.6 g/dL      Hematocrit 45.9 %      MCV 87.8 fL      MCH 29.8 pg      MCHC 34.0 g/dL      RDW 12.7 %      RDW-SD 40.6 fl      MPV 10.9 fL      Platelets 300 10*3/mm3      Neutrophil % 57.2 %      Lymphocyte % 34.3 %      Monocyte % 5.8 %      Eosinophil % 1.8 %      Basophil % 0.6 %      Immature Grans % 0.3 %      Neutrophils, Absolute 5.55 10*3/mm3      Lymphocytes, Absolute 3.32 10*3/mm3      Monocytes, Absolute 0.56 10*3/mm3      Eosinophils, Absolute 0.17 10*3/mm3      Basophils, Absolute 0.06 10*3/mm3      Immature Grans, Absolute 0.03 10*3/mm3      nRBC 0.0 /100 WBC            Imaging:  MRI Brain Without Contrast    Result Date: 9/21/2022  PROCEDURE: MRI BRAIN WO CONTRAST  COMPARISON: [facility], CT, CT ANGIOGRAM HEAD, 7/06/2022, 23:29.  [facility], MR, MRI BRAIN WO CONTRAST, 7/06/2022, 22:47.  INDICATIONS: Eval left-sided facial droop and numbness, slurred speech x6 days; history of  old stroke      TECHNIQUE: Multiplanar multisequence images of the brain without intravenous gadolinium.  FINDINGS: There is a 5 mm area of restricted diffusion in the right thalamus.  No evidence of acute intracranial hemorrhage, mass or midline shift.  There are old cerebellar infarcts right greater than left.  The ventricles have a normal size and configuration.  There are mild chronic appearing changes in the white matter.  There are no extra-axial fluid collections.  There is a 5 mm old right pontine infarct.  IMPRESSION:  5 mm acute right thalamic infarct.  ESTELLA BARRERA MD       Electronically Signed and Approved By: ESTELLA BARRERA MD on 9/21/2022 at 14:47             XR Chest 1 View    Result Date: 9/21/2022  PROCEDURE: XR CHEST 1 VW  COMPARISON: [facility], , CHEST PA/AP & LAT 2V, 4/25/2021, 16:42.  [facility], CR, XR CHEST 1 VW, 2/15/2022, 23:48.  [facility], , XR CHEST 1 VW, 7/10/2022, 19:00.  INDICATIONS: Stroke Protocol (Onset > 12 hrs)/STROKE LIKE SYMPTOMS  FINDINGS:   The lungs are well-expanded. The heart and pulmonary vasculature are within normal limits. No pleural effusions are identified. There is a 1.8 cm density in the right paraspinal region.  IMPRESSION:  1.8 cm density in the right paraspinal region.  Recommend a follow-up CT scan of the chest to evaluate for a pulmonary nodule.  ESTELLA BARRERA MD       Electronically Signed and Approved By: ESTELLA BARRERA MD on 9/21/2022 at 13:08             CT Head Without Contrast Stroke Protocol    Result Date: 9/21/2022  PROCEDURE: CT HEAD WO CONTRAST STROKE PROTOCOL  COMPARISON:  [facility], CT, CT HEAD WO CONTRAST, 7/10/2022, 22:17. INDICATIONS: Stroke, follow up. left face and hand numbness x few days, h/o CVA july 2022  PROTOCOL:   Standard imaging protocol performed    RADIATION:   DLP: 954.5mGy*cm   MA and/or KV was adjusted to minimize radiation dose.     TECHNIQUE: After  obtaining the patient's consent, CT images were obtained without non-ionic intravenous contrast material.  FINDINGS:  There is a chronic infarct in the right cerebellar hemisphere.  No CT evidence of acute infarction, mass or intracranial hemorrhage is identified.  The ventricles are normal in size and configuration.  The visualized extracranial soft tissues appear normal.  No focal calvarial abnormality is seen.        1. No CT evidence of acute infarction 2. Chronic infarct in the right cerebellar hemisphere     Omega Gabriel M.D.       Electronically Signed and Approved By: Omega Gabriel M.D. on 9/21/2022 at 12:59                EKG:      Procedures:  ECG 12 Lead      Date/Time: 9/21/2022 1:21 PM  Performed by: Bladimir Phillips MD  Authorized by: Bladimir Phillips MD   Interpreted by physician  Comparison: not compared with previous ECG   Rhythm: sinus bradycardia  Rate: normal  BPM: 54  Conduction: conduction normal  ST Segments: ST segments normal  T Waves: T waves normal  Clinical impression: normal ECG  Comments: Normal P waves, inferior Q waves present, ST segments normal, T waves normal, no acute ischemia.          Progress                            Medical Decision Making:  MDM  Number of Diagnoses or Management Options  Patient Progress  Patient progress: other (comment) (Pt is informed that she has a new CVA. Pt wants to be discharged against medical advice. Pt will be discharged and will follow up with Dr. Chambers, her neurologist.  )         Differential diagnosis in this patient with signs of possible ischemic stroke include TIA or ischemic stroke, hemorrhagic stroke, hypoglycemic episode, toxic or metabolic encephalopathy, paresthesias.        This patient is a 62-year-old female with previous history of strokes on Plavix, now presenting with new left-sided facial droop and difficulty speaking and some visual deficit on the left eye, already seen by her neurologist, all going on for 6 days now at  least.    CT of the brain was negative for hemorrhage and I did order MRI of the brain as I felt she may have had a stroke this week.    MRI actually did come back positive for an acute thalamic stroke on the right side which could explain her symptoms, and I have discussed this with her neurologist on-call.    However, when I discussed with the patient that she had another ischemic stroke this week and I wanted to admit her for further management and see her neurologist in the hospital, she became upset and very frustrated and essentially was adamant that she be discharged home, but would agree to follow-up with her neurologist and take her Plavix.    I gave her return precautions for any worsening symptoms, and she will sign out AGAINST MEDICAL ADVICE.    Final diagnoses:   Facial droop   Left facial numbness   Acute ischemic stroke (HCC)   Left against medical advice        Disposition:  ED Disposition     ED Disposition   AMA    Condition   --    Comment   --                Zeus Zayas  09/21/22 1322       Zeus Zayas  09/21/22 1342       Zeus Zayas  09/21/22 8108       Bladimir Phillips MD  09/21/22 5951

## 2022-09-22 ENCOUNTER — OFFICE VISIT (OUTPATIENT)
Dept: FAMILY MEDICINE CLINIC | Facility: CLINIC | Age: 62
End: 2022-09-22

## 2022-09-22 VITALS
TEMPERATURE: 97.8 F | HEIGHT: 58 IN | RESPIRATION RATE: 14 BRPM | BODY MASS INDEX: 31.7 KG/M2 | SYSTOLIC BLOOD PRESSURE: 158 MMHG | OXYGEN SATURATION: 97 % | DIASTOLIC BLOOD PRESSURE: 80 MMHG | HEART RATE: 54 BPM | WEIGHT: 151 LBS

## 2022-09-22 DIAGNOSIS — I63.431 CEREBROVASCULAR ACCIDENT (CVA) DUE TO EMBOLISM OF RIGHT POSTERIOR CEREBRAL ARTERY: ICD-10-CM

## 2022-09-22 DIAGNOSIS — I10 ESSENTIAL HYPERTENSION: Primary | ICD-10-CM

## 2022-09-22 PROCEDURE — 99214 OFFICE O/P EST MOD 30 MIN: CPT | Performed by: FAMILY MEDICINE

## 2022-09-22 NOTE — PROGRESS NOTES
"Answers for HPI/ROS submitted by the patient on 9/15/2022  What is the primary reason for your visit?: Neurological Problem  focal weakness: Yes  loss of balance: Yes  memory loss: Yes  visual change: Yes  weakness: Yes  Chronicity: recurrent  Onset: more than 1 month ago  Onset quality: insidiously  Progression since onset: gradually improving  Focality: lower extremity  abdominal pain: No  auditory change: No  back pain: Yes  bladder incontinence: No  bowel incontinence: No  chest pain: No  confusion: No  diaphoresis: No  dizziness: No  fatigue: Yes  fever: No  headaches: No  light-headedness: No  nausea: No  neck pain: No  palpitations: No  shortness of breath: No  vertigo: No  vomiting: No  Treatments tried: aspirin, medication, sleep, walking  Improvement on treatment: significant    Chief Complaint  Chief Complaint   Patient presents with   • Cerebrovascular Accident     7/2022       HPI:  Audra Montejo presents to [facility]     Pt reports that she was in physical therapy and she feels she was always tired and she woke up and realized she was not at home and she called the nurse on the bottom and no one came and she crawled out the bed and got a \"puke bag to pee in\" then the nurse came in with a bedside commode. Pt reports she was then told she had another stroke by a neurologist. Pt reports she was told the stroke was affecting her left eye.       Pt was seen in the ER yesterday for facial numbness and facial droop.  Pt is currently on Plavix. Pt reports they wanted to admit her but she wanted to go home.  Pt had a MRI of the brain which showed a 5mm acute right thalamic infarct. Pt reports she called and got on short term disability.     HTN- Pt BP today is 158/80 which is not well controlled.  Pt is compliant with their medication and will continue their current medication regimen. No side effects to the medication.  Pt is very agitated today due to all the appointments " and her difficulties with care.     Past History:  Medical History: has a past medical history of AC joint arthropathy (10/13/2016), Acid reflux disease, Allergic (Unknown), Allergy, Anxiety, Asthma (Unknown), Back pain, Bilateral carpal tunnel syndrome (08/17/2017), Bipolar disorder ([facility]), Bursitis of left shoulder (08/09/2016), Cataract (Unknown), Depression, Eczema, Gall stones, H/O psychiatric care, Heart attack ([facility]), Heart murmur, Hypertension, Incomplete tear of left rotator cuff (01/25/2017), Left shoulder pain, Loss of appetite, Lumbago (05/08/2015), Memory loss, Migraine headache, Need for hepatitis C screening test, Palpitation, Ringing in ears, Seasonal allergies, Shortness of breath, Sinus trouble, Stroke (cerebrum) ([facility]), Subacromial impingement, left (10/13/2016), Superior glenoid labrum lesion of left shoulder (10/13/2016), and Trouble swallowing.   Surgical History: has a past surgical history that includes Appendectomy; Shoulder surgery (Left, 10/03/2016); Carpal tunnel release; Cholecystectomy; Colonoscopy (2014); Esophagogastroduodenoscopy (2014); and Hysterectomy (1999).   Family History: family history includes Arthritis in her mother, sister, and son; Cancer in her father, sister, and son; Diabetes in her brother, mother, and sister; Heart disease in her brother, father, sister, and son; Hypertension in an other family member; Osteoporosis in her son; Other in her son; Rheum arthritis in an other family member; Stroke in her mother and son.   Social History: reports that she has been smoking cigarettes and cigarettes. She has a 30.00 pack-year smoking history. She has never used smokeless tobacco. She reports previous alcohol use. She reports that she does not use drugs.  Immunization History   Administered Date(s) Administered   • Flu Vaccine Quad PF >36MO 10/11/2016   • Influenza, Unspecified 11/15/2019   • Tdap 06/01/2018         Allergies: Tape     Medications:  Current Outpatient  "Medications on File Prior to Visit   Medication Sig Dispense Refill   • albuterol (PROVENTIL) (2.5 MG/3ML) 0.083% nebulizer solution Take 2.5 mg by nebulization 3 (Three) Times a Day.     • albuterol sulfate  (90 Base) MCG/ACT inhaler Inhale 2 puffs Every 6 (Six) Hours As Needed for Wheezing.     • amLODIPine (NORVASC) 5 MG tablet Take 1 tablet by mouth Daily for 90 days. 90 tablet 3   • atorvastatin (LIPITOR) 80 MG tablet TAKE 1 TABLET BY MOUTH EVERY NIGHT 30 tablet 0   • clopidogrel (PLAVIX) 75 MG tablet Take 1 tablet by mouth Daily for 90 days. 90 tablet 0   • montelukast (SINGULAIR) 10 MG tablet Take 10 mg by mouth Every Night.     • ondansetron ODT (ZOFRAN-ODT) 4 MG disintegrating tablet Place 4 mg under the tongue Every 6 (Six) Hours As Needed.     • oxybutynin XL (DITROPAN-XL) 10 MG 24 hr tablet TAKE 1 TABLET BY MOUTH DAILY 90 tablet 1   • metoprolol succinate XL (TOPROL-XL) 25 MG 24 hr tablet Take 25 mg by mouth Daily.       No current facility-administered medications on file prior to visit.        Health Maintenance Due   Topic Date Due   • ANNUAL PHYSICAL  Never done   • LUNG CANCER SCREENING  03/29/2017   • MAMMOGRAM  08/15/2019   • HEPATITIS C SCREENING  Never done       Vital Signs:   Vitals:    09/22/22 1435   BP: 158/80   BP Location: Right arm   Patient Position: Sitting   Pulse: 54   Resp: 14   Temp: 97.8 °F (36.6 °C)   SpO2: 97%   Weight: 68.5 kg (151 lb)   Height: 147.3 cm (58\")      Body mass index is 31.56 kg/m².     ROS:  Review of Systems   Constitutional: Positive for fatigue. Negative for diaphoresis and fever.   HENT: Negative for congestion, ear pain and sinus pressure.    Respiratory: Negative for cough, chest tightness and shortness of breath.    Cardiovascular: Negative for chest pain, palpitations and leg swelling.   Gastrointestinal: Negative for abdominal pain, diarrhea, nausea and vomiting.   Genitourinary: Negative for dysuria, frequency and urinary incontinence. "   Musculoskeletal: Positive for back pain. Negative for neck pain.   Neurological: Positive for weakness. Negative for dizziness, speech difficulty, light-headedness, headache and confusion.   Psychiatric/Behavioral: Negative for agitation and behavioral problems.          Physical Exam  Vitals reviewed.   Constitutional:       Appearance: Normal appearance.   HENT:      Right Ear: Tympanic membrane normal.      Left Ear: Tympanic membrane normal.      Nose: Nose normal.   Eyes:      Extraocular Movements: Extraocular movements intact.      Conjunctiva/sclera: Conjunctivae normal.      Pupils: Pupils are equal, round, and reactive to light.   Cardiovascular:      Rate and Rhythm: Normal rate and regular rhythm.   Pulmonary:      Effort: Pulmonary effort is normal.      Breath sounds: Normal breath sounds.   Abdominal:      General: Bowel sounds are normal.   Musculoskeletal:         General: Normal range of motion.      Cervical back: Normal range of motion.   Skin:     General: Skin is warm and dry.   Neurological:      General: No focal deficit present.      Mental Status: She is alert and oriented to person, place, and time.   Psychiatric:         Mood and Affect: Mood normal.         Behavior: Behavior normal.          Result Review   Comprehensive Metabolic Panel (09/21/2022 12:27)  CBC & Differential (09/21/2022 12:27)  ED with Bladimir Phillips MD (09/21/2022)       Diagnoses and all orders for this visit:    1. Essential hypertension (Primary)    2. Cerebrovascular accident (CVA) due to embolism of right posterior cerebral artery (HCC)      I spent 45 minutes caring for Audra on this date of service. This time includes time spent by me in the following activities: care coordination call to Dr. Chambers office to coordinate patient anticoagulation.    Smoking Cessation:    Audra Montejo  reports that she has been smoking cigarettes and cigarettes. She has a 30.00 pack-year smoking history. She has never used  smokeless tobacco.          Follow Up   Return in about 4 weeks (around 10/20/2022).  Patient was given instructions and counseling regarding her condition or for health maintenance advice. Please see specific information pulled into the AVS if appropriate.       Anayeli Pina MD

## 2022-09-23 ENCOUNTER — TREATMENT (OUTPATIENT)
Dept: PHYSICAL THERAPY | Facility: CLINIC | Age: 62
End: 2022-09-23

## 2022-09-23 ENCOUNTER — TELEPHONE (OUTPATIENT)
Dept: FAMILY MEDICINE CLINIC | Facility: CLINIC | Age: 62
End: 2022-09-23

## 2022-09-23 DIAGNOSIS — R29.898 WEAKNESS OF BOTH LOWER EXTREMITIES: Primary | ICD-10-CM

## 2022-09-23 DIAGNOSIS — I63.431 CEREBROVASCULAR ACCIDENT (CVA) DUE TO EMBOLISM OF RIGHT POSTERIOR CEREBRAL ARTERY: Primary | ICD-10-CM

## 2022-09-23 DIAGNOSIS — I63.533: ICD-10-CM

## 2022-09-23 DIAGNOSIS — R26.89 IMBALANCE: ICD-10-CM

## 2022-09-23 DIAGNOSIS — R26.9 GAIT DISTURBANCE: ICD-10-CM

## 2022-09-23 PROCEDURE — 97112 NEUROMUSCULAR REEDUCATION: CPT | Performed by: PHYSICAL THERAPIST

## 2022-09-23 PROCEDURE — 97110 THERAPEUTIC EXERCISES: CPT | Performed by: PHYSICAL THERAPIST

## 2022-09-23 RX ORDER — ASPIRIN 81 MG/1
81 TABLET ORAL DAILY
Qty: 90 TABLET | Refills: 3 | Status: SHIPPED | OUTPATIENT
Start: 2022-09-23 | End: 2022-12-22

## 2022-09-23 NOTE — PROGRESS NOTES
Physical Therapy Daily Treatment Note      Patient: Audra Montejo   : 1960  Referring practitioner: Varun Kincaid MD  Date of Initial Visit: Type: THERAPY  Noted: 2022  Today's Date: 2022  Patient seen for 10 sessions           Subjective Questionnaire:       Subjective Evaluation    History of Present Illness    Subjective comment: Pt reports to clinic with no new information or questions.  When I questioned pt about visit to ED she stated she had an MRI and hasn't been given results and doesn't have an appointment that she knows of.       Objective   See Exercise, Manual, and Modality Logs for complete treatment.       Assessment & Plan     Assessment    Assessment details: Pt able to perform balance activities slowly concentrating on not using her hands.  Pt did not attempt stepping over foam blocks from L to R stating that will come later and did go from L to R leading with LLE.          Visit Diagnoses:    ICD-10-CM ICD-9-CM   1. Weakness of both lower extremities  R29.898 729.89   2. Gait disturbance  R26.9 781.2   3. Imbalance  R26.89 781.2   4. Cerebral infarction due to bilateral stenosis of posterior cerebral arteries (HCC)  I63.533 434.91       Progress per Plan of Care and Progress strengthening /stabilization /functional activity           Timed:  Manual Therapy:         mins  72800;  Therapeutic Exercise:    8     mins  60421;     Neuromuscular Jaison:    23    mins  28521;    Therapeutic Activity:          mins  60041;     Gait Training:           mins  95086;     Ultrasound:          mins  48580;    Electrical Stimulation:         mins  95646 ( );  Aquatic Therapy          mins  14082    Untimed:  Electrical Stimulation:         mins  05739 ( );  Mechanical Traction:         mins  69470;     Timed Treatment:   31   mins   Total Treatment:     31   mins    Electronically signed    Taylor Medina PTA  Physical Therapist Assistant    [place] license: [id number]

## 2022-09-23 NOTE — TELEPHONE ENCOUNTER
Called and spoke to Dr. Chambers neurology about patient current condition and the fact that she just recently had a new stroke on September 21, 2022.  Dr. Chambers advised that the patient can add a baby aspirin to her regimen to see if that can help prevent further strokes.  He also reports that she was resistant to the idea of going to the emergency room when he felt she was having strokelike symptoms.  I informed her the same with our office.  We will be contacting the patient to let her know that she needs to start taking a baby aspirin along with her Plavix.

## 2022-09-27 ENCOUNTER — TREATMENT (OUTPATIENT)
Dept: PHYSICAL THERAPY | Facility: CLINIC | Age: 62
End: 2022-09-27

## 2022-09-27 DIAGNOSIS — R26.89 IMBALANCE: ICD-10-CM

## 2022-09-27 DIAGNOSIS — R26.9 GAIT DISTURBANCE: ICD-10-CM

## 2022-09-27 DIAGNOSIS — R29.898 WEAKNESS OF BOTH LOWER EXTREMITIES: Primary | ICD-10-CM

## 2022-09-27 DIAGNOSIS — I63.533: ICD-10-CM

## 2022-09-27 PROCEDURE — 97112 NEUROMUSCULAR REEDUCATION: CPT | Performed by: PHYSICAL THERAPIST

## 2022-09-27 NOTE — PROGRESS NOTES
Physical Therapy Daily Treatment Note      Patient: Audra Montejo   : 1960  Referring practitioner: Varun Kincaid MD  Date of Initial Visit: Type: THERAPY  Noted: 2022  Today's Date: 2022  Patient seen for 11 sessions           Subjective Questionnaire:       Subjective Evaluation    History of Present Illness    Subjective comment: Pt reported that today her walking is working, her eyesight is working better but her balance is not good.  Pt spent half of the session explaining her view of not being well cared for by her PCP and neurologist.       Objective   See Exercise, Manual, and Modality Logs for complete treatment.       Assessment/Plan    Visit Diagnoses:    ICD-10-CM ICD-9-CM   1. Weakness of both lower extremities  R29.898 729.89   2. Gait disturbance  R26.9 781.2   3. Imbalance  R26.89 781.2   4. Cerebral infarction due to bilateral stenosis of posterior cerebral arteries (HCC)  I63.533 434.91       Progress per Plan of Care and Progress strengthening /stabilization /functional activity           Timed:  Manual Therapy:         mins  58104;  Therapeutic Exercise:         mins  15394;     Neuromuscular Jaison:    23    mins  35282;    Therapeutic Activity:          mins  44870;     Gait Training:           mins  15093;     Ultrasound:          mins  43220;    Electrical Stimulation:         mins  96828 ( );  Aquatic Therapy          mins  54638    Untimed:  Electrical Stimulation:         mins  42817 ( );  Mechanical Traction:         mins  86185;     Timed Treatment:   23   mins   Total Treatment:     23   mins    Electronically signed    Taylor Medina PTA  Physical Therapist Assistant    [employer] license: [id number]

## 2022-09-29 ENCOUNTER — TREATMENT (OUTPATIENT)
Dept: PHYSICAL THERAPY | Facility: CLINIC | Age: 62
End: 2022-09-29

## 2022-09-29 DIAGNOSIS — R29.898 WEAKNESS OF BOTH LOWER EXTREMITIES: Primary | ICD-10-CM

## 2022-09-29 DIAGNOSIS — I63.533: ICD-10-CM

## 2022-09-29 DIAGNOSIS — R26.89 IMBALANCE: ICD-10-CM

## 2022-09-29 DIAGNOSIS — R26.9 GAIT DISTURBANCE: ICD-10-CM

## 2022-09-29 PROCEDURE — 97112 NEUROMUSCULAR REEDUCATION: CPT | Performed by: PHYSICAL THERAPIST

## 2022-09-29 PROCEDURE — 97530 THERAPEUTIC ACTIVITIES: CPT | Performed by: PHYSICAL THERAPIST

## 2022-09-29 NOTE — PROGRESS NOTES
"Physical Therapy Daily Treatment Note      Patient: Audra Montejo   : 1960  Referring practitioner: Varun Kincaid MD  Date of Initial Visit: Type: THERAPY  Noted: 2022  Today's Date: 2022  Patient seen for 12 sessions           Subjective  Audra Montejo reports: she \"wants to get this done.\" Audra explained what she is doing at home. PTA inquired as to safe performance of balance, Audra stated she is now a hemopheliac due to medications so she can't take an injury. Audra also reminded PTA as to why she can't look forward while doing balance tasks, \"it makes me dizzy.\"       Objective   See Exercise, Manual, and Modality Logs for complete treatment.       Assessment/Plan  Audra performed balance activities to aid her safety. She required VC for safe performance of activities and CGA using gait belt to assure safe recovery of LOB.     Visit Diagnoses:    ICD-10-CM ICD-9-CM   1. Weakness of both lower extremities  R29.898 729.89   2. Gait disturbance  R26.9 781.2   3. Imbalance  R26.89 781.2   4. Cerebral infarction due to bilateral stenosis of posterior cerebral arteries (HCC)  I63.533 434.91       Progress per Plan of Care and Progress strengthening /stabilization /functional activity           Time  Manual Therapy:         mins  31654;  Therapeutic Exercise:         mins  15155;     Neuromuscular Jaison:    23    mins  02776;    Therapeutic Activity:     8     mins  44318;     Gait Training:           mins  07732;     Ultrasound:          mins  15520;    Electrical Stimulation:         mins  79165 ( );  Aquatics  __   mins   46208    Untimed:  Electrical Stimulation:         mins  07499 ( );  Mechanical Traction:         mins  87137;     Timed Treatment:   31   mins   Total Treatment:     31   mins    Electronically Signed:  Jennifer Trujillo PTA  Physical Therapist Assistant    [place] PTA license [id number]            "

## 2022-10-03 ENCOUNTER — TELEPHONE (OUTPATIENT)
Dept: FAMILY MEDICINE CLINIC | Facility: CLINIC | Age: 62
End: 2022-10-03

## 2022-10-03 NOTE — TELEPHONE ENCOUNTER
Caller: Audra Montejo    Relationship: Self    Best call back number: [phone]    What is the best time to reach you: ANY    Who are you requesting to speak with (clinical staff, provider,  specific staff member): CLINICAL     What was the call regarding: FMLA PAPERWORK STATUS    Do you require a callback: YES

## 2022-10-04 ENCOUNTER — TREATMENT (OUTPATIENT)
Dept: PHYSICAL THERAPY | Facility: CLINIC | Age: 62
End: 2022-10-04

## 2022-10-04 DIAGNOSIS — I63.533: ICD-10-CM

## 2022-10-04 DIAGNOSIS — R29.898 WEAKNESS OF BOTH LOWER EXTREMITIES: Primary | ICD-10-CM

## 2022-10-04 DIAGNOSIS — R26.89 IMBALANCE: ICD-10-CM

## 2022-10-04 DIAGNOSIS — R26.9 GAIT DISTURBANCE: ICD-10-CM

## 2022-10-04 PROCEDURE — 97112 NEUROMUSCULAR REEDUCATION: CPT | Performed by: PHYSICAL THERAPIST

## 2022-10-04 PROCEDURE — 97530 THERAPEUTIC ACTIVITIES: CPT | Performed by: PHYSICAL THERAPIST

## 2022-10-04 NOTE — PROGRESS NOTES
"Physical Therapy Daily Treatment Note      Patient: Audra Montejo   : 1960  Referring practitioner: Varun Kincaid MD  Date of Initial Visit: Type: THERAPY  Noted: 2022  Today's Date: 10/4/2022  Patient seen for 13 sessions           Subjective  Audra Montejo reports: \"I will do better because I just got up. I stopped taking one of the medicines.\" PTA asked if this was at physician direction, she replied, \"I just stopped taking it.\"       Objective   See Exercise, Manual, and Modality Logs for complete treatment.       Assessment/Plan  Audra seemed to have improved balance today. She still requires extended time to complete tasks asked of her. Audra has requested to schedule early afternoon sessions to go out through her next progress note.     Visit Diagnoses:    ICD-10-CM ICD-9-CM   1. Weakness of both lower extremities  R29.898 729.89   2. Gait disturbance  R26.9 781.2   3. Imbalance  R26.89 781.2   4. Cerebral infarction due to bilateral stenosis of posterior cerebral arteries (HCC)  I63.533 434.91       Progress per Plan of Care and Progress strengthening /stabilization /functional activity           Timed:  Manual Therapy:         mins  79630;  Therapeutic Exercise:         mins  16048;     Neuromuscular Jaison:    23    mins  68433;    Therapeutic Activity:      8    mins  58643;     Gait Training:           mins  62824;     Ultrasound:          mins  06293;    Electrical Stimulation:         mins  74168 ( );  Aquatics  __   mins   34271    Untimed:  Electrical Stimulation:         mins  41144 ( );  Mechanical Traction:         mins  60471;     Timed Treatment:   31   mins   Total Treatment:     31   mins    Electronically Signed:  Jennifer Trujillo PTA  Physical Therapist Assistant    [place] PTA license [id number]            "

## 2022-10-06 ENCOUNTER — TREATMENT (OUTPATIENT)
Dept: PHYSICAL THERAPY | Facility: CLINIC | Age: 62
End: 2022-10-06

## 2022-10-06 DIAGNOSIS — R29.898 WEAKNESS OF BOTH LOWER EXTREMITIES: Primary | ICD-10-CM

## 2022-10-06 DIAGNOSIS — I63.533: ICD-10-CM

## 2022-10-06 DIAGNOSIS — R26.89 IMBALANCE: ICD-10-CM

## 2022-10-06 DIAGNOSIS — R26.9 GAIT DISTURBANCE: ICD-10-CM

## 2022-10-06 PROCEDURE — 97530 THERAPEUTIC ACTIVITIES: CPT | Performed by: PHYSICAL THERAPIST

## 2022-10-06 PROCEDURE — 97110 THERAPEUTIC EXERCISES: CPT | Performed by: PHYSICAL THERAPIST

## 2022-10-06 NOTE — PROGRESS NOTES
"Physical Therapy Daily Treatment Note      Patient: Audra Montejo   : 1960  Referring practitioner: Varun Kincaid MD  Date of Initial Visit: Type: THERAPY  Noted: 2022  Today's Date: 10/6/2022  Patient seen for 14 sessions           Subjective Questionnaire:       Subjective Evaluation    History of Present Illness    Subjective comment: Pt reported feeling 100% better today.  Pt reports her heart isn't doing \"what it was doing.\" Pt reported she is still off balance but feeling better.       Objective   See Exercise, Manual, and Modality Logs for complete treatment.       Assessment & Plan     Assessment    Assessment details: Pt was able to perform Rhomberg on airex with eyes open and eyes closed for 30 seconds without holding.  Pt was able to maintain balance in tandem on airex for 27 seconds without holding.  Pt was able to side step and forward step over 7 foam blocks with much more ease today than previously and did not knock over any blocks.        Visit Diagnoses:    ICD-10-CM ICD-9-CM   1. Weakness of both lower extremities  R29.898 729.89   2. Gait disturbance  R26.9 781.2   3. Imbalance  R26.89 781.2   4. Cerebral infarction due to bilateral stenosis of posterior cerebral arteries (HCC)  I63.533 434.91       Progress per Plan of Care and Progress strengthening /stabilization /functional activity           Timed:  Manual Therapy:         mins  58787;  Therapeutic Exercise:    22     mins  88173;     Neuromuscular Jaison:        mins  45364;    Therapeutic Activity:     8     mins  69475;     Gait Training:           mins  61503;     Ultrasound:          mins  80785;    Electrical Stimulation:         mins  15698 ( );  Aquatic Therapy          mins  28107    Untimed:  Electrical Stimulation:         mins  66349 ( );  Mechanical Traction:         mins  10966;     Timed Treatment:   30   mins   Total Treatment:     30   mins    Electronically signed    Taylor Medina PTA  Physical " Therapist Assistant    [place] license: [id number]

## 2022-10-10 ENCOUNTER — TREATMENT (OUTPATIENT)
Dept: PHYSICAL THERAPY | Facility: CLINIC | Age: 62
End: 2022-10-10

## 2022-10-10 DIAGNOSIS — R29.898 WEAKNESS OF BOTH LOWER EXTREMITIES: Primary | ICD-10-CM

## 2022-10-10 DIAGNOSIS — I63.533: ICD-10-CM

## 2022-10-10 DIAGNOSIS — R26.89 IMBALANCE: ICD-10-CM

## 2022-10-10 DIAGNOSIS — R26.9 GAIT DISTURBANCE: ICD-10-CM

## 2022-10-10 PROCEDURE — 97110 THERAPEUTIC EXERCISES: CPT | Performed by: PHYSICAL THERAPIST

## 2022-10-10 PROCEDURE — 97112 NEUROMUSCULAR REEDUCATION: CPT | Performed by: PHYSICAL THERAPIST

## 2022-10-10 NOTE — PROGRESS NOTES
Physical Therapy Daily Treatment Note      Patient: Audra Montejo   : 1960  Referring practitioner: Varun Kincaid MD  Date of Initial Visit: Type: THERAPY  Noted: 2022  Today's Date: 10/10/2022  Patient seen for 15 sessions           Subjective Questionnaire:       Subjective Evaluation    History of Present Illness    Subjective comment: Pt reported she has tweaked her medicine is feeling better.  Pt reported her R eye vision is still lacking.        Objective   See Exercise, Manual, and Modality Logs for complete treatment.       Assessment & Plan     Assessment    Assessment details: Pt feels her balance is better today, not perfect and is not all shaky and doesn't feel as weak. Standing with sharpened Rhomberg pt was able to stand with  LLE in back for 19 seconds without holding and with R in front for 30 seconds without holding. Pt was able to step over grey blocks slowly with intermittent holding.  Pt is unsteady while stepping over blocks.  Pt reports she thinks it might be one of her meds that she may have to stop that one.        Visit Diagnoses:    ICD-10-CM ICD-9-CM   1. Weakness of both lower extremities  R29.898 729.89   2. Gait disturbance  R26.9 781.2   3. Imbalance  R26.89 781.2   4. Cerebral infarction due to bilateral stenosis of posterior cerebral arteries (HCC)  I63.533 434.91       Progress per Plan of Care and Progress strengthening /stabilization /functional activity           Timed:  Manual Therapy:         mins  00738;  Therapeutic Exercise:    10     mins  09423;     Neuromuscular Jaison:    20    mins  27275;    Therapeutic Activity:          mins  46183;     Gait Training:           mins  82918;     Ultrasound:          mins  14464;    Electrical Stimulation:         mins  93141 ( );  Aquatic Therapy          mins  65764    Untimed:  Electrical Stimulation:         mins  24733 ( );  Mechanical Traction:         mins  58264;     Timed Treatment:   30   mins    Total Treatment:     30   mins    Electronically signed    Taylor Medina PTA  Physical Therapist Assistant    [place] license: [id number]

## 2022-10-12 ENCOUNTER — TELEPHONE (OUTPATIENT)
Dept: FAMILY MEDICINE CLINIC | Facility: CLINIC | Age: 62
End: 2022-10-12

## 2022-10-12 NOTE — TELEPHONE ENCOUNTER
Caller: Audra Montejo    Relationship: Self    Best call back number: [phone]    What is the medical concern/diagnosis: STROKES    What specialty or service is being requested: NEUROLOGY     Any additional details: PATIENT STATED THAT SHE IS NEEDING A REFERRAL TO A DIFFERENT NEUROLOGIST AND ONE THAT SPECIALIZES IN STROKES

## 2022-10-19 ENCOUNTER — TREATMENT (OUTPATIENT)
Dept: PHYSICAL THERAPY | Facility: CLINIC | Age: 62
End: 2022-10-19

## 2022-10-19 ENCOUNTER — TELEPHONE (OUTPATIENT)
Dept: ONCOLOGY | Facility: HOSPITAL | Age: 62
End: 2022-10-19

## 2022-10-19 DIAGNOSIS — R26.9 GAIT DISTURBANCE: ICD-10-CM

## 2022-10-19 DIAGNOSIS — R29.898 WEAKNESS OF BOTH LOWER EXTREMITIES: Primary | ICD-10-CM

## 2022-10-19 DIAGNOSIS — R26.89 IMBALANCE: ICD-10-CM

## 2022-10-19 DIAGNOSIS — I63.533: ICD-10-CM

## 2022-10-19 NOTE — PROGRESS NOTES
Progress Assessment/Discharge Summary  [place] [facility]: [address] [address]      Patient: Audra Montejo   : 1960  Diagnosis/ICD-10 Code:  Weakness of both lower extremities [R29.898]  Referring practitioner: Varun Kincaid MD  Date of Initial Visit: Type: THERAPY  Noted: 2022  Today's Date: 10/19/2022  Patient seen for 16 sessions      Subjective:   Audra Montejo reports: she changed some of her medications and has been doing better since then.  She states her walking and balance are better.  She states she has come to the end of the road with rehab and is ready for discharge.    Clinical Progress: improved  Home Program Compliance: Yes  Treatment has included: therapeutic exercise, neuromuscular re-education, therapeutic activity and gait training    Subjective     Objective   Strength/Myotome Testing      Left Hip   Planes of Motion   Flexion: 5  Extension: 4  Abduction: 4+     Right Hip   Planes of Motion   Flexion: 5  Extension: 4  Abduction: 4+     Left Knee   Flexion: 5  Extension: 5     Right Knee   Flexion: 5  Extension: 5     Left Ankle/Foot   Dorsiflexion: 5  Plantar flexion: 5  Inversion: 5  Eversion: 5     Right Ankle/Foot   Dorsiflexion: 5  Plantar flexion: 5  Inversion: 5  Eversion: 5     Comments  TUG (without AD): 8 seconds or 0% limited  Dynamic Gait Index:       Assessment/Plan  Pt shows significant improvements in her walking and balance.  She is able to ambulate with normal gait mechanics without use of an AD and no evidence of imbalance.  Her score on the TUG and Dynamic Gait Index have both improved significantly.  Her R LE strength has also improved.  Pt has met goals and will discharge from PT.      Goals  Plan Goals: 1. Mobility: Walking/Moving Around Functional Limitation                       LTG 1: 12 weeks:  The patient will demonstrate 0% limitation by achieving a score of 10 seconds on the Timed Up and Go Test (without  AD)              STATUS:met  STG 1a: 6 weeks:  The patient will demonstrate 1-19% limitation by achieving a score of 12 seconds on the Timed Up and Go Test (without AD)              STATUS:met    2. The patient demonstrates weakness of the B lower extremity.    LTG 2: 12 weeks:  The patient will demonstrate 4+/5 hip strength and 5/5 knee strength in order to improve balance and safety with ambulation.               STATUS:met for all except hip extension  STG 2a: 6 weeks: The patient will demonstrate 4/5 hip strength in order to improve balance and safety with ambulation.               STATUS:  met    3. The patient has decreased ability to ambulate   LTG 3: 12 weeks:  The patient will ambulate with stand by assistance without AD for 300 feet without loss of balance in order to safely ambulate in the community.                           STATUS:met  STG 3a: 6 weeks: The patient will ambulate with SBA assistance without AD for 100 feet.                           STATUS:met    4. The patient has difficulty with walking..  LTG 4: 12 weeks: The patient will improve score on the Dynamic Gait Index to 20/24 in order to improve safety with ambulation at home and in the community.  STATUS:met  STG 4a: 6 weeks:  The patient will improve score on the Dynamic Gait Index to 18/24 in order to improve safety with ambulation at home and in the community.  STATUS:met  Progress toward previous goals: All Met    See Exercise, Manual, and Modality Logs for complete treatment.         Recommendations: Discharge    PT Signature: Gaivota Merritt PT, DPT  License Number: [id number]    Based upon review of the patient's progress and continued therapy plan, it is my medical opinion that Audra Montejo should continue physical therapy treatment at [facility]  [address] [place] [address] [place] [address] [phone].      Timed:         Manual Therapy:         mins  84439;     Therapeutic Exercise:          mins  41191;     Neuromuscular Jaison:        mins  60633;    Therapeutic Activity:          mins  88075;     Gait Training:           mins  88147;     Ultrasound:          mins  61560;    Ionto                                   mins   57813  Self Care                            mins   34315  Aquatic                               mins 69440      Un-Timed:  Electrical Stimulation:         mins  15869 ( );  Dry Needling          mins self-pay  Traction          mins 49947  Low Eval          Mins  05246  Mod Eval          Mins  36212  High Eval                            Mins  44518  Re-Eval                               mins  33400  Canalith Repos         mins 97894    Timed Treatment:   0   mins   Total Treatment:     25   mins      I certify that the therapy services are furnished while this patient is under my care.  The services outlined above are required by this patient, and will be reviewed every 90 days.

## 2022-11-01 PROBLEM — R00.2 PALPITATION: Status: RESOLVED | Noted: 2021-06-25 | Resolved: 2022-11-01

## 2022-11-01 PROBLEM — R73.01 IMPAIRED FASTING GLUCOSE: Status: ACTIVE | Noted: 2022-11-01

## 2022-11-01 PROBLEM — R07.9 CHEST PAIN: Status: RESOLVED | Noted: 2021-06-25 | Resolved: 2022-11-01

## 2022-11-01 PROBLEM — L30.9 ECZEMA: Status: RESOLVED | Noted: 2021-06-25 | Resolved: 2022-11-01

## 2022-11-01 PROBLEM — M75.100 NONTRAUMATIC TEAR OF ROTATOR CUFF: Status: RESOLVED | Noted: 2017-01-25 | Resolved: 2022-11-01

## 2022-11-01 PROBLEM — G43.909 MIGRAINE: Status: RESOLVED | Noted: 2021-06-25 | Resolved: 2022-11-01

## 2022-11-01 PROBLEM — R06.02 SHORTNESS OF BREATH: Status: RESOLVED | Noted: 2021-06-25 | Resolved: 2022-11-01

## 2022-11-01 PROBLEM — R13.10 TROUBLE SWALLOWING: Status: RESOLVED | Noted: 2021-06-25 | Resolved: 2022-11-01

## 2022-11-01 PROBLEM — F17.200 TOBACCO USE DISORDER: Status: ACTIVE | Noted: 2022-11-01

## 2022-11-01 PROBLEM — E78.2 MIXED HYPERLIPIDEMIA: Status: ACTIVE | Noted: 2022-11-01

## 2022-11-01 PROBLEM — M54.9 BACK PAIN: Status: RESOLVED | Noted: 2021-06-25 | Resolved: 2022-11-01

## 2022-11-01 PROBLEM — R63.0 LOSS OF APPETITE: Status: RESOLVED | Noted: 2021-06-25 | Resolved: 2022-11-01

## 2022-11-01 NOTE — ASSESSMENT & PLAN NOTE
A1c was 5.9 in 7/22.    Blood sugars in the 130 ballpark when she is been in the ER in hospital.  Discussed with patient 11/22 to limit simple sugars in her diet.  We will get an A1c on return to office in about 6 weeks.

## 2022-11-01 NOTE — ASSESSMENT & PLAN NOTE
ER visit 9/21/2022:  This patient is a 62-year-old female with previous history of strokes on Plavix, now presenting with new left-sided facial droop and difficulty speaking and some visual deficit on the left eye.  However, when I discussed with the patient that she had another ischemic stroke this week and I wanted to admit her for further management and see her neurologist in the hospital, she became upset and very frustrated and essentially was adamant that she be discharged home.    I reviewed the patient's multiple recent hospitalizations and ER visits at the time of her 11/22 new patient appointment.  Discussed with the patient she needs to be very compliant with all her medications along with smoking cessation.  She was to follow-up with Dr. Chambers, she is asking to be referred to a neurologist that specializes in stroke.  We will see what the timeframe is on getting her in with another provider, but recommend she keep appointment with Dr. Chambers as well. Also d/w her to stay on Plavix and baby aspirin = script sent.

## 2022-11-01 NOTE — PROGRESS NOTES
"Chief Complaint  Getting established (Pt states that she has had two strokes since July 2022. She feels that she hasn't had a great care since then. She wants to be referred to a neurologist that specializes in strokes. )    Subjective       Audra Montejo presents to [facility]    History of Present Illness  Patient 62-year-old female with longstanding history of tobacco abuse and medical noncompliance, leaving the hospital AMA on more than one occasion in the past 4 months, who had at least 4 infarcts over that timeframe, with underlying hypertension and IFG as well as hyperlipidemia, coming into me 11/22 as a New Patient.  We will review her medications, address compliance, review recent labs, and make further recommendations at that time.    Review of Systems   Constitutional: Negative for appetite change, fatigue and fever.   HENT: Negative for congestion and ear pain.    Eyes: Negative for blurred vision.   Respiratory: Negative for cough, chest tightness, shortness of breath and wheezing.    Cardiovascular: Negative for chest pain, palpitations and leg swelling.   Gastrointestinal: Negative for abdominal pain.   Genitourinary: Negative for difficulty urinating, dysuria and hematuria.   Musculoskeletal: Negative for arthralgias and gait problem.   Skin: Negative for skin lesions.   Neurological: Negative for syncope, memory problem and confusion.   Psychiatric/Behavioral: Negative for self-injury and depressed mood.       Objective   Vital Signs:   /76 (BP Location: Right arm, Patient Position: Sitting, Cuff Size: Adult)   Pulse 71   Temp 97.6 °F (36.4 °C) (Skin)   Ht 147.3 cm (57.99\")   Wt 71.3 kg (157 lb 3.2 oz)   SpO2 98%   BMI 32.86 kg/m²           Physical Exam  Vitals and nursing note reviewed.   Constitutional:       General: She is not in acute distress.     Appearance: Normal appearance. She is not toxic-appearing.   HENT:      Head: Atraumatic.      " Right Ear: External ear normal.      Left Ear: External ear normal.      Nose: Nose normal.      Mouth/Throat:      Mouth: Mucous membranes are moist.   Eyes:      General:         Right eye: No discharge.         Left eye: No discharge.      Extraocular Movements: Extraocular movements intact.      Pupils: Pupils are equal, round, and reactive to light.   Cardiovascular:      Rate and Rhythm: Normal rate and regular rhythm.      Pulses: Normal pulses.      Heart sounds: Normal heart sounds. No murmur heard.    No gallop.   Pulmonary:      Effort: Pulmonary effort is normal. No respiratory distress.      Breath sounds: No wheezing, rhonchi or rales.   Abdominal:      General: There is no distension.      Palpations: Abdomen is soft. There is no mass.      Tenderness: There is no abdominal tenderness. There is no guarding.   Musculoskeletal:         General: No swelling or tenderness.      Cervical back: No tenderness.      Right lower leg: No edema.      Left lower leg: No edema.   Skin:     General: Skin is warm and dry.      Findings: No rash.   Neurological:      General: No focal deficit present.      Mental Status: She is alert and oriented to person, place, and time. Mental status is at baseline.      Motor: No weakness.      Gait: Gait normal.   Psychiatric:         Mood and Affect: Mood normal.         Thought Content: Thought content normal.          Result Review   The following data was reviewed by: Lowell Alex MD on 11/02/2022:  [] Laboratory  [] Microbiology  [] Radiology  [] EKG/telemetry  [] Cardiology/Vascular  [] Pathology  [] Old records             Assessment and Plan   Diagnoses and all orders for this visit:    1. Essential hypertension (Primary)  Assessment & Plan:  Patient is status post multiple CVAs, blood pressure not adequately controlled as of her 11/22 office visit, she needs to be on an ACE inhibitor, will start low-dose lisinopril at this time.    Orders:  -     Cancel: Comprehensive  Metabolic Panel; Future  -     Comprehensive Metabolic Panel; Future    2. Cerebellar stroke (HCC)  Overview:  H/O ischemic CVA in 2013   Resultant mild cognitive impairment.     MRI 7/6/2022:  Acute infarcts involve the bilateral cerebellar hemispheres, larger on the right than the left, as detailed above.  No acute intracranial hemorrhage.    CTA Head/Neck 7/6/2022:  1. There is an irregular CTA appearance of the proximal right posterior inferior cerebellar artery (PICA), which may represent atherosclerotic change.  Vasospasm or arteritis would be in the differential diagnosis. Arterial emboli are possible but probably less likely.  Other types of   vasculopathies cannot be excluded.       2. There is also moderate-to-severe short-segment stenosis of the proximal right posterior cerebral artery (PCA), especially centered at its P1 and P2 segment junctions.  This finding may have similar etiologies as to that mentioned above for the findings in the right PICA.      3. No hemodynamically significant carotid stenoses are seen.     ARTHUR 7/8/2022:  • Calculated left ventricular EF = 65% Estimated left ventricular EF was in agreement with the calculated left ventricular EF. Left ventricular systolic function is normal.  • Mild mitral regurgitation  • No evidence of thrombi in the left atrium left atrial appendage and the left ventricle  • Bubble study negative      Head CT 7/10/2022:  Evolving subacute cerebellar hemispheric infarcts are noted.  Also, an evolving subacute right posterior, lateral infarct of the medulla oblongata is noted    MRI brain 9/21/2022:  5 mm acute right thalamic infarct.      Assessment & Plan:  ER visit 9/21/2022:  This patient is a 62-year-old female with previous history of strokes on Plavix, now presenting with new left-sided facial droop and difficulty speaking and some visual deficit on the left eye.  However, when I discussed with the patient that she had another ischemic stroke this week  and I wanted to admit her for further management and see her neurologist in the hospital, she became upset and very frustrated and essentially was adamant that she be discharged home.    I reviewed the patient's multiple recent hospitalizations and ER visits at the time of her 11/22 new patient appointment.  Discussed with the patient she needs to be very compliant with all her medications along with smoking cessation.  She was to follow-up with Dr. Chambers, she is asking to be referred to a neurologist that specializes in stroke.  We will see what the timeframe is on getting her in with another provider, but recommend she keep appointment with Dr. Chambers as well. Also d/w her to stay on Plavix and baby aspirin = script sent.    Orders:  -     Protein C & S Antigens; Future  -     Protein C & S, Functional; Future  -     Factor 5 Leiden; Future  -     APC Resistance; Future  -     Factor 2 Activity; Future  -     Protime-INR; Future  -     aPTT; Future  -     Lupus Anticoagulant; Future  -     Anticardiolipin Antibody, IgG / M, Qn; Future  -     Beta-2 Glycoprotein Antibodies; Future  -     Homocysteine; Future  -     Antithrombin Panel; Future  -     D-dimer, Quantitative; Future  -     Fibrinogen; Future  -     MTHFR Mutation; Future  -     Factor 8 Activity; Future  -     C-reactive Protein; Future    3. Impaired fasting glucose  Assessment & Plan:  A1c was 5.9 in 7/22.    Blood sugars in the 130 ballpark when she is been in the ER in hospital.  Discussed with patient 11/22 to limit simple sugars in her diet.  We will get an A1c on return to office in about 6 weeks.    Orders:  -     Cancel: Hemoglobin A1c; Future  -     Hemoglobin A1c; Future    4. Mixed hyperlipidemia  Assessment & Plan:  Patient's LDL was 87 when she was hospitalized in 7/22.  She was started on high-dose statin at that time given her acute stroke.  I do not see it on her med list as of her 11/22 new patient visit, I discussed with her the need  to be on lifelong statin therapy, prescription sent to her pharmacy.    Orders:  -     Cancel: Lipid Panel; Future  -     Lipid Panel; Future    5. Cerebrovascular accident (CVA) due to embolism of right posterior cerebral artery (HCC)  -     clopidogrel (PLAVIX) 75 MG tablet; Take 1 tablet by mouth Daily for 90 days.  Dispense: 90 tablet; Refill: 1    6. Well adult exam  -     Cancel: Hepatitis C Antibody; Future  -     Hepatitis C Antibody; Future    7. Tobacco use disorder  Overview:  Patient reports that she has been smoking cigarettes.  She has a 30.00 pack-year smoking history.  I have educated her on the risk of diseases from using tobacco products such as cancer, COPD, heart disease, and stroke.      I advised her to quit and she is not willing to quit.    62 y.o. female with a history of bipolar disorder, CVA and hypertension presented with dizziness. Of note, she was recently hospitalized from 7/6 to 7/8/2022 and was found to have bilateral cerebellar infarcts right more than left with high-grade stenosis of PICA. She was evaluated by neurology, but she left AMA because she wanted to smoke.    ---> d/w pt 11/22 that she needs annual chest CT's given continued tobacco use.    Orders:  -      CT Chest Low Dose Cancer Screening WO; Future    Other orders  -     atorvastatin (LIPITOR) 20 MG tablet; Take 1 tablet by mouth Daily for 90 days.  Dispense: 90 tablet; Refill: 1  -     lisinopril (PRINIVIL,ZESTRIL) 10 MG tablet; Take 1 tablet by mouth Daily.  Dispense: 90 tablet; Refill: 1        Follow Up   Return in about 5 weeks (around 12/7/2022).     Total Time Spent: 65 minutes     This time includes time spent by me in the following activities: preparing for the visit, reviewing extensive past medical history and tests, performing a medically appropriate examination and/or evaluation, counseling and educating the patient and/or caregivers, ordering medications, tests, or procedures, referring and/or  communicating with other health care professionals and documenting information in the medical record all on this date of service.       Patient was given instructions and counseling regarding her condition or for health maintenance advice. Please see specific information pulled into the AVS if appropriate.

## 2022-11-02 ENCOUNTER — OFFICE VISIT (OUTPATIENT)
Dept: INTERNAL MEDICINE | Facility: CLINIC | Age: 62
End: 2022-11-02

## 2022-11-02 ENCOUNTER — TELEPHONE (OUTPATIENT)
Dept: INTERNAL MEDICINE | Facility: CLINIC | Age: 62
End: 2022-11-02

## 2022-11-02 VITALS
DIASTOLIC BLOOD PRESSURE: 76 MMHG | WEIGHT: 157.2 LBS | HEART RATE: 71 BPM | OXYGEN SATURATION: 98 % | BODY MASS INDEX: 33 KG/M2 | TEMPERATURE: 97.6 F | SYSTOLIC BLOOD PRESSURE: 155 MMHG | HEIGHT: 58 IN

## 2022-11-02 DIAGNOSIS — R73.01 IMPAIRED FASTING GLUCOSE: ICD-10-CM

## 2022-11-02 DIAGNOSIS — Z00.00 WELL ADULT EXAM: ICD-10-CM

## 2022-11-02 DIAGNOSIS — E78.2 MIXED HYPERLIPIDEMIA: ICD-10-CM

## 2022-11-02 DIAGNOSIS — I63.431 CEREBROVASCULAR ACCIDENT (CVA) DUE TO EMBOLISM OF RIGHT POSTERIOR CEREBRAL ARTERY: ICD-10-CM

## 2022-11-02 DIAGNOSIS — F17.200 TOBACCO USE DISORDER: ICD-10-CM

## 2022-11-02 DIAGNOSIS — I63.9 CEREBELLAR STROKE: ICD-10-CM

## 2022-11-02 DIAGNOSIS — I10 ESSENTIAL HYPERTENSION: Primary | ICD-10-CM

## 2022-11-02 PROCEDURE — 99205 OFFICE O/P NEW HI 60 MIN: CPT | Performed by: INTERNAL MEDICINE

## 2022-11-02 RX ORDER — ATORVASTATIN CALCIUM 20 MG/1
20 TABLET, FILM COATED ORAL DAILY
Qty: 90 TABLET | Refills: 1 | Status: SHIPPED | OUTPATIENT
Start: 2022-11-02 | End: 2022-11-09

## 2022-11-02 RX ORDER — LISINOPRIL 10 MG/1
10 TABLET ORAL DAILY
Qty: 90 TABLET | Refills: 1 | Status: SHIPPED | OUTPATIENT
Start: 2022-11-02 | End: 2022-11-02 | Stop reason: SINTOL

## 2022-11-02 RX ORDER — CLOPIDOGREL BISULFATE 75 MG/1
75 TABLET ORAL DAILY
Qty: 90 TABLET | Refills: 1 | Status: SHIPPED | OUTPATIENT
Start: 2022-11-02 | End: 2023-02-01

## 2022-11-02 NOTE — TELEPHONE ENCOUNTER
Lisinopril taken off med list for side effects and I also put it on her allergy list so it is not prescribed again

## 2022-11-02 NOTE — PATIENT INSTRUCTIONS
1.  There are orders pended in the system for fasting labs to be done in about a month, just prior to your follow-up appointment in December.    2.  You need to be on a cholesterol medication due to your recent strokes, prescription for lower dose lipitor was sent to your pharmacy, take this every evening.    3.  Additionally, blood pressure needs to be more aggressively controlled to help prevent further strokes, the blood pressure medicine lisinopril has been sent to your pharmacy.  Take this every morning in addition to your amlodipine.    4.  You should get a call from [place] neurology in regards to scheduling an appointment for further evaluation of your stroke.

## 2022-11-02 NOTE — ASSESSMENT & PLAN NOTE
Patient is status post multiple CVAs, blood pressure not adequately controlled as of her 11/22 office visit, she needs to be on an ACE inhibitor, will start low-dose lisinopril at this time.

## 2022-11-02 NOTE — ASSESSMENT & PLAN NOTE
Patient's LDL was 87 when she was hospitalized in 7/22.  She was started on high-dose statin at that time given her acute stroke.  I do not see it on her med list as of her 11/22 new patient visit, I discussed with her the need to be on lifelong statin therapy, prescription sent to her pharmacy.

## 2022-11-02 NOTE — TELEPHONE ENCOUNTER
"  Caller: Audra Montejo    Relationship: Self    Best call back number: [phone]    Who are you requesting to speak with (clinical staff, provider,  specific staff member): CLINICAL    What was the call regarding: PATIENT CALLED IN UPSET STATING THAT SHE CANNOT TAKE LISINOPRIL BECAUSE THE LAST TIME SHE TOOK IT THE MEDICATION NEARLY KILLED HER. PATIENT STATES SHE WILL NOT TAKE THAT MEDICATION AND THAT DR WALKER NEEDS TO REMOVE IT FROM HER LIST OF MEDICATIONS AND NEVER PRESCRIBE IT AGAIN.    PATIENT THEN STATED SHE MEANT TO REQUEST LIPITOR, AND I THEN ASKED IF I NEEDED TO REQUEST THAT MEDICATION INSTEAD OF LISINOPRIL. PATIENT THEN BECAME EVEN MORE UPSET AND TERMINATED THE PHONE CALL AFTER YELLING \"NO.\"    Do you require a callback: NO  "

## 2022-11-04 ENCOUNTER — TELEPHONE (OUTPATIENT)
Dept: INTERNAL MEDICINE | Facility: CLINIC | Age: 62
End: 2022-11-04

## 2022-11-04 ENCOUNTER — PATIENT ROUNDING (BHMG ONLY) (OUTPATIENT)
Dept: INTERNAL MEDICINE | Facility: CLINIC | Age: 62
End: 2022-11-04

## 2022-11-04 DIAGNOSIS — I63.9 RECURRENT STROKES: Primary | ICD-10-CM

## 2022-11-04 NOTE — TELEPHONE ENCOUNTER
"Patient stated that dr schwartz prescribed her Lisinopril without talking to her about it and also said that the medication almost \"killed her\" a couple years ago. Patient is wanting to continue her medication that dr. ya prescribed to her which is the Amlodipine and wants to up the dose to 10 mg instead of 5 mg because her blood pressure is still running high.    Patient is also wanting a referral put in to Neurology so they can release her to go back to work.  "

## 2022-11-04 NOTE — PROGRESS NOTES
November 4, 2022    Hello, may I speak with Audra Montejo?    My name is Michelle      I am  with [facility]  [facility]  [address] [address]  [place] [address].    Before we get started may I verify your date of birth? 1960    I am calling to officially welcome you to our practice and ask about your recent visit. Is this a good time to talk? yes    Tell me about your visit with us. What things went well?  Patient was very upset. She stated that dr schwartz prescribed her on a medication that he didn't talk to her about and stated that the medication almost killed her a couple of years ago and patient also said she needed a referral for Neuro to be released to go back to work and that was also not done.       We're always looking for ways to make our patients' experiences even better. Do you have recommendations on ways we may improve?  no    Overall were you satisfied with your first visit to our practice? no       I appreciate you taking the time to speak with me today. Is there anything else I can do for you? no      Thank you, and have a great day.

## 2022-11-05 DIAGNOSIS — I10 ESSENTIAL HYPERTENSION: ICD-10-CM

## 2022-11-05 RX ORDER — AMLODIPINE BESYLATE 10 MG/1
10 TABLET ORAL DAILY
Qty: 90 TABLET | Refills: 1 | Status: SHIPPED | OUTPATIENT
Start: 2022-11-05 | End: 2023-02-03

## 2022-11-07 NOTE — TELEPHONE ENCOUNTER
I have let pt know of this. She states that there are no other meds that she is allergic to or that she can't take. I am checking on referral for her.      Please pend a referral to [facility] [place], operated by [facility] for f/u recurrent strokes.   Referral has been put in. Pt is aware.

## 2022-11-09 ENCOUNTER — OFFICE VISIT (OUTPATIENT)
Dept: INTERNAL MEDICINE | Facility: CLINIC | Age: 62
End: 2022-11-09

## 2022-11-09 VITALS
SYSTOLIC BLOOD PRESSURE: 173 MMHG | DIASTOLIC BLOOD PRESSURE: 86 MMHG | BODY MASS INDEX: 32.45 KG/M2 | TEMPERATURE: 98.1 F | WEIGHT: 154.6 LBS | HEART RATE: 78 BPM | OXYGEN SATURATION: 97 % | HEIGHT: 58 IN

## 2022-11-09 DIAGNOSIS — E78.2 MIXED HYPERLIPIDEMIA: ICD-10-CM

## 2022-11-09 DIAGNOSIS — Z76.89 RETURN TO WORK EVALUATION: ICD-10-CM

## 2022-11-09 DIAGNOSIS — I10 ESSENTIAL HYPERTENSION: Primary | ICD-10-CM

## 2022-11-09 PROCEDURE — 99213 OFFICE O/P EST LOW 20 MIN: CPT | Performed by: INTERNAL MEDICINE

## 2022-11-09 RX ORDER — LISINOPRIL 10 MG/1
10 TABLET ORAL DAILY
COMMUNITY
Start: 2022-11-02 | End: 2022-11-09 | Stop reason: SINTOL

## 2022-11-09 RX ORDER — ATORVASTATIN CALCIUM 20 MG/1
20 TABLET, FILM COATED ORAL DAILY
COMMUNITY
End: 2023-02-02

## 2022-11-09 NOTE — PROGRESS NOTES
"Chief Complaint  release for work (Patient states that she is just needing a release back to work )    Subjective       Audra Montejo presents to [facility] INTERNAL MEDICINE    History of Present Illness  Patient 62-year-old female with longstanding history of tobacco abuse and medical noncompliance, leaving the hospital AMA on more than one occasion in the past 4 months, who had at least 4 infarcts over that timeframe, with underlying hypertension and IFG as well as hyperlipidemia, coming into me 11/22 as a New Patient.  We will review her medications, address compliance, review recent labs, and make further recommendations at that time.    Review of Systems   Constitutional: Negative for appetite change, fatigue and fever.   HENT: Negative for congestion and ear pain.    Eyes: Negative for blurred vision.   Respiratory: Negative for cough, chest tightness, shortness of breath and wheezing.    Cardiovascular: Negative for chest pain, palpitations and leg swelling.   Gastrointestinal: Negative for abdominal pain.   Genitourinary: Negative for difficulty urinating, dysuria and hematuria.   Musculoskeletal: Negative for arthralgias and gait problem.   Skin: Negative for skin lesions.   Neurological: Negative for syncope, memory problem and confusion.   Psychiatric/Behavioral: Negative for self-injury and depressed mood.       Objective   Vital Signs:   /86   Pulse 78   Temp 98.1 °F (36.7 °C)   Ht 147.3 cm (57.99\")   Wt 70.1 kg (154 lb 9.6 oz)   SpO2 97%   BMI 32.32 kg/m²           Physical Exam  Vitals and nursing note reviewed.   Constitutional:       General: She is not in acute distress.     Appearance: Normal appearance. She is not toxic-appearing.   HENT:      Head: Atraumatic.      Right Ear: External ear normal.      Left Ear: External ear normal.      Nose: Nose normal.      Mouth/Throat:      Mouth: Mucous membranes are moist.   Eyes:      General:         Right eye: No " discharge.         Left eye: No discharge.      Extraocular Movements: Extraocular movements intact.      Pupils: Pupils are equal, round, and reactive to light.   Cardiovascular:      Rate and Rhythm: Normal rate and regular rhythm.      Pulses: Normal pulses.      Heart sounds: Normal heart sounds. No murmur heard.    No gallop.   Pulmonary:      Effort: Pulmonary effort is normal. No respiratory distress.      Breath sounds: No wheezing, rhonchi or rales.   Abdominal:      General: There is no distension.      Palpations: Abdomen is soft. There is no mass.      Tenderness: There is no abdominal tenderness. There is no guarding.   Musculoskeletal:         General: No swelling or tenderness.      Cervical back: No tenderness.      Right lower leg: No edema.      Left lower leg: No edema.   Skin:     General: Skin is warm and dry.      Findings: No rash.   Neurological:      General: No focal deficit present.      Mental Status: She is alert and oriented to person, place, and time. Mental status is at baseline.      Motor: No weakness.      Gait: Gait normal.   Psychiatric:         Mood and Affect: Mood normal.         Thought Content: Thought content normal.          Result Review   The following data was reviewed by: Lowell Alex MD on 11/02/2022:  [x] Laboratory  [] Microbiology  [x] Radiology  [] EKG/telemetry  [] Cardiology/Vascular  [] Pathology  [x] Old records             Assessment and Plan   Diagnoses and all orders for this visit:    1. Essential hypertension (Primary)  Assessment & Plan:  Patient had previous side effects lisinopril, so we went ahead and increased her amlodipine from 5 to 10 mg.  Blood pressure still remains elevated as of her 11/22 follow-up, but we need to give it more time.  We will see how its running next month.      2. Mixed hyperlipidemia  Assessment & Plan:  Patient was intolerant to high-dose Lipitor, we lowered her from 40-20, will see where this is running when she comes back  next month as of her 11/22 follow-up.      3. Return to work evaluation  Assessment & Plan:  Patient is here 11/9/2022 in regards to returning to work at [employer].  Patient works on production.  She has been hospitalized here recently for strokes and is being referred to the stroke clinic in [place].  Although she is at risk for recurrence, she is not can be at any higher risk while at work then at home.  She is stable on Plavix and baby aspirin, and we are working towards control of her blood pressure and cholesterol.  Certainly reasonable to attempt return to work at light duty initially, and will reevaluate in the near future.          Follow Up   Return for Next scheduled follow up.    Patient was given instructions and counseling regarding her condition or for health maintenance advice. Please see specific information pulled into the AVS if appropriate.       Answers for HPI/ROS submitted by the patient on 11/8/2022  Please describe your symptoms.: None. Need papers filled outt  Have you had these symptoms before?: No  How long have you been having these symptoms?: 1-4 days  Please list any medications you are currently taking for this condition.: Not at this time  Please describe any probable cause for these symptoms. : None that i know of  What is the primary reason for your visit?: Other

## 2022-11-09 NOTE — ASSESSMENT & PLAN NOTE
Patient is here 11/9/2022 in regards to returning to work at [employer].  Patient works on production.  She has been hospitalized here recently for strokes and is being referred to the stroke clinic in [place].  Although she is at risk for recurrence, she is not can be at any higher risk while at work then at home.  She is stable on Plavix and baby aspirin, and we are working towards control of her blood pressure and cholesterol.  Certainly reasonable to attempt return to work at light duty initially, and will reevaluate in the near future.

## 2022-11-09 NOTE — ASSESSMENT & PLAN NOTE
Patient was intolerant to high-dose Lipitor, we lowered her from 40-20, will see where this is running when she comes back next month as of her 11/22 follow-up.

## 2022-11-09 NOTE — ASSESSMENT & PLAN NOTE
Patient had previous side effects lisinopril, so we went ahead and increased her amlodipine from 5 to 10 mg.  Blood pressure still remains elevated as of her 11/22 follow-up, but we need to give it more time.  We will see how its running next month.

## 2022-11-10 ENCOUNTER — APPOINTMENT (OUTPATIENT)
Dept: CT IMAGING | Facility: HOSPITAL | Age: 62
End: 2022-11-10

## 2022-11-10 ENCOUNTER — LAB (OUTPATIENT)
Dept: LAB | Facility: HOSPITAL | Age: 62
End: 2022-11-10

## 2022-11-10 DIAGNOSIS — Z00.00 WELL ADULT EXAM: ICD-10-CM

## 2022-11-10 DIAGNOSIS — E87.6 HYPOKALEMIA: ICD-10-CM

## 2022-11-10 DIAGNOSIS — E78.2 MIXED HYPERLIPIDEMIA: ICD-10-CM

## 2022-11-10 DIAGNOSIS — R73.01 IMPAIRED FASTING GLUCOSE: ICD-10-CM

## 2022-11-10 DIAGNOSIS — I63.431 CEREBROVASCULAR ACCIDENT (CVA) DUE TO EMBOLISM OF RIGHT POSTERIOR CEREBRAL ARTERY: ICD-10-CM

## 2022-11-10 DIAGNOSIS — I63.9 CEREBELLAR STROKE: ICD-10-CM

## 2022-11-10 DIAGNOSIS — F17.200 TOBACCO USE DISORDER: ICD-10-CM

## 2022-11-10 DIAGNOSIS — I10 ESSENTIAL HYPERTENSION: ICD-10-CM

## 2022-11-10 LAB
ALBUMIN SERPL-MCNC: 3.6 G/DL (ref 3.5–5.2)
ALBUMIN/GLOB SERPL: 1.4 G/DL
ALP SERPL-CCNC: 163 U/L (ref 39–117)
ALT SERPL W P-5'-P-CCNC: 9 U/L (ref 1–33)
ANION GAP SERPL CALCULATED.3IONS-SCNC: 10.8 MMOL/L (ref 5–15)
APTT PPP: 25.9 SECONDS (ref 24.2–34.2)
AST SERPL-CCNC: 16 U/L (ref 1–32)
BILIRUB SERPL-MCNC: 0.3 MG/DL (ref 0–1.2)
BUN SERPL-MCNC: 8 MG/DL (ref 8–23)
BUN/CREAT SERPL: 13.8 (ref 7–25)
CALCIUM SPEC-SCNC: 8.4 MG/DL (ref 8.6–10.5)
CHLORIDE SERPL-SCNC: 104 MMOL/L (ref 98–107)
CHOLEST SERPL-MCNC: 112 MG/DL (ref 0–200)
CO2 SERPL-SCNC: 26.2 MMOL/L (ref 22–29)
CREAT SERPL-MCNC: 0.58 MG/DL (ref 0.57–1)
CRP SERPL-MCNC: <0.3 MG/DL (ref 0–0.5)
D DIMER PPP FEU-MCNC: 0.38 MCGFEU/ML (ref 0–0.57)
EGFRCR SERPLBLD CKD-EPI 2021: 103.7 ML/MIN/1.73
FIBRINOGEN PPP-MCNC: 340 MG/DL (ref 215–521)
GLOBULIN UR ELPH-MCNC: 2.6 GM/DL
GLUCOSE SERPL-MCNC: 102 MG/DL (ref 65–99)
HBA1C MFR BLD: 5.9 % (ref 4.8–5.6)
HCV AB SER DONR QL: NORMAL
HCYS SERPL-MCNC: 8 UMOL/L (ref 0–15)
HDLC SERPL-MCNC: 64 MG/DL (ref 40–60)
INR PPP: 0.94 (ref 0.86–1.15)
LDLC SERPL CALC-MCNC: 31 MG/DL (ref 0–100)
LDLC/HDLC SERPL: 0.47 {RATIO}
POTASSIUM SERPL-SCNC: 2.9 MMOL/L (ref 3.5–5.2)
PROT SERPL-MCNC: 6.2 G/DL (ref 6–8.5)
PROTHROMBIN TIME: 12.7 SECONDS (ref 11.8–14.9)
SODIUM SERPL-SCNC: 141 MMOL/L (ref 136–145)
TRIGL SERPL-MCNC: 91 MG/DL (ref 0–150)
VLDLC SERPL-MCNC: 17 MG/DL (ref 5–40)

## 2022-11-10 PROCEDURE — 85613 RUSSELL VIPER VENOM DILUTED: CPT

## 2022-11-10 PROCEDURE — 85730 THROMBOPLASTIN TIME PARTIAL: CPT

## 2022-11-10 PROCEDURE — 83735 ASSAY OF MAGNESIUM: CPT

## 2022-11-10 PROCEDURE — 85610 PROTHROMBIN TIME: CPT

## 2022-11-10 PROCEDURE — 85306 CLOT INHIBIT PROT S FREE: CPT

## 2022-11-10 PROCEDURE — 85732 THROMBOPLASTIN TIME PARTIAL: CPT

## 2022-11-10 PROCEDURE — 83036 HEMOGLOBIN GLYCOSYLATED A1C: CPT

## 2022-11-10 PROCEDURE — 85705 THROMBOPLASTIN INHIBITION: CPT

## 2022-11-10 PROCEDURE — 36415 COLL VENOUS BLD VENIPUNCTURE: CPT

## 2022-11-10 PROCEDURE — 85300 ANTITHROMBIN III ACTIVITY: CPT

## 2022-11-10 PROCEDURE — 85307 ASSAY ACTIVATED PROTEIN C: CPT

## 2022-11-10 PROCEDURE — 83090 ASSAY OF HOMOCYSTEINE: CPT

## 2022-11-10 PROCEDURE — 85303 CLOT INHIBIT PROT C ACTIVITY: CPT

## 2022-11-10 PROCEDURE — 85384 FIBRINOGEN ACTIVITY: CPT

## 2022-11-10 PROCEDURE — 81291 MTHFR GENE: CPT

## 2022-11-10 PROCEDURE — 85302 CLOT INHIBIT PROT C ANTIGEN: CPT

## 2022-11-10 PROCEDURE — 85379 FIBRIN DEGRADATION QUANT: CPT

## 2022-11-10 PROCEDURE — 85301 ANTITHROMBIN III ANTIGEN: CPT

## 2022-11-10 PROCEDURE — 86803 HEPATITIS C AB TEST: CPT

## 2022-11-10 PROCEDURE — 86140 C-REACTIVE PROTEIN: CPT

## 2022-11-10 PROCEDURE — 80053 COMPREHEN METABOLIC PANEL: CPT

## 2022-11-10 PROCEDURE — 86146 BETA-2 GLYCOPROTEIN ANTIBODY: CPT

## 2022-11-10 PROCEDURE — 85670 THROMBIN TIME PLASMA: CPT

## 2022-11-10 PROCEDURE — 81241 F5 GENE: CPT

## 2022-11-10 PROCEDURE — 85210 CLOT FACTOR II PROTHROM SPEC: CPT

## 2022-11-10 PROCEDURE — 80061 LIPID PANEL: CPT

## 2022-11-10 PROCEDURE — 85305 CLOT INHIBIT PROT S TOTAL: CPT

## 2022-11-10 PROCEDURE — 86147 CARDIOLIPIN ANTIBODY EA IG: CPT

## 2022-11-10 PROCEDURE — 85240 CLOT FACTOR VIII AHG 1 STAGE: CPT

## 2022-11-11 LAB
CARDIOLIPIN IGG SER IA-ACNC: <9 GPL U/ML (ref 0–14)
CARDIOLIPIN IGM SER IA-ACNC: <9 MPL U/ML (ref 0–12)
F5 GENE MUT ANL BLD/T: NORMAL

## 2022-11-12 LAB
APCR PPP: 2.7 RATIO (ref 2.2–3.5)
APTT SCREEN TO CONFIRM RATIO: 1.22 RATIO (ref 0–1.34)
AT III ACT/NOR PPP CHRO: 118 % (ref 75–135)
AT III AG ACT/NOR PPP IA: 85 % (ref 72–124)
CONFIRM APTT/NORMAL: 34.6 SEC (ref 0–47.6)
FACT VIII ACT/NOR PPP: 231 % (ref 56–140)
LA 2 SCREEN W REFLEX-IMP: NORMAL
PROT C ACT/NOR PPP: 163 % (ref 73–180)
PROT S ACT/NOR PPP: 83 % (ref 63–140)
PROTHROM ACT/NOR PPP: 107 % (ref 50–154)
SCREEN APTT: 29.8 SEC (ref 0–51.9)
SCREEN DRVVT: 31.6 SEC (ref 0–47)
THROMBIN TIME: 15.6 SEC (ref 0–23)

## 2022-11-13 DIAGNOSIS — E87.6 HYPOKALEMIA: Primary | ICD-10-CM

## 2022-11-13 LAB
B2 GLYCOPROT1 IGA SER-ACNC: <9 GPI IGA UNITS (ref 0–25)
B2 GLYCOPROT1 IGG SER-ACNC: <9 GPI IGG UNITS (ref 0–20)
B2 GLYCOPROT1 IGM SER-ACNC: <9 GPI IGM UNITS (ref 0–32)
MAGNESIUM SERPL-MCNC: 1.7 MG/DL (ref 1.6–2.4)

## 2022-11-13 RX ORDER — POTASSIUM CHLORIDE 600 MG/1
TABLET, FILM COATED, EXTENDED RELEASE ORAL
Qty: 40 TABLET | Refills: 0 | Status: SHIPPED | OUTPATIENT
Start: 2022-11-13 | End: 2022-12-05

## 2022-11-14 ENCOUNTER — TELEPHONE (OUTPATIENT)
Dept: NEUROLOGY | Facility: CLINIC | Age: 62
End: 2022-11-14

## 2022-11-14 LAB
PROT C AG ACT/NOR PPP IA: 138 % (ref 60–150)
PROT S AG ACT/NOR PPP IA: 88 % (ref 60–150)
PROT S FREE AG ACT/NOR PPP IA: 78 % (ref 61–136)

## 2022-11-14 RX ORDER — MONTELUKAST SODIUM 10 MG/1
TABLET ORAL
Qty: 90 TABLET | Refills: 1 | Status: SHIPPED | OUTPATIENT
Start: 2022-11-14

## 2022-11-14 NOTE — TELEPHONE ENCOUNTER
Caller: PALOMA    Relationship to patient: REFERRING PROVIDERS OFFICE    Best call back number: [phone]    Chief complaint: REOCCURING STROKES    Type of visit: NEW PT REFERRAL APPT    Requested date: ASAP     Additional notes:REFERRING PROVIDERS OFFICE TELEPHONE RE: APPT FOR NEW PT REFERRAL DX:REOCCURING STROKES.    THIS REFERRAL WAS INITIATED ON 11/7/22 & THEY ARE F/U ON NEW PT APPT.     OK TO SCHEDULE? IF SO WITH WHICH PROVIDER?    PLEASE CALL & ADVISE

## 2022-11-15 ENCOUNTER — TELEPHONE (OUTPATIENT)
Dept: INTERNAL MEDICINE | Facility: CLINIC | Age: 62
End: 2022-11-15

## 2022-11-15 DIAGNOSIS — R19.7 DIARRHEA OF PRESUMED INFECTIOUS ORIGIN: Primary | ICD-10-CM

## 2022-11-15 NOTE — TELEPHONE ENCOUNTER
Caller: Audra Montejo    Relationship: Self    Best call back number: [phone]    What is the best time to reach you: ANYTIME    Who are you requesting to speak with (clinical staff, provider,  specific staff member): DR. DEEDEE WALKER MD OR NURSE       What was the call regarding: PLEASE CALL PATIENT SHE IS CONFESSED BY THE TWO TEST THAT PCP HAS ORDERED AND SHE DID NOT KNOW SHE WAS TO GO TODAY AND HAVE THEM DONE    Do you require a callback: YES

## 2022-11-21 LAB — MTHFR GENE MUT ANL BLD/T: NORMAL

## 2022-11-23 ENCOUNTER — TELEPHONE (OUTPATIENT)
Dept: INTERNAL MEDICINE | Facility: CLINIC | Age: 62
End: 2022-11-23

## 2022-11-23 DIAGNOSIS — I63.9 RECURRENT STROKES: Primary | ICD-10-CM

## 2022-11-23 NOTE — TELEPHONE ENCOUNTER
Caller: Audra Montejo    Relationship: Self    Best call back number: [phone]    What is the best time to reach you: ANY    Who are you requesting to speak with (clinical staff, provider,  specific staff member): CLINICAL STAFF    What was the call regarding: PATIENT CALLED WANTING TO CANCEL HER NEUROLOGY REFERRAL.    Do you require a callback: NO

## 2022-11-23 NOTE — TELEPHONE ENCOUNTER
I spoke to pt yesterday and she voiced that she wanted to cancel this referral, I have closed and documented.

## 2022-12-05 ENCOUNTER — OFFICE VISIT (OUTPATIENT)
Dept: INTERNAL MEDICINE | Facility: CLINIC | Age: 62
End: 2022-12-05

## 2022-12-05 VITALS
WEIGHT: 151.6 LBS | HEART RATE: 76 BPM | TEMPERATURE: 97.3 F | HEIGHT: 58 IN | OXYGEN SATURATION: 96 % | SYSTOLIC BLOOD PRESSURE: 140 MMHG | BODY MASS INDEX: 31.82 KG/M2 | DIASTOLIC BLOOD PRESSURE: 84 MMHG

## 2022-12-05 DIAGNOSIS — F31.62 BIPOLAR DISORDER, CURRENT EPISODE MIXED, MODERATE: ICD-10-CM

## 2022-12-05 DIAGNOSIS — E78.2 MIXED HYPERLIPIDEMIA: ICD-10-CM

## 2022-12-05 DIAGNOSIS — I63.9 CEREBELLAR STROKE: ICD-10-CM

## 2022-12-05 DIAGNOSIS — J45.20 MILD INTERMITTENT ASTHMA WITHOUT COMPLICATION: ICD-10-CM

## 2022-12-05 DIAGNOSIS — R73.01 IMPAIRED FASTING GLUCOSE: ICD-10-CM

## 2022-12-05 DIAGNOSIS — I10 ESSENTIAL HYPERTENSION: Primary | ICD-10-CM

## 2022-12-05 DIAGNOSIS — E87.6 HYPOKALEMIA: ICD-10-CM

## 2022-12-05 DIAGNOSIS — R79.1 ELEVATED FACTOR VIII LEVEL: ICD-10-CM

## 2022-12-05 PROCEDURE — 99214 OFFICE O/P EST MOD 30 MIN: CPT | Performed by: INTERNAL MEDICINE

## 2022-12-05 RX ORDER — DIVALPROEX SODIUM 500 MG/1
500 TABLET, EXTENDED RELEASE ORAL DAILY
Qty: 90 TABLET | Refills: 1 | Status: SHIPPED | OUTPATIENT
Start: 2022-12-05 | End: 2022-12-06

## 2022-12-05 NOTE — PATIENT INSTRUCTIONS
You have orders in the system for routine blood test to be done fasting in about 4 months.  Just do them about a week before your appointment.    2.  I also put in orders to check a Depakote level as well as your potassium again in about 6 weeks.  You do not have to fast for these labs.  We will see you in the office just a few days after that.

## 2022-12-05 NOTE — ASSESSMENT & PLAN NOTE
Patient with some issues in regards to the irritability as of her 12/22 office visit.  She also has some issues with her memory following the strokes.  She has been on Depakote in the past, but certainly an appropriate agent to utilize.  We will go that route first, if not improving, will need to get her in with psychiatry.

## 2022-12-05 NOTE — ASSESSMENT & PLAN NOTE
Patient stable this regards as of her 12/22 office visit.  She has had some memory issues following the strokes.  She is on aspirin and Plavix for future CVA prevention.  Additionally we are working towards adequate blood pressure and lipid control.  Continue current anticoagulation.  Appt with neuro pending.

## 2022-12-05 NOTE — ASSESSMENT & PLAN NOTE
Patient is utilizing daily Singulair as of 12/22 due to work environment.  She has albuterol rescue inhaler available, is using it maybe a little more recently, but certainly no labored respirations and her O2 sats are 96% as of her 12/22 office visit.  Continue daily Singulair and as needed albuterol as written

## 2022-12-05 NOTE — PROGRESS NOTES
"Chief Complaint  Hypertension (Pt here for follow up. Pt needs release to work. Pt has been struggling with memory and feeling angry all of the time. )    Subjective       Audra Montejo presents to [facility] INTERNAL MEDICINE    Hypertension  Pertinent negatives include no blurred vision, chest pain, palpitations or shortness of breath.     History of present illness:  Patient 62-year-old female with longstanding history of tobacco abuse and medical noncompliance, leaving the hospital AMA on more than one occasion in the past 4 months, who had at least 4 infarcts over that timeframe, with underlying hypertension and IFG as well as hyperlipidemia, coming into me 11/22 as a New Patient.  We will review her medications, address compliance, review recent labs, and make further recommendations at that time.    Review of Systems   Constitutional: Negative for appetite change, fatigue and fever.   HENT: Negative for congestion and ear pain.    Eyes: Negative for blurred vision.   Respiratory: Negative for cough, chest tightness, shortness of breath and wheezing.    Cardiovascular: Negative for chest pain, palpitations and leg swelling.   Gastrointestinal: Negative for abdominal pain.   Genitourinary: Negative for difficulty urinating, dysuria and hematuria.   Musculoskeletal: Negative for arthralgias and gait problem.   Skin: Negative for skin lesions.   Neurological: Negative for syncope, memory problem and confusion.   Psychiatric/Behavioral: Negative for self-injury and depressed mood.       Objective   Vital Signs:   /84   Pulse 76   Temp 97.3 °F (36.3 °C)   Ht 147.3 cm (57.99\")   Wt 68.8 kg (151 lb 9.6 oz)   SpO2 96%   BMI 31.69 kg/m²           Physical Exam  Vitals and nursing note reviewed.   Constitutional:       General: She is not in acute distress.     Appearance: Normal appearance. She is not toxic-appearing.   HENT:      Head: Atraumatic.      Right Ear: External ear normal. "      Left Ear: External ear normal.      Nose: Nose normal.      Mouth/Throat:      Mouth: Mucous membranes are moist.   Eyes:      General:         Right eye: No discharge.         Left eye: No discharge.      Extraocular Movements: Extraocular movements intact.      Pupils: Pupils are equal, round, and reactive to light.   Cardiovascular:      Rate and Rhythm: Normal rate and regular rhythm.      Pulses: Normal pulses.      Heart sounds: Normal heart sounds. No murmur heard.    No gallop.   Pulmonary:      Effort: Pulmonary effort is normal. No respiratory distress.      Breath sounds: No wheezing, rhonchi or rales.   Abdominal:      General: There is no distension.      Palpations: Abdomen is soft. There is no mass.      Tenderness: There is no abdominal tenderness. There is no guarding.   Musculoskeletal:         General: No swelling or tenderness.      Cervical back: No tenderness.      Right lower leg: No edema.      Left lower leg: No edema.   Skin:     General: Skin is warm and dry.      Findings: No rash.   Neurological:      General: No focal deficit present.      Mental Status: She is alert and oriented to person, place, and time. Mental status is at baseline.      Motor: No weakness.      Gait: Gait normal.   Psychiatric:         Mood and Affect: Mood normal.         Thought Content: Thought content normal.          Result Review   The following data was reviewed by: Lowell Alex MD on 11/02/2022:  [x] Laboratory  [] Microbiology  [x] Radiology  [] EKG/telemetry  [] Cardiology/Vascular  [] Pathology  [x] Old records             Assessment and Plan   Diagnoses and all orders for this visit:    1. Essential hypertension (Primary)  Assessment & Plan:  Blood pressure remained stable as of her 12/22 follow-up.  She is on full dose amlodipine only, so certainly reasonable to continue with that going forward.    Orders:  -     Comprehensive Metabolic Panel; Future    2. Impaired fasting glucose  Assessment &  Plan:  A1c was stable at 5.9 per her 11/22 labs.  Certainly no indication for treatment, will keep an eye on this every 6 months or so.    Orders:  -     Hemoglobin A1c; Future    3. Mixed hyperlipidemia  Assessment & Plan:  LDL was only 31 in 11/22, this was on the 20 mg of atorvastatin, just not sure how long she had been on the lower dose.  We will check this on return to office next year otherwise patient stable to continue with low-dose atorvastatin.    Orders:  -     Lipid Panel; Future    4. Elevated factor VIII level  Assessment & Plan:  This is of uncertain significance, since since it may play a role in venous thrombosis, and patient is suffered several strokes of arterial origin.  Do not think this warrants full anticoagulation, and probably just needs to be repeated down the road.    Orders:  -     Factor 8 Activity; Future    5. Cerebellar stroke (HCC)  Overview:  H/O ischemic CVA in 2013   Resultant mild cognitive impairment.     MRI 7/6/2022:  Acute infarcts involve the bilateral cerebellar hemispheres, larger on the right than the left, as detailed above.  No acute intracranial hemorrhage.    CTA Head/Neck 7/6/2022:  1. There is an irregular CTA appearance of the proximal right posterior inferior cerebellar artery (PICA), which may represent atherosclerotic change.  Vasospasm or arteritis would be in the differential diagnosis. Arterial emboli are possible but probably less likely.  Other types of   vasculopathies cannot be excluded.       2. There is also moderate-to-severe short-segment stenosis of the proximal right posterior cerebral artery (PCA), especially centered at its P1 and P2 segment junctions.  This finding may have similar etiologies as to that mentioned above for the findings in the right PICA.      3. No hemodynamically significant carotid stenoses are seen.     ARTHUR 7/8/2022:  • Calculated left ventricular EF = 65% Estimated left ventricular EF was in agreement with the calculated  left ventricular EF. Left ventricular systolic function is normal.  • Mild mitral regurgitation  • No evidence of thrombi in the left atrium left atrial appendage and the left ventricle  • Bubble study negative      Head CT 7/10/2022:  Evolving subacute cerebellar hemispheric infarcts are noted.  Also, an evolving subacute right posterior, lateral infarct of the medulla oblongata is noted    MRI brain 9/21/2022:  5 mm acute right thalamic infarct.      Assessment & Plan:  Patient stable this regards as of her 12/22 office visit.  She has had some memory issues following the strokes.  She is on aspirin and Plavix for future CVA prevention.  Additionally we are working towards adequate blood pressure and lipid control.  Continue current anticoagulation.  Appt with neuro pending.      6. Mild intermittent asthma without complication  Assessment & Plan:  Patient is utilizing daily Singulair as of 12/22 due to work environment.  She has albuterol rescue inhaler available, is using it maybe a little more recently, but certainly no labored respirations and her O2 sats are 96% as of her 12/22 office visit.  Continue daily Singulair and as needed albuterol as written      7. Bipolar disorder, current episode mixed, moderate (HCC)  Assessment & Plan:  Patient with some issues in regards to the irritability as of her 12/22 office visit.  She also has some issues with her memory following the strokes.  She has been on Depakote in the past, but certainly an appropriate agent to utilize.  We will go that route first, if not improving, will need to get her in with psychiatry.    Orders:  -     Valproic Acid Level, Total; Future    8. Hypokalemia  -     Basic Metabolic Panel; Future    Other orders  -     divalproex (Depakote ER) 500 MG 24 hr tablet; Take 1 tablet by mouth Daily.  Dispense: 90 tablet; Refill: 1        Follow Up   Return in about 4 months (around 4/5/2023).    Patient was given instructions and counseling regarding  her condition or for health maintenance advice. Please see specific information pulled into the AVS if appropriate.       Answers for HPI/ROS submitted by the patient on 11/8/2022  Please describe your symptoms.: None. Need papers filled outt  Have you had these symptoms before?: No  How long have you been having these symptoms?: 1-4 days  Please list any medications you are currently taking for this condition.: Not at this time  Please describe any probable cause for these symptoms. : None that i know of  What is the primary reason for your visit?: Other

## 2022-12-05 NOTE — ASSESSMENT & PLAN NOTE
Is been almost a month since we saw the patient for release to work, but unfortunately unable to go since they had no light duty for a non-Workmen's Comp situations.  I think she is stable from medical standpoint for that, she will certainly need to focus on her nutrition and her rest when she is off work.

## 2022-12-05 NOTE — ASSESSMENT & PLAN NOTE
Blood pressure remained stable as of her 12/22 follow-up.  She is on full dose amlodipine only, so certainly reasonable to continue with that going forward.

## 2022-12-05 NOTE — ASSESSMENT & PLAN NOTE
LDL was only 31 in 11/22, this was on the 20 mg of atorvastatin, just not sure how long she had been on the lower dose.  We will check this on return to office next year otherwise patient stable to continue with low-dose atorvastatin.

## 2022-12-05 NOTE — ASSESSMENT & PLAN NOTE
This is of uncertain significance, since since it may play a role in venous thrombosis, and patient is suffered several strokes of arterial origin.  Do not think this warrants full anticoagulation, and probably just needs to be repeated down the road.

## 2022-12-05 NOTE — ASSESSMENT & PLAN NOTE
A1c was stable at 5.9 per her 11/22 labs.  Certainly no indication for treatment, will keep an eye on this every 6 months or so.

## 2022-12-06 ENCOUNTER — TELEPHONE (OUTPATIENT)
Dept: INTERNAL MEDICINE | Facility: CLINIC | Age: 62
End: 2022-12-06

## 2022-12-06 RX ORDER — DIVALPROEX SODIUM 125 MG/1
125 CAPSULE, COATED PELLETS ORAL 2 TIMES DAILY
Qty: 60 CAPSULE | Refills: 5 | Status: SHIPPED | OUTPATIENT
Start: 2022-12-06 | End: 2023-01-18 | Stop reason: SDUPTHER

## 2022-12-06 NOTE — TELEPHONE ENCOUNTER
Pt states that she can't start out on the highest strength of medication due to her recent reaction. She states that she has never been on Depakote 500mg before she wants a lower dose. Pleases advise.

## 2022-12-06 NOTE — TELEPHONE ENCOUNTER
Okay yes, if she has not been on that dose previously, then we should start with a lower dose.  I sent over 125 mg twice daily prescription.  This is a shorter acting medication, it is not the same as taking 250 mg a day, it is only 125 mg total average dose.  If this prescription is too expensive, there is a 3 time a day dose, I think that is cheaper, but obviously would be more cumbersome to take, so lets see what the price is of the twice daily.

## 2022-12-09 ENCOUNTER — TELEPHONE (OUTPATIENT)
Dept: INTERNAL MEDICINE | Facility: CLINIC | Age: 62
End: 2022-12-09

## 2022-12-09 NOTE — TELEPHONE ENCOUNTER
Patient called she cant afford the Dapakote told the pharmacy to put it on hold.  Patient said her internet and phone is getting shut off today and she is tearing up shirts to wipe her butt with and she doesn't give a sh__ anymore and she has come to her end and she was done. Thank you goodbye.    I called her back and got her voice mail left her a message that I was calling to check on her.

## 2022-12-13 NOTE — TELEPHONE ENCOUNTER
Pt states that she went back to work and she is picking up her medication tomorrow, she is just out of money and she will continue to pay her bills. She states that she is okay now.

## 2022-12-14 ENCOUNTER — TELEPHONE (OUTPATIENT)
Dept: INTERNAL MEDICINE | Facility: CLINIC | Age: 62
End: 2022-12-14

## 2022-12-14 NOTE — TELEPHONE ENCOUNTER
I called patient back and she gave me a number for [employer] [phone]. By the end of the phone call, she got very frustrated and told me to forget it, I was just asking if I still need to fill out paperwork for them if she went back to work, I didn't want to mess anything up. She hung up.

## 2022-12-14 NOTE — TELEPHONE ENCOUNTER
Caller: Audra Montejo    Relationship: Self    Best call back number: [phone]    What is the best time to reach you: ANYTIME    Who are you requesting to speak with (clinical staff, provider,  specific staff member): CLINICAL    What was the call regarding: THE PATIENT STATED HER INSURANCE HAS BEEN TRYING TO CONTACT PCP VIA FAX SINCE NOVEMBER AND CANNOT GET ANY FAX THROUGH. HUB VERIFIED THE FAX NUMBER BUT PATIENT WOULD LIKE A CALL BACK TO DISCUSS.     INSURANCE STATED THEY ARE UNABLE TO CONTACT PCP VIA PHONE. PCP MUST CONTACT INSURANCE.     Do you require a callback: YES

## 2022-12-30 ENCOUNTER — TELEPHONE (OUTPATIENT)
Dept: INTERNAL MEDICINE | Facility: CLINIC | Age: 62
End: 2022-12-30

## 2022-12-30 NOTE — TELEPHONE ENCOUNTER
Caller: Audra Montejo    Relationship: Self    Best call back number: [phone]    What is the best time to reach you: ANY    Who are you requesting to speak with (clinical staff, provider,  specific staff member): CLINICAL    What was the call regarding: PATIENT STATED THAT SHE HAS LABS TO HAVE DONE AND NEEDING TO KNOW IF THOSE CAN BE DONE IN OFFICE OR AT HOSPITAL.     Do you require a callback: YES

## 2023-01-04 ENCOUNTER — HOSPITAL ENCOUNTER (EMERGENCY)
Facility: HOSPITAL | Age: 63
Discharge: LEFT BEFORE DISCHARGE | End: 2023-01-04
Payer: COMMERCIAL

## 2023-01-04 VITALS
SYSTOLIC BLOOD PRESSURE: 142 MMHG | HEIGHT: 58 IN | BODY MASS INDEX: 31.84 KG/M2 | OXYGEN SATURATION: 100 % | TEMPERATURE: 97.9 F | DIASTOLIC BLOOD PRESSURE: 66 MMHG | RESPIRATION RATE: 16 BRPM | HEART RATE: 58 BPM | WEIGHT: 151.68 LBS

## 2023-01-04 LAB
ALBUMIN SERPL-MCNC: 4 G/DL (ref 3.5–5.2)
ALBUMIN/GLOB SERPL: 1.5 G/DL
ALP SERPL-CCNC: 182 U/L (ref 39–117)
ALT SERPL W P-5'-P-CCNC: 10 U/L (ref 1–33)
ANION GAP SERPL CALCULATED.3IONS-SCNC: 9.3 MMOL/L (ref 5–15)
AST SERPL-CCNC: 15 U/L (ref 1–32)
BASOPHILS # BLD AUTO: 0.06 10*3/MM3 (ref 0–0.2)
BASOPHILS NFR BLD AUTO: 0.5 % (ref 0–1.5)
BILIRUB SERPL-MCNC: <0.2 MG/DL (ref 0–1.2)
BUN SERPL-MCNC: 17 MG/DL (ref 8–23)
BUN/CREAT SERPL: 20.5 (ref 7–25)
CALCIUM SPEC-SCNC: 9.2 MG/DL (ref 8.6–10.5)
CHLORIDE SERPL-SCNC: 106 MMOL/L (ref 98–107)
CO2 SERPL-SCNC: 27.7 MMOL/L (ref 22–29)
CREAT SERPL-MCNC: 0.83 MG/DL (ref 0.57–1)
DEPRECATED RDW RBC AUTO: 43.9 FL (ref 37–54)
EGFRCR SERPLBLD CKD-EPI 2021: 80.8 ML/MIN/1.73
EOSINOPHIL # BLD AUTO: 0.28 10*3/MM3 (ref 0–0.4)
EOSINOPHIL NFR BLD AUTO: 2.3 % (ref 0.3–6.2)
ERYTHROCYTE [DISTWIDTH] IN BLOOD BY AUTOMATED COUNT: 13.5 % (ref 12.3–15.4)
GLOBULIN UR ELPH-MCNC: 2.6 GM/DL
GLUCOSE SERPL-MCNC: 94 MG/DL (ref 65–99)
HCT VFR BLD AUTO: 40.1 % (ref 34–46.6)
HGB BLD-MCNC: 13.4 G/DL (ref 12–15.9)
HOLD SPECIMEN: NORMAL
HOLD SPECIMEN: NORMAL
IMM GRANULOCYTES # BLD AUTO: 0.04 10*3/MM3 (ref 0–0.05)
IMM GRANULOCYTES NFR BLD AUTO: 0.3 % (ref 0–0.5)
LYMPHOCYTES # BLD AUTO: 4.34 10*3/MM3 (ref 0.7–3.1)
LYMPHOCYTES NFR BLD AUTO: 35.7 % (ref 19.6–45.3)
MCH RBC QN AUTO: 29.4 PG (ref 26.6–33)
MCHC RBC AUTO-ENTMCNC: 33.4 G/DL (ref 31.5–35.7)
MCV RBC AUTO: 87.9 FL (ref 79–97)
MONOCYTES # BLD AUTO: 1.21 10*3/MM3 (ref 0.1–0.9)
MONOCYTES NFR BLD AUTO: 10 % (ref 5–12)
NEUTROPHILS NFR BLD AUTO: 51.2 % (ref 42.7–76)
NEUTROPHILS NFR BLD AUTO: 6.22 10*3/MM3 (ref 1.7–7)
NRBC BLD AUTO-RTO: 0 /100 WBC (ref 0–0.2)
NT-PROBNP SERPL-MCNC: 255.5 PG/ML (ref 0–900)
PLATELET # BLD AUTO: 248 10*3/MM3 (ref 140–450)
PMV BLD AUTO: 11.4 FL (ref 6–12)
POTASSIUM SERPL-SCNC: 4.1 MMOL/L (ref 3.5–5.2)
PROT SERPL-MCNC: 6.6 G/DL (ref 6–8.5)
QT INTERVAL: 454 MS
RBC # BLD AUTO: 4.56 10*6/MM3 (ref 3.77–5.28)
SODIUM SERPL-SCNC: 143 MMOL/L (ref 136–145)
TROPONIN T SERPL-MCNC: <0.01 NG/ML (ref 0–0.03)
WBC NRBC COR # BLD: 12.15 10*3/MM3 (ref 3.4–10.8)
WHOLE BLOOD HOLD COAG: NORMAL
WHOLE BLOOD HOLD SPECIMEN: NORMAL

## 2023-01-04 PROCEDURE — 85025 COMPLETE CBC W/AUTO DIFF WBC: CPT

## 2023-01-04 PROCEDURE — 80053 COMPREHEN METABOLIC PANEL: CPT

## 2023-01-04 PROCEDURE — 83880 ASSAY OF NATRIURETIC PEPTIDE: CPT

## 2023-01-04 PROCEDURE — 93005 ELECTROCARDIOGRAM TRACING: CPT

## 2023-01-04 PROCEDURE — 93010 ELECTROCARDIOGRAM REPORT: CPT | Performed by: INTERNAL MEDICINE

## 2023-01-04 PROCEDURE — 36415 COLL VENOUS BLD VENIPUNCTURE: CPT

## 2023-01-04 PROCEDURE — 84484 ASSAY OF TROPONIN QUANT: CPT

## 2023-01-04 PROCEDURE — 99211 OFF/OP EST MAY X REQ PHY/QHP: CPT

## 2023-01-04 RX ORDER — SODIUM CHLORIDE 0.9 % (FLUSH) 0.9 %
10 SYRINGE (ML) INJECTION AS NEEDED
Status: DISCONTINUED | OUTPATIENT
Start: 2023-01-04 | End: 2023-01-04 | Stop reason: HOSPADM

## 2023-01-11 ENCOUNTER — TELEPHONE (OUTPATIENT)
Dept: URGENT CARE | Facility: CLINIC | Age: 63
End: 2023-01-11

## 2023-01-11 PROCEDURE — U0004 COV-19 TEST NON-CDC HGH THRU: HCPCS | Performed by: NURSE PRACTITIONER

## 2023-01-12 NOTE — TELEPHONE ENCOUNTER
----- Message from HERMAN Maldonado sent at 1/11/2023  6:27 PM EST -----  Please notify patient of negative COVID-19 test result.

## 2023-01-18 RX ORDER — DIVALPROEX SODIUM 125 MG/1
125 CAPSULE, COATED PELLETS ORAL 2 TIMES DAILY
Qty: 60 CAPSULE | Refills: 5 | Status: SHIPPED | OUTPATIENT
Start: 2023-01-18 | End: 2023-03-22 | Stop reason: SDUPTHER

## 2023-01-18 NOTE — TELEPHONE ENCOUNTER
Caller: Audra Montejo    Relationship: Self    Best call back number: [phone]    Requested Prescriptions:   Requested Prescriptions     Pending Prescriptions Disp Refills   • Divalproex Sodium (Depakote Sprinkles) 125 MG capsule 60 capsule 5     Sig: Take 1 capsule by mouth 2 (Two) Times a Day.        Pharmacy where request should be sent: [id number] - [place], [place] - [address] AT [facility] [phone] [facility] [phone] [facility]     Additional details provided by patient: 2 DAYS LEFT     Does the patient have less than a 3 day supply:  [x] Yes  [] No    Would you like a call back once the refill request has been completed: [x] Yes [] No    If the office needs to give you a call back, can they leave a voicemail: [] Yes [x] No    Kiel Darby Rep   01/18/23 10:57 EST

## 2023-01-19 ENCOUNTER — CLINICAL SUPPORT (OUTPATIENT)
Dept: INTERNAL MEDICINE | Facility: CLINIC | Age: 63
End: 2023-01-19
Payer: COMMERCIAL

## 2023-01-19 DIAGNOSIS — E87.6 HYPOKALEMIA: ICD-10-CM

## 2023-01-19 DIAGNOSIS — F31.62 BIPOLAR DISORDER, CURRENT EPISODE MIXED, MODERATE: ICD-10-CM

## 2023-01-19 LAB
ANION GAP SERPL CALCULATED.3IONS-SCNC: 7 MMOL/L (ref 5–15)
BUN SERPL-MCNC: 19 MG/DL (ref 8–23)
BUN/CREAT SERPL: 27.9 (ref 7–25)
CALCIUM SPEC-SCNC: 9.4 MG/DL (ref 8.6–10.5)
CHLORIDE SERPL-SCNC: 107 MMOL/L (ref 98–107)
CO2 SERPL-SCNC: 30 MMOL/L (ref 22–29)
CREAT SERPL-MCNC: 0.68 MG/DL (ref 0.57–1)
EGFRCR SERPLBLD CKD-EPI 2021: 99.8 ML/MIN/1.73
GLUCOSE SERPL-MCNC: 120 MG/DL (ref 65–99)
POTASSIUM SERPL-SCNC: 4.1 MMOL/L (ref 3.5–5.2)
SODIUM SERPL-SCNC: 144 MMOL/L (ref 136–145)
VALPROATE SERPL-MCNC: 26 MCG/ML (ref 50–125)

## 2023-01-19 PROCEDURE — 80164 ASSAY DIPROPYLACETIC ACD TOT: CPT | Performed by: INTERNAL MEDICINE

## 2023-01-19 PROCEDURE — 80048 BASIC METABOLIC PNL TOTAL CA: CPT | Performed by: INTERNAL MEDICINE

## 2023-01-19 PROCEDURE — 36415 COLL VENOUS BLD VENIPUNCTURE: CPT | Performed by: INTERNAL MEDICINE

## 2023-01-28 ENCOUNTER — APPOINTMENT (OUTPATIENT)
Dept: CT IMAGING | Facility: HOSPITAL | Age: 63
End: 2023-01-28
Payer: COMMERCIAL

## 2023-01-28 ENCOUNTER — HOSPITAL ENCOUNTER (EMERGENCY)
Facility: HOSPITAL | Age: 63
Discharge: HOME | End: 2023-01-28
Attending: EMERGENCY MEDICINE | Admitting: EMERGENCY MEDICINE
Payer: COMMERCIAL

## 2023-01-28 ENCOUNTER — APPOINTMENT (OUTPATIENT)
Dept: GENERAL RADIOLOGY | Facility: HOSPITAL | Age: 63
End: 2023-01-28
Payer: COMMERCIAL

## 2023-01-28 VITALS
WEIGHT: 155.87 LBS | SYSTOLIC BLOOD PRESSURE: 139 MMHG | OXYGEN SATURATION: 98 % | RESPIRATION RATE: 16 BRPM | HEIGHT: 58 IN | BODY MASS INDEX: 32.72 KG/M2 | HEART RATE: 61 BPM | DIASTOLIC BLOOD PRESSURE: 61 MMHG | TEMPERATURE: 98.2 F

## 2023-01-28 DIAGNOSIS — N39.0 ACUTE UTI: Primary | ICD-10-CM

## 2023-01-28 LAB
ALBUMIN SERPL-MCNC: 3.8 G/DL (ref 3.5–5.2)
ALBUMIN/GLOB SERPL: 1.7 G/DL
ALP SERPL-CCNC: 163 U/L (ref 39–117)
ALT SERPL W P-5'-P-CCNC: 10 U/L (ref 1–33)
ANION GAP SERPL CALCULATED.3IONS-SCNC: 8.2 MMOL/L (ref 5–15)
AST SERPL-CCNC: 13 U/L (ref 1–32)
BACTERIA UR QL AUTO: ABNORMAL /HPF
BASOPHILS # BLD AUTO: 0.05 10*3/MM3 (ref 0–0.2)
BASOPHILS NFR BLD AUTO: 0.5 % (ref 0–1.5)
BILIRUB SERPL-MCNC: 0.3 MG/DL (ref 0–1.2)
BILIRUB UR QL STRIP: NEGATIVE
BUN SERPL-MCNC: 15 MG/DL (ref 8–23)
BUN/CREAT SERPL: 24.2 (ref 7–25)
CALCIUM SPEC-SCNC: 8.9 MG/DL (ref 8.6–10.5)
CHLORIDE SERPL-SCNC: 108 MMOL/L (ref 98–107)
CLARITY UR: ABNORMAL
CO2 SERPL-SCNC: 27.8 MMOL/L (ref 22–29)
COLOR UR: YELLOW
CREAT SERPL-MCNC: 0.62 MG/DL (ref 0.57–1)
DEPRECATED RDW RBC AUTO: 45.1 FL (ref 37–54)
EGFRCR SERPLBLD CKD-EPI 2021: 102.1 ML/MIN/1.73
EOSINOPHIL # BLD AUTO: 0.5 10*3/MM3 (ref 0–0.4)
EOSINOPHIL NFR BLD AUTO: 5 % (ref 0.3–6.2)
ERYTHROCYTE [DISTWIDTH] IN BLOOD BY AUTOMATED COUNT: 14 % (ref 12.3–15.4)
GLOBULIN UR ELPH-MCNC: 2.3 GM/DL
GLUCOSE SERPL-MCNC: 122 MG/DL (ref 65–99)
GLUCOSE UR STRIP-MCNC: NEGATIVE MG/DL
HCT VFR BLD AUTO: 39.1 % (ref 34–46.6)
HGB BLD-MCNC: 13.2 G/DL (ref 12–15.9)
HGB UR QL STRIP.AUTO: NEGATIVE
HOLD SPECIMEN: NORMAL
HOLD SPECIMEN: NORMAL
HYALINE CASTS UR QL AUTO: ABNORMAL /LPF
IMM GRANULOCYTES # BLD AUTO: 0.04 10*3/MM3 (ref 0–0.05)
IMM GRANULOCYTES NFR BLD AUTO: 0.4 % (ref 0–0.5)
KETONES UR QL STRIP: NEGATIVE
LEUKOCYTE ESTERASE UR QL STRIP.AUTO: ABNORMAL
LYMPHOCYTES # BLD AUTO: 3.89 10*3/MM3 (ref 0.7–3.1)
LYMPHOCYTES NFR BLD AUTO: 38.6 % (ref 19.6–45.3)
MAGNESIUM SERPL-MCNC: 1.9 MG/DL (ref 1.6–2.4)
MCH RBC QN AUTO: 29.9 PG (ref 26.6–33)
MCHC RBC AUTO-ENTMCNC: 33.8 G/DL (ref 31.5–35.7)
MCV RBC AUTO: 88.7 FL (ref 79–97)
MONOCYTES # BLD AUTO: 1.03 10*3/MM3 (ref 0.1–0.9)
MONOCYTES NFR BLD AUTO: 10.2 % (ref 5–12)
NEUTROPHILS NFR BLD AUTO: 4.57 10*3/MM3 (ref 1.7–7)
NEUTROPHILS NFR BLD AUTO: 45.3 % (ref 42.7–76)
NITRITE UR QL STRIP: NEGATIVE
NRBC BLD AUTO-RTO: 0 /100 WBC (ref 0–0.2)
PH UR STRIP.AUTO: 7.5 [PH] (ref 5–8)
PLATELET # BLD AUTO: 243 10*3/MM3 (ref 140–450)
PMV BLD AUTO: 11.1 FL (ref 6–12)
POTASSIUM SERPL-SCNC: 4 MMOL/L (ref 3.5–5.2)
PROT SERPL-MCNC: 6.1 G/DL (ref 6–8.5)
PROT UR QL STRIP: NEGATIVE
QT INTERVAL: 434 MS
RBC # BLD AUTO: 4.41 10*6/MM3 (ref 3.77–5.28)
RBC # UR STRIP: ABNORMAL /HPF
REF LAB TEST METHOD: ABNORMAL
SODIUM SERPL-SCNC: 144 MMOL/L (ref 136–145)
SP GR UR STRIP: 1.02 (ref 1–1.03)
SQUAMOUS #/AREA URNS HPF: ABNORMAL /HPF
TROPONIN T SERPL-MCNC: <0.01 NG/ML (ref 0–0.03)
UROBILINOGEN UR QL STRIP: ABNORMAL
WBC # UR STRIP: ABNORMAL /HPF
WBC NRBC COR # BLD: 10.08 10*3/MM3 (ref 3.4–10.8)
WHOLE BLOOD HOLD COAG: NORMAL
WHOLE BLOOD HOLD SPECIMEN: NORMAL
YEAST URNS QL MICRO: ABNORMAL /HPF

## 2023-01-28 PROCEDURE — 0 CEFTRIAXONE PER 250 MG: Performed by: EMERGENCY MEDICINE

## 2023-01-28 PROCEDURE — 70450 CT HEAD/BRAIN W/O DYE: CPT

## 2023-01-28 PROCEDURE — 85025 COMPLETE CBC W/AUTO DIFF WBC: CPT

## 2023-01-28 PROCEDURE — 71045 X-RAY EXAM CHEST 1 VIEW: CPT

## 2023-01-28 PROCEDURE — 93005 ELECTROCARDIOGRAM TRACING: CPT | Performed by: EMERGENCY MEDICINE

## 2023-01-28 PROCEDURE — 93005 ELECTROCARDIOGRAM TRACING: CPT

## 2023-01-28 PROCEDURE — 36415 COLL VENOUS BLD VENIPUNCTURE: CPT

## 2023-01-28 PROCEDURE — 93010 ELECTROCARDIOGRAM REPORT: CPT | Performed by: INTERNAL MEDICINE

## 2023-01-28 PROCEDURE — 80053 COMPREHEN METABOLIC PANEL: CPT

## 2023-01-28 PROCEDURE — 81001 URINALYSIS AUTO W/SCOPE: CPT

## 2023-01-28 PROCEDURE — 96365 THER/PROPH/DIAG IV INF INIT: CPT

## 2023-01-28 PROCEDURE — 83735 ASSAY OF MAGNESIUM: CPT

## 2023-01-28 PROCEDURE — 99284 EMERGENCY DEPT VISIT MOD MDM: CPT

## 2023-01-28 PROCEDURE — 84484 ASSAY OF TROPONIN QUANT: CPT

## 2023-01-28 RX ORDER — CEPHALEXIN 500 MG/1
500 CAPSULE ORAL 4 TIMES DAILY
Qty: 20 CAPSULE | Refills: 0 | Status: SHIPPED | OUTPATIENT
Start: 2023-01-28 | End: 2023-02-23

## 2023-01-28 RX ORDER — SODIUM CHLORIDE 0.9 % (FLUSH) 0.9 %
10 SYRINGE (ML) INJECTION AS NEEDED
Status: DISCONTINUED | OUTPATIENT
Start: 2023-01-28 | End: 2023-01-28 | Stop reason: HOSPADM

## 2023-01-28 RX ORDER — CEFTRIAXONE SODIUM 2 G/50ML
2 INJECTION, SOLUTION INTRAVENOUS ONCE
Status: COMPLETED | OUTPATIENT
Start: 2023-01-28 | End: 2023-01-28

## 2023-01-28 RX ADMIN — CEFTRIAXONE SODIUM 2 G: 2 INJECTION, SOLUTION INTRAVENOUS at 18:09

## 2023-02-01 ENCOUNTER — OFFICE VISIT (OUTPATIENT)
Dept: INTERNAL MEDICINE | Facility: CLINIC | Age: 63
End: 2023-02-01
Payer: COMMERCIAL

## 2023-02-01 VITALS
HEIGHT: 58 IN | BODY MASS INDEX: 32.07 KG/M2 | WEIGHT: 152.8 LBS | HEART RATE: 68 BPM | DIASTOLIC BLOOD PRESSURE: 72 MMHG | SYSTOLIC BLOOD PRESSURE: 156 MMHG | OXYGEN SATURATION: 99 % | TEMPERATURE: 97.3 F

## 2023-02-01 DIAGNOSIS — I10 ESSENTIAL HYPERTENSION: Primary | ICD-10-CM

## 2023-02-01 DIAGNOSIS — Z12.31 BREAST CANCER SCREENING BY MAMMOGRAM: ICD-10-CM

## 2023-02-01 DIAGNOSIS — I63.9 CEREBELLAR STROKE: ICD-10-CM

## 2023-02-01 DIAGNOSIS — F17.200 TOBACCO USE DISORDER: ICD-10-CM

## 2023-02-01 DIAGNOSIS — F31.62 BIPOLAR DISORDER, CURRENT EPISODE MIXED, MODERATE: ICD-10-CM

## 2023-02-01 PROCEDURE — 99214 OFFICE O/P EST MOD 30 MIN: CPT | Performed by: INTERNAL MEDICINE

## 2023-02-01 RX ORDER — CANDESARTAN 4 MG/1
4 TABLET ORAL DAILY
Qty: 30 TABLET | Refills: 5 | Status: SHIPPED | OUTPATIENT
Start: 2023-02-01 | End: 2023-02-23 | Stop reason: SINTOL

## 2023-02-01 NOTE — PROGRESS NOTES
"Chief Complaint  Hypertension, Follow-up (Pt states that this routine, she states that she was suppose to have labs and she has been to ER several times. ), and Shortness of Breath (Pt is still having Shortness of breath, she has been on three sets of antibiotics, her WBC is not elevated and she doesn't have a fever. /She also had a UTI. )    Subjective       Audra Montejo presents to [facility]    History of present illness:  Patient is a 62-year-old female with longstanding history of tobacco abuse and medical noncompliance, leaving the hospital AMA on more than one occasion btwn 7/22-9/22, who had at least 4 infarcts over that timeframe, with underlying hypertension and IFG as well as hyperlipidemia, seen 11/22 as a New Patient, who is coming in now 2/23 for urgent issues as per the chief complaint above.  We will review her medications, address compliance, review recent labs, and make further recommendations at that time.    Review of Systems   Constitutional: Negative for appetite change, fatigue and fever.   HENT: Negative for congestion and ear pain.    Eyes: Negative for blurred vision.   Respiratory: Negative for cough, chest tightness, shortness of breath and wheezing.    Cardiovascular: Negative for chest pain, palpitations and leg swelling.   Gastrointestinal: Negative for abdominal pain.   Genitourinary: Negative for difficulty urinating, dysuria and hematuria.   Musculoskeletal: Negative for arthralgias and gait problem.   Skin: Negative for skin lesions.   Neurological: Negative for syncope, memory problem and confusion.   Psychiatric/Behavioral: Negative for self-injury and depressed mood.       Objective   Vital Signs:   /72   Pulse 68   Temp 97.3 °F (36.3 °C) (Skin)   Ht 147.3 cm (57.99\")   Wt 69.3 kg (152 lb 12.8 oz)   SpO2 99%   BMI 31.94 kg/m²           Physical Exam  Vitals and nursing note reviewed.   Constitutional:       General: She is not " in acute distress.     Appearance: Normal appearance. She is not toxic-appearing.   HENT:      Head: Atraumatic.      Right Ear: External ear normal.      Left Ear: External ear normal.      Nose: Nose normal.      Mouth/Throat:      Mouth: Mucous membranes are moist.   Eyes:      General:         Right eye: No discharge.         Left eye: No discharge.      Extraocular Movements: Extraocular movements intact.      Pupils: Pupils are equal, round, and reactive to light.   Cardiovascular:      Rate and Rhythm: Normal rate and regular rhythm.      Pulses: Normal pulses.      Heart sounds: Normal heart sounds. No murmur heard.    No gallop.   Pulmonary:      Effort: Pulmonary effort is normal. No respiratory distress.      Breath sounds: No wheezing, rhonchi or rales.   Abdominal:      General: There is no distension.      Palpations: Abdomen is soft. There is no mass.      Tenderness: There is no abdominal tenderness. There is no guarding.   Musculoskeletal:         General: No swelling or tenderness.      Cervical back: No tenderness.      Right lower leg: No edema.      Left lower leg: No edema.   Skin:     General: Skin is warm and dry.      Findings: No rash.   Neurological:      General: No focal deficit present.      Mental Status: She is alert and oriented to person, place, and time. Mental status is at baseline.      Motor: No weakness.      Gait: Gait normal.   Psychiatric:         Mood and Affect: Mood normal.         Thought Content: Thought content normal.          Result Review   The following data was reviewed by: Lowell Alex MD on 11/02/2022:  [x] Laboratory  [] Microbiology  [x] Radiology  [] EKG/telemetry  [] Cardiology/Vascular  [] Pathology  [x] Old records             Assessment and Plan   Diagnoses and all orders for this visit:    1. Essential hypertension (Primary)  Assessment & Plan:  Patient's blood pressure was quite a bit better on my repeat, but is still not optimal, particularly given  her recurrent strokes.  She is allergic to lisinopril, she is already on full dose amlodipine and should continue that.  We will add very low-dose candesartan at this time over diuretic given her lowish potassium.  This is as of her 2/23 office visit.      2. Cerebellar stroke (HCC)  Overview:  H/O ischemic CVA in 2013   Resultant mild cognitive impairment.     MRI 7/6/2022:  Acute infarcts involve the bilateral cerebellar hemispheres, larger on the right than the left, as detailed above.  No acute intracranial hemorrhage.    CTA Head/Neck 7/6/2022:  1. There is an irregular CTA appearance of the proximal right posterior inferior cerebellar artery (PICA), which may represent atherosclerotic change.  Vasospasm or arteritis would be in the differential diagnosis. Arterial emboli are possible but probably less likely.  Other types of   vasculopathies cannot be excluded.       2. There is also moderate-to-severe short-segment stenosis of the proximal right posterior cerebral artery (PCA), especially centered at its P1 and P2 segment junctions.  This finding may have similar etiologies as to that mentioned above for the findings in the right PICA.      3. No hemodynamically significant carotid stenoses are seen.     ARTHUR 7/8/2022:  • Calculated left ventricular EF = 65% Estimated left ventricular EF was in agreement with the calculated left ventricular EF. Left ventricular systolic function is normal.  • Mild mitral regurgitation  • No evidence of thrombi in the left atrium left atrial appendage and the left ventricle  • Bubble study negative      Head CT 7/10/2022:  Evolving subacute cerebellar hemispheric infarcts are noted.  Also, an evolving subacute right posterior, lateral infarct of the medulla oblongata is noted    MRI brain 9/21/2022:  5 mm acute right thalamic infarct.    Head CT 1/28/2023:  There is a chronic infarct in the mid to inferior right cerebellar hemisphere.  A chronic lacunar infarct is noted in the  "mid right arnol. Chronic lacunar infarcts are noted in the posterior limb   of the right internal capsule.  There is no specific CT evidence of acute infarction.  No intracranial hemorrhage or mass is evident.  The ventricles are normal in size and configuration      Assessment & Plan:  Patient had head CT done in ER during one of her evaluations for urgent issues, UTI/URI, with results unchanged as noted above.  She will continue with aspirin and CVA for additional stroke protection.  Blood pressure being addressed as well.      3. Bipolar disorder, current episode mixed, moderate (HCC)  Overview:  Patient's level was 26 when we checked it earlier this month, so she is stable to continue with low-dose Depakote as written.  We will check it again on return to office in early spring.    Assessment & Plan:  Phone message 12/6/22:  \"Okay yes, if she has not been on that dose previously, then we should start with a lower dose.  I sent over 125 mg twice daily prescription.  This is a shorter acting medication, it is not the same as taking 250 mg a day, it is only 125 mg total average dose.  If this prescription is too expensive, there is a 3 time a day dose, I think that is cheaper, but obviously would be more cumbersome to take, so lets see what the price is of the twice daily.\"    Orders:  -     Valproic Acid Level, Total; Future    4. Tobacco use disorder  Overview:  Patient reports that she has been smoking cigarettes.  She has a 30.00 pack-year smoking history.  I have educated her on the risk of diseases from using tobacco products such as cancer, COPD, heart disease, and stroke.      I advised her to quit and she is not willing to quit.    62 y.o. female with a history of bipolar disorder, CVA and hypertension presented with dizziness. Of note, she was recently hospitalized from 7/6 to 7/8/2022 and was found to have bilateral cerebellar infarcts right more than left with high-grade stenosis of PICA. She was " evaluated by neurology, but she left AMA because she wanted to smoke.    ---> d/w pt 11/22 that she needs annual chest CT's given continued tobacco use.    Assessment & Plan:  Chest CT yet to be scheduled since patient has had intermittent phone usage.  We will try to get her the phone number so she can call and schedule it herself.      5. Breast cancer screening by mammogram  -     Mammo Screening Digital Tomosynthesis Bilateral With CAD; Future    Other orders  -     candesartan (ATACAND) 4 MG tablet; Take 1 tablet by mouth Daily.  Dispense: 30 tablet; Refill: 5        Follow Up   Return for Next scheduled follow up.    Patient was given instructions and counseling regarding her condition or for health maintenance advice. Please see specific information pulled into the AVS if appropriate.       Answers for HPI/ROS submitted by the patient on 11/8/2022  Please describe your symptoms.: None. Need papers filled outt  Have you had these symptoms before?: No  How long have you been having these symptoms?: 1-4 days  Please list any medications you are currently taking for this condition.: Not at this time  Please describe any probable cause for these symptoms. : None that i know of  What is the primary reason for your visit?: Other

## 2023-02-01 NOTE — ASSESSMENT & PLAN NOTE
Patient's blood pressure was quite a bit better on my repeat, but is still not optimal, particularly given her recurrent strokes.  She is allergic to lisinopril, she is already on full dose amlodipine and should continue that.  We will add very low-dose candesartan at this time over diuretic given her lowish potassium.  This is as of her 2/23 office visit.

## 2023-02-01 NOTE — ASSESSMENT & PLAN NOTE
"Phone message 12/6/22:  \"Okay yes, if she has not been on that dose previously, then we should start with a lower dose.  I sent over 125 mg twice daily prescription.  This is a shorter acting medication, it is not the same as taking 250 mg a day, it is only 125 mg total average dose.  If this prescription is too expensive, there is a 3 time a day dose, I think that is cheaper, but obviously would be more cumbersome to take, so lets see what the price is of the twice daily.\"  "

## 2023-02-01 NOTE — ASSESSMENT & PLAN NOTE
Chest CT yet to be scheduled since patient has had intermittent phone usage.  We will try to get her the phone number so she can call and schedule it herself.

## 2023-02-01 NOTE — ASSESSMENT & PLAN NOTE
Patient had head CT done in ER during one of her evaluations for urgent issues, UTI/URI, with results unchanged as noted above.  She will continue with aspirin and CVA for additional stroke protection.  Blood pressure being addressed as well.

## 2023-02-01 NOTE — PATIENT INSTRUCTIONS
When they gave you the phone number for radiology, just call them at your convenience to see about scheduling what is referred to as a low-dose CAT scan of the chest as a screening for lung cancer in smokers.  Additionally it is being ordered to follow-up possible pulmonary nodule.

## 2023-02-02 RX ORDER — ATORVASTATIN CALCIUM 20 MG/1
20 TABLET, FILM COATED ORAL DAILY
Qty: 90 TABLET | Refills: 1 | Status: SHIPPED | OUTPATIENT
Start: 2023-02-02

## 2023-02-06 ENCOUNTER — HOSPITAL ENCOUNTER (OUTPATIENT)
Dept: MAMMOGRAPHY | Facility: HOSPITAL | Age: 63
Discharge: HOME | End: 2023-02-06
Admitting: INTERNAL MEDICINE
Payer: COMMERCIAL

## 2023-02-06 DIAGNOSIS — Z12.31 BREAST CANCER SCREENING BY MAMMOGRAM: ICD-10-CM

## 2023-02-06 PROCEDURE — 77063 BREAST TOMOSYNTHESIS BI: CPT

## 2023-02-06 PROCEDURE — 77067 SCR MAMMO BI INCL CAD: CPT

## 2023-02-12 ENCOUNTER — HOSPITAL ENCOUNTER (EMERGENCY)
Facility: HOSPITAL | Age: 63
Discharge: HOME | End: 2023-02-12
Attending: EMERGENCY MEDICINE | Admitting: EMERGENCY MEDICINE
Payer: COMMERCIAL

## 2023-02-12 VITALS
HEART RATE: 57 BPM | HEIGHT: 57 IN | BODY MASS INDEX: 32.87 KG/M2 | DIASTOLIC BLOOD PRESSURE: 65 MMHG | SYSTOLIC BLOOD PRESSURE: 137 MMHG | WEIGHT: 152.34 LBS | TEMPERATURE: 98.2 F | RESPIRATION RATE: 20 BRPM | OXYGEN SATURATION: 99 %

## 2023-02-12 DIAGNOSIS — N89.8 ITCHING IN THE VAGINAL AREA: ICD-10-CM

## 2023-02-12 DIAGNOSIS — S20.01XA POSTTRAUMATIC HEMATOMA OF BREAST, RIGHT, INITIAL ENCOUNTER: Primary | ICD-10-CM

## 2023-02-12 PROCEDURE — 99282 EMERGENCY DEPT VISIT SF MDM: CPT

## 2023-02-12 RX ORDER — AMLODIPINE BESYLATE 10 MG/1
10 TABLET ORAL DAILY
COMMUNITY

## 2023-02-12 RX ORDER — FLUCONAZOLE 150 MG/1
150 TABLET ORAL ONCE
Qty: 1 TABLET | Refills: 0 | Status: SHIPPED | OUTPATIENT
Start: 2023-02-12 | End: 2023-02-12

## 2023-02-12 NOTE — DISCHARGE INSTRUCTIONS
Please take the medication prescribed you today as directed.  Please use caution around your work  And attempt to avoid striking your breast on objects.  You will need to follow-up with your primary care provider in 5 to 7 days to have your breast reevaluated.  If it anytime you feel that the bruising and hematoma is becoming larger, if you develop any discharge from your nipple, bleeding from your nipple, or feel that your vaginal itching is becoming more intense you may return to the emergency department however following up with your primary care provider as recommended.

## 2023-02-12 NOTE — ED PROVIDER NOTES
"Emergency Department Encounter    Date seen: 2/12/2023  Time: 7:12 AM EST    Room number: 27/27    Chief Complaint: vaginal itching and right breat pain           History of Present Illness:  Patient is a 62 y.o. year old female who presents to the emergency department today with right breast pain and vaginal itching.  Patient states due to the position she is currently assigned at at work, she keeps hitting her right breast on equipment and has developed a \"zehra\" and some bruising.  She also describes having an external vaginal itching sensation stating that she believes she might have a yeast infection due to the fact that she has been on a total of 4 antibiotics recently for an upper respiratory infection and for urinary tract infection.  She rates her current discomfort as a 5.    Independent Historian/Clinical History and Information was obtained by:   Patient    History is limited by: N/A      PCP: Lowell Alxe MD        Past Medical History:     Allergies   Allergen Reactions   • Lisinopril Unknown - High Severity     Pt states it almost killed her last time she was on this medication    • Tape Itching     Past Medical History:   Diagnosis Date   • 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   • Acid reflux disease    • Allergic Unknown   • Allergy     unspecified   • Anxiety    • Asthma Unknown   • Back pain    • Bilateral carpal tunnel syndrome 08/17/2017   • Bipolar disorder (HCC)    • Bursitis of left shoulder 08/09/2016   • Cataract Unknown   • Depression    • Eczema    • Gall stones    • H/O psychiatric care    • Heart attack (HCC)    • Heart murmur    • Hypertension    • Incomplete tear of left rotator cuff 01/25/2017   • Left shoulder pain    • Loss of appetite    • Lumbago 05/08/2015    low back pain    • Memory loss     forgetfulness   • Migraine headache    • Need for hepatitis C screening test    • Palpitation    • Ringing in ears    • Seasonal allergies    • Shortness of breath    • Sinus trouble     " unspecified    • Stroke (cerebrum) (HCC)    • Subacromial impingement, left 10/13/2016   • Superior glenoid labrum lesion of left shoulder 10/13/2016    subsequent encounter   • Trouble swallowing      Past Surgical History:   Procedure Laterality Date   • APPENDECTOMY     • CARPAL TUNNEL RELEASE     • CHOLECYSTECTOMY     • COLONOSCOPY  2014   • ENDOSCOPY  2014   • HYSTERECTOMY  1999   • SHOULDER SURGERY Left 10/03/2016    arthroscopic, left shoulder scope, RTC repair- Dr. Flores     Family History   Problem Relation Age of Onset   • Stroke Mother    • Diabetes Mother    • Arthritis Mother    • Heart disease Father    • Cancer Father    • Heart disease Sister    • Cancer Sister    • Diabetes Sister    • Arthritis Sister    • Heart disease Brother    • Diabetes Brother    • Hypertension Other    • Rheum arthritis Other    • Stroke Son    • Heart disease Son    • Cancer Son    • Other Son         blood disease   • Arthritis Son    • Osteoporosis Son        Home Medications:  Prior to Admission medications    Medication Sig Start Date End Date Taking? Authorizing Provider   albuterol (ACCUNEB) 1.25 MG/3ML nebulizer solution Take 3 mL by nebulization Every 6 (Six) Hours As Needed for Wheezing. 1/16/23   Gaviota Peter APRN   albuterol sulfate  (90 Base) MCG/ACT inhaler Inhale 2 puffs Every 6 (Six) Hours As Needed for Wheezing. 1/16/23   Gaviota Peter APRN   aspirin 81 MG chewable tablet Chew 81 mg Daily.    Provider, MD [place]   atorvastatin (LIPITOR) 20 MG tablet TAKE 1 TABLET BY MOUTH DAILY 2/2/23   Lowell Alex MD   candesartan (ATACAND) 4 MG tablet Take 1 tablet by mouth Daily. 2/1/23   Lowell Alex MD   cephalexin (KEFLEX) 500 MG capsule Take 1 capsule by mouth 4 (Four) Times a Day. 1/28/23   Sendy Arroyo MD   Divalproex Sodium (Depakote Sprinkles) 125 MG capsule Take 1 capsule by mouth 2 (Two) Times a Day. 1/18/23   Lowell Alex MD   guaifenesin (ROBITUSSIN) 100 MG/5ML liquid  "Take 10 mL by mouth Every 4 (Four) Hours As Needed for Cough. 1/16/23   Gaviota Peter APRKACEY   montelukast (SINGULAIR) 10 MG tablet TAKE 1 TABLET(10 MG) BY MOUTH EVERY DAY IN THE EVENING 11/14/22   Anayeli Pina MD   oxybutynin XL (DITROPAN-XL) 10 MG 24 hr tablet TAKE 1 TABLET BY MOUTH DAILY 8/16/22   Anayeli Pina MD        Social History:   Social History     Tobacco Use   • Smoking status: Every Day     Packs/day: 0.50     Years: 30.00     Pack years: 15.00     Types: Cigarettes   • Smokeless tobacco: Never   • Tobacco comments:     5 ciggs per day    Vaping Use   • Vaping Use: Never used   Substance Use Topics   • Alcohol use: Not Currently     Comment: rarely drinks   • Drug use: Never             Review of Systems:  Review of Systems   Constitutional: Negative for chills and fever.   HENT: Negative for congestion, ear pain and sore throat.    Eyes: Negative for pain.   Respiratory: Negative for cough, chest tightness and shortness of breath.    Cardiovascular: Negative for chest pain.   Gastrointestinal: Negative for abdominal pain, diarrhea, nausea and vomiting.   Genitourinary: Positive for vaginal discharge (\"not yellow\" described as \"clear\"  - reprots she cannot determine if it is white due to always wearing white underwear. Itching is described to be worse in fold.). Negative for flank pain and hematuria.   Musculoskeletal: Negative for joint swelling.   Skin: Negative for pallor. Color change: bruising to right breast with new \"knot\"   Neurological: Negative for seizures and headaches.   All other systems reviewed and are negative.       Physical Exam:  /65   Pulse 57   Temp 98.2 °F (36.8 °C)   Resp 20   Ht 144.8 cm (57\")   Wt 69.1 kg (152 lb 5.4 oz)   LMP  (LMP Unknown)   SpO2 99%   BMI 32.97 kg/m²     Physical Exam  Vitals and nursing note reviewed. Exam conducted with a chaperone present.   Constitutional:       General: She is not in acute distress.     Appearance: Normal " appearance. She is not toxic-appearing.   HENT:      Head: Normocephalic and atraumatic.      Mouth/Throat:      Mouth: Mucous membranes are moist.   Eyes:      General: No scleral icterus.  Cardiovascular:      Rate and Rhythm: Normal rate and regular rhythm.      Pulses: Normal pulses.      Heart sounds: Normal heart sounds.   Pulmonary:      Effort: Pulmonary effort is normal. No respiratory distress.      Breath sounds: Normal breath sounds.   Abdominal:      General: Abdomen is flat.      Palpations: Abdomen is soft.      Tenderness: There is no abdominal tenderness.   Musculoskeletal:         General: Tenderness (Right breast) present. Normal range of motion.        Arms:       Cervical back: Normal range of motion and neck supple.   Skin:     General: Skin is warm and dry.      Findings: Bruising (Right breast) present.   Neurological:      Mental Status: She is alert and oriented to person, place, and time. Mental status is at baseline.      Sensory: No sensory deficit.   Psychiatric:         Mood and Affect: Mood normal.         Behavior: Behavior normal.         Thought Content: Thought content normal.         Judgment: Judgment normal.                  Procedures:  Procedures      Medical Decision Making:      Comorbidities that affect care:    Previous CVAs, Hypertension, Smoking    External Notes reviewed:    Previous Clinic Note: reviewed mammogram results from 2/6/2023 and Previous ED Note      The following orders were placed and all results were independently analyzed by me:  No orders of the defined types were placed in this encounter.      Medications Given in the Emergency Department:  Medications - No data to display     ED Course:    ED Course as of 02/12/23 0802   Sun Feb 12, 2023   0736 Pt reports that she had a mammogram last week and 3 days ago received her results which were benign.     Pelvic exam was offered to pt to confirm symptoms are related to yeast infection, however pt declined.  It is likely that her vaginal itching is from yeast given that she has recently been on 4 antibiotics.  [MS]      ED Course User Index  [MS] Traci Shields, HERMAN       Labs:    Lab Results (last 24 hours)     ** No results found for the last 24 hours. **           Imaging:    No Radiology Exams Resulted Within Past 24 Hours      Differential Diagnosis and Discussion:    Vaginal Discharge: Differential diagnosis includes but is not limited to bacterial vaginosis, candidiasis, trichomonas vaginitis, cervicitis, rectovaginal fistula, irritants and allergens, foreign body, pelvic inflammatory disease.        Patient Care Considerations:    I considered doing a pelvic exam to confirm yeast infection however patient declined.  I also considered doing a breast ultrasound however patient recently had a mammogram and it was benign.  Symptoms are consistent with trauma as reported by patient from work site      Consultants/Shared Management Plan:    None    Social Determinants of Health:    Patient is independent, reliable, and has access to care.       Disposition and Care Coordination:    Discharged: The patient is suitable and stable for discharge with no need for consideration of observation or admission.    I have explained discharge medications and the need for follow up with the patient/caretakers. This was also printed in the discharge instructions. Patient was discharged with the following medications and follow up:      Medication List      New Prescriptions    fluconazole 150 MG tablet  Commonly known as: DIFLUCAN  Take 1 tablet by mouth 1 (One) Time for 1 dose.     miconazole 2 % vaginal cream  Commonly known as: MICOTIN  Apply a thin layer to external vaginal area and labias twice a day for itching sensation.           Where to Get Your Medications      These medications were sent to [id number] - [place], [place] - [address] AT [facility] [phone]  -  [phone] FX  [address] [place] [address]    Phone: [phone]   · fluconazole 150 MG tablet  · miconazole 2 % vaginal cream      Lowell Alex MD  [address] [facility]  [address]  [place] [address]  [phone]    In 1 week         MDM  Number of Diagnoses or Management Options  Itching in the vaginal area: new and does not require workup  Posttraumatic hematoma of breast, right, initial encounter: new and does not require workup     Amount and/or Complexity of Data Reviewed  Review and summarize past medical records: yes (I have personally reviewed patient's previous medical encounters.  )    Risk of Complications, Morbidity, and/or Mortality  Presenting problems: low  Management options: low    Patient Progress  Patient progress: stable       Final diagnoses:   Posttraumatic hematoma of breast, right, initial encounter   Itching in the vaginal area        ED Disposition     ED Disposition   Discharge    Condition   Stable    Comment   --             This medical record created using voice recognition software.                     Traci Shields, APRN  02/12/23 0802

## 2023-02-22 NOTE — TELEPHONE ENCOUNTER
----- Message from HERMAN Maldonado sent at 7/7/2022  6:18 PM EDT -----  Please notify patient of negative COVID-19 test result.   Worsening  Recommend lifestyle modifications

## 2023-02-23 ENCOUNTER — OFFICE VISIT (OUTPATIENT)
Dept: INTERNAL MEDICINE | Facility: CLINIC | Age: 63
End: 2023-02-23
Payer: COMMERCIAL

## 2023-02-23 VITALS
HEART RATE: 68 BPM | OXYGEN SATURATION: 98 % | WEIGHT: 152 LBS | BODY MASS INDEX: 32.79 KG/M2 | DIASTOLIC BLOOD PRESSURE: 68 MMHG | SYSTOLIC BLOOD PRESSURE: 140 MMHG | HEIGHT: 57 IN | TEMPERATURE: 98.4 F

## 2023-02-23 DIAGNOSIS — M15.9 PRIMARY OSTEOARTHRITIS INVOLVING MULTIPLE JOINTS: ICD-10-CM

## 2023-02-23 DIAGNOSIS — N64.89 HEMATOMA OF RIGHT BREAST: Primary | ICD-10-CM

## 2023-02-23 DIAGNOSIS — I10 ESSENTIAL HYPERTENSION: ICD-10-CM

## 2023-02-23 DIAGNOSIS — I63.9 CEREBELLAR STROKE: ICD-10-CM

## 2023-02-23 PROCEDURE — 99214 OFFICE O/P EST MOD 30 MIN: CPT | Performed by: INTERNAL MEDICINE

## 2023-02-23 RX ORDER — CLOPIDOGREL BISULFATE 75 MG/1
75 TABLET ORAL DAILY
COMMUNITY
End: 2023-03-13 | Stop reason: SDUPTHER

## 2023-02-23 RX ORDER — TRAMADOL HYDROCHLORIDE 50 MG/1
TABLET ORAL
Qty: 60 TABLET | Refills: 1 | Status: SHIPPED | OUTPATIENT
Start: 2023-02-23

## 2023-02-23 RX ORDER — DOXAZOSIN MESYLATE 1 MG/1
1 TABLET ORAL NIGHTLY
Qty: 30 TABLET | Refills: 2 | Status: SHIPPED | OUTPATIENT
Start: 2023-02-23

## 2023-02-23 NOTE — ASSESSMENT & PLAN NOTE
Patient was seen in the ER for this about 10 days ago, she was hitting the right breast against a piece of equipment at work.  The ER notes were reviewed with their exam as below:              Of note, patient is on Plavix due to recurrent stroke, so that obviously contributing to the persistent of the hematoma and ecchymoses.  The area is still without any evidence of infection as noted above, and she is not having any fevers.  Discussed the patient is going to take some time for the ecchymoses to resolve, but I would recommend against discontinuing the Plavix given her severe recurrent strokes.

## 2023-02-23 NOTE — PROGRESS NOTES
"Chief Complaint  Knot and bruise on right middle breast (Pt states that she hits a clamp at her job, there is a knot and she is bruised. She feels that the Plavix is making her do this. )    Subjective       Audra Montejo presents to [facility]    History of present illness:  Patient is a 62-year-old female with longstanding history of tobacco abuse and medical noncompliance, leaving the hospital AMA on more than one occasion btwn 7/22-9/22, who had at least 4 infarcts over that timeframe, with underlying hypertension and IFG as well as hyperlipidemia, seen 11/22 as a New Patient, who is coming in again in 2/23 for urgent issues as per the chief complaint above.  We will review her medications, address compliance, review recent labs, and make further recommendations at that time.    Review of Systems   Constitutional: Negative for appetite change, fatigue and fever.   HENT: Negative for congestion and ear pain.    Eyes: Negative for blurred vision.   Respiratory: Negative for cough, chest tightness, shortness of breath and wheezing.    Cardiovascular: Negative for chest pain, palpitations and leg swelling.   Gastrointestinal: Negative for abdominal pain.   Genitourinary: Negative for difficulty urinating, dysuria and hematuria.   Musculoskeletal: Negative for arthralgias and gait problem.   Skin: Negative for skin lesions.   Neurological: Negative for syncope, memory problem and confusion.   Psychiatric/Behavioral: Negative for self-injury and depressed mood.       Objective   Vital Signs:   /68   Pulse 68   Temp 98.4 °F (36.9 °C) (Skin)   Ht 144.8 cm (57.01\")   Wt 68.9 kg (152 lb)   SpO2 98%   BMI 32.88 kg/m²           Physical Exam  Vitals and nursing note reviewed.   Constitutional:       General: She is not in acute distress.     Appearance: Normal appearance. She is not toxic-appearing.   HENT:      Head: Atraumatic.      Right Ear: External ear normal.      " Left Ear: External ear normal.      Nose: Nose normal.      Mouth/Throat:      Mouth: Mucous membranes are moist.   Eyes:      General:         Right eye: No discharge.         Left eye: No discharge.      Extraocular Movements: Extraocular movements intact.      Pupils: Pupils are equal, round, and reactive to light.   Cardiovascular:      Rate and Rhythm: Normal rate and regular rhythm.      Pulses: Normal pulses.      Heart sounds: Normal heart sounds. No murmur heard.    No gallop.   Pulmonary:      Effort: Pulmonary effort is normal. No respiratory distress.      Breath sounds: No wheezing, rhonchi or rales.   Abdominal:      General: There is no distension.      Palpations: Abdomen is soft. There is no mass.      Tenderness: There is no abdominal tenderness. There is no guarding.   Musculoskeletal:         General: No swelling or tenderness.      Cervical back: No tenderness.      Right lower leg: No edema.      Left lower leg: No edema.   Skin:     General: Skin is warm and dry.      Findings: No rash.   Neurological:      General: No focal deficit present.      Mental Status: She is alert and oriented to person, place, and time. Mental status is at baseline.      Motor: No weakness.      Gait: Gait normal.   Psychiatric:         Mood and Affect: Mood normal.         Thought Content: Thought content normal.          Result Review   The following data was reviewed by: Lowell Alex MD on 11/02/2022:  [x] Laboratory  [] Microbiology  [x] Radiology  [] EKG/telemetry  [] Cardiology/Vascular  [] Pathology  [x] Old records             Assessment and Plan   Diagnoses and all orders for this visit:    1. Hematoma of right breast (Primary)  Assessment & Plan:  Patient was seen in the ER for this about 10 days ago, she was hitting the right breast against a piece of equipment at work.  The ER notes were reviewed with their exam as below:              Of note, patient is on Plavix due to recurrent stroke, so that  obviously contributing to the persistent of the hematoma and ecchymoses.  The area is still without any evidence of infection as noted above, and she is not having any fevers.  Discussed the patient is going to take some time for the ecchymoses to resolve, but I would recommend against discontinuing the Plavix given her severe recurrent strokes.      2. Essential hypertension  Assessment & Plan:  Patient blood pressure stable as of her 2/23 urgent visit.  She is on full dose amlodipine and just low-dose candesartan, however she is having increased urine output from the candesartan.  She has overactive bladder and is on moderate dose oxybutynin for that.  We will try switching her to low-dose doxazosin.  Patient to let me know if she is having any issues with this.      3. Cerebellar stroke (HCC)  Overview:  H/O ischemic CVA in 2013   Resultant mild cognitive impairment.     MRI 7/6/2022:  Acute infarcts involve the bilateral cerebellar hemispheres, larger on the right than the left, as detailed above.  No acute intracranial hemorrhage.    CTA Head/Neck 7/6/2022:  1. There is an irregular CTA appearance of the proximal right posterior inferior cerebellar artery (PICA), which may represent atherosclerotic change.  Vasospasm or arteritis would be in the differential diagnosis. Arterial emboli are possible but probably less likely.  Other types of   vasculopathies cannot be excluded.       2. There is also moderate-to-severe short-segment stenosis of the proximal right posterior cerebral artery (PCA), especially centered at its P1 and P2 segment junctions.  This finding may have similar etiologies as to that mentioned above for the findings in the right PICA.      3. No hemodynamically significant carotid stenoses are seen.     ARTHUR 7/8/2022:  • Calculated left ventricular EF = 65% Estimated left ventricular EF was in agreement with the calculated left ventricular EF. Left ventricular systolic function is normal.  • Mild  mitral regurgitation  • No evidence of thrombi in the left atrium left atrial appendage and the left ventricle  • Bubble study negative      Head CT 7/10/2022:  Evolving subacute cerebellar hemispheric infarcts are noted.  Also, an evolving subacute right posterior, lateral infarct of the medulla oblongata is noted    MRI brain 9/21/2022:  5 mm acute right thalamic infarct.    Head CT 1/28/2023:  There is a chronic infarct in the mid to inferior right cerebellar hemisphere.  A chronic lacunar infarct is noted in the mid right arnol. Chronic lacunar infarcts are noted in the posterior limb   of the right internal capsule.  There is no specific CT evidence of acute infarction.  No intracranial hemorrhage or mass is evident.  The ventricles are normal in size and configuration      Assessment & Plan:  AGAINST MEDICAL ADVICE, patient wants to discontinue Plavix due to bruising.  She is concerned that she will be in an accident and bleed to death.  Discussed with her that she had multiple strokes last year, and hopes are that the Plavix aspirin combination will keep these from recurring.  If she decides to discontinue the Plavix, she should get on a full-strength aspirin daily.      4. Primary osteoarthritis involving multiple joints  -     traMADol (ULTRAM) 50 MG tablet; 1 to 2 tablets up to 3 times a day as needed for right breast pain and for chronic back pain.  Dispense: 60 tablet; Refill: 1    Other orders  -     doxazosin (Cardura) 1 MG tablet; Take 1 tablet by mouth Every Night.  Dispense: 30 tablet; Refill: 2        Follow Up   Return for Next scheduled follow up.    Patient was given instructions and counseling regarding her condition or for health maintenance advice. Please see specific information pulled into the AVS if appropriate.       Answers for HPI/ROS submitted by the patient on 11/8/2022  Please describe your symptoms.: None. Need papers filled outt  Have you had these symptoms before?: No  How long  have you been having these symptoms?: 1-4 days  Please list any medications you are currently taking for this condition.: Not at this time  Please describe any probable cause for these symptoms. : None that i know of  What is the primary reason for your visit?: Other

## 2023-02-23 NOTE — PATIENT INSTRUCTIONS
Stop candesartan given the increased urine output.    2.  Start doxazosin 1 mg tablet once daily.    3.  Call if you are having any side effects of the doxazosin, or if its not effective in keeping blood pressure down.    4.  If you discontinue Plavix, you will need to take 325 mg of aspirin once daily, you can get a coated aspirin of this strength.

## 2023-02-23 NOTE — ASSESSMENT & PLAN NOTE
AGAINST MEDICAL ADVICE, patient wants to discontinue Plavix due to bruising.  She is concerned that she will be in an accident and bleed to death.  Discussed with her that she had multiple strokes last year, and hopes are that the Plavix aspirin combination will keep these from recurring.  If she decides to discontinue the Plavix, she should get on a full-strength aspirin daily.

## 2023-02-23 NOTE — ASSESSMENT & PLAN NOTE
Patient blood pressure stable as of her 2/23 urgent visit.  She is on full dose amlodipine and just low-dose candesartan, however she is having increased urine output from the candesartan.  She has overactive bladder and is on moderate dose oxybutynin for that.  We will try switching her to low-dose doxazosin.  Patient to let me know if she is having any issues with this.

## 2023-03-12 ENCOUNTER — APPOINTMENT (OUTPATIENT)
Dept: GENERAL RADIOLOGY | Facility: HOSPITAL | Age: 63
End: 2023-03-12
Payer: COMMERCIAL

## 2023-03-12 VITALS
DIASTOLIC BLOOD PRESSURE: 66 MMHG | WEIGHT: 149.91 LBS | TEMPERATURE: 98 F | HEIGHT: 58 IN | OXYGEN SATURATION: 98 % | HEART RATE: 58 BPM | SYSTOLIC BLOOD PRESSURE: 139 MMHG | BODY MASS INDEX: 31.47 KG/M2 | RESPIRATION RATE: 16 BRPM

## 2023-03-12 LAB
ALBUMIN SERPL-MCNC: 4 G/DL (ref 3.5–5.2)
ALBUMIN/GLOB SERPL: 1.6 G/DL
ALP SERPL-CCNC: 154 U/L (ref 39–117)
ALT SERPL W P-5'-P-CCNC: 17 U/L (ref 1–33)
ANION GAP SERPL CALCULATED.3IONS-SCNC: 7.2 MMOL/L (ref 5–15)
AST SERPL-CCNC: 17 U/L (ref 1–32)
BASOPHILS # BLD AUTO: 0.04 10*3/MM3 (ref 0–0.2)
BASOPHILS NFR BLD AUTO: 0.4 % (ref 0–1.5)
BILIRUB SERPL-MCNC: 0.2 MG/DL (ref 0–1.2)
BUN SERPL-MCNC: 15 MG/DL (ref 8–23)
BUN/CREAT SERPL: 24.6 (ref 7–25)
CALCIUM SPEC-SCNC: 9.4 MG/DL (ref 8.6–10.5)
CHLORIDE SERPL-SCNC: 107 MMOL/L (ref 98–107)
CO2 SERPL-SCNC: 27.8 MMOL/L (ref 22–29)
CREAT SERPL-MCNC: 0.61 MG/DL (ref 0.57–1)
DEPRECATED RDW RBC AUTO: 44.4 FL (ref 37–54)
EGFRCR SERPLBLD CKD-EPI 2021: 102.5 ML/MIN/1.73
EOSINOPHIL # BLD AUTO: 0.24 10*3/MM3 (ref 0–0.4)
EOSINOPHIL NFR BLD AUTO: 2.2 % (ref 0.3–6.2)
ERYTHROCYTE [DISTWIDTH] IN BLOOD BY AUTOMATED COUNT: 13.7 % (ref 12.3–15.4)
GLOBULIN UR ELPH-MCNC: 2.5 GM/DL
GLUCOSE SERPL-MCNC: 117 MG/DL (ref 65–99)
HCT VFR BLD AUTO: 41.6 % (ref 34–46.6)
HGB BLD-MCNC: 14 G/DL (ref 12–15.9)
IMM GRANULOCYTES # BLD AUTO: 0.03 10*3/MM3 (ref 0–0.05)
IMM GRANULOCYTES NFR BLD AUTO: 0.3 % (ref 0–0.5)
LYMPHOCYTES # BLD AUTO: 4.14 10*3/MM3 (ref 0.7–3.1)
LYMPHOCYTES NFR BLD AUTO: 38.1 % (ref 19.6–45.3)
MAGNESIUM SERPL-MCNC: 1.8 MG/DL (ref 1.6–2.4)
MCH RBC QN AUTO: 29.7 PG (ref 26.6–33)
MCHC RBC AUTO-ENTMCNC: 33.7 G/DL (ref 31.5–35.7)
MCV RBC AUTO: 88.3 FL (ref 79–97)
MONOCYTES # BLD AUTO: 0.87 10*3/MM3 (ref 0.1–0.9)
MONOCYTES NFR BLD AUTO: 8 % (ref 5–12)
NEUTROPHILS NFR BLD AUTO: 5.56 10*3/MM3 (ref 1.7–7)
NEUTROPHILS NFR BLD AUTO: 51 % (ref 42.7–76)
NRBC BLD AUTO-RTO: 0 /100 WBC (ref 0–0.2)
PLATELET # BLD AUTO: 250 10*3/MM3 (ref 140–450)
PMV BLD AUTO: 11.2 FL (ref 6–12)
POTASSIUM SERPL-SCNC: 4.5 MMOL/L (ref 3.5–5.2)
PROT SERPL-MCNC: 6.5 G/DL (ref 6–8.5)
RBC # BLD AUTO: 4.71 10*6/MM3 (ref 3.77–5.28)
SODIUM SERPL-SCNC: 142 MMOL/L (ref 136–145)
TROPONIN T SERPL HS-MCNC: 7 NG/L
VALPROATE SERPL-MCNC: 20.5 MCG/ML (ref 50–125)
WBC NRBC COR # BLD: 10.88 10*3/MM3 (ref 3.4–10.8)

## 2023-03-12 PROCEDURE — 84484 ASSAY OF TROPONIN QUANT: CPT

## 2023-03-12 PROCEDURE — 80053 COMPREHEN METABOLIC PANEL: CPT

## 2023-03-12 PROCEDURE — 83735 ASSAY OF MAGNESIUM: CPT

## 2023-03-12 PROCEDURE — 99281 EMR DPT VST MAYX REQ PHY/QHP: CPT

## 2023-03-12 PROCEDURE — 93005 ELECTROCARDIOGRAM TRACING: CPT

## 2023-03-12 PROCEDURE — 85025 COMPLETE CBC W/AUTO DIFF WBC: CPT

## 2023-03-12 PROCEDURE — 80164 ASSAY DIPROPYLACETIC ACD TOT: CPT

## 2023-03-12 PROCEDURE — 36415 COLL VENOUS BLD VENIPUNCTURE: CPT

## 2023-03-12 RX ORDER — SODIUM CHLORIDE 0.9 % (FLUSH) 0.9 %
10 SYRINGE (ML) INJECTION AS NEEDED
Status: DISCONTINUED | OUTPATIENT
Start: 2023-03-12 | End: 2023-03-13 | Stop reason: HOSPADM

## 2023-03-13 ENCOUNTER — HOSPITAL ENCOUNTER (EMERGENCY)
Facility: HOSPITAL | Age: 63
Discharge: LEFT BEFORE DISCHARGE | End: 2023-03-13
Admitting: EMERGENCY MEDICINE
Payer: COMMERCIAL

## 2023-03-13 ENCOUNTER — OFFICE VISIT (OUTPATIENT)
Dept: NEUROLOGY | Facility: CLINIC | Age: 63
End: 2023-03-13
Payer: COMMERCIAL

## 2023-03-13 VITALS
BODY MASS INDEX: 31.09 KG/M2 | SYSTOLIC BLOOD PRESSURE: 151 MMHG | HEART RATE: 66 BPM | WEIGHT: 148.1 LBS | HEIGHT: 58 IN | DIASTOLIC BLOOD PRESSURE: 58 MMHG

## 2023-03-13 DIAGNOSIS — Z86.73 HISTORY OF STROKE: Primary | ICD-10-CM

## 2023-03-13 DIAGNOSIS — I67.9 INTRACRANIAL VASCULAR STENOSIS: ICD-10-CM

## 2023-03-13 LAB
HOLD SPECIMEN: NORMAL
HOLD SPECIMEN: NORMAL
QT INTERVAL: 429 MS
WHOLE BLOOD HOLD COAG: NORMAL
WHOLE BLOOD HOLD SPECIMEN: NORMAL

## 2023-03-13 PROCEDURE — 99215 OFFICE O/P EST HI 40 MIN: CPT | Performed by: NURSE PRACTITIONER

## 2023-03-13 RX ORDER — CANDESARTAN 4 MG/1
1 TABLET ORAL DAILY
COMMUNITY
Start: 2023-02-27

## 2023-03-13 RX ORDER — CLOPIDOGREL BISULFATE 75 MG/1
75 TABLET ORAL DAILY
Qty: 90 TABLET | Refills: 1 | Status: SHIPPED | OUTPATIENT
Start: 2023-03-13

## 2023-03-13 NOTE — ED TRIAGE NOTES
"Pt to ED from work with reports of shooting pain to left leg and tonight she noticed \"huge bruise and knot in upper thigh\".  Pt denies injury.      Pt c/o intermittent \"headache that doesn't go into a full headache and my vision gets blurry and my right arm aches.  It's not pain, it just aches\".      Pt also c/o feeling off balanced when she \"tips my head back to look at things\".  Pt was started on new medicine for blood pressure recently and states \"I immediately had a bad reaction to it so I went back to the other blood pressure medicine I was taking\".    Pt takes plavix for afib and hx of stroke x2 and states she has been taking as prescribed.  " no

## 2023-03-13 NOTE — ED PROVIDER NOTES
Time: 10:42 PM EDT  Date of encounter:  3/12/2023  Independent Historian/Clinical History and Information was obtained by:   Patient  Chief Complaint: Dizziness    History is limited by: N/A    History of Present Illness:  Patient is a 63 y.o. year old female who presents to the emergency department for evaluation of dizziness, leg pain and headache.  States she has a bruise on her left anterior thigh but denies injury.  Patient states she is on a blood thinner.    HPI    Patient Care Team  Primary Care Provider: Lwoell Alex MD    Past Medical History:     Allergies   Allergen Reactions   • Lisinopril Unknown - High Severity     Pt states it almost killed her last time she was on this medication    • Doxazosin Other (See Comments)     Feeling like she was going to pass out   • Tape Itching     Past Medical History:   Diagnosis Date   • 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   • Acid reflux disease    • Allergic Unknown   • Allergy     unspecified   • Anxiety    • Asthma Unknown   • Back pain    • Bilateral carpal tunnel syndrome 08/17/2017   • Bipolar disorder (HCC)    • Bursitis of left shoulder 08/09/2016   • Cataract Unknown   • Depression    • Eczema    • Gall stones    • H/O psychiatric care    • Heart attack ([facility])    • Heart murmur    • Hypertension    • Incomplete tear of left rotator cuff 01/25/2017   • Left shoulder pain    • Loss of appetite    • Lumbago 05/08/2015    low back pain    • Memory loss     forgetfulness   • Migraine headache    • Need for hepatitis C screening test    • Palpitation    • Ringing in ears    • Seasonal allergies    • Shortness of breath    • Sinus trouble     unspecified    • Stroke (cerebrum) (Former[facility])    • Subacromial impingement, left 10/13/2016   • Superior glenoid labrum lesion of left shoulder 10/13/2016    subsequent encounter   • Trouble swallowing      Past Surgical History:   Procedure Laterality Date   • APPENDECTOMY     • CARPAL TUNNEL RELEASE     • CHOLECYSTECTOMY     •  COLONOSCOPY  2014   • ENDOSCOPY  2014   • HYSTERECTOMY  1999   • SHOULDER SURGERY Left 10/03/2016    arthroscopic, left shoulder scope, RTC repair- Dr. Flores     Family History   Problem Relation Age of Onset   • Stroke Mother    • Diabetes Mother    • Arthritis Mother    • Heart disease Father    • Cancer Father    • Heart disease Sister    • Cancer Sister    • Diabetes Sister    • Arthritis Sister    • Heart disease Brother    • Diabetes Brother    • Hypertension Other    • Rheum arthritis Other    • Stroke Son    • Heart disease Son    • Cancer Son    • Other Son         blood disease   • Arthritis Son    • Osteoporosis Son        Home Medications:  Prior to Admission medications    Medication Sig Start Date End Date Taking? Authorizing Provider   albuterol (ACCUNEB) 1.25 MG/3ML nebulizer solution Take 3 mL by nebulization Every 6 (Six) Hours As Needed for Wheezing. 1/16/23   Gaviota Peter APRN   albuterol sulfate  (90 Base) MCG/ACT inhaler Inhale 2 puffs Every 6 (Six) Hours As Needed for Wheezing. 1/16/23   Gaviota Peter APRN   amLODIPine (NORVASC) 10 MG tablet Take 10 mg by mouth Daily.    ProviderIsrael MD   aspirin 81 MG chewable tablet Chew 81 mg Daily.    ProviderIsrael MD   atorvastatin (LIPITOR) 20 MG tablet TAKE 1 TABLET BY MOUTH DAILY 2/2/23   Lowell Alex MD   clopidogrel (PLAVIX) 75 MG tablet Take 75 mg by mouth Daily.    ProviderIsrael MD   Divalproex Sodium (Depakote Sprinkles) 125 MG capsule Take 1 capsule by mouth 2 (Two) Times a Day. 1/18/23   Lowell Alex MD   doxazosin (Cardura) 1 MG tablet Take 1 tablet by mouth Every Night. 2/23/23   Lowell Alex MD   miconazole (MICOTIN) 2 % vaginal cream Apply a thin layer to external vaginal area and labias twice a day for itching sensation. 2/12/23   Traci Shields APRN   montelukast (SINGULAIR) 10 MG tablet TAKE 1 TABLET(10 MG) BY MOUTH EVERY DAY IN THE EVENING 11/14/22   Anayeli Pina MD  "  oxybutynin XL (DITROPAN-XL) 10 MG 24 hr tablet TAKE 1 TABLET BY MOUTH DAILY 8/16/22   Anayeli Pina MD   traMADol (ULTRAM) 50 MG tablet 1 to 2 tablets up to 3 times a day as needed for right breast pain and for chronic back pain. 2/23/23   Lowell Alex MD        Social History:   Social History     Tobacco Use   • Smoking status: Every Day     Packs/day: 1.00     Years: 30.00     Pack years: 30.00     Types: Cigarettes   • Smokeless tobacco: Never   • Tobacco comments:     Not your business   Vaping Use   • Vaping Use: Never used   Substance Use Topics   • Alcohol use: Not Currently     Comment: rarely drinks   • Drug use: Never         Review of Systems:  Review of Systems   Musculoskeletal: Positive for arthralgias and myalgias.   Skin: Positive for color change.   Neurological: Positive for dizziness and headaches.        Physical Exam:  /66 (BP Location: Left arm, Patient Position: Sitting)   Pulse 58   Temp 98 °F (36.7 °C) (Oral)   Resp 16   Ht 147.3 cm (58\")   Wt 68 kg (149 lb 14.6 oz)   LMP  (LMP Unknown)   SpO2 98%   BMI 31.33 kg/m²     Physical Exam  HENT:      Head: Normocephalic.      Mouth/Throat:      Mouth: Mucous membranes are moist.   Eyes:      Pupils: Pupils are equal, round, and reactive to light.   Pulmonary:      Effort: Pulmonary effort is normal.   Abdominal:      General: There is no distension.   Musculoskeletal:      Cervical back: Neck supple.   Skin:     General: Skin is warm and dry.   Neurological:      General: No focal deficit present.      Mental Status: She is alert and oriented to person, place, and time.   Psychiatric:         Mood and Affect: Mood normal.         Behavior: Behavior normal.                  Procedures:  Procedures      Medical Decision Making:      Comorbidities that affect care:        External Notes reviewed:          The following orders were placed and all results were independently analyzed by me:  Orders Placed This Encounter "   Procedures   • [place] Draw   • Comprehensive Metabolic Panel   • Single High Sensitivity Troponin T   • Magnesium   • Valproic Acid Level, Total   • CBC Auto Differential   • ECG 12 Lead ED Triage Standing Order; Weak / Dizzy / AMS   • CBC & Differential   • Green Top (Gel)   • Lavender Top   • Gold Top - SST   • Light Blue Top       Medications Given in the Emergency Department:  Medications - No data to display     ED Course:    ED Course as of 03/24/23 2238   Sun Mar 12, 2023   2243 --- PROVIDER IN TRIAGE NOTE ---    The patient was evaluated by Darron styles in triage. Orders were placed and the patient is currently awaiting disposition.    [AJ]      ED Course User Index  [AJ] Darron Gillespie PA-C       Labs:    Lab Results (last 24 hours)     ** No results found for the last 24 hours. **           Imaging:    No Radiology Exams Resulted Within Past 24 Hours      Differential Diagnosis and Discussion:    Dizziness: Based on the patient's history, signs, and symptoms, the diffential diagnosis includes but is not limited to meningitis, stroke, sepsis, subarachnoid hemorrhage, intracranial bleeding, encephalitis, vertigo, electrolyte imbalance, and metabolic disorders.  Headache: Differential diagnosis includes but is not limited to migraine, cluster headache, hypertension, tumor, subarachnoid bleeding, pseudotumor cerebri, temporal arteritis, infections, tension headache, and TMJ syndrome.        MDM         Patient Care Considerations:          Consultants/Shared Management Plan:        Social Determinants of Health:          Disposition and Care Coordination:    Eloped: This patient has left the emergency department or waiting room with no communication to myself, nursing or administrative staff. There was no opportunity to discuss the patient's decision to leave, provide medical advice or discuss alternatives to. The staff has made efforts to locate patient without success.        Final  diagnoses:   None        ED Disposition     ED Disposition   Eloped    Condition   --    Comment   --             This medical record created using voice recognition software.           Darron Gillespie PA-C  03/17/23 [id number]       Darron Gillespie PA-C  03/24/23 [id number]

## 2023-03-13 NOTE — PROGRESS NOTES
"Chief Complaint  Neurologic Problem    Subjective          Audra Montejo presents to [facility]  History of Present Illness  History of four strokes previously, last stroke was in Sept 2022.  States she's having memory difficulty post stroke.  Struggling to follow instructions.  States she's had some irritability and anger difficulty.  Endorses imbalance, states she often falls to the left.  States she feels generally weaker than previously. Fatigues easily. Works at [employer].       Objective   Vital Signs:   /58   Pulse 66   Ht 147.3 cm (58\")   Wt 67.2 kg (148 lb 1.6 oz)   BMI 30.95 kg/m²     Physical Exam  Neurological:      Mental Status: She is oriented to person, place, and time.      Cranial Nerves: Cranial nerves 2-12 are intact.      Motor: Motor strength is normal.      Gait: Gait is intact.        Neurologic Exam     Mental Status   Oriented to person, place, and time.     Cranial Nerves   Cranial nerves II through XII intact.     Motor Exam   Muscle bulk: normal    Strength   Strength 5/5 throughout.     Sensory Exam   Light touch normal.     Gait, Coordination, and Reflexes     Gait  Gait: normal    Reflexes   Reflexes 2+ except as noted.        Result Review :   CBC:  Lab Results   Component Value Date    WBC 10.88 (H) 03/12/2023    RBC 4.71 03/12/2023    HGB 14.0 03/12/2023    HCT 41.6 03/12/2023    MCV 88.3 03/12/2023    MCH 29.7 03/12/2023    MCHC 33.7 03/12/2023    RDW 13.7 03/12/2023     03/12/2023     CMP:  Lab Results   Component Value Date    BUN 15 03/12/2023    CREATININE 0.61 03/12/2023    EGFRIFNONA 73 02/15/2022     03/12/2023    K 4.5 03/12/2023     03/12/2023    CALCIUM 9.4 03/12/2023    ALBUMIN 4.0 03/12/2023    BILITOT 0.2 03/12/2023    ALKPHOS 154 (H) 03/12/2023    AST 17 03/12/2023    ALT 17 03/12/2023     LIPID PANEL:  Lab Results   Component Value Date    TRIG 91 11/10/2022    HDL 64 (H) 11/10/2022    VLDL 17 " 11/10/2022    LDL 31 11/10/2022    LDLHDL 0.47 11/10/2022     Hemoglobin A1C   Date Value Ref Range Status   11/10/2022 5.90 (H) 4.80 - 5.60 % Final       Data reviewed: Consultant notes Neurology, inpatient and Recent hospitalization notes Stroke hospitalization July 2022        MRI Brain 9/21/22:   There is a 5 mm area of restricted diffusion in the right thalamus.  No evidence of acute intracranial hemorrhage, mass or midline shift.  There are old cerebellar infarcts right greater than left.  The ventricles have a normal size and configuration.  There are mild chronic appearing changes in the white matter.  There are no extra-axial fluid collections.  There is a 5 mm old right pontine infarct.    ARTHUR:   • Calculated left ventricular EF = 65% Estimated left ventricular EF was in agreement with the calculated left ventricular EF. Left ventricular systolic function is normal.  • Mild mitral regurgitation  • No evidence of thrombi in the left atrium left atrial appendage and the left ventricle  • Bubble study negative    CTA Neck 7/6/22:   No hemodynamically significant stenoses are seen.  No arterial dissection.        CTA Head 7/6/22:  1. There is an irregular CTA appearance of the proximal right posterior inferior cerebellar artery   (PICA), which may represent atherosclerotic change.  Vasospasm or arteritis would be in the   differential diagnosis.  Arterial emboli are possible but probably less likely.  Other types of   vasculopathies cannot be excluded.       2. There is also moderate-to-severe short-segment stenosis of the proximal right posterior cerebral   artery (PCA), especially centered at its P1 and P2 segment junctions.  This finding may have   similar etiologies as to that mentioned above for the findings in the right PICA.       3. Otherwise, no hemodynamically significant stenoses are seen.  No other areas of emergent large   vessel occlusion are suggested.       4. Please see above comments for  further detail.      Assessment and Plan    Diagnoses and all orders for this visit:    1. History of stroke (Primary)  Assessment & Plan:  Will order repeat CTA head and neck for further evaluation of intracranial stenosis.  Recommend that she continue Plavix and atorvastatin for secondary stroke risk reduction.  Strongly encouraged smoking cessation.  Discussed correlation between smoking and increased vascular risk.  Discussed signs and symptoms of stroke including, but not limited to, visual disturbances, weakness of one side of the body, facial droop, cognitive changes, slurred speech, imbalance and dizziness.  Instructed patient to call 911 and get emergent medical treatment immediately at the onset of those symptoms.   Patient verbalized understanding.     Orders:  -     CT Angiogram Head; Future  -     CT Angiogram Neck With & Without Contrast; Future    2. Intracranial vascular stenosis  -     CT Angiogram Head; Future  -     CT Angiogram Neck With & Without Contrast; Future    Other orders  -     clopidogrel (PLAVIX) 75 MG tablet; Take 1 tablet by mouth Daily.  Dispense: 90 tablet; Refill: 1    I spent 55 minutes caring for Audra on this date of service. This time includes time spent by me in the following activities:preparing for the visit, reviewing tests, obtaining and/or reviewing a separately obtained history, performing a medically appropriate examination and/or evaluation , counseling and educating the patient/family/caregiver, ordering medications, tests, or procedures, documenting information in the medical record and independently interpreting results and communicating that information with the patient/family/caregiver  Follow Up   Return in about 3 months (around 6/13/2023) for stroke f/u.  Patient was given instructions and counseling regarding her condition or for health maintenance advice. Please see specific information pulled into the AVS if appropriate.

## 2023-03-14 ENCOUNTER — TELEPHONE (OUTPATIENT)
Dept: INTERNAL MEDICINE | Facility: CLINIC | Age: 63
End: 2023-03-14
Payer: COMMERCIAL

## 2023-03-14 NOTE — TELEPHONE ENCOUNTER
Those are certainly not typical allergic responses to the medication.  However, if she stopped the medication, any reaction to it will be out of her system within just a day or 2.  Please list doxazosin as an intolerance on her allergy list.  The other symptoms in regards to chills stomach pains and nausea sound like she could have a viral stomach bug, that should also resolve within just a couple of days.  Tell her to push fluids, she will need to be seen at an urgent care facility if symptoms are persisting.

## 2023-03-14 NOTE — TELEPHONE ENCOUNTER
Spoke with patient and she stated it started on LT side lower rib and it radiated to Rt side and she feels like she is having some fever, patient stated she took doxazosin and it made her pass out and she stop the medication, she stated she is losing appetite and she is having some itchiness on her hands, please advise

## 2023-03-14 NOTE — TELEPHONE ENCOUNTER
Caller: Audra Montejo    Relationship: Self    Best call back number: [phone]    What is the best time to reach you: ANY     Who are you requesting to speak with (clinical staff, provider,  specific staff member): CLINICAL     What was the call regarding: PATIENT CALLED TO LET DENISE KNOW SHE IS HAVING SOME CHILLS, STOMACH PAINS, AND SOME NAUSEA AND IS THINKING IT MAY BE FROM HER BLOOD PRESSURE MEDICATION. PLEASE ADVISE.     Do you require a callback: YES

## 2023-03-14 NOTE — TELEPHONE ENCOUNTER
Spoke with patient rely message, pt verbalized understanding.      Medication added to allergy list

## 2023-03-16 PROBLEM — Z86.73 HISTORY OF STROKE: Status: ACTIVE | Noted: 2023-03-16

## 2023-03-16 NOTE — ASSESSMENT & PLAN NOTE
Will order repeat CTA head and neck for further evaluation of intracranial stenosis.  Recommend that she continue Plavix and atorvastatin for secondary stroke risk reduction.  Strongly encouraged smoking cessation.  Discussed correlation between smoking and increased vascular risk.  Discussed signs and symptoms of stroke including, but not limited to, visual disturbances, weakness of one side of the body, facial droop, cognitive changes, slurred speech, imbalance and dizziness.  Instructed patient to call 911 and get emergent medical treatment immediately at the onset of those symptoms.   Patient verbalized understanding.

## 2023-03-22 ENCOUNTER — TELEPHONE (OUTPATIENT)
Dept: INTERNAL MEDICINE | Facility: CLINIC | Age: 63
End: 2023-03-22

## 2023-03-22 RX ORDER — DIVALPROEX SODIUM 125 MG/1
250 CAPSULE, COATED PELLETS ORAL 2 TIMES DAILY
Qty: 120 CAPSULE | Refills: 2 | Status: SHIPPED | OUTPATIENT
Start: 2023-03-22 | End: 2023-04-21

## 2023-03-22 NOTE — TELEPHONE ENCOUNTER
Caller: Audra Montejo    Relationship: Self    Best call back number: [phone]    What was the call regarding: PATIENT IS CALLING BACK REGARDING THE Divalproex Sodium (Depakote Sprinkles) 125 MG capsule.. SHE STATED THAT IT WAS NOT WORKING HENCE THE BROKEN WINDSHIELD, SIDE VIEW MIRROR IS OFF..    Do you require a callback: YES

## 2023-03-22 NOTE — TELEPHONE ENCOUNTER
Let patient know she can take 2 of the 125 mg capsules twice daily, to equal 250 mg twice daily.  I updated her prescription, so that we will give her enough to take 4 a day going forward   The Delivery OB Provider certifies that vaginal examination and/or abdominal examination after the delivery was done and no foreign body was found.

## 2023-03-22 NOTE — TELEPHONE ENCOUNTER
Pt states that the Depakote 125 was not strong enough, she had the Depakote 500mg qd on file at the pharmacy and she is going to take that, she said that she would rather have an allergic reaction instead of hurting someone due to her levels being low.

## 2023-04-17 ENCOUNTER — CLINICAL SUPPORT (OUTPATIENT)
Dept: INTERNAL MEDICINE | Facility: CLINIC | Age: 63
End: 2023-04-17
Payer: COMMERCIAL

## 2023-04-17 DIAGNOSIS — R73.01 IMPAIRED FASTING GLUCOSE: ICD-10-CM

## 2023-04-17 DIAGNOSIS — I10 ESSENTIAL HYPERTENSION: ICD-10-CM

## 2023-04-17 DIAGNOSIS — E78.2 MIXED HYPERLIPIDEMIA: ICD-10-CM

## 2023-04-17 DIAGNOSIS — F31.62 BIPOLAR DISORDER, CURRENT EPISODE MIXED, MODERATE: ICD-10-CM

## 2023-04-17 LAB
ALBUMIN SERPL-MCNC: 4.4 G/DL (ref 3.5–5.2)
ALBUMIN/GLOB SERPL: 1.8 G/DL
ALP SERPL-CCNC: 139 U/L (ref 39–117)
ALT SERPL W P-5'-P-CCNC: 12 U/L (ref 1–33)
ANION GAP SERPL CALCULATED.3IONS-SCNC: 10.9 MMOL/L (ref 5–15)
AST SERPL-CCNC: 18 U/L (ref 1–32)
BILIRUB SERPL-MCNC: 0.4 MG/DL (ref 0–1.2)
BUN SERPL-MCNC: 13 MG/DL (ref 8–23)
BUN/CREAT SERPL: 21 (ref 7–25)
CALCIUM SPEC-SCNC: 9.5 MG/DL (ref 8.6–10.5)
CHLORIDE SERPL-SCNC: 107 MMOL/L (ref 98–107)
CHOLEST SERPL-MCNC: 129 MG/DL (ref 0–200)
CO2 SERPL-SCNC: 26.1 MMOL/L (ref 22–29)
CREAT SERPL-MCNC: 0.62 MG/DL (ref 0.57–1)
EGFRCR SERPLBLD CKD-EPI 2021: 101.5 ML/MIN/1.73
GLOBULIN UR ELPH-MCNC: 2.4 GM/DL
GLUCOSE SERPL-MCNC: 101 MG/DL (ref 65–99)
HBA1C MFR BLD: 5.7 % (ref 4.8–5.6)
HDLC SERPL-MCNC: 78 MG/DL (ref 40–60)
LDLC SERPL CALC-MCNC: 40 MG/DL (ref 0–100)
LDLC/HDLC SERPL: 0.54 {RATIO}
POTASSIUM SERPL-SCNC: 4.4 MMOL/L (ref 3.5–5.2)
PROT SERPL-MCNC: 6.8 G/DL (ref 6–8.5)
SODIUM SERPL-SCNC: 144 MMOL/L (ref 136–145)
TRIGL SERPL-MCNC: 45 MG/DL (ref 0–150)
VALPROATE SERPL-MCNC: 39 MCG/ML (ref 50–125)
VLDLC SERPL-MCNC: 11 MG/DL (ref 5–40)

## 2023-04-17 PROCEDURE — 80164 ASSAY DIPROPYLACETIC ACD TOT: CPT | Performed by: INTERNAL MEDICINE

## 2023-04-17 PROCEDURE — 80053 COMPREHEN METABOLIC PANEL: CPT | Performed by: INTERNAL MEDICINE

## 2023-04-17 PROCEDURE — 83036 HEMOGLOBIN GLYCOSYLATED A1C: CPT | Performed by: INTERNAL MEDICINE

## 2023-04-17 PROCEDURE — 80061 LIPID PANEL: CPT | Performed by: INTERNAL MEDICINE

## 2023-04-17 PROCEDURE — 36415 COLL VENOUS BLD VENIPUNCTURE: CPT | Performed by: INTERNAL MEDICINE

## 2023-04-20 PROBLEM — J30.2 SEASONAL ALLERGIC RHINITIS: Status: RESOLVED | Noted: 2021-06-25 | Resolved: 2023-04-20

## 2023-04-20 PROBLEM — F41.9 ANXIETY: Status: RESOLVED | Noted: 2021-06-25 | Resolved: 2023-04-20

## 2023-04-20 PROBLEM — I63.9 CEREBELLAR STROKE: Status: RESOLVED | Noted: 2022-07-10 | Resolved: 2023-04-20

## 2023-04-20 PROBLEM — H93.19 RINGING IN EARS: Status: RESOLVED | Noted: 2021-06-25 | Resolved: 2023-04-20

## 2023-04-20 PROBLEM — Z76.89 RETURN TO WORK EVALUATION: Status: RESOLVED | Noted: 2022-11-09 | Resolved: 2023-04-20

## 2023-04-20 PROBLEM — G56.03 BILATERAL CARPAL TUNNEL SYNDROME: Status: RESOLVED | Noted: 2017-08-17 | Resolved: 2023-04-20

## 2023-04-20 PROBLEM — Z00.00 WELL ADULT EXAM: Status: ACTIVE | Noted: 2023-04-20

## 2023-04-21 ENCOUNTER — OFFICE VISIT (OUTPATIENT)
Dept: INTERNAL MEDICINE | Facility: CLINIC | Age: 63
End: 2023-04-21
Payer: COMMERCIAL

## 2023-04-21 ENCOUNTER — TELEPHONE (OUTPATIENT)
Dept: INTERNAL MEDICINE | Facility: CLINIC | Age: 63
End: 2023-04-21

## 2023-04-21 VITALS
BODY MASS INDEX: 32.32 KG/M2 | SYSTOLIC BLOOD PRESSURE: 142 MMHG | DIASTOLIC BLOOD PRESSURE: 82 MMHG | HEIGHT: 58 IN | TEMPERATURE: 98.2 F | OXYGEN SATURATION: 95 % | HEART RATE: 56 BPM | WEIGHT: 154 LBS

## 2023-04-21 DIAGNOSIS — E55.9 VITAMIN D DEFICIENCY: ICD-10-CM

## 2023-04-21 DIAGNOSIS — R73.01 IMPAIRED FASTING GLUCOSE: ICD-10-CM

## 2023-04-21 DIAGNOSIS — F17.200 TOBACCO USE DISORDER: ICD-10-CM

## 2023-04-21 DIAGNOSIS — Z86.73 HISTORY OF STROKE: ICD-10-CM

## 2023-04-21 DIAGNOSIS — I10 ESSENTIAL HYPERTENSION: ICD-10-CM

## 2023-04-21 DIAGNOSIS — F31.62 BIPOLAR DISORDER, CURRENT EPISODE MIXED, MODERATE: ICD-10-CM

## 2023-04-21 DIAGNOSIS — Z00.00 WELL ADULT EXAM: Primary | ICD-10-CM

## 2023-04-21 DIAGNOSIS — E78.2 MIXED HYPERLIPIDEMIA: ICD-10-CM

## 2023-04-21 DIAGNOSIS — R79.1 ELEVATED FACTOR VIII LEVEL: ICD-10-CM

## 2023-04-21 PROBLEM — I69.398 SEIZURE DISORDER AS SEQUELA OF CEREBROVASCULAR ACCIDENT: Status: ACTIVE | Noted: 2023-04-21

## 2023-04-21 PROBLEM — G40.909 SEIZURE DISORDER AS SEQUELA OF CEREBROVASCULAR ACCIDENT: Status: ACTIVE | Noted: 2023-04-21

## 2023-04-21 PROCEDURE — 99396 PREV VISIT EST AGE 40-64: CPT | Performed by: INTERNAL MEDICINE

## 2023-04-21 RX ORDER — MONTELUKAST SODIUM 10 MG/1
10 TABLET ORAL NIGHTLY
Qty: 90 TABLET | Refills: 1 | Status: SHIPPED | OUTPATIENT
Start: 2023-04-21

## 2023-04-21 RX ORDER — OXYBUTYNIN CHLORIDE 10 MG/1
10 TABLET, EXTENDED RELEASE ORAL DAILY
Qty: 90 TABLET | Refills: 1 | Status: SHIPPED | OUTPATIENT
Start: 2023-04-21

## 2023-04-21 RX ORDER — DIVALPROEX SODIUM 500 MG/1
500 TABLET, EXTENDED RELEASE ORAL DAILY
Qty: 90 TABLET | Refills: 1 | Status: SHIPPED | OUTPATIENT
Start: 2023-04-21

## 2023-04-21 NOTE — ASSESSMENT & PLAN NOTE
AGAINST MEDICAL ADVICE, patient wants to discontinue Plavix due to bruising.  She is concerned that she will be in an accident and bleed to death.  Discussed with her that she had multiple strokes last year, and hopes are that the Plavix aspirin combination will keep these from recurring.  If she decides to discontinue the Plavix, she should get on a full-strength aspirin daily.---> Pt saw Neuro last month and I reviewed this eval with her at her 4/23 OV.  She is scheduled for repeat CTAs next month, and has follow-up with them in July.  As noted her LDL is only 40, and blood pressure is controlled presently.  She continues on single agent with Plavix for stroke prevention.

## 2023-04-21 NOTE — TELEPHONE ENCOUNTER
Mailbox is full, unable to contact pt. If pt calls this message can be relayed to her at that time.

## 2023-04-21 NOTE — ASSESSMENT & PLAN NOTE
Patient is on Depakote ER as per her list now, the sprinkles were not available on a consistent basis

## 2023-04-21 NOTE — TELEPHONE ENCOUNTER
Pt is questioning why she has a diagnoses of seizure on her medical records. She states that her work would question that diagnoses and is concerned that if they see that in the records she may lose her job. Please advise.

## 2023-04-21 NOTE — ASSESSMENT & PLAN NOTE
A1c is down from 5.9 to 5.7 as of her 4/23 office visit.  Certainly no treatment indicated still, will monitor this given her associated risks.

## 2023-04-21 NOTE — ASSESSMENT & PLAN NOTE
Chest CT is scheduled next month as of her 4/23 office visit.  She is going to have this done at the same time as her CT angios for neurology.

## 2023-04-21 NOTE — PROGRESS NOTES
"Chief Complaint  Hypertension (Routine follow up, Lab follow up. Pt is unsure what medication is needed. )    Subjective       Audra Montejo presents to [facility]    History of present illness:  Patient is a 63-year-old female with longstanding history of tobacco abuse and medical noncompliance, leaving the hospital AMA on more than one occasion btwn 7/22-9/22, who had at least 4 infarcts over that timeframe, with underlying hypertension and IFG as well as hyperlipidemia, seen 11/22 as a New Patient, who is coming in 4/23 for annual physical. We will review her medications, address compliance, review recent labs, and make further recommendations at that time.    Review of Systems   Constitutional: Negative for appetite change, fatigue and fever.   HENT: Negative for congestion and ear pain.    Eyes: Negative for blurred vision.   Respiratory: Negative for cough, chest tightness, shortness of breath and wheezing.    Cardiovascular: Negative for chest pain, palpitations and leg swelling.   Gastrointestinal: Negative for abdominal pain.   Genitourinary: Negative for difficulty urinating, dysuria and hematuria.   Musculoskeletal: Negative for arthralgias and gait problem.   Skin: Negative for skin lesions.   Neurological: Negative for syncope, memory problem and confusion.   Psychiatric/Behavioral: Negative for self-injury and depressed mood.       Objective   Vital Signs:   /82   Pulse 56   Temp 98.2 °F (36.8 °C)   Ht 147.3 cm (57.99\")   Wt 69.9 kg (154 lb)   SpO2 95%   BMI 32.19 kg/m²           Physical Exam  Vitals and nursing note reviewed.   Constitutional:       General: She is not in acute distress.     Appearance: Normal appearance. She is not toxic-appearing.   HENT:      Head: Atraumatic.      Right Ear: External ear normal.      Left Ear: External ear normal.      Nose: Nose normal.      Mouth/Throat:      Mouth: Mucous membranes are moist.   Eyes:      " General:         Right eye: No discharge.         Left eye: No discharge.      Extraocular Movements: Extraocular movements intact.      Pupils: Pupils are equal, round, and reactive to light.   Cardiovascular:      Rate and Rhythm: Normal rate and regular rhythm.      Pulses: Normal pulses.      Heart sounds: Normal heart sounds. No murmur heard.    No gallop.   Pulmonary:      Effort: Pulmonary effort is normal. No respiratory distress.      Breath sounds: No wheezing, rhonchi or rales.   Abdominal:      General: There is no distension.      Palpations: Abdomen is soft. There is no mass.      Tenderness: There is no abdominal tenderness. There is no guarding.   Musculoskeletal:         General: No swelling or tenderness.      Cervical back: No tenderness.      Right lower leg: No edema.      Left lower leg: No edema.   Skin:     General: Skin is warm and dry.      Findings: No rash.   Neurological:      General: No focal deficit present.      Mental Status: She is alert and oriented to person, place, and time. Mental status is at baseline.      Motor: No weakness.      Gait: Gait normal.   Psychiatric:         Mood and Affect: Mood normal.         Thought Content: Thought content normal.          Result Review   The following data was reviewed by: Lowell Alex MD on 11/02/2022:  [x] Laboratory  [] Microbiology  [x] Radiology  [] EKG/telemetry  [] Cardiology/Vascular  [] Pathology  [x] Old records             Assessment and Plan   Diagnoses and all orders for this visit:    1. Well adult exam (Primary)  Overview:  Preventive measures: were reviewed with the patient at this office visit.  They included but were not limited to discussions in regards to vaccines outstanding, auto safety with seat belts and other assistive devices, fall prevention, and routine screening studies.    Exercise: No ischemic symptoms with her job that is physical, but she does occasionally have to use her inhaler for asthma.  Comprehensive  labs: Reviewed all at her  office visit.    Covid vaccine: Patient declined prior.  Other vaccines: We will need Prevnar 20 in the fall.    MM2023 per me. (Aunt with breast CA)  Colon:  = negative = 10 years per Dr. Clark.      Orders:  -     TSH+Free T4; Future  -     Vitamin B12 anemia; Future  -     Folate anemia; Future  -     CBC & Differential; Future    2. History of stroke  Overview:  H/O ischemic CVA in 2013   Resultant mild cognitive impairment.     MRI 2022:  Acute infarcts involve the bilateral cerebellar hemispheres, larger on the right than the left, as detailed above.  No acute intracranial hemorrhage.    CTA Head/Neck 2022:  1. There is an irregular CTA appearance of the proximal right posterior inferior cerebellar artery (PICA), which may represent atherosclerotic change.  Vasospasm or arteritis would be in the differential diagnosis. Arterial emboli are possible but probably less likely.  Other types of   vasculopathies cannot be excluded.       2. There is also moderate-to-severe short-segment stenosis of the proximal right posterior cerebral artery (PCA), especially centered at its P1 and P2 segment junctions.  This finding may have similar etiologies as to that mentioned above for the findings in the right PICA.      3. No hemodynamically significant carotid stenoses are seen.     ARTHUR 2022:  • Calculated left ventricular EF = 65% Estimated left ventricular EF was in agreement with the calculated left ventricular EF. Left ventricular systolic function is normal.  • Mild mitral regurgitation  • No evidence of thrombi in the left atrium left atrial appendage and the left ventricle  • Bubble study negative      Head CT 7/10/2022:  Evolving subacute cerebellar hemispheric infarcts are noted.  Also, an evolving subacute right posterior, lateral infarct of the medulla oblongata is noted    MRI brain 2022:  5 mm acute right thalamic infarct.    Head CT  1/28/2023:  There is a chronic infarct in the mid to inferior right cerebellar hemisphere.  A chronic lacunar infarct is noted in the mid right arnol. Chronic lacunar infarcts are noted in the posterior limb   of the right internal capsule.  There is no specific CT evidence of acute infarction.  No intracranial hemorrhage or mass is evident.  The ventricles are normal in size and configuration        Assessment & Plan:  AGAINST MEDICAL ADVICE, patient wants to discontinue Plavix due to bruising.  She is concerned that she will be in an accident and bleed to death.  Discussed with her that she had multiple strokes last year, and hopes are that the Plavix aspirin combination will keep these from recurring.  If she decides to discontinue the Plavix, she should get on a full-strength aspirin daily.---> Pt saw Neuro last month and I reviewed this eval with her at her 4/23 OV.  She is scheduled for repeat CTAs next month, and has follow-up with them in July.  As noted her LDL is only 40, and blood pressure is controlled presently.  She continues on single agent with Plavix for stroke prevention.      3. Essential hypertension  Assessment & Plan:  Blood pressure is stable as of 4/23 office visit.  She will continue with full dose amlodipine and just very low-dose candesartan.    Orders:  -     Comprehensive Metabolic Panel; Future    4. Impaired fasting glucose  Assessment & Plan:  A1c is down from 5.9 to 5.7 as of her 4/23 office visit.  Certainly no treatment indicated still, will monitor this given her associated risks.    Orders:  -     Hemoglobin A1c; Future    5. Mixed hyperlipidemia  Assessment & Plan:  LDL of 40 remains easily at goal as of her 4/23 office visit.  She will continue just low to moderate dose atorvastatin as noted.      6. Elevated factor VIII level  Assessment & Plan:  Lab not obtained as read quested, as noted at prior office visit, this is more of an issue with venous thrombosis than arterial in  regards to her stroke, we likely do not require full anticoagulation, but would like to see a follow-up on return to office please.    Orders:  -     Factor 8 Activity; Future    7. Vitamin D deficiency  -     Vitamin D,25-Hydroxy; Future    8. Tobacco use disorder  Overview:  Patient reports that she has been smoking cigarettes.  She has a 30.00 pack-year smoking history.  I have educated her on the risk of diseases from using tobacco products such as cancer, COPD, heart disease, and stroke.      I advised her to quit and she is not willing to quit.    62 y.o. female with a history of bipolar disorder, CVA and hypertension presented with dizziness. Of note, she was recently hospitalized from 7/6 to 7/8/2022 and was found to have bilateral cerebellar infarcts right more than left with high-grade stenosis of PICA. She was evaluated by neurology, but she left AMA because she wanted to smoke.    ---> d/w pt 11/22 that she needs annual chest CT's given continued tobacco use.    Assessment & Plan:  Chest CT is scheduled next month as of her 4/23 office visit.  She is going to have this done at the same time as her CT angios for neurology.      9. Bipolar disorder, current episode mixed, moderate  Overview:  Patient's level was 26 when we checked it earlier this month, so she is stable to continue with low-dose Depakote as written.  We will check it again on return to office in early spring.    Assessment & Plan:  Patient is on Depakote ER as per her list now, the sprinkles were not available on a consistent basis      Orders:  -     Valproic Acid Level, Total; Future    Other orders  -     oxybutynin XL (DITROPAN-XL) 10 MG 24 hr tablet; Take 1 tablet by mouth Daily.  Dispense: 90 tablet; Refill: 1  -     montelukast (SINGULAIR) 10 MG tablet; Take 1 tablet by mouth Every Night.  Dispense: 90 tablet; Refill: 1  -     divalproex (Depakote ER) 500 MG 24 hr tablet; Take 1 tablet by mouth Daily.  Dispense: 90 tablet; Refill:  1  -     Cancel: Valproic Acid Level, Total; Future        Follow Up   Return in about 4 months (around 8/21/2023).    Patient was given instructions and counseling regarding her condition or for health maintenance advice. Please see specific information pulled into the AVS if appropriate.

## 2023-04-21 NOTE — ASSESSMENT & PLAN NOTE
LDL of 40 remains easily at goal as of her 4/23 office visit.  She will continue just low to moderate dose atorvastatin as noted.

## 2023-04-21 NOTE — ASSESSMENT & PLAN NOTE
Blood pressure is stable as of 4/23 office visit.  She will continue with full dose amlodipine and just very low-dose candesartan.

## 2023-04-21 NOTE — ASSESSMENT & PLAN NOTE
Lab not obtained as read quested, as noted at prior office visit, this is more of an issue with venous thrombosis than arterial in regards to her stroke, we likely do not require full anticoagulation, but would like to see a follow-up on return to office please.

## 2023-04-27 RX ORDER — CLOPIDOGREL BISULFATE 75 MG/1
75 TABLET ORAL DAILY
Qty: 90 TABLET | Refills: 1 | Status: SHIPPED | OUTPATIENT
Start: 2023-04-27

## 2023-05-05 RX ORDER — AMLODIPINE BESYLATE 10 MG/1
10 TABLET ORAL DAILY
Qty: 90 TABLET | Refills: 1 | Status: SHIPPED | OUTPATIENT
Start: 2023-05-05

## 2023-05-11 ENCOUNTER — HOSPITAL ENCOUNTER (OUTPATIENT)
Dept: CT IMAGING | Facility: HOSPITAL | Age: 63
Discharge: HOME | End: 2023-05-11
Payer: COMMERCIAL

## 2023-05-11 DIAGNOSIS — I67.9 INTRACRANIAL VASCULAR STENOSIS: ICD-10-CM

## 2023-05-11 DIAGNOSIS — F17.200 TOBACCO USE DISORDER: ICD-10-CM

## 2023-05-11 DIAGNOSIS — Z86.73 HISTORY OF STROKE: ICD-10-CM

## 2023-05-11 PROCEDURE — 70498 CT ANGIOGRAPHY NECK: CPT

## 2023-05-11 PROCEDURE — 70496 CT ANGIOGRAPHY HEAD: CPT

## 2023-05-11 PROCEDURE — 25510000001 IOPAMIDOL PER 1 ML: Performed by: NURSE PRACTITIONER

## 2023-05-11 PROCEDURE — 71271 CT THORAX LUNG CANCER SCR C-: CPT

## 2023-05-11 RX ADMIN — IOPAMIDOL 100 ML: 755 INJECTION, SOLUTION INTRAVENOUS at 12:06

## 2023-05-12 ENCOUNTER — TELEPHONE (OUTPATIENT)
Dept: NEUROLOGY | Facility: CLINIC | Age: 63
End: 2023-05-12
Payer: COMMERCIAL

## 2023-05-12 NOTE — TELEPHONE ENCOUNTER
Very small outpouching.  Doesn't really even meet criteria for aneurysm. Should not be causing her symptoms.  We will continue to monitor

## 2023-05-12 NOTE — TELEPHONE ENCOUNTER
Caller: Audra Montejo    Relationship: Self    Best call back number: [phone]    Caller requesting test results: CT    What test was performed: CT SCAN    When was the test performed: 5/11/23    Where was the test performed: [facility]    Additional notes: PT CALLED TO SEE IF SOMEONE COULD CALL AND GIVE HER MORE DETAILS REGARDING HER RESULTS FROM THE CT SHE HAD. SHE SAW IT SAID 2 MM BULGE OR ANEURISM. SHE IS CONCERNED ABOUT THIS FINDING AND WHAT'S TO KNOW THE SEVERITY. SHE ALSO SAID WHEN SHE IS AT WORK SHE LIFTS 30-40LBS A NIGHT AND WHEN SHE EXERTS HERSELF SHE GETS PAINS IN HER HEAD AND WHAT'S TO KNOW IF THIS IS WHAT COULD BE CAUSING THAT.     PLEASE REVIEW AND ADVISE  THANK YOU

## 2023-05-14 ENCOUNTER — APPOINTMENT (OUTPATIENT)
Dept: GENERAL RADIOLOGY | Facility: HOSPITAL | Age: 63
DRG: 69 | End: 2023-05-14
Payer: COMMERCIAL

## 2023-05-14 ENCOUNTER — HOSPITAL ENCOUNTER (INPATIENT)
Facility: HOSPITAL | Age: 63
LOS: 1 days | Discharge: LEFT BEFORE DISCHARGE | DRG: 69 | End: 2023-05-15
Attending: EMERGENCY MEDICINE | Admitting: FAMILY MEDICINE
Payer: COMMERCIAL

## 2023-05-14 DIAGNOSIS — R26.81 UNSTEADY GAIT: Primary | ICD-10-CM

## 2023-05-14 DIAGNOSIS — H53.2 DOUBLE VISION: ICD-10-CM

## 2023-05-14 DIAGNOSIS — I63.9 CEREBROVASCULAR ACCIDENT (CVA), UNSPECIFIED MECHANISM: ICD-10-CM

## 2023-05-14 LAB
ALBUMIN SERPL-MCNC: 3.9 G/DL (ref 3.5–5.2)
ALBUMIN/GLOB SERPL: 1.4 G/DL
ALP SERPL-CCNC: 122 U/L (ref 39–117)
ALT SERPL W P-5'-P-CCNC: 11 U/L (ref 1–33)
ANION GAP SERPL CALCULATED.3IONS-SCNC: 10.5 MMOL/L (ref 5–15)
AST SERPL-CCNC: 12 U/L (ref 1–32)
BASOPHILS # BLD AUTO: 0.04 10*3/MM3 (ref 0–0.2)
BASOPHILS NFR BLD AUTO: 0.4 % (ref 0–1.5)
BILIRUB SERPL-MCNC: 0.2 MG/DL (ref 0–1.2)
BILIRUB UR QL STRIP: NEGATIVE
BUN SERPL-MCNC: 17 MG/DL (ref 8–23)
BUN/CREAT SERPL: 24.3 (ref 7–25)
CALCIUM SPEC-SCNC: 9.1 MG/DL (ref 8.6–10.5)
CHLORIDE SERPL-SCNC: 106 MMOL/L (ref 98–107)
CLARITY UR: CLEAR
CO2 SERPL-SCNC: 23.5 MMOL/L (ref 22–29)
COLOR UR: ABNORMAL
CREAT SERPL-MCNC: 0.7 MG/DL (ref 0.57–1)
DEPRECATED RDW RBC AUTO: 42.4 FL (ref 37–54)
EGFRCR SERPLBLD CKD-EPI 2021: 98.5 ML/MIN/1.73
EOSINOPHIL # BLD AUTO: 0.27 10*3/MM3 (ref 0–0.4)
EOSINOPHIL NFR BLD AUTO: 2.7 % (ref 0.3–6.2)
ERYTHROCYTE [DISTWIDTH] IN BLOOD BY AUTOMATED COUNT: 13.1 % (ref 12.3–15.4)
GLOBULIN UR ELPH-MCNC: 2.8 GM/DL
GLUCOSE SERPL-MCNC: 113 MG/DL (ref 65–99)
GLUCOSE UR STRIP-MCNC: NEGATIVE MG/DL
HCT VFR BLD AUTO: 40.8 % (ref 34–46.6)
HGB BLD-MCNC: 14.1 G/DL (ref 12–15.9)
HGB UR QL STRIP.AUTO: NEGATIVE
HOLD SPECIMEN: NORMAL
HOLD SPECIMEN: NORMAL
IMM GRANULOCYTES # BLD AUTO: 0.03 10*3/MM3 (ref 0–0.05)
IMM GRANULOCYTES NFR BLD AUTO: 0.3 % (ref 0–0.5)
KETONES UR QL STRIP: NEGATIVE
LEUKOCYTE ESTERASE UR QL STRIP.AUTO: NEGATIVE
LYMPHOCYTES # BLD AUTO: 4.4 10*3/MM3 (ref 0.7–3.1)
LYMPHOCYTES NFR BLD AUTO: 44.5 % (ref 19.6–45.3)
MAGNESIUM SERPL-MCNC: 2 MG/DL (ref 1.6–2.4)
MCH RBC QN AUTO: 30.5 PG (ref 26.6–33)
MCHC RBC AUTO-ENTMCNC: 34.6 G/DL (ref 31.5–35.7)
MCV RBC AUTO: 88.3 FL (ref 79–97)
MONOCYTES # BLD AUTO: 0.81 10*3/MM3 (ref 0.1–0.9)
MONOCYTES NFR BLD AUTO: 8.2 % (ref 5–12)
NEUTROPHILS NFR BLD AUTO: 4.33 10*3/MM3 (ref 1.7–7)
NEUTROPHILS NFR BLD AUTO: 43.9 % (ref 42.7–76)
NITRITE UR QL STRIP: NEGATIVE
NRBC BLD AUTO-RTO: 0 /100 WBC (ref 0–0.2)
PH UR STRIP.AUTO: 5.5 [PH] (ref 5–8)
PLATELET # BLD AUTO: 239 10*3/MM3 (ref 140–450)
PMV BLD AUTO: 11.2 FL (ref 6–12)
POTASSIUM SERPL-SCNC: 3.8 MMOL/L (ref 3.5–5.2)
PROT SERPL-MCNC: 6.7 G/DL (ref 6–8.5)
PROT UR QL STRIP: ABNORMAL
RBC # BLD AUTO: 4.62 10*6/MM3 (ref 3.77–5.28)
SODIUM SERPL-SCNC: 140 MMOL/L (ref 136–145)
SP GR UR STRIP: 1.02 (ref 1–1.03)
TROPONIN T SERPL HS-MCNC: <6 NG/L
UROBILINOGEN UR QL STRIP: ABNORMAL
WBC NRBC COR # BLD: 9.88 10*3/MM3 (ref 3.4–10.8)
WHOLE BLOOD HOLD COAG: NORMAL
WHOLE BLOOD HOLD SPECIMEN: NORMAL

## 2023-05-14 PROCEDURE — 99285 EMERGENCY DEPT VISIT HI MDM: CPT

## 2023-05-14 PROCEDURE — 83735 ASSAY OF MAGNESIUM: CPT

## 2023-05-14 PROCEDURE — 71045 X-RAY EXAM CHEST 1 VIEW: CPT

## 2023-05-14 PROCEDURE — 80053 COMPREHEN METABOLIC PANEL: CPT

## 2023-05-14 PROCEDURE — 80164 ASSAY DIPROPYLACETIC ACD TOT: CPT | Performed by: EMERGENCY MEDICINE

## 2023-05-14 PROCEDURE — 93005 ELECTROCARDIOGRAM TRACING: CPT

## 2023-05-14 PROCEDURE — 36415 COLL VENOUS BLD VENIPUNCTURE: CPT

## 2023-05-14 PROCEDURE — 84484 ASSAY OF TROPONIN QUANT: CPT

## 2023-05-14 PROCEDURE — 81003 URINALYSIS AUTO W/O SCOPE: CPT

## 2023-05-14 PROCEDURE — 93005 ELECTROCARDIOGRAM TRACING: CPT | Performed by: EMERGENCY MEDICINE

## 2023-05-14 PROCEDURE — 85025 COMPLETE CBC W/AUTO DIFF WBC: CPT

## 2023-05-14 RX ORDER — SODIUM CHLORIDE 0.9 % (FLUSH) 0.9 %
10 SYRINGE (ML) INJECTION AS NEEDED
Status: DISCONTINUED | OUTPATIENT
Start: 2023-05-14 | End: 2023-05-15 | Stop reason: HOSPADM

## 2023-05-15 ENCOUNTER — APPOINTMENT (OUTPATIENT)
Dept: CT IMAGING | Facility: HOSPITAL | Age: 63
DRG: 69 | End: 2023-05-15
Payer: COMMERCIAL

## 2023-05-15 ENCOUNTER — READMISSION MANAGEMENT (OUTPATIENT)
Dept: CALL CENTER | Facility: HOSPITAL | Age: 63
End: 2023-05-15
Payer: COMMERCIAL

## 2023-05-15 VITALS
RESPIRATION RATE: 16 BRPM | WEIGHT: 155.65 LBS | HEIGHT: 58 IN | SYSTOLIC BLOOD PRESSURE: 135 MMHG | BODY MASS INDEX: 32.67 KG/M2 | HEART RATE: 52 BPM | OXYGEN SATURATION: 95 % | TEMPERATURE: 97.7 F | DIASTOLIC BLOOD PRESSURE: 47 MMHG

## 2023-05-15 PROBLEM — G45.9 TIA (TRANSIENT ISCHEMIC ATTACK): Status: ACTIVE | Noted: 2023-05-15

## 2023-05-15 LAB
CHOLEST SERPL-MCNC: 121 MG/DL (ref 0–200)
GLUCOSE BLDC GLUCOMTR-MCNC: 92 MG/DL (ref 70–99)
HBA1C MFR BLD: 5.8 % (ref 4.8–5.6)
HDLC SERPL-MCNC: 76 MG/DL (ref 40–60)
LDLC SERPL CALC-MCNC: 32 MG/DL (ref 0–100)
LDLC/HDLC SERPL: 0.43 {RATIO}
QT INTERVAL: 436 MS
TRIGL SERPL-MCNC: 61 MG/DL (ref 0–150)
VALPROATE SERPL-MCNC: 40.1 MCG/ML (ref 50–125)
VLDLC SERPL-MCNC: 13 MG/DL (ref 5–40)

## 2023-05-15 PROCEDURE — 82948 REAGENT STRIP/BLOOD GLUCOSE: CPT

## 2023-05-15 PROCEDURE — 99238 HOSP IP/OBS DSCHRG MGMT 30/<: CPT | Performed by: HOSPITALIST

## 2023-05-15 PROCEDURE — 94761 N-INVAS EAR/PLS OXIMETRY MLT: CPT

## 2023-05-15 PROCEDURE — 83036 HEMOGLOBIN GLYCOSYLATED A1C: CPT | Performed by: FAMILY MEDICINE

## 2023-05-15 PROCEDURE — 80061 LIPID PANEL: CPT | Performed by: FAMILY MEDICINE

## 2023-05-15 PROCEDURE — 94799 UNLISTED PULMONARY SVC/PX: CPT

## 2023-05-15 PROCEDURE — 70450 CT HEAD/BRAIN W/O DYE: CPT

## 2023-05-15 PROCEDURE — 92610 EVALUATE SWALLOWING FUNCTION: CPT

## 2023-05-15 RX ORDER — ATORVASTATIN CALCIUM 40 MG/1
80 TABLET, FILM COATED ORAL NIGHTLY
Status: DISCONTINUED | OUTPATIENT
Start: 2023-05-15 | End: 2023-05-15 | Stop reason: HOSPADM

## 2023-05-15 RX ORDER — ASPIRIN 300 MG/1
300 SUPPOSITORY RECTAL DAILY
Status: DISCONTINUED | OUTPATIENT
Start: 2023-05-15 | End: 2023-05-15 | Stop reason: HOSPADM

## 2023-05-15 RX ORDER — SODIUM CHLORIDE 0.9 % (FLUSH) 0.9 %
10 SYRINGE (ML) INJECTION EVERY 12 HOURS SCHEDULED
Status: DISCONTINUED | OUTPATIENT
Start: 2023-05-15 | End: 2023-05-15 | Stop reason: HOSPADM

## 2023-05-15 RX ORDER — SODIUM CHLORIDE 9 MG/ML
40 INJECTION, SOLUTION INTRAVENOUS AS NEEDED
Status: DISCONTINUED | OUTPATIENT
Start: 2023-05-15 | End: 2023-05-15 | Stop reason: HOSPADM

## 2023-05-15 RX ORDER — HYDROCODONE BITARTRATE AND ACETAMINOPHEN 5; 325 MG/1; MG/1
1 TABLET ORAL EVERY 4 HOURS PRN
Status: DISCONTINUED | OUTPATIENT
Start: 2023-05-15 | End: 2023-05-15 | Stop reason: HOSPADM

## 2023-05-15 RX ORDER — SODIUM CHLORIDE 0.9 % (FLUSH) 0.9 %
10 SYRINGE (ML) INJECTION AS NEEDED
Status: DISCONTINUED | OUTPATIENT
Start: 2023-05-15 | End: 2023-05-15 | Stop reason: HOSPADM

## 2023-05-15 RX ORDER — ASPIRIN 81 MG/1
81 TABLET, CHEWABLE ORAL DAILY
Status: DISCONTINUED | OUTPATIENT
Start: 2023-05-15 | End: 2023-05-15 | Stop reason: HOSPADM

## 2023-05-15 NOTE — H&P
"[facility]   HISTORY AND PHYSICAL    Patient Name: Audra Montejo  : 1960  MRN: [id number]  Primary Care Physician:  Lowell Alex MD  Date of admission: 2023    Subjective   Subjective     Chief Complaint: *Double vision weakness    History of Present Illness  Patient is a 63-year-old female with past medical history of multiple medical problems, please see past medical history.  She comes into the emergency department with complaints of vertical double vision, weakness and unsteady gait listing to the left patient says that she has had some weakness and unsteadiness on the left side for several days.  She said she was driving today when she began having double vision \"seeing things stacked on top of each other\".  Patient says that this has not gone away.  She denies any difficulties with speech or swallowing.  Also she denies any chest pain, shortness of breath, cough, chest congestion, fever, chills, abdominal pain, nausea vomiting or diarrhea, edema or other associated symptoms.  Upon further questioning the patient also reports that she is had some numbness in the left side of her body as well.  Here in the ER a CT scan was obtained which showed old infarcts in the right greater than left cerebellar hemispheres.  No acute findings were noted however  Review of Systems   Neurological: Positive for weakness and numbness.   All other systems reviewed and are negative.       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   • Acid reflux disease    • Allergic Unknown   • Allergy     unspecified   • Anxiety    • Asthma Unknown   • Back pain    • Bilateral carpal tunnel syndrome 2017   • Bipolar disorder    • Bursitis of left shoulder 2016   • Cataract Unknown   • Depression    • Eczema    • Gall stones    • H/O psychiatric care    • Heart attack    • Heart murmur    • Hypertension    • Incomplete tear of left rotator cuff 2017   • " Left shoulder pain    • Loss of appetite    • Lumbago 05/08/2015    low back pain    • Memory loss     forgetfulness   • Migraine headache    • Need for hepatitis C screening test    • Palpitation    • Ringing in ears    • Seasonal allergies    • Shortness of breath    • Sinus trouble     unspecified    • Stroke (cerebrum)    • Subacromial impingement, left 10/13/2016   • Superior glenoid labrum lesion of left shoulder 10/13/2016    subsequent encounter   • Trouble swallowing        Past Surgical History:   Procedure Laterality Date   • APPENDECTOMY     • CARPAL TUNNEL RELEASE     • CHOLECYSTECTOMY     • COLONOSCOPY  2014   • ENDOSCOPY  2014   • HYSTERECTOMY  1999   • SHOULDER SURGERY Left 10/03/2016    arthroscopic, left shoulder scope, RTC repair- Dr. Flores       Family History: family history includes Arthritis in her mother, sister, and son; Cancer in her father, sister, and son; Diabetes in her brother, mother, and sister; Heart disease in her brother, father, sister, and son; Hypertension in an other family member; Osteoporosis in her son; Other in her son; Rheum arthritis in an other family member; Stroke in her mother and son. Otherwise pertinent FHx was reviewed and not pertinent to current issue.    Social History:  reports that she has been smoking cigarettes. She has a 30.00 pack-year smoking history. She has never used smokeless tobacco. She reports that she does not currently use alcohol. She reports that she does not use drugs.    Home Medications:  albuterol, albuterol sulfate HFA, amLODIPine, atorvastatin, candesartan, clopidogrel, divalproex, montelukast, oxybutynin XL, and traMADol    Allergies:  Allergies   Allergen Reactions   • Lisinopril Unknown - High Severity     Pt states it almost killed her last time she was on this medication    • Doxazosin Other (See Comments)     Feeling like she was going to pass out   • Tape Itching       Objective    Objective     Vitals:   Temp:  [98.6 °F (37  °C)] 98.6 °F (37 °C)  Heart Rate:  [58-72] 72  Resp:  [18] 18  BP: ()/(59-79) 95/79    Physical Exam  Constitutional:  Well-developed and well-nourished.  No acute distress.      HENT:  Head:  Normocephalic and atraumatic.  Mouth:  Moist mucous membranes.    Eyes:  Conjunctivae and EOM are normal. No scleral icterus.    Neck:  Neck supple.  No JVD present.    Cardiovascular:  Normal rate, regular rhythm and normal heart sounds with no murmur.  Pulmonary/Chest:  No respiratory distress, no wheezes, no crackles, with normal breath sounds and good air movement.  Abdominal:  Soft. No distension and no tenderness.   Musculoskeletal:  No tenderness, and no deformity.  No red or swollen joints anywhere.    Neurological:  Alert and oriented to person, place, and time.  No cranial nerve deficit.  Plus 4 out of 5 strength in the left upper and lower extremity  Skin:  Skin is warm and dry. No rash noted. No pallor.   Peripheral vascular:  No clubbing, no cyanosis, no edema.  Psychiatric: Appropriate mood and affect       Result Review    Result Review:  I have personally reviewed the results from the time of this admission to 5/15/2023 02:59 EDT and agree with these findings:  [x]  Laboratory list / accordion  []  Microbiology  [x]  Radiology  []  EKG/Telemetry   []  Cardiology/Vascular   []  Pathology  []  Old records  []  Other:  Most notable findings include: Encephalomalacia involves the inferior right, greater than   left, cerebellar hemispheres and suggests old infarcts, such as involving the right posterior   inferior cerebellar artery (PICA) territory      Assessment & Plan   Assessment / Plan     Brief Patient Summary:  Audra Montejo is a 63 y.o. female who presented with several neurologic complaints including double vision, left-sided weakness.  She was admitted for CVA rule out    Active Hospital Problem    1.  TIA history of old cerebellar infarcts  2.  Diplopia  3.  Residual left-sided weakness  4.   History of cataracts  5.  Bipolar disorder  6.  History of dysphagia    Plan:   Admit the patient to the hospital service  At this point I am unsure of what is old versus what is new as far as neurologic symptoms  We will obtain MRI of the brain without contrast  Consult neurology in the a.m. for their evaluation on how to proceed with any other definitive testing  Obtain 2D echo  Reviewed patient's home medications to ensure maximum medical treatment including aspirin and statin  If no improvement in ocular symptoms appointment with ophthalmologist either consulted inpatient or follow-up outpatient should be considered  Patient will be placed on the TIA/CVA pathway  Obtain bilateral carotid Dopplers    DVT prophylaxis:   subcu heparin    CODE STATUS:    Code Status (Patient has no pulse and is not breathing): CPR (Attempt to Resuscitate)  Medical Interventions (Patient has pulse or is breathing): Full Support  Release to patient: Routine Release    Admission Status:  I believe this patient meets inpatient* status.    Naveen Sheridan,

## 2023-05-15 NOTE — THERAPY EVALUATION
Acute Care - Speech Language Pathology   Swallow Initial Evaluation BRITTANY Chen     Patient Name: Audra Montejo  : 1960  MRN: [id number]  Today's Date: 5/15/2023               Admit Date: 2023    Visit Dx:     ICD-10-CM ICD-9-CM   1. Unsteady gait  R26.81 781.2   2. Double vision  H53.2 368.2   3. 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   • Acid reflux disease    • Allergic Unknown   • Allergy     unspecified   • Anxiety    • Asthma Unknown   • Back pain    • Bilateral carpal tunnel syndrome 2017   • Bipolar disorder    • Bursitis of left shoulder 2016   • Cataract Unknown   • Depression    • Eczema    • Gall stones    • H/O psychiatric care    • Heart attack    • Heart murmur    • Hypertension    • Incomplete tear of left rotator cuff 2017   • Left shoulder pain    • Loss of appetite    • Lumbago 2015    low back pain    • Memory loss     forgetfulness   • Migraine headache    • Need for hepatitis C screening test    • Palpitation    • Ringing in ears    • Seasonal allergies    • Shortness of breath    • Sinus trouble     unspecified    • Stroke (cerebrum)    • Subacromial impingement, left 10/13/2016   • Superior glenoid labrum lesion of left shoulder 10/13/2016    subsequent encounter   • Trouble swallowing      Past Surgical History:   Procedure Laterality Date   • APPENDECTOMY     • CARPAL TUNNEL RELEASE     •  CHOLECYSTECTOMY     • COLONOSCOPY  2014   • ENDOSCOPY  2014   • HYSTERECTOMY  1999   • SHOULDER SURGERY Left 10/03/2016    arthroscopic, left shoulder scope, RTC repair- Dr. Flores       PAIN SCALE: None noted    PRECAUTIONS/CONTRAINDICATIONS: None noted    SUSPECTED ABUSE/NEGLECT/EXPLOITATION: None noted    SOCIAL/PSYCHOLOGICAL NEEDS/BARRIERS: None noted    PAST SOCIAL HISTORY: Lives at home    PRIOR FUNCTION: Independent    PATIENT GOALS/EXPECTATIONS: Did not report    HISTORY: This patient is a 63-year-old female admitted with the above diagnosis.  Patient had prior modified barium swallow in July 2022 which was negative for aspiration.    CURRENT DIET LEVEL: Regular healthy heart diet    OBJECTIVE:    TEST ADMINISTERED: Clinical dysphagia evaluation    COGNITION/SAFETY AWARENESS: Alert and oriented    BEHAVIORAL OBSERVATIONS: Pleasant cooperative    ORAL MOTOR EXAM: Lingual and labial strength and range of motion within functional limits    VOICE QUALITY: Within normal limits    REFLEX EXAM: Deferred    POSTURE: 90 degrees upright    FEEDING/SWALLOWING FUNCTION: Assessed with full range of consistencies with thin liquids from spoon cup and straw, purée consistencies soft and hard solids    CLINICAL OBSERVATIONS: Oral stage is characterized by good bolus preparation control.  Timely oral transit.  Pharyngeal phase is timely with good laryngeal elevation per palpation.  No clinical signs or symptoms of aspiration noted.  Vocal quality remained clear to auscultation.  Denies globus sensation after completion of swallow.    DYSPHAGIA CRITERIA: N/A    FUNCTIONAL ASSESSMENT INSTRUMENT: Patient currently scored a level 7 of 7 on Functional Communication Measures for swallowing indicating a 0% limitation in function.    ASSESSMENT/ PLAN OF CARE:  Pt presents with functional oropharyngeal swallow.  No clinical signs or symptoms of aspiration noted.  Vocal quality remained clear to auscultation.    REHAB POTENTIAL:  Pt  has good rehab potential.  The following limitations may influence improvement/ length of tx medical status.    RECOMMENDATIONS:   1.   DIET: Regular diet-thin liquids    2.  POSITION: 90 degrees upright    3.  COMPENSATORY STRATEGIES: General swallow precautions    YES: Pt/responsible party agrees with plan of care and has been informed of all alternatives, risks and benefits.      EDUCATION  The patient has been educated in the following areas:   Dysphagia (Swallowing Impairment).          Donita Brar, SLP  5/15/2023

## 2023-05-15 NOTE — ED PROVIDER NOTES
"Time: 11:09 PM EDT  Date of encounter:  5/14/2023  Independent Historian/Clinical History and Information was obtained by:   Patient  Chief Complaint   Patient presents with   • Headache   • Dizziness   • Weakness - Generalized       History is limited by: N/A    History of Present Illness:  Patient is a 63 y.o. year old female who presents to the emergency department for evaluation of double vision and weakness. Patient reports while driving today she started seeing double turn signals and headlights, and she reports double vision since then. She also reports weakness and feeling off balance on her left side for the past 3 days. She is unsure if she has had unilateral weakness. She notes intermittent headaches over the past week at work as well, but she denies headache at this time. She also notes confusion at work; she states she has been \"getting real lost.\" Patient has hx of stroke. 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   • Acid reflux disease    • Allergic Unknown   • Allergy     unspecified   • Anxiety    • Asthma Unknown   • Back pain    • Bilateral carpal tunnel syndrome 08/17/2017   • Bipolar disorder    • Bursitis of left shoulder 08/09/2016   • Cataract Unknown   • Depression    • Eczema    • Gall stones    • H/O psychiatric care    • Heart attack    • Heart murmur    • Hypertension    • Incomplete tear of left rotator cuff 01/25/2017   • Left shoulder pain    • Loss of appetite    • Lumbago 05/08/2015    low back pain    • Memory loss     forgetfulness   • Migraine headache    • Need for hepatitis C " screening test    • Palpitation    • Ringing in ears    • Seasonal allergies    • Shortness of breath    • Sinus trouble     unspecified    • Stroke (cerebrum)    • Subacromial impingement, left 10/13/2016   • Superior glenoid labrum lesion of left shoulder 10/13/2016    subsequent encounter   • Trouble swallowing      Past Surgical History:   Procedure Laterality Date   • APPENDECTOMY     • CARPAL TUNNEL RELEASE     • CHOLECYSTECTOMY     • COLONOSCOPY  2014   • ENDOSCOPY  2014   • HYSTERECTOMY  1999   • SHOULDER SURGERY Left 10/03/2016    arthroscopic, left shoulder scope, RTC repair- Dr. Flores     Family History   Problem Relation Age of Onset   • Stroke Mother    • Diabetes Mother    • Arthritis Mother    • Heart disease Father    • Cancer Father    • Heart disease Sister    • Cancer Sister    • Diabetes Sister    • Arthritis Sister    • Heart disease Brother    • Diabetes Brother    • Hypertension Other    • Rheum arthritis Other    • Stroke Son    • Heart disease Son    • Cancer Son    • Other Son         blood disease   • Arthritis Son    • Osteoporosis Son        Home Medications:  Prior to Admission medications    Medication Sig Start Date End Date Taking? Authorizing Provider   albuterol (ACCUNEB) 1.25 MG/3ML nebulizer solution Take 3 mL by nebulization Every 6 (Six) Hours As Needed for Wheezing. 1/16/23   Gaviota Peter APRN   albuterol sulfate  (90 Base) MCG/ACT inhaler Inhale 2 puffs Every 6 (Six) Hours As Needed for Wheezing. 1/16/23   Gaviota Peter APRN   amLODIPine (NORVASC) 10 MG tablet TAKE 1 TABLET BY MOUTH DAILY 5/5/23   Lowell Alex MD   atorvastatin (LIPITOR) 20 MG tablet TAKE 1 TABLET BY MOUTH DAILY 2/2/23   Lowell Alex MD   candesartan (ATACAND) 4 MG tablet Take 1 tablet by mouth Daily. 2/27/23   Provider, MD Israel   clopidogrel (PLAVIX) 75 MG tablet TAKE 1 TABLET BY MOUTH DAILY 4/27/23   Lowell Alex MD   divalproex (Depakote ER) 500 MG 24 hr tablet Take 1  "tablet by mouth Daily. 4/21/23   Lowell Alex MD   montelukast (SINGULAIR) 10 MG tablet Take 1 tablet by mouth Every Night. 4/21/23   Lowell Alex MD   oxybutynin XL (DITROPAN-XL) 10 MG 24 hr tablet Take 1 tablet by mouth Daily. 4/21/23   Lowell Alex MD   traMADol (ULTRAM) 50 MG tablet 1 to 2 tablets up to 3 times a day as needed for right breast pain and for chronic back pain. 2/23/23   Lowell Alex MD        Social History:   Social History     Tobacco Use   • Smoking status: Every Day     Packs/day: 1.00     Years: 30.00     Pack years: 30.00     Types: Cigarettes   • Smokeless tobacco: Never   • Tobacco comments:     Not your business   Vaping Use   • Vaping Use: Never used   Substance Use Topics   • Alcohol use: Not Currently     Comment: rarely drinks   • Drug use: Never         Review of Systems:  Review of Systems   Constitutional: Negative for chills and fever.   HENT: Negative for congestion, ear pain and sore throat.    Eyes: Positive for visual disturbance (double vision). Negative for pain.   Respiratory: Negative for cough, chest tightness and shortness of breath.    Cardiovascular: Negative for chest pain.   Gastrointestinal: Negative for abdominal pain, diarrhea, nausea and vomiting.   Genitourinary: Negative for flank pain and hematuria.   Musculoskeletal: Positive for gait problem (off balance on left side). Negative for joint swelling.   Skin: Negative for pallor.   Neurological: Positive for weakness and headaches. Negative for seizures.   Psychiatric/Behavioral: Positive for confusion.   All other systems reviewed and are negative.       Physical Exam:  /49 (BP Location: Left arm, Patient Position: Lying)   Pulse (!) 45   Temp 97.6 °F (36.4 °C) (Oral)   Resp 18   Ht 147.3 cm (58\")   Wt 70.6 kg (155 lb 10.3 oz)   LMP  (LMP Unknown)   SpO2 96%   BMI 32.53 kg/m²     Physical Exam  Vitals and nursing note reviewed.   Constitutional:       General: She is not in acute " distress.     Appearance: Normal appearance. She is not toxic-appearing.   HENT:      Head: Normocephalic and atraumatic.      Mouth/Throat:      Mouth: Mucous membranes are moist.   Eyes:      General: No scleral icterus.  Cardiovascular:      Rate and Rhythm: Normal rate and regular rhythm.      Pulses: Normal pulses.      Heart sounds: Normal heart sounds.   Pulmonary:      Effort: Pulmonary effort is normal. No respiratory distress.      Breath sounds: Normal breath sounds.   Abdominal:      General: Abdomen is flat.      Palpations: Abdomen is soft.      Tenderness: There is no abdominal tenderness.   Musculoskeletal:         General: Normal range of motion.      Cervical back: Normal range of motion and neck supple.   Skin:     General: Skin is warm and dry.   Neurological:      Mental Status: She is alert and oriented to person, place, and time. Mental status is at baseline.      Sensory: Sensory deficit (subjective decreased sensation to LLE) present.      Coordination: Finger-Nose-Finger Test abnormal (difficulty BL).                  Procedures:  Procedures      Medical Decision Making:      Comorbidities that affect care:    Asthma, Hypertension, Smoking    External Notes reviewed:    Previous Clinic Note: Patient seen by her primary physician for well adult exam on 4/21/2023      The following orders were placed and all results were independently analyzed by me:  Orders Placed This Encounter   Procedures   • XR Chest 1 View   • CT Head Without Contrast   • MRI Brain Without Contrast   • [place] Draw   • Comprehensive Metabolic Panel   • Single High Sensitivity Troponin T   • Magnesium   • Urinalysis With Culture If Indicated -   • CBC Auto Differential   • Valproic Acid Level, Total   • Hemoglobin A1c   • Lipid Panel   • Diet: Cardiac Diets; Healthy Heart (2-3 Na+); Texture: Regular Texture (IDDSI 7); Fluid Consistency: Thin (IDDSI 0)   • Undress & Gown   • Vital Signs   • Orthostatic Blood Pressure   •  Vital Signs   • Pulse Oximetry, Continuous   • Cardiac Monitoring   • Notify Provider   • Nursing Dysphagia Screening (Complete Prior to Giving Anything By Mouth)   • RN to Place Order SLP Consult - Eval & Treat Choosing Reason of RN Dysphagia Screen Failed   • Nurse to Call MD or Nutrition Services for Diet if Patient Passes Dysphagia Screen   • Intake and Output   • Neuro Checks   • NIHSS Assessment   • Order CT Head Without Contrast for Neurological Decline   • Provide Stroke Education Material   • Saline Lock & Maintain IV Access   • Place Sequential Compression Device   • Maintain Sequential Compression Device   • Activity - Strict Bed Rest with HOB Flat   • Code Status and Medical Interventions:   • Inpatient Hospitalist Consult   • Inpatient Case Management  Consult   • Inpatient Diabetes Educator Consult   • Inpatient Neurology Consult Stroke   • OT Consult: Eval & Treat   • PT Consult: Eval & Treat As Tolerated   • Oxygen Therapy- Nasal Cannula; 2 LPM; Titrate for SPO2: 92%, Greater Than or Equal To   • SLP Consult: Eval & Treat Communication Disorder   • POC Glucose Once   • POC Glucose Once   • ECG 12 Lead ED Triage Standing Order; Weak / Dizzy / AMS   • Adult Transthoracic Echo Complete W/ Cont if Necessary Per Protocol (With Agitated Saline)   • Insert Peripheral IV   • Insert Peripheral IV   • Inpatient Admission   • Fall Precautions   • CBC & Differential   • Green Top (Gel)   • Lavender Top   • Gold Top - SST   • Light Blue Top       Medications Given in the Emergency Department:  Medications   sodium chloride 0.9 % flush 10 mL (has no administration in time range)   sodium chloride 0.9 % flush 10 mL (has no administration in time range)   sodium chloride 0.9 % flush 10 mL (has no administration in time range)   sodium chloride 0.9 % infusion 40 mL (has no administration in time range)   atorvastatin (LIPITOR) tablet 80 mg (has no administration in time range)   aspirin chewable tablet  81 mg (has no administration in time range)     Or   aspirin suppository 300 mg (has no administration in time range)   HYDROcodone-acetaminophen (NORCO) 5-325 MG per tablet 1 tablet (has no administration in time range)        ED Course:    The patient was initially evaluated in the triage area where orders were placed. The patient was later dispositioned by Lacy Morales MD.      The patient was advised to stay for completion of workup which includes but is not limited to communication of labs and radiological results, reassessment and plan. The patient was advised that leaving prior to disposition by a provider could result in critical findings that are not communicated to the patient.     ED Course as of 05/15/23 0725   Mon May 15, 2023   0713 ECG 12 Lead ED Triage Standing Order; Weak / Dizzy / AMS  Normal sinus rhythm with rate of 53. QRS normal. MS interval normal. QTc interval is normal. No ST elevation or depression. No T wave abnormalities. This EKG was interpreted by me.  [LD]      ED Course User Index  [LD] Lacy Morales MD       Labs:    Lab Results (last 24 hours)     Procedure Component Value Units Date/Time    CBC & Differential [[id number]]  (Abnormal) Collected: 05/14/23 2228    Specimen: Blood Updated: 05/14/23 2255    Narrative:      The following orders were created for panel order CBC & Differential.  Procedure                               Abnormality         Status                     ---------                               -----------         ------                     CBC Auto Differential[[id number]]        Abnormal            Final result                 Please view results for these tests on the individual orders.    Comprehensive Metabolic Panel [[id number]]  (Abnormal) Collected: 05/14/23 2228    Specimen: Blood Updated: 05/14/23 2324     Glucose 113 mg/dL      BUN 17 mg/dL      Creatinine 0.70 mg/dL      Sodium 140 mmol/L      Potassium 3.8 mmol/L      Chloride 106 mmol/L       CO2 23.5 mmol/L      Calcium 9.1 mg/dL      Total Protein 6.7 g/dL      Albumin 3.9 g/dL      ALT (SGPT) 11 U/L      AST (SGOT) 12 U/L      Alkaline Phosphatase 122 U/L      Total Bilirubin 0.2 mg/dL      Globulin 2.8 gm/dL      A/G Ratio 1.4 g/dL      BUN/Creatinine Ratio 24.3     Anion Gap 10.5 mmol/L      eGFR 98.5 mL/min/1.73     Narrative:      GFR Normal >60  Chronic Kidney Disease <60  Kidney Failure <15      Single High Sensitivity Troponin T [[id number]]  (Normal) Collected: 05/14/23 2228    Specimen: Blood Updated: 05/14/23 2324     HS Troponin T <6 ng/L     Narrative:      High Sensitive Troponin T Reference Range:  <10.0 ng/L- Negative Female for AMI  <15.0 ng/L- Negative Male for AMI  >=10 - Abnormal Female indicating possible myocardial injury.  >=15 - Abnormal Male indicating possible myocardial injury.   Clinicians would have to utilize clinical acumen, EKG, Troponin, and serial changes to determine if it is an Acute Myocardial Infarction or myocardial injury due to an underlying chronic condition.         Magnesium [[id number]]  (Normal) Collected: 05/14/23 2228    Specimen: Blood Updated: 05/14/23 2324     Magnesium 2.0 mg/dL     CBC Auto Differential [[id number]]  (Abnormal) Collected: 05/14/23 2228    Specimen: Blood Updated: 05/14/23 2255     WBC 9.88 10*3/mm3      RBC 4.62 10*6/mm3      Hemoglobin 14.1 g/dL      Hematocrit 40.8 %      MCV 88.3 fL      MCH 30.5 pg      MCHC 34.6 g/dL      RDW 13.1 %      RDW-SD 42.4 fl      MPV 11.2 fL      Platelets 239 10*3/mm3      Neutrophil % 43.9 %      Lymphocyte % 44.5 %      Monocyte % 8.2 %      Eosinophil % 2.7 %      Basophil % 0.4 %      Immature Grans % 0.3 %      Neutrophils, Absolute 4.33 10*3/mm3      Lymphocytes, Absolute 4.40 10*3/mm3      Monocytes, Absolute 0.81 10*3/mm3      Eosinophils, Absolute 0.27 10*3/mm3      Basophils, Absolute 0.04 10*3/mm3      Immature Grans, Absolute 0.03 10*3/mm3      nRBC 0.0 /100 WBC     Valproic Acid Level,  Total [[id number]]  (Abnormal) Collected: 05/14/23 2228    Specimen: Blood Updated: 05/15/23 0205     Valproic Acid 40.1 mcg/mL     Narrative:      Therapeutic Ranges for Valproic Acid    Epilepsy:       mcg/ml  Bipolar/Amarilis  up to 125 mcg/ml      Urinalysis With Culture If Indicated - Urine, Clean Catch [[id number]]  (Abnormal) Collected: 05/14/23 2240    Specimen: Urine, Clean Catch Updated: 05/14/23 2257     Color, UA Dark Yellow     Appearance, UA Clear     pH, UA 5.5     Specific Gravity, UA 1.024     Glucose, UA Negative     Ketones, UA Negative     Bilirubin, UA Negative     Blood, UA Negative     Protein, UA Trace     Leuk Esterase, UA Negative     Nitrite, UA Negative     Urobilinogen, UA 0.2 E.U./dL    Narrative:      In absence of clinical symptoms, the presence of pyuria, bacteria, and/or nitrites on the urinalysis result does not correlate with infection.  Urine microscopic not indicated.    Hemoglobin A1c [[id number]] Collected: 05/15/23 0514    Specimen: Blood Updated: 05/15/23 0539    Lipid Panel [[id number]]  (Abnormal) Collected: 05/15/23 0514    Specimen: Blood Updated: 05/15/23 0612     Total Cholesterol 121 mg/dL      Triglycerides 61 mg/dL      HDL Cholesterol 76 mg/dL      LDL Cholesterol  32 mg/dL      VLDL Cholesterol 13 mg/dL      LDL/HDL Ratio 0.43    Narrative:      Cholesterol Reference Ranges  (U.S. Department of Health and Human Services ATP III Classifications)    Desirable          <200 mg/dL  Borderline High    200-239 mg/dL  High Risk          >240 mg/dL      Triglyceride Reference Ranges  (U.S. Department of Health and Human Services ATP III Classifications)    Normal           <150 mg/dL  Borderline High  150-199 mg/dL  High             200-499 mg/dL  Very High        >500 mg/dL    HDL Reference Ranges  (U.S. Department of Health and Human Services ATP III Classifications)    Low     <40 mg/dl (major risk factor for CHD)  High    >60 mg/dl ('negative' risk factor for  CHD)        LDL Reference Ranges  (U.S. Department of Health and Human Services ATP III Classifications)    Optimal          <100 mg/dL  Near Optimal     100-129 mg/dL  Borderline High  130-159 mg/dL  High             160-189 mg/dL  Very High        >189 mg/dL    POC Glucose Once [[id number]]  (Normal) Collected: 05/15/23 0557    Specimen: Blood Updated: 05/15/23 0558     Glucose 92 mg/dL      Comment: Serial Number: [id number]:  [id number]              Imaging:    CT Head Without Contrast    Result Date: 5/15/2023  PROCEDURE: CT HEAD WO CONTRAST  COMPARISONS: 5/11/2023; 1/28/2023.  INDICATIONS: Dizziness, nonspecific.  PROTOCOL:   Standard CT imaging protocol performed.    RADIATION:   Total DLP: 1,081.2 mGy*cm.   MA and/or KV were/was adjusted to minimize radiation dose.    TECHNIQUE: After obtaining the patient's consent, 138 CT images were obtained without non-ionic intravenous contrast material.  DISCUSSION:   A routine nonenhanced head CT was performed.  No acute brain abnormality is identified.  No acute intracranial hemorrhage.  No acute infarct is seen.  No acute skull fracture.  No midline shift or acute intracranial mass effect is seen.  The ventricular size and configuration are within normal limits.  Encephalomalacia involves the inferior right, greater than left, cerebellar hemispheres and suggests old infarcts, such as involving the right posterior inferior cerebellar artery (PICA) territory.  There may be mild chronic small vessel ischemia/infarction, as well.  For instance, there are suspected chronic lacunar infarcts involving the right basal ganglia and the right thalamus as well as the arnol (especially on the right).  Under-pneumatization of the bilateral mastoid temporal bones is noted, as before.  Mild-to-moderate age-indeterminate mucosal thickening involves the imaged paranasal sinuses.  No air-fluid interfaces are seen within the imaged paranasal sinuses.  No definite acute orbital  findings are appreciated.       No acute brain abnormality is seen. Specifically, no acute intracranial hemorrhage, no acute infarct, no intracranial mass lesion, and no acute intracranial mass effect are appreciated.   Please note that portions of this note were completed with a voice recognition program.  ONIEL VALDEZ JR, MD       Electronically Signed and Approved By: ONIEL VALDEZ JR, MD on 5/15/2023 at 1:40              XR Chest 1 View    Result Date: 5/15/2023  PROCEDURE: XR CHEST 1 VW  COMPARISON: 1/28/2023.  INDICATIONS: Weak/Dizzy/AMS triage protocol.  FINDINGS: A single frontal (AP or PA upright portable) chest radiograph reveals no cardiac enlargement and no acute infiltrate. No pneumothorax is seen.  The thoracic aorta is atherosclerotic.  Chronic calcified granulomatous disease involves the chest.  Degenerative changes involve the bilateral shoulders.       No acute cardiopulmonary disease is seen radiographically.     Please note that portions of this note were completed with a voice recognition program.  ONIEL VALDEZ JR, MD       Electronically Signed and Approved By: ONIEL VALDEZ JR, MD on 5/15/2023 at 0:03                  Differential Diagnosis and Discussion:      Dizziness: Based on the patient's history, signs, and symptoms, the diffential diagnosis includes but is not limited to meningitis, stroke, sepsis, subarachnoid hemorrhage, intracranial bleeding, encephalitis, vertigo, electrolyte imbalance, and metabolic disorders.  Weakness: Based on the patient's history, signs, and symptoms, the diffential diagnosis includes but is not limited to meningitis, stroke, sepsis, subarachnoid hemorrhage, intracranial bleeding, encephalitis, acute uti, dehydration, MS, myasthenia gravis, Guillan [place], migraine variant, neuromuscular disorders vertigo, electrolyte imbalance, and metabolic disorders.    All labs were reviewed and interpreted by me.  All X-rays impressions were independently  interpreted by me.  EKG was interpreted by me.  CT scan radiology impression was interpreted by me.    MDM  Number of Diagnoses or Management Options  Cerebrovascular accident (CVA), unspecified mechanism  Double vision  Unsteady gait  Diagnosis management comments: Patient presented to the emergency department with report of double vision and unsteady gait.  Symptoms started about 3 days ago.  Patient is not a candidate for tPA.  Patient has history of previous strokes.  CT showed no acute finding.  With new symptoms discussed patient with hospitalist and she will be admitted for further care and work-up.         Amount and/or Complexity of Data Reviewed  Clinical lab tests: reviewed  Tests in the radiology section of CPT®: reviewed  Tests in the medicine section of CPT®: reviewed    Risk of Complications, Morbidity, and/or Mortality  Presenting problems: moderate  Management options: moderate             Patient Care Considerations:    MRI: I considered ordering an MRI however not available at this time will obtain in the am      Consultants/Shared Management Plan:    Hospitalist: I have discussed the case with Dr Dinero who agrees to accept the patient for admission.    Social Determinants of Health:    Patient is independent, reliable, and has access to care.       Disposition and Care Coordination:    Admit:   Through independent evaluation of the patient's history, physical, and imperical data, the patient meets criteria for observation/admission to the hospital.        Final diagnoses:   Unsteady gait   Double vision   Cerebrovascular accident (CVA), unspecified mechanism        ED Disposition     ED Disposition   Decision to Admit    Condition   --    Comment   Level of Care: Progressive Care [20]   Diagnosis: TIA (transient ischemic attack) [390272]   Admitting Physician: KRISTINA DINERO [[id number]]   Certification: I Certify That Inpatient Hospital Services Are Medically Necessary For Greater Than 2  Midnights               This medical record created using voice recognition software.     Documentation assistance provided by Jorge Rodgers acting as scribe for Lacy Morales MD. Information recorded by the scribe was done at my direction and has been verified and validated by me.         Jorge Rodgers  05/15/23 0103       Jorge Rodgers  05/15/23 0109       Lacy Morales MD  05/15/23 0714       Lacy Morales MD  05/15/23 0725

## 2023-05-15 NOTE — NURSING NOTE
Patient left AMA. Did not want to take large set of keys off pants to get MRI. Refused aspirin. Education completed on importance of MRI and aspirin by nurse and MD.

## 2023-05-15 NOTE — DISCHARGE SUMMARY
"               [facility]         HOSPITALIST  DISCHARGE SUMMARY    Patient Name: Audra Montejo  : 1960  MRN: [id number]    Date of Admission: 2023  Date of Discharge: 5/15/2023  Primary Care Physician: Lowell Alex MD    Consults     Date and Time Order Name Status Description    5/15/2023  9:04 AM Inpatient Neurology Consult Stroke      5/15/2023  4:05 AM Inpatient Neurology Consult Stroke      5/15/2023  1:50 AM Inpatient Hospitalist Consult            Active and Resolved Hospital Problems:  Active Hospital Problems    Diagnosis POA   • **TIA (transient ischemic attack) [G45.9] Yes      Resolved Hospital Problems   No resolved problems to display.       Hospital Course     Hospital Course:  From Dr. Sheridan's H and P  Patient is a 63-year-old female with past medical history of multiple medical problems, please see past medical history.  She comes into the emergency department with complaints of vertical double vision, weakness and unsteady gait listing to the left patient says that she has had some weakness and unsteadiness on the left side for several days.  She said she was driving today when she began having double vision \"seeing things stacked on top of each other\".  Patient says that this has not gone away.  She denies any difficulties with speech or swallowing.  Also she denies any chest pain, shortness of breath, cough, chest congestion, fever, chills, abdominal pain, nausea vomiting or diarrhea, edema or other associated symptoms.  Upon further questioning the patient also reports that she is had some numbness in the left side of her body as well.  Here in the ER a CT scan was obtained which showed old infarcts in the right greater than left cerebellar hemispheres.      DISCHARGE Follow Up Recommendations for labs and diagnostics:   Patient left AMA. Did not want to take large set of keys off pants to get MRI. Refused aspirin. Education completed on importance of MRI and aspirin by " nurse and MD.                   Day of Discharge     Vital Signs:  Temp:  [97.6 °F (36.4 °C)-98.6 °F (37 °C)] 97.7 °F (36.5 °C)  Heart Rate:  [44-72] 52  Resp:  [16-18] 16  BP: ()/(47-79) 135/47  Physical Exam: unable to do       Discharge Details        Discharge Medications      ASK your doctor about these medications      Instructions Start Date   albuterol sulfate  (90 Base) MCG/ACT inhaler  Commonly known as: PROVENTIL HFA;VENTOLIN HFA;PROAIR HFA   2 puffs, Inhalation, Every 6 Hours PRN      albuterol 1.25 MG/3ML nebulizer solution  Commonly known as: ACCUNEB   1.25 mg, Nebulization, Every 6 Hours PRN      amLODIPine 10 MG tablet  Commonly known as: NORVASC   10 mg, Oral, Daily      atorvastatin 20 MG tablet  Commonly known as: LIPITOR   20 mg, Oral, Daily      candesartan 4 MG tablet  Commonly known as: ATACAND   1 tablet, Oral, Daily      clopidogrel 75 MG tablet  Commonly known as: PLAVIX   75 mg, Oral, Daily      divalproex 500 MG 24 hr tablet  Commonly known as: Depakote ER   500 mg, Oral, Daily      montelukast 10 MG tablet  Commonly known as: SINGULAIR   10 mg, Oral, Nightly      oxybutynin XL 10 MG 24 hr tablet  Commonly known as: DITROPAN-XL   10 mg, Oral, Daily      traMADol 50 MG tablet  Commonly known as: ULTRAM   1 to 2 tablets up to 3 times a day as needed for right breast pain and for chronic back pain.             Allergies   Allergen Reactions   • Lisinopril Unknown - High Severity     Pt states it almost killed her last time she was on this medication    • Doxazosin Other (See Comments)     Feeling like she was going to pass out   • Tape Itching       Discharge Disposition:  Left Against Medical Advice    Diet:  Hospital:  Diet Order   Procedures   • Diet: Cardiac Diets; Healthy Heart (2-3 Na+); Texture: Regular Texture (IDDSI 7); Fluid Consistency: Thin (IDDSI 0)       Discharge Activity:       CODE STATUS:  Code Status and Medical Interventions:   Ordered at: 05/15/23 0258      Code Status (Patient has no pulse and is not breathing):    CPR (Attempt to Resuscitate)     Medical Interventions (Patient has pulse or is breathing):    Full Support     Release to patient:    Routine Release         Future Appointments   Date Time Provider Department Center   7/18/2023  2:45 PM Rena King APRN [facility]   9/11/2023 11:30 AM Lowell Alex MD [facility]           Pertinent  and/or Most Recent Results     PROCEDURES:       LAB RESULTS:      Lab 05/14/23 2228   WBC 9.88   HEMOGLOBIN 14.1   HEMATOCRIT 40.8   PLATELETS 239   NEUTROS ABS 4.33   IMMATURE GRANS (ABS) 0.03   LYMPHS ABS 4.40*   MONOS ABS 0.81   EOS ABS 0.27   MCV 88.3         Lab 05/15/23  0514 05/14/23  2228   SODIUM  --  140   POTASSIUM  --  3.8   CHLORIDE  --  106   CO2  --  23.5   ANION GAP  --  10.5   BUN  --  17   CREATININE  --  0.70   EGFR  --  98.5   GLUCOSE  --  113*   CALCIUM  --  9.1   MAGNESIUM  --  2.0   HEMOGLOBIN A1C 5.80*  --          Lab 05/14/23 2228   TOTAL PROTEIN 6.7   ALBUMIN 3.9   GLOBULIN 2.8   ALT (SGPT) 11   AST (SGOT) 12   BILIRUBIN 0.2   ALK PHOS 122*         Lab 05/14/23 2228   HSTROP T <6         Lab 05/15/23  0514   CHOLESTEROL 121   LDL CHOL 32   HDL CHOL 76*   TRIGLYCERIDES 61             Brief Urine Lab Results  (Last result in the past 365 days)      Color   Clarity   Blood   Leuk Est   Nitrite   Protein   CREAT   Urine HCG        05/14/23 2240 Dark Yellow   Clear   Negative   Negative   Negative   Trace               Microbiology Results (last 10 days)     ** No results found for the last 240 hours. **          CT Head Without Contrast    Result Date: 5/15/2023  Impression:  No acute brain abnormality is seen. Specifically, no acute intracranial hemorrhage, no acute infarct, no intracranial mass lesion, and no acute intracranial mass effect are appreciated.   Please note that portions of this note were completed with a voice recognition program.  ONIEL VALDEZ JR, MD        Electronically Signed and Approved By: ONIEL VALDEZ JR, MD on 5/15/2023 at 1:40              CT Angiogram Neck    Result Date: 5/11/2023  Impression:    1. No significant cervical carotid or vertebral artery stenosis is identified.  2. There is moderate proximal stenosis of the right posterior cerebral artery.  No other significant intracranial stenosis is identified.  No evidence of large branch vessel occlusion is seen.   3. Small focal outpouching from the posterior terminal right internal carotid may be due to a PCOM infundibulum or small aneurysm.  4. Additional findings as given above.      WILMAR ELENA MD       Electronically Signed and Approved By: WILMAR ELENA MD on 5/11/2023 at 15:27             XR Chest 1 View    Result Date: 5/15/2023  Impression:  No acute cardiopulmonary disease is seen radiographically.     Please note that portions of this note were completed with a voice recognition program.  ONIEL VALDEZ JR, MD       Electronically Signed and Approved By: ONIEL VALDEZ JR, MD on 5/15/2023 at 0:03              CT Angiogram Head    Result Date: 5/11/2023  Impression:  Please see CTA neck report same day for combined head and neck findings.     WILMAR ELENA MD       Electronically Signed and Approved By: WILMAR ELENA MD on 5/11/2023 at 15:30             CT Chest Low Dose Cancer Screening WO    Result Date: 5/11/2023  Impression:   Low-dose screening CT scan of the chest demonstrating no new or suspicious lung nodules.  Low-dose screening CT scan of the chest is recommended in a year.      MCKENZIE OCAMPO MD       Electronically Signed and Approved By: MCKENZIE OCAMPO MD on 5/11/2023 at 13:10                       Results for orders placed during the hospital encounter of 07/06/22    Adult Transesophageal Echo (ARTHUR) W/ Cont if Necessary Per Protocol    Interpretation Summary  · Calculated left ventricular EF = 65% Estimated left ventricular EF was in agreement with the calculated left ventricular EF.  Left ventricular systolic function is normal.  · Mild mitral regurgitation  · No evidence of thrombi in the left atrium left atrial appendage and the left ventricle  · Bubble study negative      Labs Pending at Discharge:        Time spent on Discharge including face to face service:  Less than 30 minutes    Electronically signed by Consuelo Price DO, 05/15/23, 10:41 AM EDT.

## 2023-05-15 NOTE — PLAN OF CARE
Goal Outcome Evaluation:            Pt agitated. Refusing to have MRI as pt refuses to remove street clothing. Pt educated that pants with metal zipper were unsafe to wear during ordered MRI.  Pt initially chose to leave AMA then agreed to stay and refuse MRI

## 2023-05-16 NOTE — OUTREACH NOTE
Prep Survey    Flowsheet Row Responses   [facility] facility patient discharged from? [facility]   Is LACE score < 7 ? No   Eligibility Not Eligible   What are the reasons patient is not eligible? Other  [AMA]   Does the patient have one of the following disease processes/diagnoses(primary or secondary)? Other   Prep survey completed? Yes          Blossom PÉREZ - Registered Nurse

## 2023-06-15 RX ORDER — CANDESARTAN 4 MG/1
4 TABLET ORAL DAILY
Qty: 90 TABLET | Refills: 1 | Status: SHIPPED | OUTPATIENT
Start: 2023-06-15

## 2023-06-15 NOTE — TELEPHONE ENCOUNTER
Rx Refill Note  Requested Prescriptions      No prescriptions requested or ordered in this encounter      Last office visit with prescribing clinician: 4/21/2023   Last telemedicine visit with prescribing clinician: Visit date not found   Next office visit with prescribing clinician: 9/11/2023                         Would you like a call back once the refill request has been completed: [] Yes [] No    If the office needs to give you a call back, can they leave a voicemail: [] Yes [] No    Catrachita Abad MA  06/15/23, 13:55 EDT

## 2023-06-16 PROCEDURE — 87086 URINE CULTURE/COLONY COUNT: CPT

## 2023-06-16 PROCEDURE — 87660 TRICHOMONAS VAGIN DIR PROBE: CPT

## 2023-06-16 PROCEDURE — 87480 CANDIDA DNA DIR PROBE: CPT

## 2023-06-16 PROCEDURE — 87510 GARDNER VAG DNA DIR PROBE: CPT

## 2023-06-17 ENCOUNTER — TELEPHONE (OUTPATIENT)
Dept: URGENT CARE | Facility: CLINIC | Age: 63
End: 2023-06-17
Payer: COMMERCIAL

## 2023-06-17 NOTE — TELEPHONE ENCOUNTER
----- Message from HERMAN [facility] sent at 6/16/2023  9:29 PM EDT -----  Please call patient regarding her positive yeast infection results.  Appropriate prescriptions provided at visit.  Advised patient to take medications as directed.  Urine culture pending.

## 2023-06-18 ENCOUNTER — TELEPHONE (OUTPATIENT)
Dept: URGENT CARE | Facility: CLINIC | Age: 63
End: 2023-06-18
Payer: COMMERCIAL

## 2023-06-19 ENCOUNTER — TELEPHONE (OUTPATIENT)
Dept: URGENT CARE | Facility: CLINIC | Age: 63
End: 2023-06-19
Payer: COMMERCIAL

## 2023-09-05 ENCOUNTER — LAB (OUTPATIENT)
Dept: LAB | Facility: HOSPITAL | Age: 63
End: 2023-09-05
Payer: COMMERCIAL

## 2023-09-05 DIAGNOSIS — F31.62 BIPOLAR DISORDER, CURRENT EPISODE MIXED, MODERATE: ICD-10-CM

## 2023-09-05 DIAGNOSIS — R79.1 ELEVATED FACTOR VIII LEVEL: ICD-10-CM

## 2023-09-05 DIAGNOSIS — R73.01 IMPAIRED FASTING GLUCOSE: ICD-10-CM

## 2023-09-05 DIAGNOSIS — Z00.00 WELL ADULT EXAM: ICD-10-CM

## 2023-09-05 DIAGNOSIS — E55.9 VITAMIN D DEFICIENCY: ICD-10-CM

## 2023-09-05 DIAGNOSIS — I10 ESSENTIAL HYPERTENSION: ICD-10-CM

## 2023-09-05 LAB
25(OH)D3 SERPL-MCNC: 31 NG/ML (ref 30–100)
ALBUMIN SERPL-MCNC: 4.4 G/DL (ref 3.5–5.2)
ALBUMIN/GLOB SERPL: 1.6 G/DL
ALP SERPL-CCNC: 117 U/L (ref 39–117)
ALT SERPL W P-5'-P-CCNC: 14 U/L (ref 1–33)
ANION GAP SERPL CALCULATED.3IONS-SCNC: 12.1 MMOL/L (ref 5–15)
AST SERPL-CCNC: 18 U/L (ref 1–32)
BASOPHILS # BLD AUTO: 0.05 10*3/MM3 (ref 0–0.2)
BASOPHILS NFR BLD AUTO: 0.5 % (ref 0–1.5)
BILIRUB SERPL-MCNC: 0.3 MG/DL (ref 0–1.2)
BUN SERPL-MCNC: 23 MG/DL (ref 8–23)
BUN/CREAT SERPL: 18.7 (ref 7–25)
CALCIUM SPEC-SCNC: 9.3 MG/DL (ref 8.6–10.5)
CHLORIDE SERPL-SCNC: 104 MMOL/L (ref 98–107)
CO2 SERPL-SCNC: 24.9 MMOL/L (ref 22–29)
CREAT SERPL-MCNC: 1.23 MG/DL (ref 0.57–1)
DEPRECATED RDW RBC AUTO: 41.9 FL (ref 37–54)
EGFRCR SERPLBLD CKD-EPI 2021: 50.1 ML/MIN/1.73
EOSINOPHIL # BLD AUTO: 0.12 10*3/MM3 (ref 0–0.4)
EOSINOPHIL NFR BLD AUTO: 1.1 % (ref 0.3–6.2)
ERYTHROCYTE [DISTWIDTH] IN BLOOD BY AUTOMATED COUNT: 12.6 % (ref 12.3–15.4)
FOLATE SERPL-MCNC: >20 NG/ML (ref 4.78–24.2)
GLOBULIN UR ELPH-MCNC: 2.8 GM/DL
GLUCOSE SERPL-MCNC: 96 MG/DL (ref 65–99)
HBA1C MFR BLD: 5.8 % (ref 4.8–5.6)
HCT VFR BLD AUTO: 42.7 % (ref 34–46.6)
HGB BLD-MCNC: 14.5 G/DL (ref 12–15.9)
IMM GRANULOCYTES # BLD AUTO: 0.05 10*3/MM3 (ref 0–0.05)
IMM GRANULOCYTES NFR BLD AUTO: 0.5 % (ref 0–0.5)
LYMPHOCYTES # BLD AUTO: 3.54 10*3/MM3 (ref 0.7–3.1)
LYMPHOCYTES NFR BLD AUTO: 32.2 % (ref 19.6–45.3)
MCH RBC QN AUTO: 30.8 PG (ref 26.6–33)
MCHC RBC AUTO-ENTMCNC: 34 G/DL (ref 31.5–35.7)
MCV RBC AUTO: 90.7 FL (ref 79–97)
MONOCYTES # BLD AUTO: 0.95 10*3/MM3 (ref 0.1–0.9)
MONOCYTES NFR BLD AUTO: 8.6 % (ref 5–12)
NEUTROPHILS NFR BLD AUTO: 57.1 % (ref 42.7–76)
NEUTROPHILS NFR BLD AUTO: 6.3 10*3/MM3 (ref 1.7–7)
NRBC BLD AUTO-RTO: 0 /100 WBC (ref 0–0.2)
PLATELET # BLD AUTO: 295 10*3/MM3 (ref 140–450)
PMV BLD AUTO: 12.2 FL (ref 6–12)
POTASSIUM SERPL-SCNC: 4.5 MMOL/L (ref 3.5–5.2)
PROT SERPL-MCNC: 7.2 G/DL (ref 6–8.5)
RBC # BLD AUTO: 4.71 10*6/MM3 (ref 3.77–5.28)
SODIUM SERPL-SCNC: 141 MMOL/L (ref 136–145)
T4 FREE SERPL-MCNC: 1.22 NG/DL (ref 0.93–1.7)
TSH SERPL DL<=0.05 MIU/L-ACNC: 1.48 UIU/ML (ref 0.27–4.2)
VALPROATE SERPL-MCNC: 45 MCG/ML (ref 50–125)
VIT B12 BLD-MCNC: 1268 PG/ML (ref 211–946)
WBC NRBC COR # BLD: 11.01 10*3/MM3 (ref 3.4–10.8)

## 2023-09-05 PROCEDURE — 84439 ASSAY OF FREE THYROXINE: CPT

## 2023-09-05 PROCEDURE — 80164 ASSAY DIPROPYLACETIC ACD TOT: CPT

## 2023-09-05 PROCEDURE — 82607 VITAMIN B-12: CPT

## 2023-09-05 PROCEDURE — 82746 ASSAY OF FOLIC ACID SERUM: CPT

## 2023-09-05 PROCEDURE — 83036 HEMOGLOBIN GLYCOSYLATED A1C: CPT

## 2023-09-05 PROCEDURE — 82306 VITAMIN D 25 HYDROXY: CPT

## 2023-09-05 PROCEDURE — 80050 GENERAL HEALTH PANEL: CPT

## 2023-09-05 PROCEDURE — 36415 COLL VENOUS BLD VENIPUNCTURE: CPT

## 2023-09-05 PROCEDURE — 85240 CLOT FACTOR VIII AHG 1 STAGE: CPT

## 2023-09-07 LAB — FACT VIII ACT/NOR PPP: 149 % (ref 56–140)

## 2023-09-10 PROBLEM — E66.09 CLASS 1 OBESITY DUE TO EXCESS CALORIES WITHOUT SERIOUS COMORBIDITY WITH BODY MASS INDEX (BMI) OF 33.0 TO 33.9 IN ADULT: Status: ACTIVE | Noted: 2023-09-10

## 2023-09-10 PROBLEM — E66.811 CLASS 1 OBESITY DUE TO EXCESS CALORIES WITHOUT SERIOUS COMORBIDITY WITH BODY MASS INDEX (BMI) OF 33.0 TO 33.9 IN ADULT: Status: ACTIVE | Noted: 2023-09-10

## 2023-09-11 ENCOUNTER — OFFICE VISIT (OUTPATIENT)
Dept: INTERNAL MEDICINE | Facility: CLINIC | Age: 63
End: 2023-09-11
Payer: COMMERCIAL

## 2023-09-11 VITALS
OXYGEN SATURATION: 98 % | SYSTOLIC BLOOD PRESSURE: 140 MMHG | DIASTOLIC BLOOD PRESSURE: 80 MMHG | RESPIRATION RATE: 18 BRPM | WEIGHT: 160.2 LBS | HEART RATE: 52 BPM | BODY MASS INDEX: 33.63 KG/M2 | TEMPERATURE: 96.8 F | HEIGHT: 58 IN

## 2023-09-11 DIAGNOSIS — K59.01 SLOW TRANSIT CONSTIPATION: ICD-10-CM

## 2023-09-11 DIAGNOSIS — I10 ESSENTIAL HYPERTENSION: Primary | ICD-10-CM

## 2023-09-11 DIAGNOSIS — Z86.73 HISTORY OF STROKE: ICD-10-CM

## 2023-09-11 DIAGNOSIS — R73.01 IMPAIRED FASTING GLUCOSE: ICD-10-CM

## 2023-09-11 DIAGNOSIS — E78.2 MIXED HYPERLIPIDEMIA: ICD-10-CM

## 2023-09-11 DIAGNOSIS — N39.46 MIXED STRESS AND URGE URINARY INCONTINENCE: ICD-10-CM

## 2023-09-11 DIAGNOSIS — F31.62 BIPOLAR DISORDER, CURRENT EPISODE MIXED, MODERATE: ICD-10-CM

## 2023-09-11 PROCEDURE — 99214 OFFICE O/P EST MOD 30 MIN: CPT | Performed by: INTERNAL MEDICINE

## 2023-09-11 RX ORDER — CANDESARTAN 8 MG/1
8 TABLET ORAL DAILY
Qty: 90 TABLET | Refills: 1 | Status: SHIPPED | OUTPATIENT
Start: 2023-09-11

## 2023-09-11 RX ORDER — AMLODIPINE BESYLATE 5 MG/1
5 TABLET ORAL DAILY
Qty: 90 TABLET | Refills: 1 | Status: SHIPPED | OUTPATIENT
Start: 2023-09-11

## 2023-09-11 RX ORDER — POLYETHYLENE GLYCOL 3350 17 G/17G
17 POWDER, FOR SOLUTION ORAL DAILY
Qty: 510 G | Refills: 5 | Status: SHIPPED | OUTPATIENT
Start: 2023-09-11

## 2023-09-11 NOTE — ASSESSMENT & PLAN NOTE
Patient's level was 26 when we checked it earlier this month, so she is stable to continue with low-dose Depakote as written.  We will check it again on return to office in early spring.---> Depakote level is fine at 45 as of 9/23.

## 2023-09-11 NOTE — ASSESSMENT & PLAN NOTE
Patient has benefited from Ditropan XL just 10 mg.  It is helping her get to the bathroom in time particularly when she is at work.  She is having some constipation at this time, discussed with patient it may be related to this, but we should be able to work around it.

## 2023-09-11 NOTE — ASSESSMENT & PLAN NOTE
Fasting blood sugar is only 96 and her A1c was only 5.8 as of her 9/23 office visit.  Patient certainly no issues this regards, no indication for treatment, we will monitor this with routine labs.

## 2023-09-11 NOTE — PROGRESS NOTES
"Chief Complaint  Hypertension (62 y/o female is here today to follow up on labs. Patient declined depression screening. No other issues at this time.)    Subjective       Audra Montejo presents to [facility]    History of present illness:  Patient is a 63-year-old female with longstanding history of tobacco abuse and medical noncompliance, leaving the hospital AMA on more than one occasion btwn 7/22-9/22, who had at least 4 infarcts over that timeframe, with underlying hypertension and IFG as well as hyperlipidemia, seen 11/22 as a New Patient, who is coming in 9/23 for routine 4-6-month follow-up. We will review her medications, address compliance, review recent labs, and make further recommendations at that time.    Review of Systems   Constitutional:  Negative for appetite change, fatigue and fever.   HENT:  Negative for congestion and ear pain.    Eyes:  Negative for blurred vision.   Respiratory:  Negative for cough, chest tightness, shortness of breath and wheezing.    Cardiovascular:  Negative for chest pain, palpitations and leg swelling.   Gastrointestinal:  Negative for abdominal pain.   Genitourinary:  Negative for difficulty urinating, dysuria and hematuria.   Musculoskeletal:  Negative for arthralgias and gait problem.   Skin:  Negative for skin lesions.   Neurological:  Negative for syncope, memory problem and confusion.   Psychiatric/Behavioral:  Negative for self-injury and depressed mood.      Objective   Vital Signs:   /80 (BP Location: Right arm, Patient Position: Sitting, Cuff Size: Adult)   Pulse 52   Temp 96.8 °F (36 °C) (Temporal)   Resp 18   Ht 147.3 cm (57.99\")   Wt 72.7 kg (160 lb 3.2 oz)   SpO2 98%   BMI 33.49 kg/m²           Physical Exam  Vitals and nursing note reviewed.   Constitutional:       General: She is not in acute distress.     Appearance: Normal appearance. She is not toxic-appearing.   HENT:      Head: Atraumatic.      Right " Ear: External ear normal.      Left Ear: External ear normal.      Nose: Nose normal.      Mouth/Throat:      Mouth: Mucous membranes are moist.   Eyes:      General:         Right eye: No discharge.         Left eye: No discharge.      Extraocular Movements: Extraocular movements intact.      Pupils: Pupils are equal, round, and reactive to light.   Cardiovascular:      Rate and Rhythm: Normal rate and regular rhythm.      Pulses: Normal pulses.      Heart sounds: Normal heart sounds. No murmur heard.    No gallop.   Pulmonary:      Effort: Pulmonary effort is normal. No respiratory distress.      Breath sounds: No wheezing, rhonchi or rales.   Abdominal:      General: There is no distension.      Palpations: Abdomen is soft. There is no mass.      Tenderness: There is no abdominal tenderness. There is no guarding.   Musculoskeletal:         General: No swelling or tenderness.      Cervical back: No tenderness.      Right lower leg: No edema.      Left lower leg: No edema.   Skin:     General: Skin is warm and dry.      Findings: No rash.   Neurological:      General: No focal deficit present.      Mental Status: She is alert and oriented to person, place, and time. Mental status is at baseline.      Motor: No weakness.      Gait: Gait normal.   Psychiatric:         Mood and Affect: Mood normal.         Thought Content: Thought content normal.        Result Review   The following data was reviewed by: Lowell Alex MD on 11/02/2022:  [x] Laboratory  [] Microbiology  [x] Radiology  [] EKG/telemetry  [] Cardiology/Vascular  [] Pathology  [x] Old records             Assessment and Plan   Diagnoses and all orders for this visit:    1. Essential hypertension (Primary)  Assessment & Plan:  Blood pressure is fine as of her 9/23 office visit, however she is developing some peripheral edema from the full dose amlodipine, so we need to make some changes.  She is only on very low-dose candesartan, so we will increase that  while we are lowering the amlodipine.    Orders:  -     Comprehensive Metabolic Panel; Future    2. Impaired fasting glucose  Assessment & Plan:  Fasting blood sugar is only 96 and her A1c was only 5.8 as of her 9/23 office visit.  Patient certainly no issues this regards, no indication for treatment, we will monitor this with routine labs.      3. Mixed hyperlipidemia  -     Lipid Panel; Future    4. History of stroke  Assessment & Plan:  Patient stable this regards as of 9/23.  She is been back to work for some time, working scheduled over time, no new focal motor deficits.  We reviewed her note from July 23 with neurology.      5. Slow transit constipation  Assessment & Plan:  This is an issue as of her 9/23 office visit.  She is not having any blood in the stool per se, only moves her bowels every 3 to 4 days it sounds like.  She feels bloated in general.      6. Mixed stress and urge urinary incontinence  Assessment & Plan:  Patient has benefited from Ditropan XL just 10 mg.  It is helping her get to the bathroom in time particularly when she is at work.  She is having some constipation at this time, discussed with patient it may be related to this, but we should be able to work around it.      7. Bipolar disorder, current episode mixed, moderate  Assessment & Plan:  Patient's level was 26 when we checked it earlier this month, so she is stable to continue with low-dose Depakote as written.  We will check it again on return to office in early spring.---> Depakote level is fine at 45 as of 9/23.      Other orders  -     polyethylene glycol (MIRALAX) 17 GM/SCOOP powder; Take 17 g by mouth Daily.  Dispense: 510 g; Refill: 5  -     candesartan (ATACAND) 8 MG tablet; Take 1 tablet by mouth Daily.  Dispense: 90 tablet; Refill: 1  -     amLODIPine (NORVASC) 5 MG tablet; Take 1 tablet by mouth Daily.  Dispense: 90 tablet; Refill: 1            BMI is >= 30 and <35. (Class 1 Obesity). The following options were offered  after discussion;: exercise counseling/recommendations and nutrition counseling/recommendations       Follow Up   Return in about 3 months (around 12/11/2023).    Patient was given instructions and counseling regarding her condition or for health maintenance advice. Please see specific information pulled into the AVS if appropriate.

## 2023-09-11 NOTE — ASSESSMENT & PLAN NOTE
Blood pressure is fine as of her 9/23 office visit, however she is developing some peripheral edema from the full dose amlodipine, so we need to make some changes.  She is only on very low-dose candesartan, so we will increase that while we are lowering the amlodipine.

## 2023-09-11 NOTE — ASSESSMENT & PLAN NOTE
Patient stable this regards as of 9/23.  She is been back to work for some time, working scheduled over time, no new focal motor deficits.  We reviewed her note from July 23 with neurology.

## 2023-09-11 NOTE — PATIENT INSTRUCTIONS
1.  Increase candesartan from 4 mg to 8 mg.  Take 2 of the 4 mg pills at 1 time until they are gone, then you can  the 8 mg prescription from the pharmacy.    2.  Lower amlodipine from 10 mg to 5 mg.  Take one half of the 10 mg tablet once daily, when those pills are completed, then you can  the 5 mg prescription from your pharmacy.    3.  Please give us a call in about a month if the swelling is not improving, and if you need to call, please let me know what your blood pressure is running.

## 2023-09-11 NOTE — ASSESSMENT & PLAN NOTE
This is an issue as of her 9/23 office visit.  She is not having any blood in the stool per se, only moves her bowels every 3 to 4 days it sounds like.  She feels bloated in general.

## 2023-09-14 ENCOUNTER — TELEPHONE (OUTPATIENT)
Dept: INTERNAL MEDICINE | Facility: CLINIC | Age: 63
End: 2023-09-14

## 2023-09-14 ENCOUNTER — OFFICE VISIT (OUTPATIENT)
Dept: INTERNAL MEDICINE | Facility: CLINIC | Age: 63
End: 2023-09-14
Payer: COMMERCIAL

## 2023-09-14 VITALS
DIASTOLIC BLOOD PRESSURE: 80 MMHG | RESPIRATION RATE: 18 BRPM | HEIGHT: 58 IN | WEIGHT: 158 LBS | SYSTOLIC BLOOD PRESSURE: 130 MMHG | HEART RATE: 58 BPM | OXYGEN SATURATION: 92 % | TEMPERATURE: 97.8 F | BODY MASS INDEX: 33.17 KG/M2

## 2023-09-14 DIAGNOSIS — M54.42 ACUTE LEFT-SIDED LOW BACK PAIN WITH LEFT-SIDED SCIATICA: Primary | ICD-10-CM

## 2023-09-14 PROCEDURE — 99213 OFFICE O/P EST LOW 20 MIN: CPT | Performed by: INTERNAL MEDICINE

## 2023-09-14 RX ORDER — GABAPENTIN 100 MG/1
100 CAPSULE ORAL NIGHTLY
Qty: 30 CAPSULE | Refills: 1 | Status: SHIPPED | OUTPATIENT
Start: 2023-09-14

## 2023-09-14 RX ORDER — TRAMADOL HYDROCHLORIDE 50 MG/1
50 TABLET ORAL EVERY 8 HOURS PRN
Qty: 30 TABLET | Refills: 1 | Status: SHIPPED | OUTPATIENT
Start: 2023-09-14

## 2023-09-14 NOTE — PROGRESS NOTES
Chief Complaint  Back Pain (64 y/o female is here today c/o severe back pain, patient states the pain started last night, she states she could barely get up the bed, patient last week her she notice she started tripping and she states her Lt leg started to get numb, but it feels like its on fire. Patient states she took tylenol for the pain. Patient also states she notice her bowel movements had been hurting specially on her LLQ, patient states she does not have appetite since she is in a lot of pain. )    Subjective       Audra Montejo presents to [facility]    History of present illness:  Patient is a 63-year-old female with longstanding history of tobacco abuse and medical noncompliance, leaving the hospital AMA on more than one occasion btwn 7/22-9/22, who had at least 4 infarcts over that timeframe, with underlying hypertension and IFG as well as hyperlipidemia, seen 11/22 as a New Patient, who is coming in 9/23 for routine 4-6-month follow-up. We will review her medications, address compliance, review recent labs, and make further recommendations at that time.---> Patient being seen just 3 days later in 9/23 following a routine office visit for 2 new complaints as noted above.    Review of Systems   Constitutional:  Negative for appetite change, fatigue and fever.   HENT:  Negative for congestion and ear pain.    Eyes:  Negative for blurred vision.   Respiratory:  Negative for cough, chest tightness, shortness of breath and wheezing.    Cardiovascular:  Negative for chest pain, palpitations and leg swelling.   Gastrointestinal:  Negative for abdominal pain.   Genitourinary:  Negative for difficulty urinating, dysuria and hematuria.   Musculoskeletal:  Negative for arthralgias and gait problem.   Skin:  Negative for skin lesions.   Neurological:  Negative for syncope, memory problem and confusion.   Psychiatric/Behavioral:  Negative for self-injury and depressed mood.   "    Objective   Vital Signs:   /80 (BP Location: Left arm, Patient Position: Sitting, Cuff Size: Adult)   Pulse 58   Temp 97.8 °F (36.6 °C) (Temporal)   Resp 18   Ht 147.3 cm (57.99\")   Wt 71.7 kg (158 lb)   SpO2 92%   BMI 33.03 kg/m²           Physical Exam  Vitals and nursing note reviewed.   Constitutional:       General: She is not in acute distress.     Appearance: Normal appearance. She is not toxic-appearing.   HENT:      Head: Atraumatic.      Right Ear: External ear normal.      Left Ear: External ear normal.      Nose: Nose normal.      Mouth/Throat:      Mouth: Mucous membranes are moist.   Eyes:      General:         Right eye: No discharge.         Left eye: No discharge.      Extraocular Movements: Extraocular movements intact.      Pupils: Pupils are equal, round, and reactive to light.   Cardiovascular:      Rate and Rhythm: Normal rate and regular rhythm.      Pulses: Normal pulses.      Heart sounds: Normal heart sounds. No murmur heard.    No gallop.   Pulmonary:      Effort: Pulmonary effort is normal. No respiratory distress.      Breath sounds: No wheezing, rhonchi or rales.   Abdominal:      General: There is no distension.      Palpations: Abdomen is soft. There is no mass.      Tenderness: There is no abdominal tenderness. There is no guarding.   Musculoskeletal:         General: Tenderness present. No swelling.      Cervical back: No tenderness.      Right lower leg: No edema.      Left lower leg: No edema.      Comments: Tender lower lumbar spine.  Full ROM.   Skin:     General: Skin is warm and dry.      Findings: No rash.   Neurological:      General: No focal deficit present.      Mental Status: She is alert and oriented to person, place, and time. Mental status is at baseline.      Motor: No weakness.      Gait: Gait abnormal.      Comments: Reflexes 2+, neg SLR, strength 4+/5 bilaterally.   Psychiatric:         Mood and Affect: Mood normal.         Thought Content: " Thought content normal.        Result Review   The following data was reviewed by: Lowell Alex MD on 11/02/2022:  [x] Laboratory  [] Microbiology  [x] Radiology  [] EKG/telemetry  [] Cardiology/Vascular  [] Pathology  [x] Old records             Assessment and Plan   Diagnoses and all orders for this visit:    1. Acute left-sided low back pain with left-sided sciatica (Primary)  Overview:  MRI 7/19:    FINDINGS:     Lumbar bodies have normal height.  Alignment is preserved.  Multilevel degenerative disc disease.      Conus terminates at T12.  No paravertebral mass.         T12-L1:  Circumferential disc osteophyte.  No significant central canal or neural foraminal     narrowing.         L1-L2:  Circumferential disc osteophyte.  Early facet change.  Mild central canal narrowing.  Mild     neural foraminal narrowing.         L2-L3:  Circumferential disc osteophyte early facet change and ligamentum flavum thickening.  Mild     central canal narrowing.  Mild right and moderate left neural foraminal narrowing.         L3-L4:  Circumferential disc osteophyte.  Ligamentum flavum thickening and early facet change.      Moderate central canal narrowing.  Moderate right and moderate to moderately severe left neural     foraminal narrowing is similar to the previous study.         L4-L5:  Circumferential disc osteophyte.  Early facet change.  Moderate central canal narrowing.      Moderate right and moderate to moderately severe left neural foraminal narrowing is similar to the     previous study.         L5-S1:  Small broad-based posterior disc bulge.  Mild central canal narrowing.  Mild neural     foraminal narrowing.         CONCLUSION: Multilevel degenerative change is very similar to 4/11/2018.         No high-grade central canal narrowing.         Overall, neural foraminal narrowing is greater on the left than the right as detailed above.    Assessment & Plan:  This is the indication for her 9/23 urgent visit.  Old MRI  reviewed.  Patient reports cute onset of pain this morning.  Her job is physical, nothing different, no specific trauma.  Patient reports history of this, required epidurals in the past it sounds like.  She does not report any change in her bowel habits per se other than having a little bit of pain when she moves them that she relates to flaring of her back pain.  She is not having any new urinary issues either.    Her exam is with significant lumbar tenderness, but no focal motor deficits.    Recommend the patient that we get her back on the tramadol that helped in the past as well as some low-dose gabapentin.  Told her to call Monday if not improving and we could look at titrating it.  She says as soon as the pain settle down enough, she plans on going back to work, that certainly reasonable since nothing there particularly flared this up.  Discussed patient to await call from pain clinic as well.      Orders:  -     traMADol (ULTRAM) 50 MG tablet; Take 1 tablet by mouth Every 8 (Eight) Hours As Needed for Moderate Pain.  Dispense: 30 tablet; Refill: 1  -     gabapentin (NEURONTIN) 100 MG capsule; Take 1 capsule by mouth Every Night.  Dispense: 30 capsule; Refill: 1  -     Ambulatory Referral to Pain Management Clinic              BMI is >= 30 and <35. (Class 1 Obesity). The following options were offered after discussion;: exercise counseling/recommendations and nutrition counseling/recommendations       Follow Up   Return for Next scheduled follow up.    Patient was given instructions and counseling regarding her condition or for health maintenance advice. Please see specific information pulled into the AVS if appropriate.

## 2023-09-14 NOTE — ASSESSMENT & PLAN NOTE
This is the indication for her 9/23 urgent visit.  Old MRI reviewed.  Patient reports cute onset of pain this morning.  Her job is physical, nothing different, no specific trauma.  Patient reports history of this, required epidurals in the past it sounds like.  She does not report any change in her bowel habits per se other than having a little bit of pain when she moves them that she relates to flaring of her back pain.  She is not having any new urinary issues either.    Her exam is with significant lumbar tenderness, but no focal motor deficits.    Recommend the patient that we get her back on the tramadol that helped in the past as well as some low-dose gabapentin.  Told her to call Monday if not improving and we could look at titrating it.  She says as soon as the pain settle down enough, she plans on going back to work, that certainly reasonable since nothing there particularly flared this up.  Discussed patient to await call from pain clinic as well.

## 2023-09-14 NOTE — TELEPHONE ENCOUNTER
Caller: Audra Montejo    Relationship to patient: Self    Best call back number: [phone]     Patient is needing: PATIENT STATED SHE WAS SEEN AT URGENT CARE FOR SEVERE BACK PAIN. SHE WAS ADVISED TO REACH OUT TO HER PCP.

## 2023-11-27 RX ORDER — AMLODIPINE BESYLATE 10 MG/1
10 TABLET ORAL DAILY
Qty: 90 TABLET | Refills: 1 | Status: SHIPPED | OUTPATIENT
Start: 2023-11-27

## 2023-11-27 RX ORDER — OXYBUTYNIN CHLORIDE 10 MG/1
10 TABLET, EXTENDED RELEASE ORAL DAILY
Qty: 90 TABLET | Refills: 1 | Status: SHIPPED | OUTPATIENT
Start: 2023-11-27

## 2023-11-27 RX ORDER — MONTELUKAST SODIUM 10 MG/1
10 TABLET ORAL NIGHTLY
Qty: 90 TABLET | Refills: 1 | Status: SHIPPED | OUTPATIENT
Start: 2023-11-27

## 2023-11-27 RX ORDER — ATORVASTATIN CALCIUM 20 MG/1
20 TABLET, FILM COATED ORAL DAILY
Qty: 90 TABLET | Refills: 1 | Status: SHIPPED | OUTPATIENT
Start: 2023-11-27

## 2023-11-27 RX ORDER — CLOPIDOGREL BISULFATE 75 MG/1
75 TABLET ORAL DAILY
Qty: 90 TABLET | Refills: 1 | Status: SHIPPED | OUTPATIENT
Start: 2023-11-27

## 2023-12-04 ENCOUNTER — TELEPHONE (OUTPATIENT)
Dept: INTERNAL MEDICINE | Facility: CLINIC | Age: 63
End: 2023-12-04
Payer: COMMERCIAL

## 2023-12-04 DIAGNOSIS — F31.62 BIPOLAR DISORDER, CURRENT EPISODE MIXED, MODERATE: ICD-10-CM

## 2023-12-04 DIAGNOSIS — Z86.73 HISTORY OF STROKE: Primary | ICD-10-CM

## 2023-12-04 DIAGNOSIS — R79.1 ELEVATED FACTOR VIII LEVEL: ICD-10-CM

## 2023-12-04 NOTE — TELEPHONE ENCOUNTER
Attempted to call pt unable to leave voicemail.    Per Dr Alex, lab orders for factor 8 and depakote levels are placed.    Ok for hub to relay

## 2023-12-04 NOTE — TELEPHONE ENCOUNTER
Caller: Audra Montejo    Relationship: Self    Best call back number: [phone]    What orders are you requesting (i.e. lab or imaging): FACTOR 8 AND DEPAKOTE LEVELS CHECK WITH RECENT BLOOD WORK ORDERS      In what timeframe would the patient need to come in: AS SOON AS POSSIBLE

## 2023-12-06 ENCOUNTER — LAB (OUTPATIENT)
Dept: LAB | Facility: HOSPITAL | Age: 63
End: 2023-12-06
Payer: COMMERCIAL

## 2023-12-06 DIAGNOSIS — E78.2 MIXED HYPERLIPIDEMIA: ICD-10-CM

## 2023-12-06 DIAGNOSIS — Z86.73 HISTORY OF STROKE: ICD-10-CM

## 2023-12-06 DIAGNOSIS — R79.1 ELEVATED FACTOR VIII LEVEL: ICD-10-CM

## 2023-12-06 DIAGNOSIS — F31.62 BIPOLAR DISORDER, CURRENT EPISODE MIXED, MODERATE: ICD-10-CM

## 2023-12-06 DIAGNOSIS — I10 ESSENTIAL HYPERTENSION: ICD-10-CM

## 2023-12-06 LAB
ALBUMIN SERPL-MCNC: 4.3 G/DL (ref 3.5–5.2)
ALBUMIN/GLOB SERPL: 1.7 G/DL
ALP SERPL-CCNC: 109 U/L (ref 39–117)
ALT SERPL W P-5'-P-CCNC: 10 U/L (ref 1–33)
ANION GAP SERPL CALCULATED.3IONS-SCNC: 8.8 MMOL/L (ref 5–15)
APTT PPP: 26.7 SECONDS (ref 24.2–34.2)
AST SERPL-CCNC: 16 U/L (ref 1–32)
BILIRUB SERPL-MCNC: 0.3 MG/DL (ref 0–1.2)
BUN SERPL-MCNC: 17 MG/DL (ref 8–23)
BUN/CREAT SERPL: 20.5 (ref 7–25)
CALCIUM SPEC-SCNC: 9.2 MG/DL (ref 8.6–10.5)
CHLORIDE SERPL-SCNC: 106 MMOL/L (ref 98–107)
CHOLEST SERPL-MCNC: 123 MG/DL (ref 0–200)
CO2 SERPL-SCNC: 27.2 MMOL/L (ref 22–29)
CREAT SERPL-MCNC: 0.83 MG/DL (ref 0.57–1)
EGFRCR SERPLBLD CKD-EPI 2021: 80.3 ML/MIN/1.73
GLOBULIN UR ELPH-MCNC: 2.5 GM/DL
GLUCOSE SERPL-MCNC: 94 MG/DL (ref 65–99)
HDLC SERPL-MCNC: 71 MG/DL (ref 40–60)
LDLC SERPL CALC-MCNC: 41 MG/DL (ref 0–100)
LDLC/HDLC SERPL: 0.6 {RATIO}
POTASSIUM SERPL-SCNC: 4.4 MMOL/L (ref 3.5–5.2)
PROT SERPL-MCNC: 6.8 G/DL (ref 6–8.5)
SODIUM SERPL-SCNC: 142 MMOL/L (ref 136–145)
TRIGL SERPL-MCNC: 47 MG/DL (ref 0–150)
VALPROATE SERPL-MCNC: 44 MCG/ML (ref 50–125)
VLDLC SERPL-MCNC: 11 MG/DL (ref 5–40)

## 2023-12-06 PROCEDURE — 80061 LIPID PANEL: CPT

## 2023-12-06 PROCEDURE — 85730 THROMBOPLASTIN TIME PARTIAL: CPT

## 2023-12-06 PROCEDURE — 85240 CLOT FACTOR VIII AHG 1 STAGE: CPT

## 2023-12-06 PROCEDURE — 80164 ASSAY DIPROPYLACETIC ACD TOT: CPT

## 2023-12-06 PROCEDURE — 80053 COMPREHEN METABOLIC PANEL: CPT

## 2023-12-06 PROCEDURE — 36415 COLL VENOUS BLD VENIPUNCTURE: CPT

## 2023-12-08 LAB — FACT VIII ACT/NOR PPP: 126 % (ref 56–140)

## 2023-12-11 ENCOUNTER — OFFICE VISIT (OUTPATIENT)
Dept: INTERNAL MEDICINE | Facility: CLINIC | Age: 63
End: 2023-12-11
Payer: COMMERCIAL

## 2023-12-11 VITALS
HEIGHT: 58 IN | DIASTOLIC BLOOD PRESSURE: 70 MMHG | TEMPERATURE: 99.8 F | BODY MASS INDEX: 33.33 KG/M2 | OXYGEN SATURATION: 96 % | HEART RATE: 63 BPM | SYSTOLIC BLOOD PRESSURE: 130 MMHG | WEIGHT: 158.8 LBS

## 2023-12-11 DIAGNOSIS — Z00.00 WELL ADULT EXAM: ICD-10-CM

## 2023-12-11 DIAGNOSIS — I10 ESSENTIAL HYPERTENSION: ICD-10-CM

## 2023-12-11 DIAGNOSIS — F31.62 BIPOLAR DISORDER, CURRENT EPISODE MIXED, MODERATE: ICD-10-CM

## 2023-12-11 DIAGNOSIS — Z86.73 HISTORY OF STROKE: Primary | ICD-10-CM

## 2023-12-11 DIAGNOSIS — E78.2 MIXED HYPERLIPIDEMIA: ICD-10-CM

## 2023-12-11 DIAGNOSIS — R73.01 IMPAIRED FASTING GLUCOSE: ICD-10-CM

## 2023-12-11 NOTE — ASSESSMENT & PLAN NOTE
LDL of 41 is easily at goal as of 12/23.  Patient is fine to continue with just low to moderate dose atorvastatin.

## 2023-12-11 NOTE — PROGRESS NOTES
"Chief Complaint  Hypertension and Follow-up (Pt states that this is routine, she had labs. She has no new issues. )    Subjective       Audra Montejo presents to [facility]    History of Present Illness:  Patient is a 63-year-old female with longstanding history of tobacco abuse and medical noncompliance, leaving the hospital AMA on more than one occasion btwn 7/22-9/22, who had at least 4 infarcts over that timeframe, with underlying hypertension and IFG as well as hyperlipidemia, seen 11/22 as a New Patient, who is coming in 12/23 for routine 4-month follow-up. We will review her medications, address compliance, review recent labs, and make further recommendations at that time.    Review of Systems   Constitutional:  Negative for appetite change, fatigue and fever.   HENT:  Negative for congestion and ear pain.    Eyes:  Negative for blurred vision.   Respiratory:  Negative for cough, chest tightness, shortness of breath and wheezing.    Cardiovascular:  Negative for chest pain, palpitations and leg swelling.   Gastrointestinal:  Negative for abdominal pain.   Genitourinary:  Negative for difficulty urinating, dysuria and hematuria.   Musculoskeletal:  Negative for arthralgias and gait problem.   Skin:  Negative for skin lesions.   Neurological:  Negative for syncope, memory problem and confusion.   Psychiatric/Behavioral:  Negative for self-injury and depressed mood.        Objective   Vital Signs:   /70   Pulse 63   Temp 99.8 °F (37.7 °C) (Skin)   Ht 147.3 cm (57.99\")   Wt 72 kg (158 lb 12.8 oz)   SpO2 96%   BMI 33.20 kg/m²           Physical Exam  Vitals and nursing note reviewed.   Constitutional:       General: She is not in acute distress.     Appearance: Normal appearance. She is not toxic-appearing.   HENT:      Head: Atraumatic.      Right Ear: External ear normal.      Left Ear: External ear normal.      Nose: Nose normal.      Mouth/Throat:      Mouth: " Mucous membranes are moist.   Eyes:      General:         Right eye: No discharge.         Left eye: No discharge.      Extraocular Movements: Extraocular movements intact.      Pupils: Pupils are equal, round, and reactive to light.   Cardiovascular:      Rate and Rhythm: Normal rate and regular rhythm.      Pulses: Normal pulses.      Heart sounds: Normal heart sounds. No murmur heard.     No gallop.   Pulmonary:      Effort: Pulmonary effort is normal. No respiratory distress.      Breath sounds: No wheezing, rhonchi or rales.   Abdominal:      General: There is no distension.      Palpations: Abdomen is soft. There is no mass.      Tenderness: There is no abdominal tenderness. There is no guarding.   Musculoskeletal:         General: Tenderness present. No swelling.      Cervical back: No tenderness.      Right lower leg: No edema.      Left lower leg: No edema.      Comments: Tender lower lumbar spine.  Full ROM.   Skin:     General: Skin is warm and dry.      Findings: No rash.   Neurological:      General: No focal deficit present.      Mental Status: She is alert and oriented to person, place, and time. Mental status is at baseline.      Motor: No weakness.      Gait: Gait abnormal.      Comments: Reflexes 2+, neg SLR, strength 4+/5 bilaterally.   Psychiatric:         Mood and Affect: Mood normal.         Thought Content: Thought content normal.          Result Review   The following data was reviewed by: Lowell Alex MD on 11/02/2022:  [x] Laboratory  [] Microbiology  [x] Radiology  [] EKG/telemetry  [] Cardiology/Vascular  [] Pathology  [x] Old records             Assessment and Plan   Diagnoses and all orders for this visit:    1. History of stroke (Primary)    2. Essential hypertension  Assessment & Plan:  Blood pressure is well-controlled as of her 12/23 office visit.  She is fine to continue with low-dose candesartan and moderate dose amlodipine.    Orders:  -     Comprehensive Metabolic Panel;  Future    3. Impaired fasting glucose  Assessment & Plan:  Fasting blood sugar is only 94, previous A1c was less than 6, so we will defer A1c until blood sugars trend up.      4. Mixed hyperlipidemia  Assessment & Plan:  LDL of 41 is easily at goal as of .  Patient is fine to continue with just low to moderate dose atorvastatin.    Orders:  -     Lipid Panel; Future    5. Bipolar disorder, current episode mixed, moderate  Assessment & Plan:  Depakote level not elevated as of .    However, patient feels like it is not working presently, and there is a little confusion in regards to the dosing, will check with the pharmacy, she will check her supply at home as well.--->pt picked up 90 day supply of 500 mg on 23.      Additionally, need to get her in with Psych going forward given short fuse at present and may have issues at work before long.    Orders:  -     Ambulatory Referral to Psychiatry  -     CBC & Differential; Future  -     Valproic Acid Level, Total; Future    6. Well adult exam  Overview:  Preventive measures: were reviewed with the patient at this office visit.  They included but were not limited to discussions in regards to vaccines outstanding, auto safety with seat belts and other assistive devices, fall prevention, and routine screening studies.    Exercise: No ischemic symptoms with her job that is physical, but she does occasionally have to use her inhaler for asthma.  Comprehensive labs: Reviewed all at her  office visit.    Covid vaccine: Patient declined prior.  Other vaccines: We will need Prevnar 20 in the fall.    MM2023 per me. (Aunt with breast CA)  Colon:  = negative = 10 years per Dr. Clark.      Orders:  -     Vitamin B12 anemia; Future  -     Folate anemia; Future  -     Iron Profile; Future  -     Ferritin; Future  -     TSH; Future  -     T4, free; Future        BMI is >= 30 and <35. (Class 1 Obesity). The following options were offered after  discussion;: exercise counseling/recommendations and nutrition counseling/recommendations       Follow Up   Return in about 4 months (around 4/11/2024).    Patient was given instructions and counseling regarding her condition or for health maintenance advice. Please see specific information pulled into the AVS if appropriate.

## 2023-12-11 NOTE — ASSESSMENT & PLAN NOTE
Depakote level not elevated as of 12/23.    However, patient feels like it is not working presently, and there is a little confusion in regards to the dosing, will check with the pharmacy, she will check her supply at home as well.--->pt picked up 90 day supply of 500 mg on 8/29/23.      Additionally, need to get her in with Psych going forward given short fuse at present and may have issues at work before long.

## 2023-12-11 NOTE — Clinical Note
Please check with pharmacy regarding the last dose of Depakote she received and what prescription they have on file for future doses.

## 2023-12-11 NOTE — ASSESSMENT & PLAN NOTE
Fasting blood sugar is only 94, previous A1c was less than 6, so we will defer A1c until blood sugars trend up.

## 2023-12-11 NOTE — ASSESSMENT & PLAN NOTE
Blood pressure is well-controlled as of her 12/23 office visit.  She is fine to continue with low-dose candesartan and moderate dose amlodipine.

## 2023-12-18 NOTE — PROGRESS NOTES
I spoke to [facility] and they state that they have only filled Depakote ER 500mg qd for her. She last picked up a 90 day supply on August 29th.

## 2023-12-22 ENCOUNTER — TELEPHONE (OUTPATIENT)
Dept: FAMILY MEDICINE CLINIC | Facility: CLINIC | Age: 63
End: 2023-12-22

## 2023-12-22 NOTE — TELEPHONE ENCOUNTER
Caller: Audra Montejo    Relationship to patient: Self    Best call back number: [phone]     Patient is needing: PATIENT REQUESTS THAT IT BE ATTACHED TO CHART THAT APPOINTMENTS MUST BE SCHEDULED ONLY IN MORNINGS DUE TO EMPLOYER.

## 2023-12-29 RX ORDER — AMLODIPINE BESYLATE 5 MG/1
5 TABLET ORAL DAILY
Qty: 90 TABLET | Refills: 1 | Status: SHIPPED | OUTPATIENT
Start: 2023-12-29

## 2024-01-11 ENCOUNTER — HOSPITAL ENCOUNTER (EMERGENCY)
Facility: HOSPITAL | Age: 64
Discharge: HOME | End: 2024-01-12
Attending: EMERGENCY MEDICINE
Payer: COMMERCIAL

## 2024-01-11 ENCOUNTER — APPOINTMENT (OUTPATIENT)
Dept: GENERAL RADIOLOGY | Facility: HOSPITAL | Age: 64
End: 2024-01-11
Payer: COMMERCIAL

## 2024-01-11 ENCOUNTER — OFFICE VISIT (OUTPATIENT)
Dept: FAMILY MEDICINE CLINIC | Facility: CLINIC | Age: 64
End: 2024-01-11
Payer: COMMERCIAL

## 2024-01-11 VITALS
HEART RATE: 57 BPM | OXYGEN SATURATION: 99 % | HEIGHT: 58 IN | RESPIRATION RATE: 16 BRPM | WEIGHT: 159.39 LBS | DIASTOLIC BLOOD PRESSURE: 97 MMHG | BODY MASS INDEX: 33.46 KG/M2 | SYSTOLIC BLOOD PRESSURE: 162 MMHG | TEMPERATURE: 98.4 F

## 2024-01-11 VITALS
OXYGEN SATURATION: 96 % | DIASTOLIC BLOOD PRESSURE: 53 MMHG | BODY MASS INDEX: 33.44 KG/M2 | TEMPERATURE: 98.5 F | WEIGHT: 159.3 LBS | HEIGHT: 58 IN | HEART RATE: 58 BPM | SYSTOLIC BLOOD PRESSURE: 132 MMHG

## 2024-01-11 DIAGNOSIS — I10 ESSENTIAL HYPERTENSION: ICD-10-CM

## 2024-01-11 DIAGNOSIS — Z86.73 HISTORY OF STROKE: ICD-10-CM

## 2024-01-11 DIAGNOSIS — Z86.73 HISTORY OF CVA (CEREBROVASCULAR ACCIDENT): ICD-10-CM

## 2024-01-11 DIAGNOSIS — R51.9 NEW ONSET OF HEADACHES AFTER AGE 50: Primary | ICD-10-CM

## 2024-01-11 DIAGNOSIS — R51.9 NEW ONSET OF HEADACHES AFTER AGE 50: ICD-10-CM

## 2024-01-11 DIAGNOSIS — R42 DIZZINESS: ICD-10-CM

## 2024-01-11 DIAGNOSIS — J45.20 MILD INTERMITTENT ASTHMA WITHOUT COMPLICATION: ICD-10-CM

## 2024-01-11 DIAGNOSIS — F31.62 BIPOLAR DISORDER, CURRENT EPISODE MIXED, MODERATE: ICD-10-CM

## 2024-01-11 DIAGNOSIS — I67.1 RIGHT INTERNAL CAROTID ARTERY ANEURYSM: ICD-10-CM

## 2024-01-11 DIAGNOSIS — D68.318 FACTOR VIII INHIBITOR DISORDER: ICD-10-CM

## 2024-01-11 DIAGNOSIS — R73.01 IMPAIRED FASTING GLUCOSE: ICD-10-CM

## 2024-01-11 DIAGNOSIS — R53.1 WEAKNESS: Primary | ICD-10-CM

## 2024-01-11 DIAGNOSIS — N39.46 MIXED STRESS AND URGE URINARY INCONTINENCE: ICD-10-CM

## 2024-01-11 PROBLEM — M54.16 LUMBAR RADICULOPATHY: Status: ACTIVE | Noted: 2024-01-11

## 2024-01-11 PROBLEM — N64.89 HEMATOMA OF RIGHT BREAST: Status: RESOLVED | Noted: 2023-02-23 | Resolved: 2024-01-11

## 2024-01-11 LAB
ALBUMIN SERPL-MCNC: 4 G/DL (ref 3.5–5.2)
ALBUMIN/GLOB SERPL: 1.7 G/DL
ALP SERPL-CCNC: 112 U/L (ref 39–117)
ALT SERPL W P-5'-P-CCNC: 14 U/L (ref 1–33)
ANION GAP SERPL CALCULATED.3IONS-SCNC: 11.2 MMOL/L (ref 5–15)
APTT PPP: 27.4 SECONDS (ref 78–95.9)
AST SERPL-CCNC: 18 U/L (ref 1–32)
BASOPHILS # BLD AUTO: 0.06 10*3/MM3 (ref 0–0.2)
BASOPHILS NFR BLD AUTO: 0.6 % (ref 0–1.5)
BILIRUB SERPL-MCNC: 0.2 MG/DL (ref 0–1.2)
BILIRUB UR QL STRIP: NEGATIVE
BUN SERPL-MCNC: 17 MG/DL (ref 8–23)
BUN/CREAT SERPL: 14.5 (ref 7–25)
CALCIUM SPEC-SCNC: 8.8 MG/DL (ref 8.6–10.5)
CHLORIDE SERPL-SCNC: 106 MMOL/L (ref 98–107)
CLARITY UR: CLEAR
CO2 SERPL-SCNC: 23.8 MMOL/L (ref 22–29)
COLOR UR: YELLOW
CREAT SERPL-MCNC: 1.17 MG/DL (ref 0.57–1)
DEPRECATED RDW RBC AUTO: 45 FL (ref 37–54)
EGFRCR SERPLBLD CKD-EPI 2021: 53.2 ML/MIN/1.73
EOSINOPHIL # BLD AUTO: 0.48 10*3/MM3 (ref 0–0.4)
EOSINOPHIL NFR BLD AUTO: 4.5 % (ref 0.3–6.2)
ERYTHROCYTE [DISTWIDTH] IN BLOOD BY AUTOMATED COUNT: 13.6 % (ref 12.3–15.4)
FLUAV SUBTYP SPEC NAA+PROBE: NOT DETECTED
FLUBV RNA ISLT QL NAA+PROBE: NOT DETECTED
GLOBULIN UR ELPH-MCNC: 2.4 GM/DL
GLUCOSE SERPL-MCNC: 131 MG/DL (ref 65–99)
GLUCOSE UR STRIP-MCNC: NEGATIVE MG/DL
HCT VFR BLD AUTO: 40 % (ref 34–46.6)
HGB BLD-MCNC: 13.4 G/DL (ref 12–15.9)
HGB UR QL STRIP.AUTO: NEGATIVE
HOLD SPECIMEN: NORMAL
HOLD SPECIMEN: NORMAL
IMM GRANULOCYTES # BLD AUTO: 0.02 10*3/MM3 (ref 0–0.05)
IMM GRANULOCYTES NFR BLD AUTO: 0.2 % (ref 0–0.5)
INR PPP: 0.95 (ref 0.86–1.15)
KETONES UR QL STRIP: NEGATIVE
LEUKOCYTE ESTERASE UR QL STRIP.AUTO: NEGATIVE
LYMPHOCYTES # BLD AUTO: 4.79 10*3/MM3 (ref 0.7–3.1)
LYMPHOCYTES NFR BLD AUTO: 44.9 % (ref 19.6–45.3)
MAGNESIUM SERPL-MCNC: 1.9 MG/DL (ref 1.6–2.4)
MCH RBC QN AUTO: 30.2 PG (ref 26.6–33)
MCHC RBC AUTO-ENTMCNC: 33.5 G/DL (ref 31.5–35.7)
MCV RBC AUTO: 90.1 FL (ref 79–97)
MONOCYTES # BLD AUTO: 0.84 10*3/MM3 (ref 0.1–0.9)
MONOCYTES NFR BLD AUTO: 7.9 % (ref 5–12)
NEUTROPHILS NFR BLD AUTO: 4.49 10*3/MM3 (ref 1.7–7)
NEUTROPHILS NFR BLD AUTO: 41.9 % (ref 42.7–76)
NITRITE UR QL STRIP: NEGATIVE
NRBC BLD AUTO-RTO: 0 /100 WBC (ref 0–0.2)
PH UR STRIP.AUTO: 7 [PH] (ref 5–8)
PLATELET # BLD AUTO: 252 10*3/MM3 (ref 140–450)
PMV BLD AUTO: 11.8 FL (ref 6–12)
POTASSIUM SERPL-SCNC: 4.2 MMOL/L (ref 3.5–5.2)
PROT SERPL-MCNC: 6.4 G/DL (ref 6–8.5)
PROT UR QL STRIP: NEGATIVE
PROTHROMBIN TIME: 12.8 SECONDS (ref 11.8–14.9)
RBC # BLD AUTO: 4.44 10*6/MM3 (ref 3.77–5.28)
RSV RNA NPH QL NAA+NON-PROBE: NOT DETECTED
SARS-COV-2 RNA RESP QL NAA+PROBE: NOT DETECTED
SODIUM SERPL-SCNC: 141 MMOL/L (ref 136–145)
SP GR UR STRIP: >1.03 (ref 1–1.03)
TROPONIN T SERPL HS-MCNC: 7 NG/L
UROBILINOGEN UR QL STRIP: ABNORMAL
VALPROATE SERPL-MCNC: 40.8 MCG/ML (ref 50–125)
WBC NRBC COR # BLD AUTO: 10.68 10*3/MM3 (ref 3.4–10.8)
WHOLE BLOOD HOLD COAG: NORMAL
WHOLE BLOOD HOLD SPECIMEN: NORMAL

## 2024-01-11 PROCEDURE — 80164 ASSAY DIPROPYLACETIC ACD TOT: CPT

## 2024-01-11 PROCEDURE — 93005 ELECTROCARDIOGRAM TRACING: CPT

## 2024-01-11 PROCEDURE — 81003 URINALYSIS AUTO W/O SCOPE: CPT

## 2024-01-11 PROCEDURE — 80053 COMPREHEN METABOLIC PANEL: CPT

## 2024-01-11 PROCEDURE — 84484 ASSAY OF TROPONIN QUANT: CPT

## 2024-01-11 PROCEDURE — 85730 THROMBOPLASTIN TIME PARTIAL: CPT

## 2024-01-11 PROCEDURE — 85610 PROTHROMBIN TIME: CPT

## 2024-01-11 PROCEDURE — 85025 COMPLETE CBC W/AUTO DIFF WBC: CPT

## 2024-01-11 PROCEDURE — 83735 ASSAY OF MAGNESIUM: CPT

## 2024-01-11 PROCEDURE — 36415 COLL VENOUS BLD VENIPUNCTURE: CPT

## 2024-01-11 PROCEDURE — 87637 SARSCOV2&INF A&B&RSV AMP PRB: CPT

## 2024-01-11 PROCEDURE — 99284 EMERGENCY DEPT VISIT MOD MDM: CPT

## 2024-01-11 PROCEDURE — 93005 ELECTROCARDIOGRAM TRACING: CPT | Performed by: EMERGENCY MEDICINE

## 2024-01-11 PROCEDURE — 71045 X-RAY EXAM CHEST 1 VIEW: CPT

## 2024-01-11 RX ORDER — SODIUM CHLORIDE 0.9 % (FLUSH) 0.9 %
10 SYRINGE (ML) INJECTION AS NEEDED
Status: DISCONTINUED | OUTPATIENT
Start: 2024-01-11 | End: 2024-01-12 | Stop reason: HOSPADM

## 2024-01-11 NOTE — ASSESSMENT & PLAN NOTE
Psychological condition is newly identified.  Continue current treatment regimen.  I recommended referral to psychiatry, patient declines.  Psychological condition  will be reassessed at the next regular appointment.

## 2024-01-11 NOTE — ASSESSMENT & PLAN NOTE
She is concerned about factor VIII and blood clots, wants referral to hematology, referral placed.

## 2024-01-11 NOTE — ASSESSMENT & PLAN NOTE
Asthma is currently stable, on Singulair.  She has albuterol inhaler to use as needed.  Recommended to quit smoking but patient is not interested in quitting smoking.

## 2024-01-11 NOTE — ASSESSMENT & PLAN NOTE
I discussed with her that she does have borderline diabetes, recommendation to decrease carbs and sugars in diet.  Handout provided, see AVS.

## 2024-01-11 NOTE — PROGRESS NOTES
"Chief Complaint  Establish Care, Stroke (7/2022 & 9/2022), and Blood Clots     Subjective          Audra Montejo, 63 y.o. female presents to [facility]  History of Present Illness   As a new patient to establish care. She is a previous patient of Dr. Alex.  She has had previous CVAs but has been noncompliant.  She was admitted on 5/14/2023 for possible stroke but she left AMA.  She saw HERMAN Woods on 7/18/2023 for follow-up from stroke.  She states that she took her off of aspirin because she was having increase in bruising.  She states that no one gives her answers for what is going on in her head.  She states that she feels like she has got another blood clot \"working its way up to her head.\"  She states she is having the same symptoms that she has had before.  She is feeling dizzy and having pain in her neck and shooting pains in her head that she thinks is due to a blood clot.  She has been having the small warning symptoms but has not had any major symptoms.  She is on Plavix.  She states she had to stop the aspirin previously.  She is on atorvastatin 20 mg daily.  She is not on any other blood thinners.  She states that she had elevated factor VIII and she wants to see hematology.    She has hypertension and her blood pressure is controlled on candesartan 8 mg daily and amlodipine 5 mg daily.    She did just have lab workup done on 12/11/2023.  She did have a mildly elevated A1c indicating borderline diabetes.    She does have family history of blood clots, strokes and cancer.    She has overactive bladder and takes oxybutynin XL 10 mg daily with good response.    She has bipolar/depression: She is currently on Depakote 500 mg once daily.  She is concerned because her Depakote levels are below normal.  She refuses to go to psychiatry.    She states she has asthma and she is on Singulair daily and has albuterol inhaler to use as needed.  She is a current smoker " "and does not want to quit smoking.    Audra Montejo  reports that she has been smoking cigarettes. She has a 30.00 pack-year smoking history. She has been exposed to tobacco smoke. She has never used smokeless tobacco.. I have educated her on the risk of diseases from using tobacco products such as cancer, COPD, heart disease, and blood clots .     I advised her to quit and she is not willing to quit.    I spent 3  minutes counseling the patient.       Tobacco Use: High Risk (1/11/2024)    Patient History     Smoking Tobacco Use: Every Day     Smokeless Tobacco Use: Never     Passive Exposure: Current      Objective   Vital Signs:   /53 (BP Location: Left arm, Patient Position: Sitting, Cuff Size: Adult)   Pulse 58   Temp 98.5 °F (36.9 °C)   Ht 147.3 cm (57.99\")   Wt 72.3 kg (159 lb 4.8 oz)   SpO2 96%   BMI 33.31 kg/m²       Current Outpatient Medications:     albuterol (ACCUNEB) 1.25 MG/3ML nebulizer solution, Take 3 mL by nebulization Every 6 (Six) Hours As Needed for Wheezing., Disp: 50 each, Rfl: 0    albuterol sulfate  (90 Base) MCG/ACT inhaler, Inhale 2 puffs Every 6 (Six) Hours As Needed for Wheezing., Disp: 18 g, Rfl: 0    amLODIPine (NORVASC) 5 MG tablet, Take 1 tablet by mouth Daily., Disp: 90 tablet, Rfl: 1    atorvastatin (LIPITOR) 20 MG tablet, TAKE 1 TABLET BY MOUTH DAILY, Disp: 90 tablet, Rfl: 1    candesartan (ATACAND) 8 MG tablet, Take 1 tablet by mouth Daily., Disp: 90 tablet, Rfl: 1    clopidogrel (PLAVIX) 75 MG tablet, TAKE 1 TABLET BY MOUTH DAILY, Disp: 90 tablet, Rfl: 1    divalproex (Depakote ER) 500 MG 24 hr tablet, Take 1 tablet by mouth Daily., Disp: 90 tablet, Rfl: 1    gabapentin (NEURONTIN) 100 MG capsule, Take 1 capsule by mouth Every Night., Disp: 30 capsule, Rfl: 1    montelukast (SINGULAIR) 10 MG tablet, TAKE 1 TABLET BY MOUTH EVERY NIGHT, Disp: 90 tablet, Rfl: 1    oxybutynin XL (DITROPAN-XL) 10 MG 24 hr tablet, TAKE 1 TABLET BY MOUTH DAILY, Disp: 90 tablet, " Rfl: 1    traMADol (ULTRAM) 50 MG tablet, Take 1 tablet by mouth Every 8 (Eight) Hours As Needed for Moderate Pain., Disp: 30 tablet, Rfl: 1   Past Medical History:   Diagnosis Date    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    Acid reflux disease     Allergic Unknown    Allergy     unspecified    Anxiety     Asthma Unknown    Back pain     Bilateral carpal tunnel syndrome 08/17/2017    Bipolar disorder     Bursitis of left shoulder 08/09/2016    Cataract Unknown    Deep vein thrombosis All my life    Depression     Eczema     Gall stones     H/O psychiatric care     Heart attack     Heart murmur     Hypertension     Incomplete tear of left rotator cuff 01/25/2017    Left shoulder pain     Loss of appetite     Lumbago 05/08/2015    low back pain     Memory loss     forgetfulness    Migraine headache     Need for hepatitis C screening test     Palpitation     Ringing in ears     Seasonal allergies     Shortness of breath     Sinus trouble     unspecified     Stroke (cerebrum)     Subacromial impingement, left 10/13/2016    Superior glenoid labrum lesion of left shoulder 10/13/2016    subsequent encounter    Trouble swallowing     Urinary tract infection Unknown      Physical Exam  Vitals reviewed.   Constitutional:       Appearance: Normal appearance. She is well-developed. She is obese.   HENT:      Right Ear: Tympanic membrane, ear canal and external ear normal.      Left Ear: Tympanic membrane, ear canal and external ear normal.      Mouth/Throat:      Mouth: Mucous membranes are moist.      Pharynx: No pharyngeal swelling, oropharyngeal exudate or posterior oropharyngeal erythema.   Eyes:      Pupils:      Right eye: Pupil is reactive and not sluggish.      Left eye: Pupil is sluggish. Pupil is reactive.   Neck:      Thyroid: No thyroid mass, thyromegaly or thyroid tenderness.   Cardiovascular:      Rate and Rhythm: Normal rate and regular rhythm.      Heart sounds: No murmur heard.     No friction rub. No gallop.    Pulmonary:      Effort: Pulmonary effort is normal.      Breath sounds: Normal breath sounds. No wheezing or rhonchi.   Lymphadenopathy:      Cervical: No cervical adenopathy.   Skin:     General: Skin is warm and dry.   Neurological:      Mental Status: She is alert and oriented to person, place, and time.      Cranial Nerves: No cranial nerve deficit.      Motor: No weakness.      Coordination: Coordination is intact.      Gait: Gait is intact.   Psychiatric:         Mood and Affect: Mood and affect normal.         Behavior: Behavior normal.         Thought Content: Thought content normal. Thought content does not include homicidal or suicidal ideation.         Judgment: Judgment normal.        Result Review :   {The following data was reviewed by HERMAN Menjivar    Narrative & Impression   PROCEDURE:  CT HEAD WO CONTRAST     COMPARISONS:           5/11/2023; 1/28/2023.     INDICATIONS:  Dizziness, nonspecific.     PROTOCOL:     Standard CT imaging protocol performed.                 RADIATION:      Total DLP: 1,081.2 mGy*cm.               MA and/or KV were/was adjusted to minimize radiation dose.                  TECHNIQUE:    After obtaining the patient's consent, 138 CT images were obtained without non-ionic   intravenous contrast material.      DISCUSSION:   A routine nonenhanced head CT was performed.  No acute brain abnormality is   identified.  No acute intracranial hemorrhage.  No acute infarct is seen.  No acute skull fracture.    No midline shift or acute intracranial mass effect is seen.  The ventricular size and   configuration are within normal limits.  Encephalomalacia involves the inferior right, greater than   left, cerebellar hemispheres and suggests old infarcts, such as involving the right posterior   inferior cerebellar artery (PICA) territory.  There may be mild chronic small vessel   ischemia/infarction, as well.  For instance, there are suspected chronic lacunar infarcts  involving   the right basal ganglia and the right thalamus as well as the arnol (especially on the right).    Under-pneumatization of the bilateral mastoid temporal bones is noted, as before.  Mild-to-moderate   age-indeterminate mucosal thickening involves the imaged paranasal sinuses.  No air-fluid   interfaces are seen within the imaged paranasal sinuses.  No definite acute orbital findings are   appreciated.     IMPRESSION:               No acute brain abnormality is seen. Specifically, no acute intracranial hemorrhage,   no acute infarct, no intracranial mass lesion, and no acute intracranial mass effect are   appreciated.        Please note that portions of this note were completed with a voice recognition program.     ONIEL VALDEZ JR, MD         Electronically Signed and Approved By: ONIEL VALDEZ JR, MD on 5/15/2023 at 1:40           CT Angiogram Neck [[id number]] (Order [id number])  Order  Status: Final result     Appointment Information    Display Notes    V-MAW                  PACS Images     Radiology Images    Study Result    Narrative & Impression   PROCEDURE:  CT ANGIOGRAM NECK     COMPARISON: [facility], CT, CT ANGIOGRAM NECK, 7/06/2022, 23:29.     INDICATIONS:  H/O STROKE JULY 2022 AND SEPT 2022, FOLLOW UP. DIZZINESS, UNSTEADINESS, NAUSEA.     PROTOCOL:     Standard imaging protocol performed                 RADIATION:      DLP: 657.9mGy*cm               Automated exposure control was utilized to minimize radiation dose.   CONTRAST:      100cc Isovue 370 I.V.  LABS:   eGFR: 101ml/min/1.73m2     TECHNIQUE:    After obtaining the patient's consent, CT images of the neck were obtained without and   with non-ionic intravenous contrast material. Multi-planar reformatted/3-D images were created to   optimize visualization of vascular anatomy. Unless otherwise stated in this report, all vascular   stenoses involving the internal carotid arteries reported for this examination are derived by    dividing the lesion diameter by the diameter of the normal internal carotid artery more distally.     FINDINGS:          Vascular:     Neck:  There is a normal configuration of the great vessels.  Mild to moderate calcific ostial   stenosis of left common carotid is again noted.  There is mild eccentric stenosis of the distal   left common carotid due to soft plaquing estimated in the range 35%.  There is mild plaquing in the   left bulb and proximal internal carotid region.  There is no measurable stenosis of left internal   carotid through the skull base based on NASCET criteria. Subclavian arteries appear free of   significant stenosis.  The right common carotid appears free of stenosis.  Right carotid   bifurcation and internal carotid demonstrate no discernible plaquing.  There is no measurable   stenosis of the right internal carotid through the skull base based on NASCET criteria.  The   vertebral arteries appear free of significant stenosis bilaterally through the level of the skull   base.     Head:  There is a hypoplastic A1 segment the left anterior cerebral artery.  There is a fetal   origin of the left posterior cerebral artery.  There is short-segment moderate stenosis of the   proximal right posterior cerebral artery.  There is mild calcified plaquing in the carotid siphons,   with no significant intracranial carotid stenosis.  There is a 2 mm outpouching from the posterior   terminal right internal carotid, which may be due to posterior communicating artery infundibulum or   small aneurysm.     Nonvascular:       There are calcified granulomatous changes in the lung apices.  Mild pulmonary emphysematous changes   noted.  There are mild degenerative changes in the cervical spine.  Submandibular and parotid   glands appear symmetric.  No pathologic intracranial enhancement is identified.  There are chronic   ischemic changes noted in the cerebellum, right greater than left.  Ventricles are  appropriate in   size and configuration.  No abnormal fluid collections are identified intracranially.  There is   mucosal thickening in the frontal sinuses ethmoid air cells.  Mastoids are underpneumatized.  There   is partial opacification of the mastoid air cells bilaterally.  No middle ear fluid identified.    Orbits are grossly unremarkable.     IMPRESSION:                    1. No significant cervical carotid or vertebral artery stenosis is identified.      2. There is moderate proximal stenosis of the right posterior cerebral artery.  No other   significant intracranial stenosis is identified.  No evidence of large branch vessel occlusion is   seen.       3. Small focal outpouching from the posterior terminal right internal carotid may be due to a PCOM   infundibulum or small aneurysm.      4. Additional findings as given above.              WILMAR ELENA MD         Electronically Signed and Approved By: WILMAR ELENA MD on 5/11/2023 at 15:27                   Common Labs   Common labs          5/15/2023    05:14 9/5/2023    07:50 12/6/2023    14:26   Common Labs   Glucose  96  94    BUN  23  17    Creatinine  1.23  0.83    Sodium  141  142    Potassium  4.5  4.4    Chloride  104  106    Calcium  9.3  9.2    Albumin  4.4  4.3    Total Bilirubin  0.3  0.3    Alkaline Phosphatase  117  109    AST (SGOT)  18  16    ALT (SGPT)  14  10    WBC  11.01     Hemoglobin  14.5     Hematocrit  42.7     Platelets  295     Total Cholesterol 121   123    Triglycerides 61   47    HDL Cholesterol 76   71    LDL Cholesterol  32   41    Hemoglobin A1C 5.80  5.80       TSH   TSH          9/5/2023    07:50   TSH   TSH 1.480      VITD   Lab Results   Component Value Date    HEIF43SJ 31.0 09/05/2023     FREET4   Lab Results   Component Value Date    FREET4 1.22 09/05/2023          Component  Ref Range & Units 4 mo ago 1 yr ago   Factor VIII Activity  56 - 140 % 149 High  231 High  CM               Assessment and Plan    Diagnoses and  all orders for this visit:    1. New onset of headaches after age 52 (Primary)  Assessment & Plan:  She did not want to go back to the emergency room.  Due to her concerns and symptoms, will order CT angiogram of the head with contrast, will call with results.  Will determine next plan of care after reviewing results.  I did advise her if she has any new or worsening of symptoms to go to the emergency room, verbalizes understanding for    Orders:  -     CT Angiogram Head With Contrast; Future    2. Factor VIII inhibitor disorder  Assessment & Plan:  She is concerned about factor VIII and blood clots, wants referral to hematology, referral placed.    Orders:  -     Ambulatory Referral to Hematology / Oncology    3. History of CVA (cerebrovascular accident)  -     Ambulatory Referral to Hematology / Oncology  -     CT Angiogram Head With Contrast; Future    4. Bipolar disorder, current episode mixed, moderate  Assessment & Plan:  Psychological condition is newly identified.  Continue current treatment regimen.  I recommended referral to psychiatry, patient declines.  Psychological condition  will be reassessed at the next regular appointment.      5. Essential hypertension  Assessment & Plan:  Hypertension is improving with treatment.  Continue current treatment regimen.  Stop smoking.  Continue current medications.  Blood pressure will be reassessed at the next regular appointment.      6. Impaired fasting glucose  Assessment & Plan:  I discussed with her that she does have borderline diabetes, recommendation to decrease carbs and sugars in diet.  Handout provided, see AVS.      7. Mild intermittent asthma without complication  Assessment & Plan:  Asthma is currently stable, on Singulair.  She has albuterol inhaler to use as needed.  Recommended to quit smoking but patient is not interested in quitting smoking.        8. Mixed stress and urge urinary incontinence  Assessment & Plan:  Currently stable on oxybutynin,  "patient will continue 10 mg daily.        I spent 60 minutes caring for Audra on this date of service. This time includes time spent by me in the following activities:preparing for the visit, reviewing tests, performing a medically appropriate examination and/or evaluation , counseling and educating the patient/family/caregiver, ordering medications, tests, or procedures, and documenting information in the medical record.    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  I called patient and notified her of the results.  I did recommend that she go to the emergency room to have full workup with neurology.  She states she has a friend who can drive her to the emergency room.    Follow Up   Return for will determine f/u after reviewing results.  Patient was given instructions and counseling regarding her condition or for health maintenance advice. Please see specific information pulled into the AVS if appropriate.     Parts of this note are electronic transcriptions/translations of spoken language to printed text using the Dragon Dictation system.      Gale Negrete, APRN  01/11/2024   "

## 2024-01-11 NOTE — ASSESSMENT & PLAN NOTE
She did not want to go back to the emergency room.  Due to her concerns and symptoms, will order CT angiogram of the head with contrast, will call with results.  Will determine next plan of care after reviewing results.  I did advise her if she has any new or worsening of symptoms to go to the emergency room, verbalizes understanding for

## 2024-01-12 ENCOUNTER — APPOINTMENT (OUTPATIENT)
Dept: CT IMAGING | Facility: HOSPITAL | Age: 64
End: 2024-01-12
Payer: COMMERCIAL

## 2024-01-12 LAB
QT INTERVAL: 427 MS
QTC INTERVAL: 389 MS

## 2024-01-12 PROCEDURE — 70450 CT HEAD/BRAIN W/O DYE: CPT

## 2024-01-12 NOTE — ED PROVIDER NOTES
Time: 8:13 PM EST  Date of encounter:  1/11/2024  Independent Historian/Clinical History and Information was obtained by:   Patient    History is limited by: N/A    Chief complaint: Generalized weakness      History of Present Illness:  Patient is a 63 y.o. year old female who presents to the emergency department for evaluation of weakness. Patient states for the past 3 weeks she has had symptoms of generalized weakness, loss of taste, loss of smell, ringing in her ears, nausea, vomiting, and headaches.  The patient notes that she has had dizziness and intermittent weakness since having a stroke 2 years ago.  She states the stroke was in the back of her brain.  She does note that she also has an occasional headache.  She does not have a headache at this time.  The patient denies any focal weakness on one side of the body or the other in her extremities.  The patient's had no change in her speech or swallowing.  Patient does note that her gait has been off since having the stroke 2 years ago.  The patient denies any fever, rigors or myalgias.  The patient denies any chest pain or chest pressure.  The patient does have a chronic cough from smoking.  The patient does have COPD.  Patient denies any abdominal pain.  The patient's had no vomiting or diarrhea.  The patient's had no urinary frequency urgency or dysuria.  Patient states that she saw her primary care physician yesterday who ordered an outpatient CT scan.  She was then called today to come to the emergency room for evaluation.  The patient is quick to point out that no matter what she is not staying in the hospital.      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    Acid reflux disease     Allergic Unknown    Allergy     unspecified    Anxiety     Asthma Unknown    Back pain     Bilateral carpal tunnel syndrome 08/17/2017    Bipolar disorder     Bursitis of left shoulder 08/09/2016    Cataract Unknown    Deep vein thrombosis All my life    Depression     Eczema     Gall stones     H/O psychiatric care     Heart attack     Heart murmur     Hypertension     Incomplete tear of left rotator cuff 01/25/2017    Left shoulder pain     Loss of appetite     Lumbago 05/08/2015    low back pain     Memory loss     forgetfulness    Migraine headache     Need for hepatitis C screening test     Palpitation     Ringing in ears     Seasonal allergies     Shortness of breath     Sinus trouble     unspecified     Stroke (cerebrum)     Subacromial impingement, left 10/13/2016    Superior glenoid labrum lesion of left shoulder 10/13/2016    subsequent encounter    Trouble swallowing     Urinary tract infection Unknown     Past Surgical History:   Procedure Laterality Date    APPENDECTOMY      CARPAL TUNNEL RELEASE      CHOLECYSTECTOMY      COLONOSCOPY  2014    ENDOSCOPY  2014    HYSTERECTOMY  1999    SHOULDER SURGERY Left 10/03/2016    arthroscopic, left shoulder scope, RTC repair- Dr. Flores     Family History   Problem Relation Age of Onset    Stroke Mother     Diabetes Mother     Arthritis Mother     COPD Mother     Hyperlipidemia Mother     Vision loss Mother     Heart disease Father     Cancer Father         Lukimia    Heart disease Sister     Cancer Sister     Diabetes Sister     Arthritis Sister     Heart disease Brother     Diabetes Brother     Stroke Brother     Hypertension Other     Rheum arthritis Other     Stroke Son     Heart disease Son     Cancer Son     Other Son         blood disease    Arthritis Son     Osteoporosis Son     Arthritis Son     Cancer Son     Heart  disease Son     Stroke Son     Other Son        Home Medications:  Prior to Admission medications    Medication Sig Start Date End Date Taking? Authorizing Provider   albuterol (ACCUNEB) 1.25 MG/3ML nebulizer solution Take 3 mL by nebulization Every 6 (Six) Hours As Needed for Wheezing. 1/16/23   Gaviota Peter APRN   albuterol sulfate  (90 Base) MCG/ACT inhaler Inhale 2 puffs Every 6 (Six) Hours As Needed for Wheezing. 1/16/23   Gaviota Peter APRN   amLODIPine (NORVASC) 5 MG tablet Take 1 tablet by mouth Daily. 12/29/23   Lowell Alex MD   atorvastatin (LIPITOR) 20 MG tablet TAKE 1 TABLET BY MOUTH DAILY 11/27/23   Lowell Alex MD   candesartan (ATACAND) 8 MG tablet Take 1 tablet by mouth Daily. 9/11/23   Lowell Alex MD   clopidogrel (PLAVIX) 75 MG tablet TAKE 1 TABLET BY MOUTH DAILY 11/27/23   Lowell Alex MD   divalproex (Depakote ER) 500 MG 24 hr tablet Take 1 tablet by mouth Daily. 4/21/23   Lowell Alex MD   gabapentin (NEURONTIN) 100 MG capsule Take 1 capsule by mouth Every Night. 9/14/23   Lowell Alex MD   montelukast (SINGULAIR) 10 MG tablet TAKE 1 TABLET BY MOUTH EVERY NIGHT 11/27/23   Lowell Alex MD   oxybutynin XL (DITROPAN-XL) 10 MG 24 hr tablet TAKE 1 TABLET BY MOUTH DAILY 11/27/23   Lowell Alex MD   traMADol (ULTRAM) 50 MG tablet Take 1 tablet by mouth Every 8 (Eight) Hours As Needed for Moderate Pain. 9/14/23   Lowell Alex MD        Social History:   Social History     Tobacco Use    Smoking status: Every Day     Packs/day: 1.00     Years: 30.00     Additional pack years: 0.00     Total pack years: 30.00     Types: Cigarettes     Passive exposure: Current    Smokeless tobacco: Never    Tobacco comments:     Not your business   Vaping Use    Vaping Use: Never used   Substance Use Topics    Alcohol use: Not Currently     Comment: rarely drinks    Drug use: Never         Review of Systems:  Review of Systems   Constitutional:  Negative for chills, diaphoresis and  "fever.   HENT:  Negative for congestion, postnasal drip, rhinorrhea and sore throat.    Eyes:  Negative for photophobia.   Respiratory:  Positive for cough. Negative for chest tightness and shortness of breath.    Cardiovascular:  Negative for chest pain, palpitations and leg swelling.   Gastrointestinal:  Positive for nausea and vomiting. Negative for abdominal pain and diarrhea.   Genitourinary:  Negative for difficulty urinating, dysuria, flank pain, frequency, hematuria and urgency.   Musculoskeletal:  Negative for neck pain and neck stiffness.   Skin:  Negative for pallor and rash.   Neurological:  Positive for dizziness, weakness and headaches. Negative for syncope and numbness.   Hematological:  Negative for adenopathy. Does not bruise/bleed easily.   Psychiatric/Behavioral: Negative.          Physical Exam:  /97   Pulse 57   Temp 98.4 °F (36.9 °C) (Oral)   Resp 16   Ht 147.3 cm (58\")   Wt 72.3 kg (159 lb 6.3 oz)   LMP  (LMP Unknown)   SpO2 99%   Breastfeeding No   BMI 33.31 kg/m²         Physical Exam  Vitals and nursing note reviewed.   Constitutional:       General: She is not in acute distress.     Appearance: Normal appearance. She is not ill-appearing, toxic-appearing or diaphoretic.   HENT:      Head: Normocephalic and atraumatic.      Mouth/Throat:      Mouth: Mucous membranes are moist.   Eyes:      General: No visual field deficit.     Pupils: Pupils are equal, round, and reactive to light.   Cardiovascular:      Rate and Rhythm: Normal rate and regular rhythm.      Pulses: Normal pulses.           Carotid pulses are 2+ on the right side and 2+ on the left side.       Radial pulses are 2+ on the right side and 2+ on the left side.        Femoral pulses are 2+ on the right side and 2+ on the left side.       Popliteal pulses are 2+ on the right side and 2+ on the left side.        Dorsalis pedis pulses are 2+ on the right side and 2+ on the left side.        Posterior tibial pulses are " 2+ on the right side and 2+ on the left side.      Heart sounds: Normal heart sounds. No murmur heard.  Pulmonary:      Effort: Pulmonary effort is normal. No accessory muscle usage, respiratory distress or retractions.      Breath sounds: Normal breath sounds. No wheezing, rhonchi or rales.   Abdominal:      General: Abdomen is flat. There is no distension.      Palpations: Abdomen is soft. There is no mass or pulsatile mass.      Tenderness: There is no abdominal tenderness. There is no right CVA tenderness, left CVA tenderness, guarding or rebound.      Comments: No rigidity   Musculoskeletal:         General: No swelling, tenderness or deformity.      Cervical back: Neck supple. No tenderness.      Right lower leg: No edema.      Left lower leg: No edema.   Skin:     General: Skin is warm and dry.      Capillary Refill: Capillary refill takes less than 2 seconds.      Coloration: Skin is not jaundiced or pale.      Findings: No erythema.   Neurological:      General: No focal deficit present.      Mental Status: She is alert and oriented to person, place, and time. Mental status is at baseline.      Cranial Nerves: Cranial nerves 2-12 are intact. No cranial nerve deficit, dysarthria or facial asymmetry.      Sensory: Sensation is intact. No sensory deficit.      Motor: Motor function is intact. No weakness or pronator drift.      Coordination: Coordination is intact. Coordination normal. Finger-Nose-Finger Test normal.   Psychiatric:         Mood and Affect: Mood normal.         Behavior: Behavior normal.                Procedures:  Procedures      Medical Decision Making:      Comorbidities that affect care:    Anxiety, hypertension, cerebellar stroke, coronary disease, asthma, DVT, chronic back pain, bipolar, migraine    External Notes reviewed:    None      The following orders were placed and all results were independently analyzed by me:  Orders Placed This Encounter   Procedures    COVID PRE-OP /  PRE-PROCEDURE SCREENING ORDER (NO ISOLATION) - Swab, Nasopharynx    COVID-19, FLU A/B, RSV PCR 1 HR TAT - Swab, Nasopharynx    XR Chest 1 View    CT Head Without Contrast    [place] Draw    Comprehensive Metabolic Panel    Single High Sensitivity Troponin T    Magnesium    Urinalysis With Microscopic If Indicated (No Culture) - Urine, Clean Catch    CBC Auto Differential    Protime-INR    aPTT    Valproic Acid Level, Total    Undress & Gown    Continuous Pulse Oximetry    Vital Signs    ECG 12 Lead ED Triage Standing Order; Weak / Dizzy / AMS    CBC & Differential    Green Top (Gel)    Lavender Top    Gold Top - SST    Light Blue Top       Medications Given in the Emergency Department:  Medications - No data to display       ED Course:    The patient was initially evaluated in the triage area where orders were placed. The patient was later dispositioned by Tejas Mckeon DO.      The patient was advised to stay for completion of workup which includes but is not limited to communication of labs and radiological results, reassessment and plan. The patient was advised that leaving prior to disposition by a provider could result in critical findings that are not communicated to the patient.     ED Course as of 01/14/24 2143   Thu Jan 11, 2024 2012 PROVIDER IN TRIAGE  Patient was evaluated by me in triage, Mario Lopez PA-C.  Orders were placed and patient is currently awaiting final results and disposition.     I discussed patient with Dr. Ambrosio so that he could review patient's CT angio.  He recommended teleneuro consult once patient is roomed that she is not having any acute signs of stroke at this time. [MD]   2025 EKG:    Rhythm: Sinus bradycardia  Rate: 50  Intervals: Normal [place] and QT interval  T-wave: Nonspecific T wave flattening  ST Segment: No pathological ST elevation and reciprocal ST depression to suggest STEMI    EKG Comparison: No change in the QRS and ST morphology from the EKG performed May 14,  2023    Interpreted by me   [SD]      ED Course User Index  [MD] Mario Lopez PA-C  [SD] Tejas Mckeon DO       Labs:    Lab Results (last 24 hours)       ** No results found for the last 24 hours. **             Imaging:    No Radiology Exams Resulted Within Past 24 Hours      Differential Diagnosis and Discussion:      Dizziness: Based on the patient's history, signs, and symptoms, the diffential diagnosis includes but is not limited to meningitis, stroke, sepsis, subarachnoid hemorrhage, intracranial bleeding, encephalitis, vertigo, electrolyte imbalance, and metabolic disorders.    All labs were reviewed and interpreted by me.  All X-rays impressions were independently interpreted by me.  EKG was interpreted by me.  CT scan radiology impression was interpreted by me.    MDM  Number of Diagnoses or Management Options  Dizziness  History of stroke  Right internal carotid artery aneurysm  Weakness  Diagnosis management comments: Patient's vital signs were stable in the emergency room.    The patient's Covid swab was negative   The patient's Influenza swab was negative     The patient's urinalysis was negative for obvious infection or blood    The patient's CMP was reviewed and shows no abnormalities of critical concern.  Of note, the patient's sodium and potassium are acceptable.  The patient's liver enzymes are unremarkable.  The patient's renal function including creatinine is preserved.  The patient has a normal anion gap.    The patient's CBC was reviewed and shows no abnormalities of critical concern.  Of note, there is no anemia requiring a blood transfusion and the platelet count is acceptable    The patient had a normal high-sensitivity troponin    The patient's Depakote level was not toxic    CT scan of the brain demonstrated no acute abnormalities    The patient's chest x-ray demonstrated normal no acute cardiopulmonary process      At the time of disposition, the patient appears well, no distress  and nontoxic.  The patient has no symptoms at this time.  Again, the patient states her symptoms have been going on for over 3 weeks.  I did offer admission to the hospital due to her prior strokes and the findings on CTA.  The patient is aware that the purpose of admission to the hospital would be to perform an MRI to rule out acute or subacute strokes as well as vascular surgery referral.  The patient states that she is absolutely not staying in the hospital.  She states she needs to go home and wants to have this evaluation outpatient basis.  The patient is aware that she possibly could go home and have an acute stroke which could cause permanent neurological deficits.  The patient has capacity to make her own medical decision making and will be discharged at her request.  I have strongly encouraged the patient if she changes her mind and wants inpatient evaluation to return immediately.  Have also discussed other reasons she should return back to the emergency immediately.  She voiced understanding and she felt comfortable those instructions.  We will continue her current medications which include Plavix, Lipitor and antihypertensive.  I have also discussed the importance of smoking cessation       Amount and/or Complexity of Data Reviewed  Clinical lab tests: reviewed  Tests in the radiology section of CPT®: reviewed  Tests in the medicine section of CPT®: reviewed  Review and summarize past medical records: yes (I reviewed the patient's CT scan of the brain from May 15, 2023 when the patient presented with similar symptoms including dizziness and weakness.  The CT scan at that time showed no acute brain abnormality specifically no acute intracranial hemorrhage infarct or intracranial mass.  The patient did have encephalomalacia involving inferior right greater than left cerebellar hemispheres to suggest old infarct suggesting a right posterior inferior cerebellar artery territory stroke.  The patient had  suspected chronic lacunar infarct involving the right basal ganglia and the right thalamus    I have reviewed the patient's CT angiogram that was performed on an outpatient basis on January 11.  There is a short segment high-grade stenosis of the right P2 vessel.  Additionally there is probable aneurysm of the right supraclinoid ICA about 2.8 x 2 mm    )  Discuss the patient with other providers: yes (01:29 EST  I discussed the case with Dr. Brink, vascular surgeon.  We have reviewed the patient's imaging.  We have reviewed the patient's CT angiogram.  He feels these are nonemergent findings and the occlusion in the posterior cerebral artery is old.  He feels the aneurysm in the right ICA is an incidental finding and can be evaluated on an outpatient basis.  He is recommending outpatient vascular surgery referral.  I have discussed this with the patient in detail.  She voiced understanding and will follow-up with her primary care physician for outpatient referral)  Independent visualization of images, tracings, or specimens: yes         Social Determinants of Health:    Patient is independent, reliable, and has access to care.       Disposition and Care Coordination:    I advised the patient that in my opinion through evaluation of the history, physical, and objective data admission to the hospital is warranted. However, the patient/caretaker has declined admission understanding the risks of discharge.    I have explained discharge medications and the need for follow up with the patient/caretakers. This was also printed in the discharge instructions. Patient was discharged with the following medications and follow up:      Medication List      No changes were made to your prescriptions during this visit.      Gale Negrete, HERMAN  [id number] [place] [address]  [phone]    On 1/15/2024  Call for appointment       Final diagnoses:   Weakness   Dizziness   History of stroke   Right internal carotid  artery aneurysm        ED Disposition       ED Disposition   Discharge    Condition   Stable    Comment   --               This medical record created using voice recognition software.             Tejas Mckeon DO  01/14/24 0178

## 2024-01-12 NOTE — DISCHARGE INSTRUCTIONS
Please continue your current medication including the Plavix and Lipitor as previously prescribed    Please continue your blood pressure medicine as previously prescribed.    Please discuss the need for MRI of the brain with your primary care physician.    Please review the CT angiogram that was performed on outpatient basis with your primary care physician and discuss the need for vascular surgery referral as discussed with Dr. Brink on an outpatient basis    Please return to emergency immediately for intractable headache, vomiting, altered mental status, difficulties walking, difficulties coordination, change in vision, change in speech, focal weakness in your extremities or any new symptoms that you are concerned with    Please stop smoking

## 2024-01-15 ENCOUNTER — OFFICE VISIT (OUTPATIENT)
Dept: FAMILY MEDICINE CLINIC | Facility: CLINIC | Age: 64
End: 2024-01-15
Payer: COMMERCIAL

## 2024-01-15 VITALS
DIASTOLIC BLOOD PRESSURE: 88 MMHG | RESPIRATION RATE: 16 BRPM | HEIGHT: 58 IN | OXYGEN SATURATION: 100 % | BODY MASS INDEX: 32.26 KG/M2 | TEMPERATURE: 97.3 F | WEIGHT: 153.7 LBS | HEART RATE: 63 BPM | SYSTOLIC BLOOD PRESSURE: 178 MMHG

## 2024-01-15 DIAGNOSIS — I67.1 SMALL ANEURYSM OF SUPRACLINOID CAROTID ARTERY: ICD-10-CM

## 2024-01-15 DIAGNOSIS — I66.9 STENOSIS OF CEREBRAL ARTERY: ICD-10-CM

## 2024-01-15 DIAGNOSIS — I10 PRIMARY HYPERTENSION: Primary | ICD-10-CM

## 2024-01-15 PROCEDURE — 99214 OFFICE O/P EST MOD 30 MIN: CPT | Performed by: NURSE PRACTITIONER

## 2024-01-15 RX ORDER — CANDESARTAN 8 MG/1
8 TABLET ORAL 2 TIMES DAILY
Qty: 180 TABLET | Refills: 1 | Status: SHIPPED | OUTPATIENT
Start: 2024-01-15

## 2024-01-15 NOTE — LETTER
January 15, 2024     Patient: Audra Montejo   YOB: 1960   Date of Visit: 1/15/2024       To Whom It May Concern:    It is my medical opinion that Audra Montejo may return to work on 1/22/2024.            Sincerely,        HERMAN Menjivar    CC: No Recipients

## 2024-01-15 NOTE — ASSESSMENT & PLAN NOTE
Hypertension is worsening.  Stop smoking.  Medication changes per orders.  Ambulatory blood pressure monitoring.  I will increase candesartan 8 mg to twice daily and she will continue amlodipine 5 mg daily.  Blood pressure will be reassessed in 4 weeks.

## 2024-01-15 NOTE — PROGRESS NOTES
"Chief Complaint  Headache (States sharps in forehead and she was concern it could be the stroke), Hypertension, and Results (Discussed most recent CT angiogram and ER visit)    Subjective          Audra Montejo, 63 y.o. female presents to [facility]  History of Present Illness   She is following up from the emergency room.  She went to [facility] emergency room on 1/11/2024 after she was instructed to due to the results of her CT of angiogram.  The ER doctor reviewed the results with vascular Dr. Brink.  Dr. Brink felt that these were nonemergent findings and the occlusions in the posterior cerebral artery is old.  He feels the aneurysm in the right ICA is an incidental finding and can be evaluated in outpatient basis he is recommending outpatient vascular surgery referral. She states she is still having these little symptoms which had led up to her previous stroke.  Her blood pressure is noted to be elevated in the office today.  She has not been checking her blood pressure at home but she does have a blood pressure cuff.  She is currently taking amlodipine 5 mg daily and candesartan 8 mg once daily.  She does continue to smoke and is not interested in quitting.    Tobacco Use: High Risk (1/15/2024)    Patient History     Smoking Tobacco Use: Every Day     Smokeless Tobacco Use: Never     Passive Exposure: Current      Objective   Vital Signs:   /88   Pulse 63   Temp 97.3 °F (36.3 °C)   Resp 16   Ht 147.3 cm (58\")   Wt 69.7 kg (153 lb 11.2 oz)   SpO2 100%   BMI 32.12 kg/m²       Current Outpatient Medications:     albuterol (ACCUNEB) 1.25 MG/3ML nebulizer solution, Take 3 mL by nebulization Every 6 (Six) Hours As Needed for Wheezing., Disp: 50 each, Rfl: 0    albuterol sulfate  (90 Base) MCG/ACT inhaler, Inhale 2 puffs Every 6 (Six) Hours As Needed for Wheezing., Disp: 18 g, Rfl: 0    amLODIPine (NORVASC) 5 MG tablet, Take 1 tablet by mouth Daily., " Disp: 90 tablet, Rfl: 1    atorvastatin (LIPITOR) 20 MG tablet, TAKE 1 TABLET BY MOUTH DAILY, Disp: 90 tablet, Rfl: 1    candesartan (ATACAND) 8 MG tablet, Take 1 tablet by mouth 2 (Two) Times a Day. Indications: High Blood Pressure Disorder, Disp: 180 tablet, Rfl: 1    clopidogrel (PLAVIX) 75 MG tablet, TAKE 1 TABLET BY MOUTH DAILY, Disp: 90 tablet, Rfl: 1    divalproex (Depakote ER) 500 MG 24 hr tablet, Take 1 tablet by mouth Daily., Disp: 90 tablet, Rfl: 1    gabapentin (NEURONTIN) 100 MG capsule, Take 1 capsule by mouth Every Night., Disp: 30 capsule, Rfl: 1    montelukast (SINGULAIR) 10 MG tablet, TAKE 1 TABLET BY MOUTH EVERY NIGHT, Disp: 90 tablet, Rfl: 1    oxybutynin XL (DITROPAN-XL) 10 MG 24 hr tablet, TAKE 1 TABLET BY MOUTH DAILY, Disp: 90 tablet, Rfl: 1    traMADol (ULTRAM) 50 MG tablet, Take 1 tablet by mouth Every 8 (Eight) Hours As Needed for Moderate Pain., Disp: 30 tablet, Rfl: 1   Past Medical History:   Diagnosis Date    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    Acid reflux disease     Allergic Unknown    Allergy     unspecified    Anxiety     Asthma Unknown    Back pain     Bilateral carpal tunnel syndrome 08/17/2017    Bipolar disorder     Bursitis of left shoulder 08/09/2016    Cataract Unknown    Deep vein thrombosis All my life    Depression     Eczema     Gall stones     H/O psychiatric care     Heart attack     Heart murmur     Hyperlipidemia 11/22    Hypertension     Incomplete tear of left rotator cuff 01/25/2017    Left shoulder pain     Loss of appetite     Lumbago 05/08/2015    low back pain     Memory loss     forgetfulness    Migraine headache     Need for hepatitis C screening test     Obesity     Palpitation     Ringing in ears     Seasonal allergies     Shortness of breath     Sinus trouble     unspecified     Stroke (cerebrum)     Subacromial impingement, left 10/13/2016    Superior glenoid labrum lesion of left shoulder 10/13/2016    subsequent encounter    Trouble swallowing      Urinary tract infection Unknown      Physical Exam  Vitals reviewed.   Constitutional:       Appearance: Normal appearance. She is well-developed. She is obese.   Cardiovascular:      Rate and Rhythm: Normal rate and regular rhythm.      Heart sounds: No murmur heard.     No friction rub. No gallop.   Pulmonary:      Effort: Pulmonary effort is normal.      Breath sounds: Normal breath sounds. No wheezing or rhonchi.   Skin:     General: Skin is warm and dry.   Neurological:      Mental Status: She is alert and oriented to person, place, and time.      Cranial Nerves: No cranial nerve deficit.   Psychiatric:         Mood and Affect: Mood and affect normal.         Behavior: Behavior normal.         Thought Content: Thought content normal. Thought content does not include homicidal or suicidal ideation.         Judgment: Judgment normal.        Result Review :   {The following data was reviewed by HERMAN Menjivar    [facility] emergency room records were reviewed.  We also reviewed the CT angiogram that was done at [facility] on 1/11/2024.    CT Head Without Contrast    Result Date: 1/12/2024   No acute brain abnormality is seen.    Please note that portions of this note were completed with a voice recognition program.  ONIEL VADLEZ JR, MD       Electronically Signed and Approved By: ONIEL VALDEZ JR, MD on 1/12/2024 at 0:42              XR Chest 1 View    Result Date: 1/11/2024    1. No acute process       KIRA DALAL MD       Electronically Signed and Approved By: KIRA DALAL MD on 1/11/2024 at 20:50               CMP   CMP          9/5/2023    07:50 12/6/2023    14:26 1/11/2024    20:22   CMP   Glucose 96  94  131    BUN 23  17  17    Creatinine 1.23  0.83  1.17    EGFR 50.1  80.3  53.2    Sodium 141  142  141    Potassium 4.5  4.4  4.2    Chloride 104  106  106    Calcium 9.3  9.2  8.8    Total Protein 7.2  6.8  6.4    Albumin 4.4  4.3  4.0    Globulin 2.8  2.5  2.4     Total Bilirubin 0.3  0.3  0.2    Alkaline Phosphatase 117  109  112    AST (SGOT) 18  16  18    ALT (SGPT) 14  10  14    Albumin/Globulin Ratio 1.6  1.7  1.7    BUN/Creatinine Ratio 18.7  20.5  14.5    Anion Gap 12.1  8.8  11.2      CBC   CBC          5/14/2023    22:28 9/5/2023    07:50 1/11/2024    20:22   CBC   WBC 9.88  11.01  10.68    RBC 4.62  4.71  4.44    Hemoglobin 14.1  14.5  13.4    Hematocrit 40.8  42.7  40.0    MCV 88.3  90.7  90.1    MCH 30.5  30.8  30.2    MCHC 34.6  34.0  33.5    RDW 13.1  12.6  13.6    Platelets 239  295  252      LIPID   Lipid Panel          4/17/2023    11:19 5/15/2023    05:14 12/6/2023    14:26   Lipid Panel   Total Cholesterol 129  121  123    Triglycerides 45  61  47    HDL Cholesterol 78  76  71    VLDL Cholesterol 11  13  11    LDL Cholesterol  40  32  41    LDL/HDL Ratio 0.54  0.43  0.60      TSH   TSH          9/5/2023    07:50   TSH   TSH 1.480      [id number]   A1C Last 3 Results          4/17/2023    11:19 5/15/2023    05:14 9/5/2023    07:50   HGBA1C Last 3 Results   Hemoglobin A1C 5.70  5.80  5.80      VITD   Lab Results   Component Value Date    MWJY53NJ 31.0 09/05/2023     VITB12   Lab Results   Component Value Date    UGSSEXIY82 1,268 (H) 09/05/2023              Assessment and Plan    Diagnoses and all orders for this visit:    1. Primary hypertension (Primary)  Assessment & Plan:  Hypertension is worsening.  Stop smoking.  Medication changes per orders.  Ambulatory blood pressure monitoring.  I will increase candesartan 8 mg to twice daily and she will continue amlodipine 5 mg daily.  Blood pressure will be reassessed in 4 weeks.    Orders:  -     candesartan (ATACAND) 8 MG tablet; Take 1 tablet by mouth 2 (Two) Times a Day. Indications: High Blood Pressure Disorder  Dispense: 180 tablet; Refill: 1    2. Stenosis of cerebral artery  Assessment & Plan:  I will refer her to vascular surgery for further evaluation.    Orders:  -     Cancel: Ambulatory Referral to  Vascular Surgery  -     Ambulatory Referral to Neurosurgery    3. Small aneurysm of supraclinoid carotid artery  Assessment & Plan:  We discussed that the aneurysm is small but she does need to follow-up with vascular.    Orders:  -     Cancel: Ambulatory Referral to Vascular Surgery  -     Cancel: Ambulatory Referral to Neurosurgery  -     Ambulatory Referral to Neurosurgery      Handouts on how to take blood pressure, cerebral aneurysm and health risk of smoking provided, see AVS.    Follow Up   Return in about 1 month (around 2/15/2024) for Next scheduled follow up.  Patient was given instructions and counseling regarding her condition or for health maintenance advice. Please see specific information pulled into the AVS if appropriate.     Parts of this note are electronic transcriptions/translations of spoken language to printed text using the Dragon Dictation system.      Gale Negrete, APRN  01/15/2024

## 2024-01-17 ENCOUNTER — TELEPHONE (OUTPATIENT)
Dept: FAMILY MEDICINE CLINIC | Facility: CLINIC | Age: 64
End: 2024-01-17

## 2024-01-22 ENCOUNTER — TELEPHONE (OUTPATIENT)
Dept: FAMILY MEDICINE CLINIC | Facility: CLINIC | Age: 64
End: 2024-01-22

## 2024-01-22 NOTE — TELEPHONE ENCOUNTER
Caller: Audra Montejo    Relationship: Self    Best call back number: [phone]    What form or medical record are you requesting: NOTE FOR WORK     Who is requesting this form or medical record from you: PATIENT     How would you like to receive the form or medical records (pick-up, mail, fax): CALL PATIENT AND SHE WILL COME AND PICK IT UP     Timeframe paperwork needed: AS SOON AS POSSIBLE.     Additional notes: PATIENT WENT BACK TO WORK LAST NIGHT AND HAD TO LEAVE TO COME HOME, STARTED GETTING SICK AT STOMACH, BROKE OUT IN A SWEAT.

## 2024-01-23 ENCOUNTER — TRANSCRIBE ORDERS (OUTPATIENT)
Dept: ADMINISTRATIVE | Facility: HOSPITAL | Age: 64
End: 2024-01-23
Payer: COMMERCIAL

## 2024-01-23 DIAGNOSIS — D69.9 BLEEDING DISORDER: Primary | ICD-10-CM

## 2024-01-23 DIAGNOSIS — R10.10 PAIN OF UPPER ABDOMEN: ICD-10-CM

## 2024-01-25 ENCOUNTER — TELEPHONE (OUTPATIENT)
Dept: FAMILY MEDICINE CLINIC | Facility: CLINIC | Age: 64
End: 2024-01-25

## 2024-01-25 NOTE — TELEPHONE ENCOUNTER
Audra Montejo 1960  aware and voiced understanding   
Caller: Audra Montejo    Relationship to patient: Self    Best call back number: [phone]     Patient is needing: PATIENT WOULD LIKE TO BE ADVISED IF IT IS SAFE TO START TAKING 80MG ASPRIN AGAIN OVER THE COUNTER.           
No, she is on Plavix right now so she should not be taking aspirin.  
Please advise  
show

## 2024-02-02 ENCOUNTER — APPOINTMENT (OUTPATIENT)
Dept: GENERAL RADIOLOGY | Facility: HOSPITAL | Age: 64
End: 2024-02-02
Payer: COMMERCIAL

## 2024-02-02 ENCOUNTER — HOSPITAL ENCOUNTER (EMERGENCY)
Facility: HOSPITAL | Age: 64
Discharge: HOME | End: 2024-02-02
Attending: EMERGENCY MEDICINE
Payer: COMMERCIAL

## 2024-02-02 ENCOUNTER — TELEPHONE (OUTPATIENT)
Dept: FAMILY MEDICINE CLINIC | Facility: CLINIC | Age: 64
End: 2024-02-02
Payer: COMMERCIAL

## 2024-02-02 ENCOUNTER — APPOINTMENT (OUTPATIENT)
Dept: MRI IMAGING | Facility: HOSPITAL | Age: 64
End: 2024-02-02
Payer: COMMERCIAL

## 2024-02-02 VITALS
OXYGEN SATURATION: 97 % | SYSTOLIC BLOOD PRESSURE: 142 MMHG | BODY MASS INDEX: 34.75 KG/M2 | DIASTOLIC BLOOD PRESSURE: 58 MMHG | HEART RATE: 56 BPM | HEIGHT: 58 IN | WEIGHT: 165.57 LBS | RESPIRATION RATE: 18 BRPM | TEMPERATURE: 98.5 F

## 2024-02-02 DIAGNOSIS — H10.32 ACUTE CONJUNCTIVITIS OF LEFT EYE, UNSPECIFIED ACUTE CONJUNCTIVITIS TYPE: Primary | ICD-10-CM

## 2024-02-02 LAB
ALBUMIN SERPL-MCNC: 4.1 G/DL (ref 3.5–5.2)
ALBUMIN/GLOB SERPL: 1.5 G/DL
ALP SERPL-CCNC: 95 U/L (ref 39–117)
ALT SERPL W P-5'-P-CCNC: 11 U/L (ref 1–33)
ANION GAP SERPL CALCULATED.3IONS-SCNC: 9.3 MMOL/L (ref 5–15)
AST SERPL-CCNC: 14 U/L (ref 1–32)
BASOPHILS # BLD AUTO: 0.06 10*3/MM3 (ref 0–0.2)
BASOPHILS NFR BLD AUTO: 0.5 % (ref 0–1.5)
BILIRUB SERPL-MCNC: 0.3 MG/DL (ref 0–1.2)
BUN SERPL-MCNC: 14 MG/DL (ref 8–23)
BUN/CREAT SERPL: 18.9 (ref 7–25)
CALCIUM SPEC-SCNC: 9.3 MG/DL (ref 8.6–10.5)
CHLORIDE SERPL-SCNC: 104 MMOL/L (ref 98–107)
CO2 SERPL-SCNC: 27.7 MMOL/L (ref 22–29)
CREAT SERPL-MCNC: 0.74 MG/DL (ref 0.57–1)
DEPRECATED RDW RBC AUTO: 45.1 FL (ref 37–54)
EGFRCR SERPLBLD CKD-EPI 2021: 92.2 ML/MIN/1.73
EOSINOPHIL # BLD AUTO: 0.34 10*3/MM3 (ref 0–0.4)
EOSINOPHIL NFR BLD AUTO: 2.9 % (ref 0.3–6.2)
ERYTHROCYTE [DISTWIDTH] IN BLOOD BY AUTOMATED COUNT: 13.4 % (ref 12.3–15.4)
FLUAV SUBTYP SPEC NAA+PROBE: NOT DETECTED
FLUBV RNA ISLT QL NAA+PROBE: NOT DETECTED
GLOBULIN UR ELPH-MCNC: 2.8 GM/DL
GLUCOSE SERPL-MCNC: 114 MG/DL (ref 65–99)
HCT VFR BLD AUTO: 42.6 % (ref 34–46.6)
HGB BLD-MCNC: 13.8 G/DL (ref 12–15.9)
HOLD SPECIMEN: NORMAL
HOLD SPECIMEN: NORMAL
IMM GRANULOCYTES # BLD AUTO: 0.04 10*3/MM3 (ref 0–0.05)
IMM GRANULOCYTES NFR BLD AUTO: 0.3 % (ref 0–0.5)
LYMPHOCYTES # BLD AUTO: 4.43 10*3/MM3 (ref 0.7–3.1)
LYMPHOCYTES NFR BLD AUTO: 38.3 % (ref 19.6–45.3)
MCH RBC QN AUTO: 29.5 PG (ref 26.6–33)
MCHC RBC AUTO-ENTMCNC: 32.4 G/DL (ref 31.5–35.7)
MCV RBC AUTO: 91 FL (ref 79–97)
MONOCYTES # BLD AUTO: 1.15 10*3/MM3 (ref 0.1–0.9)
MONOCYTES NFR BLD AUTO: 9.9 % (ref 5–12)
NEUTROPHILS NFR BLD AUTO: 48.1 % (ref 42.7–76)
NEUTROPHILS NFR BLD AUTO: 5.56 10*3/MM3 (ref 1.7–7)
NRBC BLD AUTO-RTO: 0 /100 WBC (ref 0–0.2)
NT-PROBNP SERPL-MCNC: 132.6 PG/ML (ref 0–900)
PLATELET # BLD AUTO: 243 10*3/MM3 (ref 140–450)
PMV BLD AUTO: 11.2 FL (ref 6–12)
POTASSIUM SERPL-SCNC: 3.9 MMOL/L (ref 3.5–5.2)
PROT SERPL-MCNC: 6.9 G/DL (ref 6–8.5)
QT INTERVAL: 442 MS
QTC INTERVAL: 425 MS
RBC # BLD AUTO: 4.68 10*6/MM3 (ref 3.77–5.28)
RSV RNA NPH QL NAA+NON-PROBE: NOT DETECTED
SARS-COV-2 RNA RESP QL NAA+PROBE: NOT DETECTED
SODIUM SERPL-SCNC: 141 MMOL/L (ref 136–145)
TROPONIN T SERPL HS-MCNC: 7 NG/L
WBC NRBC COR # BLD AUTO: 11.58 10*3/MM3 (ref 3.4–10.8)
WHOLE BLOOD HOLD COAG: NORMAL
WHOLE BLOOD HOLD SPECIMEN: NORMAL

## 2024-02-02 PROCEDURE — 84484 ASSAY OF TROPONIN QUANT: CPT | Performed by: EMERGENCY MEDICINE

## 2024-02-02 PROCEDURE — 99285 EMERGENCY DEPT VISIT HI MDM: CPT

## 2024-02-02 PROCEDURE — 83880 ASSAY OF NATRIURETIC PEPTIDE: CPT | Performed by: EMERGENCY MEDICINE

## 2024-02-02 PROCEDURE — 80053 COMPREHEN METABOLIC PANEL: CPT | Performed by: EMERGENCY MEDICINE

## 2024-02-02 PROCEDURE — A9577 INJ MULTIHANCE: HCPCS | Performed by: EMERGENCY MEDICINE

## 2024-02-02 PROCEDURE — 93005 ELECTROCARDIOGRAM TRACING: CPT | Performed by: EMERGENCY MEDICINE

## 2024-02-02 PROCEDURE — 87637 SARSCOV2&INF A&B&RSV AMP PRB: CPT | Performed by: EMERGENCY MEDICINE

## 2024-02-02 PROCEDURE — 93010 ELECTROCARDIOGRAM REPORT: CPT | Performed by: INTERNAL MEDICINE

## 2024-02-02 PROCEDURE — 70553 MRI BRAIN STEM W/O & W/DYE: CPT

## 2024-02-02 PROCEDURE — 85025 COMPLETE CBC W/AUTO DIFF WBC: CPT | Performed by: EMERGENCY MEDICINE

## 2024-02-02 PROCEDURE — 36415 COLL VENOUS BLD VENIPUNCTURE: CPT

## 2024-02-02 PROCEDURE — 0 GADOBENATE DIMEGLUMINE 529 MG/ML SOLUTION: Performed by: EMERGENCY MEDICINE

## 2024-02-02 RX ORDER — SODIUM CHLORIDE 0.9 % (FLUSH) 0.9 %
10 SYRINGE (ML) INJECTION AS NEEDED
Status: DISCONTINUED | OUTPATIENT
Start: 2024-02-02 | End: 2024-02-02 | Stop reason: HOSPADM

## 2024-02-02 RX ORDER — ERYTHROMYCIN 5 MG/G
OINTMENT OPHTHALMIC
Qty: 3.5 G | Refills: 0 | Status: SHIPPED | OUTPATIENT
Start: 2024-02-02 | End: 2024-02-05

## 2024-02-02 RX ADMIN — GADOBENATE DIMEGLUMINE 15 ML: 529 INJECTION, SOLUTION INTRAVENOUS at 11:13

## 2024-02-02 NOTE — ED NOTES
"Patient stated that she didn't want no \"fucking eye test\" then she quickly ran through the test to the 25/20 line and refused to do any more and stated that was all I was going to get and to get her discharge paper work  "

## 2024-02-02 NOTE — TELEPHONE ENCOUNTER
Yes, she should use/finish all of erythromycin eye ointment as directed by the ER physician.  Advised that erythromycin can cause temporary blurriness for a few minutes when she first administered so do not drive while using the medication.  She should rest, push fluids and take Tylenol as needed.  She can follow-up with me next week if she is not any better.

## 2024-02-02 NOTE — TELEPHONE ENCOUNTER
PT WENT TO ER FOR BLURRY EYE AND ER SAID SHE HAD A BACTERIAL INFECTION. PT STATES THAT SHE ISNT FEELING WELL SHE BELIEVES SHE HAS A SINUS INFECTION AND THAT'S WHAT HAS CAUSED IT. SHE IS HAVING CHILLS, SNEEZING, AND COUGHING. SHE WAS PRESCRIBED ERTHROMYCIN AND WANTS TO KNOW IF THIS IS SOMETHING THAT SHE CAN USE THIS MED.

## 2024-02-02 NOTE — ED PROVIDER NOTES
"Time: 9:30 AM EST  Date of encounter:  2/2/2024  Independent Historian/Clinical History and Information was obtained by:   Patient    History is limited by: N/A    Chief Complaint: Headaches, vision changes      History of Present Illness:  Patient is a 63 y.o. year old female who presents to the emergency department for evaluation of 1 week of intermittent headaches and vision changes primarily to the left eye.  She also complains of intermittent difficulty breathing with subjective fevers and chills along with right arm aching.  She has been having intermittent hearing issues since November 2023.  She states the left eye feels \"dry and magi\" and is occasionally having weeping drainage as well.    HPI    Patient Care Team  Primary Care Provider: Gale Negrete APRN    Past Medical History:     Allergies   Allergen Reactions    Lisinopril Unknown - High Severity     Pt states it almost killed her last time she was on this medication     Doxazosin Other (See Comments)     Feeling like she was going to pass out    Nsaids Other (See Comments)     HX CVA    Tape Itching     Past Medical History:   Diagnosis Date    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    Acid reflux disease     Allergic Unknown    Allergy     unspecified    Anxiety     Asthma Unknown    Back pain     Bilateral carpal tunnel syndrome 08/17/2017    Bipolar disorder     Bursitis of left shoulder 08/09/2016    Cataract Unknown    Deep vein thrombosis All my life    Depression     Eczema     Gall stones     H/O psychiatric care     Heart attack     Heart murmur     Hyperlipidemia 11/22    Hypertension     Incomplete tear of left rotator cuff 01/25/2017    Left shoulder pain     Loss of appetite     Lumbago 05/08/2015    low back pain     Memory loss     forgetfulness    Migraine headache     Need for hepatitis C screening test     Obesity     Palpitation     Ringing in ears     Seasonal allergies     Shortness of breath     Sinus trouble     unspecified  "    Stroke (cerebrum)     Subacromial impingement, left 10/13/2016    Superior glenoid labrum lesion of left shoulder 10/13/2016    subsequent encounter    Trouble swallowing     Urinary tract infection Unknown     Past Surgical History:   Procedure Laterality Date    APPENDECTOMY      CARPAL TUNNEL RELEASE      CHOLECYSTECTOMY      COLONOSCOPY  2014    ENDOSCOPY  2014    HYSTERECTOMY  1999    SHOULDER SURGERY Left 10/03/2016    arthroscopic, left shoulder scope, RTC repair- Dr. Flores     Family History   Problem Relation Age of Onset    Stroke Mother     Diabetes Mother     Arthritis Mother     COPD Mother     Hyperlipidemia Mother     Vision loss Mother     Heart disease Mother     Heart disease Father     Cancer Father         Lukimia    Heart disease Sister     Cancer Sister     Diabetes Sister     Arthritis Sister     Heart disease Brother     Diabetes Brother     Stroke Brother     Hypertension Other     Rheum arthritis Other     Stroke Son     Heart disease Son     Cancer Son     Other Son         blood disease    Arthritis Son     Osteoporosis Son     Arthritis Son     Cancer Son     Heart disease Son     Stroke Son     Other Son        Home Medications:  Prior to Admission medications    Medication Sig Start Date End Date Taking? Authorizing Provider   albuterol (ACCUNEB) 1.25 MG/3ML nebulizer solution Take 3 mL by nebulization Every 6 (Six) Hours As Needed for Wheezing. 1/16/23   Gaviota Peter APRN   albuterol sulfate  (90 Base) MCG/ACT inhaler Inhale 2 puffs Every 6 (Six) Hours As Needed for Wheezing. 1/16/23   Gaviota Peter APRN   amLODIPine (NORVASC) 5 MG tablet Take 1 tablet by mouth Daily. 12/29/23   Lowell Alex MD   atorvastatin (LIPITOR) 20 MG tablet TAKE 1 TABLET BY MOUTH DAILY 11/27/23   Lowell Alex MD   candesartan (ATACAND) 8 MG tablet Take 1 tablet by mouth 2 (Two) Times a Day. Indications: High Blood Pressure Disorder 1/15/24   Gale Negrete APRN   clopidogrel  (PLAVIX) 75 MG tablet TAKE 1 TABLET BY MOUTH DAILY 11/27/23   Lowell Alex MD   divalproex (Depakote ER) 500 MG 24 hr tablet Take 1 tablet by mouth Daily. 4/21/23   Lowell Alex MD   gabapentin (NEURONTIN) 100 MG capsule Take 1 capsule by mouth Every Night. 9/14/23   Lowell Alex MD   montelukast (SINGULAIR) 10 MG tablet TAKE 1 TABLET BY MOUTH EVERY NIGHT 11/27/23   Lowell Alex MD   oxybutynin XL (DITROPAN-XL) 10 MG 24 hr tablet TAKE 1 TABLET BY MOUTH DAILY 11/27/23   Lowell Alex MD   traMADol (ULTRAM) 50 MG tablet Take 1 tablet by mouth Every 8 (Eight) Hours As Needed for Moderate Pain. 9/14/23   Lowell Alex MD        Social History:   Social History     Tobacco Use    Smoking status: Every Day     Packs/day: 1.00     Years: 30.00     Additional pack years: 0.00     Total pack years: 30.00     Types: Cigarettes     Passive exposure: Current    Smokeless tobacco: Never    Tobacco comments:     Not your business   Vaping Use    Vaping Use: Never used   Substance Use Topics    Alcohol use: Not Currently     Comment: rarely drinks    Drug use: Never         Review of Systems:  Review of Systems   Constitutional:  Negative for chills and fever.   HENT:  Negative for congestion, rhinorrhea and sore throat.    Eyes:  Positive for discharge, redness, itching and visual disturbance. Negative for photophobia.   Respiratory:  Negative for apnea, cough, chest tightness and shortness of breath.    Cardiovascular:  Negative for chest pain and palpitations.   Gastrointestinal:  Negative for abdominal pain, diarrhea, nausea and vomiting.   Endocrine: Negative.    Genitourinary:  Negative for difficulty urinating and dysuria.   Musculoskeletal:  Positive for arthralgias and myalgias. Negative for back pain and joint swelling.   Skin:  Negative for color change and wound.   Allergic/Immunologic: Negative.    Neurological:  Positive for headaches. Negative for seizures.   Psychiatric/Behavioral: Negative.     All other  "systems reviewed and are negative.       Physical Exam:  /58   Pulse 56   Temp 98.5 °F (36.9 °C) (Oral)   Resp 18   Ht 147.3 cm (58\")   Wt 75.1 kg (165 lb 9.1 oz)   LMP  (LMP Unknown)   SpO2 97%   BMI 34.60 kg/m²     Physical Exam  Vitals and nursing note reviewed.   Constitutional:       General: She is awake.      Appearance: Normal appearance. She is well-developed.   HENT:      Head: Normocephalic and atraumatic.      Nose: Nose normal.      Mouth/Throat:      Mouth: Mucous membranes are moist.   Eyes:      General: Lids are normal. Vision grossly intact. Gaze aligned appropriately.      Extraocular Movements: Extraocular movements intact.      Conjunctiva/sclera:      Right eye: Right conjunctiva is injected. No chemosis, exudate or hemorrhage.     Pupils: Pupils are equal, round, and reactive to light.   Cardiovascular:      Rate and Rhythm: Normal rate and regular rhythm.      Heart sounds: Normal heart sounds.   Pulmonary:      Effort: Pulmonary effort is normal. No respiratory distress.      Breath sounds: Normal breath sounds. No wheezing, rhonchi or rales.   Abdominal:      General: Bowel sounds are normal.      Palpations: Abdomen is soft.      Tenderness: There is no abdominal tenderness. There is no guarding or rebound.      Comments: No rigidity   Musculoskeletal:         General: No tenderness. Normal range of motion.      Cervical back: Normal range of motion and neck supple.   Skin:     General: Skin is warm and dry.      Coloration: Skin is not jaundiced.   Neurological:      General: No focal deficit present.      Mental Status: She is alert. Mental status is at baseline.   Psychiatric:         Mood and Affect: Mood normal.                  Procedures:  Procedures      Medical Decision Making:      Comorbidities that affect care:    Anxiety/bipolar, depression, hypertension, palpitations, CVA    External Notes reviewed:    Previous Clinic Note: Office visit 1/15/2024 with family " medicine.  Description: Primary hypertension; stenosis of cerebral artery      The following orders were placed and all results were independently analyzed by me:  Orders Placed This Encounter   Procedures    COVID PRE-OP / PRE-PROCEDURE SCREENING ORDER (NO ISOLATION) - Swab, Nasopharynx    COVID-19, FLU A/B, RSV PCR 1 HR TAT - Swab, Nasopharynx    MRI Brain With & Without Contrast    [place] Draw    Comprehensive Metabolic Panel    BNP    Single High Sensitivity Troponin T    CBC Auto Differential    Ambulatory Referral to Ophthalmology    NPO Diet NPO Type: Strict NPO    Undress & Gown    Continuous Pulse Oximetry    Vital Signs    Visual acuity screening    David-Pen, eye box with woods lamp to bedside please and thanks  Nursing Communication    Oxygen Therapy- Nasal Cannula; Titrate 1-6 LPM Per SpO2; 90 - 95%    ECG 12 Lead ED Triage Standing Order; SOA    Insert Peripheral IV    CBC & Differential    Green Top (Gel)    Lavender Top    Gold Top - SST    Light Blue Top       Medications Given in the Emergency Department:  Medications   sodium chloride 0.9 % flush 10 mL (has no administration in time range)   gadobenate dimeglumine (MULTIHANCE) injection 15 mL (15 mL Intravenous Given 2/2/24 1113)        ED Course:    ED Course as of 02/02/24 1351   Fri Feb 02, 2024   0929 I have personally interpreted the EKG today and it shows no evidence of any acute ischemia or heart arrhythmia. [RP]   1348 Patient refused chest x-ray.  Patient also refused visual acuity check and slit-lamp eye exam.  Cursing at the tach who attempted to check visual acuity [RP]      ED Course User Index  [RP] Jay Vicente MD       Labs:    Lab Results (last 24 hours)       Procedure Component Value Units Date/Time    CBC & Differential [[id number]]  (Abnormal) Collected: 02/02/24 1000    Specimen: Blood Updated: 02/02/24 1006    Narrative:      The following orders were created for panel order CBC & Differential.  Procedure                                Abnormality         Status                     ---------                               -----------         ------                     CBC Auto Differential[[id number]]        Abnormal            Final result                 Please view results for these tests on the individual orders.    BNP [[id number]]  (Normal) Collected: 02/02/24 1000    Specimen: Blood Updated: 02/02/24 1024     proBNP 132.6 pg/mL     Narrative:      This assay is used as an aid in the diagnosis of individuals suspected of having heart failure. It can be used as an aid in the diagnosis of acute decompensated heart failure (ADHF) in patients presenting with signs and symptoms of ADHF to the emergency department (ED). In addition, NT-proBNP of <300 pg/mL indicates ADHF is not likely.    Age Range Result Interpretation  NT-proBNP Concentration (pg/mL:      <50             Positive            >450                   Gray                 300-450                    Negative             <300    50-75           Positive            >900                  Gray                300-900                  Negative            <300      >75             Positive            >1800                  Gray                300-1800                  Negative            <300    CBC Auto Differential [[id number]]  (Abnormal) Collected: 02/02/24 1000    Specimen: Blood Updated: 02/02/24 1006     WBC 11.58 10*3/mm3      RBC 4.68 10*6/mm3      Hemoglobin 13.8 g/dL      Hematocrit 42.6 %      MCV 91.0 fL      MCH 29.5 pg      MCHC 32.4 g/dL      RDW 13.4 %      RDW-SD 45.1 fl      MPV 11.2 fL      Platelets 243 10*3/mm3      Neutrophil % 48.1 %      Lymphocyte % 38.3 %      Monocyte % 9.9 %      Eosinophil % 2.9 %      Basophil % 0.5 %      Immature Grans % 0.3 %      Neutrophils, Absolute 5.56 10*3/mm3      Lymphocytes, Absolute 4.43 10*3/mm3      Monocytes, Absolute 1.15 10*3/mm3      Eosinophils, Absolute 0.34 10*3/mm3      Basophils, Absolute 0.06 10*3/mm3       Immature Grans, Absolute 0.04 10*3/mm3      nRBC 0.0 /100 WBC     COVID PRE-OP / PRE-PROCEDURE SCREENING ORDER (NO ISOLATION) - Swab, Nasopharynx [[id number]]  (Normal) Collected: 02/02/24 1004    Specimen: Swab from Nasopharynx Updated: 02/02/24 1050    Narrative:      The following orders were created for panel order COVID PRE-OP / PRE-PROCEDURE SCREENING ORDER (NO ISOLATION) - Swab, Nasopharynx.  Procedure                               Abnormality         Status                     ---------                               -----------         ------                     COVID-19, FLU A/B, RSV P...[[id number]]  Normal              Final result                 Please view results for these tests on the individual orders.    COVID-19, FLU A/B, RSV PCR 1 HR TAT - Swab, Nasopharynx [[id number]]  (Normal) Collected: 02/02/24 1004    Specimen: Swab from Nasopharynx Updated: 02/02/24 1050     COVID19 Not Detected     Influenza A PCR Not Detected     Influenza B PCR Not Detected     RSV, PCR Not Detected    Narrative:      Fact sheet for providers: https://www.fda.gov/media/390212/download    Fact sheet for patients: https://www.fda.gov/media/774594/download    Test performed by PCR.    Comprehensive Metabolic Panel [[id number]]  (Abnormal) Collected: 02/02/24 1239    Specimen: Blood Updated: 02/02/24 1317     Glucose 114 mg/dL      BUN 14 mg/dL      Creatinine 0.74 mg/dL      Sodium 141 mmol/L      Potassium 3.9 mmol/L      Comment: Slight hemolysis detected by analyzer. Result may be falsely elevated.        Chloride 104 mmol/L      CO2 27.7 mmol/L      Calcium 9.3 mg/dL      Total Protein 6.9 g/dL      Albumin 4.1 g/dL      ALT (SGPT) 11 U/L      AST (SGOT) 14 U/L      Alkaline Phosphatase 95 U/L      Total Bilirubin 0.3 mg/dL      Globulin 2.8 gm/dL      A/G Ratio 1.5 g/dL      BUN/Creatinine Ratio 18.9     Anion Gap 9.3 mmol/L      eGFR 92.2 mL/min/1.73     Narrative:      GFR Normal >60  Chronic Kidney Disease <60  Kidney  Failure <15      Single High Sensitivity Troponin T [[id number]]  (Normal) Collected: 02/02/24 1239    Specimen: Blood Updated: 02/02/24 1330     HS Troponin T 7 ng/L     Narrative:      High Sensitive Troponin T Reference Range:  <14.0 ng/L- Negative Female for AMI  <22.0 ng/L- Negative Male for AMI  >=14 - Abnormal Female indicating possible myocardial injury.  >=22 - Abnormal Male indicating possible myocardial injury.   Clinicians would have to utilize clinical acumen, EKG, Troponin, and serial changes to determine if it is an Acute Myocardial Infarction or myocardial injury due to an underlying chronic condition.                  Imaging:    MRI Brain With & Without Contrast    Result Date: 2/2/2024  PROCEDURE: MRI BRAIN W WO CONTRAST  COMPARISON: [facility], , MRI BRAIN WO CONTRAST, 9/21/2022, 14:13.  INDICATIONS: Headache, vision changes X COUPLE WEEKS.  CONTRAST: 15ML  Multihance I.V.  TECHNIQUE: A variety of imaging planes and parameters were utilized for visualization of suspected pathology.  Images were performed without and with gadolinium contrast.  FINDINGS:  No acute diffusion restriction abnormality noted.  Encephalomalacia in the posterior fossa most prominently involving the right cerebellar hemisphere mild changes on the left noted.  Small lacunar infarcts noted within the findings and basal ganglia on the right.  Overall this appears similar to prior study.  Associated increased FLAIR and T2 signal compatible with gliosis in posterior fossa noted.  Scattered areas of FLAIR and T2 signal within the supratentorial brain compatible with small vessel ischemic disease in this age group appear similar given differences in technique to comparison.  The midline structures of the brain appear grossly unremarkable in appearance.  Pituitary and sellar structures are grossly unremarkable.  Craniocervical junction demonstrates no acute abnormality.  Major intracranial flow voids appear  unremarkable in appearance.  Increased signal within left mastoid air cells compatible with an effusion minimal changes also noted on the right.  Findings appear grossly similar given differences in technique.  The visualized paranasal sinuses demonstrate mild mucosal thickening and partial opacification.  Globes and orbits appear grossly unremarkable in their appearance.  No abnormal areas of enhancement on postcontrast imaging.         1. No acute intracranial abnormality 2. Sequela of prior infarcts 3. Stable white matter changes compatible small-vessel ischemic disease 4. Bilateral mastoid effusions      KIRA DALAL MD       Electronically Signed and Approved By: KIRA DALAL MD on 2/02/2024 at 11:54                Differential Diagnosis and Discussion:    Dyspnea: Differential diagnosis includes but is not limited to metabolic acidosis, neurological disorders, psychogenic, asthma, pneumothorax, upper airway obstruction, COPD, pneumonia, noncardiogenic pulmonary edema, interstitial lung disease, anemia, congestive heart failure, and pulmonary embolism  Eye Pain/Blurred Vision: Differential diagnosis includes but is not limited to dacryocystitis, hordeolum, chalazion, periorbital cellulitis, cavernous sinus thrombosis, blepharitis, and glaucoma.    All labs were reviewed and interpreted by me.  All X-rays impressions were independently interpreted by me.  EKG was interpreted by me.  MRI impression was interpreted by me.     MDM     Amount and/or Complexity of Data Reviewed  Clinical lab tests: reviewed  Tests in the radiology section of CPT®: reviewed  Tests in the medicine section of CPT®: reviewed                 Patient Care Considerations:    CONSULT: I considered consulting neurology, however MRI is negative for acute stroke.      Consultants/Shared Management Plan:    None    Social Determinants of Health:    Patient is independent, reliable, and has access to care.       Disposition and Care  Coordination:    Discharged: The patient is suitable and stable for discharge with no need for consideration of admission.    I have explained the patient´s condition, diagnoses and treatment plan based on the information available to me at this time. I have answered questions and addressed any concerns. The patient has a good  understanding of the patient´s diagnosis, condition, and treatment plan as can be expected at this point. The vital signs have been stable. The patient´s condition is stable and appropriate for discharge from the emergency department.      The patient will pursue further outpatient evaluation with the primary care physician or other designated or consulting physician as outlined in the discharge instructions. They are agreeable to this plan of care and follow-up instructions have been explained in detail. The patient has received these instructions in written format and have expressed an understanding of the discharge instructions. The patient is aware that any significant change in condition or worsening of symptoms should prompt an immediate return to this or the closest emergency department or call to 911.    Final diagnoses:   Acute conjunctivitis of left eye, unspecified acute conjunctivitis type        ED Disposition       ED Disposition   Discharge    Condition   Stable    Comment   --               This medical record created using voice recognition software.             Jay Vicente MD  02/02/24 4421

## 2024-02-02 NOTE — Clinical Note
[facility] EMERGENCY ROOM  [address]  [place] [address]  Phone: [phone]    Audra Montejo was seen and treated in our emergency department on 2/2/2024.  She may return to work on 02/03/2024.         Thank you for choosing [facility].    Jay Vicente MD

## 2024-02-05 ENCOUNTER — OFFICE VISIT (OUTPATIENT)
Dept: FAMILY MEDICINE CLINIC | Facility: CLINIC | Age: 64
End: 2024-02-05
Payer: COMMERCIAL

## 2024-02-05 VITALS
HEIGHT: 58 IN | BODY MASS INDEX: 34.63 KG/M2 | OXYGEN SATURATION: 98 % | TEMPERATURE: 97.5 F | HEART RATE: 66 BPM | SYSTOLIC BLOOD PRESSURE: 130 MMHG | WEIGHT: 165 LBS | DIASTOLIC BLOOD PRESSURE: 62 MMHG

## 2024-02-05 DIAGNOSIS — H10.33 ACUTE BACTERIAL CONJUNCTIVITIS OF BOTH EYES: Primary | ICD-10-CM

## 2024-02-05 DIAGNOSIS — J30.2 SEASONAL ALLERGIC RHINITIS, UNSPECIFIED TRIGGER: ICD-10-CM

## 2024-02-05 PROCEDURE — 99213 OFFICE O/P EST LOW 20 MIN: CPT | Performed by: NURSE PRACTITIONER

## 2024-02-05 RX ORDER — OFLOXACIN 3 MG/ML
1 SOLUTION/ DROPS OPHTHALMIC 4 TIMES DAILY
Qty: 10 ML | Refills: 0 | Status: SHIPPED | OUTPATIENT
Start: 2024-02-05 | End: 2024-02-12

## 2024-02-05 RX ORDER — LORATADINE 10 MG/1
10 TABLET ORAL DAILY
Qty: 30 TABLET | Refills: 5 | Status: SHIPPED | OUTPATIENT
Start: 2024-02-05

## 2024-02-05 NOTE — PROGRESS NOTES
"Chief Complaint  Eye Pain (Left eye, red, irritated, painful, with blurred vision ), Cough (Started over two weeks ago ), and Nasal Congestion (Runny nose )    Subjective          Audra Montejo, 63 y.o. female presents to [facility]  History of Present Illness   She is here for an acute visit.  She is complaining of irritated eye.  She was diagnosed with acute bacterial conjunctivitis of the left eye when she went to the emergency room on 2/2/2024.  She was prescribed erythromycin ointment.  She states she does not like the ointment because it smears all over her eyelid.  She states she is now starting to get conjunctivitis in the right eye.     States she is still coughing some and sneezing.  She has been taking left over amoxil. She states it is starting to get better.     Tobacco Use: High Risk (2/5/2024)    Patient History     Smoking Tobacco Use: Every Day     Smokeless Tobacco Use: Never     Passive Exposure: Current      Objective   Vital Signs:   /62 (BP Location: Right arm, Patient Position: Sitting, Cuff Size: Adult)   Pulse 66   Temp 97.5 °F (36.4 °C)   Ht 147.3 cm (58\")   Wt 74.8 kg (165 lb)   SpO2 98%   BMI 34.49 kg/m²       Current Outpatient Medications:     albuterol (ACCUNEB) 1.25 MG/3ML nebulizer solution, Take 3 mL by nebulization Every 6 (Six) Hours As Needed for Wheezing., Disp: 50 each, Rfl: 0    albuterol sulfate  (90 Base) MCG/ACT inhaler, Inhale 2 puffs Every 6 (Six) Hours As Needed for Wheezing., Disp: 18 g, Rfl: 0    amLODIPine (NORVASC) 5 MG tablet, Take 1 tablet by mouth Daily., Disp: 90 tablet, Rfl: 1    atorvastatin (LIPITOR) 20 MG tablet, TAKE 1 TABLET BY MOUTH DAILY, Disp: 90 tablet, Rfl: 1    candesartan (ATACAND) 8 MG tablet, Take 1 tablet by mouth 2 (Two) Times a Day. Indications: High Blood Pressure Disorder, Disp: 180 tablet, Rfl: 1    clopidogrel (PLAVIX) 75 MG tablet, TAKE 1 TABLET BY MOUTH DAILY, Disp: 90 tablet, Rfl: " 1    divalproex (Depakote ER) 500 MG 24 hr tablet, Take 1 tablet by mouth Daily., Disp: 90 tablet, Rfl: 1    gabapentin (NEURONTIN) 100 MG capsule, Take 1 capsule by mouth Every Night., Disp: 30 capsule, Rfl: 1    montelukast (SINGULAIR) 10 MG tablet, TAKE 1 TABLET BY MOUTH EVERY NIGHT, Disp: 90 tablet, Rfl: 1    oxybutynin XL (DITROPAN-XL) 10 MG 24 hr tablet, TAKE 1 TABLET BY MOUTH DAILY, Disp: 90 tablet, Rfl: 1    traMADol (ULTRAM) 50 MG tablet, Take 1 tablet by mouth Every 8 (Eight) Hours As Needed for Moderate Pain., Disp: 30 tablet, Rfl: 1    loratadine (Claritin) 10 MG tablet, Take 1 tablet by mouth Daily., Disp: 30 tablet, Rfl: 5    ofloxacin (Ocuflox) 0.3 % ophthalmic solution, Administer 1 drop to both eyes 4 (Four) Times a Day for 7 days., Disp: 10 mL, Rfl: 0   Past Medical History:   Diagnosis Date    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    Acid reflux disease     Allergic Unknown    Allergy     unspecified    Anxiety     Asthma Unknown    Back pain     Bilateral carpal tunnel syndrome 08/17/2017    Bipolar disorder     Bursitis of left shoulder 08/09/2016    Cataract Unknown    Deep vein thrombosis All my life    Depression     Eczema     Gall stones     H/O psychiatric care     Heart attack     Heart murmur     Hyperlipidemia 11/22    Hypertension     Incomplete tear of left rotator cuff 01/25/2017    Left shoulder pain     Loss of appetite     Lumbago 05/08/2015    low back pain     Memory loss     forgetfulness    Migraine headache     Need for hepatitis C screening test     Obesity     Palpitation     Ringing in ears     Seasonal allergies     Shortness of breath     Sinus trouble     unspecified     Stroke (cerebrum)     Subacromial impingement, left 10/13/2016    Superior glenoid labrum lesion of left shoulder 10/13/2016    subsequent encounter    Trouble swallowing     Urinary tract infection Unknown      Physical Exam  Vitals reviewed.   Constitutional:       Appearance: Normal appearance. She is  well-developed.   HENT:      Right Ear: Tympanic membrane, ear canal and external ear normal.      Left Ear: Tympanic membrane, ear canal and external ear normal.      Mouth/Throat:      Mouth: Mucous membranes are moist.      Pharynx: No pharyngeal swelling, oropharyngeal exudate or posterior oropharyngeal erythema.      Comments: Postnasal drainage noted.  Eyes:      General:         Right eye: No discharge.         Left eye: No discharge.      Conjunctiva/sclera:      Right eye: Right conjunctiva is injected.      Left eye: Left conjunctiva is injected.   Neck:      Thyroid: No thyroid mass, thyromegaly or thyroid tenderness.   Cardiovascular:      Rate and Rhythm: Normal rate and regular rhythm.      Heart sounds: No murmur heard.     No friction rub. No gallop.   Pulmonary:      Effort: Pulmonary effort is normal.      Breath sounds: Normal breath sounds. No wheezing or rhonchi.   Lymphadenopathy:      Cervical: No cervical adenopathy.   Skin:     General: Skin is warm and dry.   Neurological:      Mental Status: She is alert and oriented to person, place, and time.      Cranial Nerves: No cranial nerve deficit.   Psychiatric:         Mood and Affect: Mood and affect normal.         Behavior: Behavior normal.         Thought Content: Thought content normal. Thought content does not include homicidal or suicidal ideation.         Judgment: Judgment normal.        Result Review :   {The following data was reviewed by HERMAN Menjivar    Time: 9:30 AM EST  Date of encounter:  2/2/2024  Independent Historian/Clinical History and Information was obtained by:   Patient     History is limited by: N/A     Chief Complaint: Headaches, vision changes     History of Present Illness:  Patient is a 63 y.o. year old female who presents to the emergency department for evaluation of 1 week of intermittent headaches and vision changes primarily to the left eye.  She also complains of intermittent difficulty breathing  "with subjective fevers and chills along with right arm aching.  She has been having intermittent hearing issues since November 2023.  She states the left eye feels \"dry and magi\" and is occasionally having weeping drainage as well.     MRI Brain With & Without Contrast    Result Date: 2/2/2024    1. No acute intracranial abnormality 2. Sequela of prior infarcts 3. Stable white matter changes compatible small-vessel ischemic disease 4. Bilateral mastoid effusions      KIRA DALAL MD       Electronically Signed and Approved By: KIRA DALAL MD on 2/02/2024 at 11:54                    CMP   CMP          12/6/2023    14:26 1/11/2024    20:22 2/2/2024    12:39   CMP   Glucose 94  131  114    BUN 17  17  14    Creatinine 0.83  1.17  0.74    EGFR 80.3  53.2  92.2    Sodium 142  141  141    Potassium 4.4  4.2  3.9    Chloride 106  106  104    Calcium 9.2  8.8  9.3    Total Protein 6.8  6.4  6.9    Albumin 4.3  4.0  4.1    Globulin 2.5  2.4  2.8    Total Bilirubin 0.3  0.2  0.3    Alkaline Phosphatase 109  112  95    AST (SGOT) 16  18  14    ALT (SGPT) 10  14  11    Albumin/Globulin Ratio 1.7  1.7  1.5    BUN/Creatinine Ratio 20.5  14.5  18.9    Anion Gap 8.8  11.2  9.3      CBC   CBC          9/5/2023    07:50 1/11/2024    20:22 2/2/2024    10:00   CBC   WBC 11.01  10.68  11.58    RBC 4.71  4.44  4.68    Hemoglobin 14.5  13.4  13.8    Hematocrit 42.7  40.0  42.6    MCV 90.7  90.1  91.0    MCH 30.8  30.2  29.5    MCHC 34.0  33.5  32.4    RDW 12.6  13.6  13.4    Platelets 295  252  243               Assessment and Plan    Diagnoses and all orders for this visit:    1. Acute bacterial conjunctivitis of both eyes (Primary)  -     ofloxacin (Ocuflox) 0.3 % ophthalmic solution; Administer 1 drop to both eyes 4 (Four) Times a Day for 7 days.  Dispense: 10 mL; Refill: 0    2. Seasonal allergic rhinitis, unspecified trigger  -     loratadine (Claritin) 10 MG tablet; Take 1 tablet by mouth Daily.  Dispense: 30 tablet; " Refill: 5    She can discontinue the erythromycin ointment and I will start her on ofloxacin.  Discussed conjunctivitis (pinkeye) is very contagious, requires frequent hand washing.  Instructed to wash hands before instilling eye drops and afterwards to prevent spread of infection.  Advised to avoid touching eyes and to wash hands often.  Also advised to disinfect all high touch areas in the home.  Advised to finish all of eye drops and follow-up if not improving or any worsening of symptoms.  I will also start her on Claritin daily for her allergies.    Follow Up   Return if symptoms worsen or fail to improve.  Patient was given instructions and counseling regarding her condition or for health maintenance advice. Please see specific information pulled into the AVS if appropriate.     Parts of this note are electronic transcriptions/translations of spoken language to printed text using the Dragon Dictation system.      Gale Negrete, APRN  02/05/2024

## 2024-02-15 ENCOUNTER — OFFICE VISIT (OUTPATIENT)
Dept: FAMILY MEDICINE CLINIC | Facility: CLINIC | Age: 64
End: 2024-02-15
Payer: COMMERCIAL

## 2024-02-15 VITALS
SYSTOLIC BLOOD PRESSURE: 130 MMHG | BODY MASS INDEX: 34.43 KG/M2 | HEIGHT: 58 IN | HEART RATE: 57 BPM | TEMPERATURE: 97.2 F | WEIGHT: 164 LBS | DIASTOLIC BLOOD PRESSURE: 54 MMHG | OXYGEN SATURATION: 99 %

## 2024-02-15 DIAGNOSIS — E66.9 CLASS 1 OBESITY WITH SERIOUS COMORBIDITY AND BODY MASS INDEX (BMI) OF 34.0 TO 34.9 IN ADULT, UNSPECIFIED OBESITY TYPE: ICD-10-CM

## 2024-02-15 DIAGNOSIS — I10 PRIMARY HYPERTENSION: ICD-10-CM

## 2024-02-15 DIAGNOSIS — J30.2 SEASONAL ALLERGIC RHINITIS, UNSPECIFIED TRIGGER: Primary | ICD-10-CM

## 2024-02-15 DIAGNOSIS — R73.03 PREDIABETES: ICD-10-CM

## 2024-02-15 DIAGNOSIS — R53.83 OTHER FATIGUE: ICD-10-CM

## 2024-02-15 DIAGNOSIS — G62.9 NEUROPATHY: ICD-10-CM

## 2024-02-15 DIAGNOSIS — Z51.81 MEDICATION MONITORING ENCOUNTER: ICD-10-CM

## 2024-02-15 DIAGNOSIS — F31.62 BIPOLAR DISORDER, CURRENT EPISODE MIXED, MODERATE: ICD-10-CM

## 2024-02-15 LAB
25(OH)D3 SERPL-MCNC: 35.1 NG/ML (ref 30–100)
FERRITIN SERPL-MCNC: 316 NG/ML (ref 13–150)
FOLATE SERPL-MCNC: >20 NG/ML (ref 4.78–24.2)
HBA1C MFR BLD: 5.9 % (ref 4.8–5.6)
IRON 24H UR-MRATE: 51 MCG/DL (ref 37–145)
IRON SATN MFR SERPL: 14 % (ref 20–50)
T4 FREE SERPL-MCNC: 1.07 NG/DL (ref 0.93–1.7)
TIBC SERPL-MCNC: 374 MCG/DL (ref 298–536)
TRANSFERRIN SERPL-MCNC: 251 MG/DL (ref 200–360)
TSH SERPL DL<=0.05 MIU/L-ACNC: 2.85 UIU/ML (ref 0.27–4.2)
VALPROATE SERPL-MCNC: 49 MCG/ML (ref 50–125)
VIT B12 BLD-MCNC: 1334 PG/ML (ref 211–946)

## 2024-02-15 PROCEDURE — 82728 ASSAY OF FERRITIN: CPT | Performed by: NURSE PRACTITIONER

## 2024-02-15 PROCEDURE — 83921 ORGANIC ACID SINGLE QUANT: CPT | Performed by: NURSE PRACTITIONER

## 2024-02-15 PROCEDURE — 84466 ASSAY OF TRANSFERRIN: CPT | Performed by: NURSE PRACTITIONER

## 2024-02-15 PROCEDURE — 84439 ASSAY OF FREE THYROXINE: CPT | Performed by: NURSE PRACTITIONER

## 2024-02-15 PROCEDURE — 82746 ASSAY OF FOLIC ACID SERUM: CPT | Performed by: NURSE PRACTITIONER

## 2024-02-15 PROCEDURE — 84443 ASSAY THYROID STIM HORMONE: CPT | Performed by: NURSE PRACTITIONER

## 2024-02-15 PROCEDURE — 82306 VITAMIN D 25 HYDROXY: CPT | Performed by: NURSE PRACTITIONER

## 2024-02-15 PROCEDURE — 82607 VITAMIN B-12: CPT | Performed by: NURSE PRACTITIONER

## 2024-02-15 PROCEDURE — 83540 ASSAY OF IRON: CPT | Performed by: NURSE PRACTITIONER

## 2024-02-15 PROCEDURE — 83036 HEMOGLOBIN GLYCOSYLATED A1C: CPT | Performed by: NURSE PRACTITIONER

## 2024-02-15 PROCEDURE — 80164 ASSAY DIPROPYLACETIC ACD TOT: CPT | Performed by: NURSE PRACTITIONER

## 2024-02-15 RX ORDER — CETIRIZINE HYDROCHLORIDE 10 MG/1
10 TABLET ORAL NIGHTLY PRN
Qty: 30 TABLET | Refills: 2 | Status: SHIPPED | OUTPATIENT
Start: 2024-02-15

## 2024-02-16 ENCOUNTER — PATIENT ROUNDING (BHMG ONLY) (OUTPATIENT)
Dept: FAMILY MEDICINE CLINIC | Facility: CLINIC | Age: 64
End: 2024-02-16
Payer: COMMERCIAL

## 2024-02-23 LAB — METHYLMALONATE SERPL-SCNC: 227 NMOL/L (ref 0–378)

## 2024-02-29 RX ORDER — DIVALPROEX SODIUM 500 MG/1
500 TABLET, EXTENDED RELEASE ORAL DAILY
Qty: 90 TABLET | Refills: 1 | Status: SHIPPED | OUTPATIENT
Start: 2024-02-29

## 2024-03-04 ENCOUNTER — OFFICE VISIT (OUTPATIENT)
Dept: FAMILY MEDICINE CLINIC | Facility: CLINIC | Age: 64
End: 2024-03-04
Payer: COMMERCIAL

## 2024-03-04 ENCOUNTER — TELEPHONE (OUTPATIENT)
Dept: FAMILY MEDICINE CLINIC | Facility: CLINIC | Age: 64
End: 2024-03-04

## 2024-03-04 VITALS
DIASTOLIC BLOOD PRESSURE: 57 MMHG | BODY MASS INDEX: 34.44 KG/M2 | HEIGHT: 58 IN | SYSTOLIC BLOOD PRESSURE: 127 MMHG | TEMPERATURE: 97.5 F | WEIGHT: 164.1 LBS | OXYGEN SATURATION: 99 % | HEART RATE: 59 BPM

## 2024-03-04 DIAGNOSIS — Z15.89 MTHFR GENE MUTATION: ICD-10-CM

## 2024-03-04 DIAGNOSIS — I10 PRIMARY HYPERTENSION: ICD-10-CM

## 2024-03-04 DIAGNOSIS — I67.1 NONRUPTURED CEREBRAL ANEURYSM: Primary | ICD-10-CM

## 2024-03-04 PROBLEM — I66.29 ASYMPTOMATIC STENOSIS OF POSTERIOR CEREBRAL ARTERY: Status: ACTIVE | Noted: 2024-02-25

## 2024-03-04 PROBLEM — I63.9 CEREBELLAR INFARCTION: Status: ACTIVE | Noted: 2024-02-25

## 2024-03-04 PROBLEM — I66.29 ASYMPTOMATIC STENOSIS OF POSTERIOR CEREBRAL ARTERY: Status: RESOLVED | Noted: 2024-02-25 | Resolved: 2024-03-04

## 2024-03-04 PROCEDURE — 99214 OFFICE O/P EST MOD 30 MIN: CPT | Performed by: NURSE PRACTITIONER

## 2024-03-04 RX ORDER — FOLIC ACID 1 MG/1
1 TABLET ORAL DAILY
COMMUNITY
Start: 2024-02-28

## 2024-03-04 NOTE — TELEPHONE ENCOUNTER
Pt contacted our office upset bc she had picked up her Rx of folic acid sent by provider with [facility]. Rx was written for 1 mg and her multivitamin has 400 mg of folic acid. She wanted to know why  would even write this Rx and stated it was pointless. I advised pt to contact the office of the prescriber as he may have been unaware of her multivitamin or only wanted her on a small dose and her multivitamin could have been to high. I advised her we would have no way of knowing the whys of another office and that she would need to contact their office for clarification. Pt voiced understanding.

## 2024-03-04 NOTE — PROGRESS NOTES
"Chief Complaint  Follow-up (From outpatient test done at [facility])    Subjective          Audra Montejo, 63 y.o. female presents to [facility]  History of Present Illness   She is here to follow-up after seeing the [facility] neurosurgery group.  She states she was told to follow-up with her PCP.  She underwent a MRI head angiogram.  She states she was told that she has a cerebral aneurysm that was small and that she needs to repeat this test next year.  She states, \"I am not going to go through that again.\"  She states she had to take off several days of work to have this done and states she states \"there is nothing they can do for it anyway.\"  She also states that she has looked this up online and that it would take \"4 to 5 years before the size of her aneurysm would rupture.\"  She is on Plavix 75 mg daily.  She is also on atorvastatin 20 mg daily.  She does continue to smoke and refuses to quit.    Hypertension: Her blood pressure is stable on candesartan 8 mg twice daily and amlodipine 5 mg daily.  She states that she has 1 more refill from her previous PCP Dr. Alex and then she will need me to take over her refills.    She states that the hematologist found that she had of mutated gene and that she has to take folic acid now.  She states she does not really understand this.     Tobacco Use: High Risk (3/4/2024)    Patient History     Smoking Tobacco Use: Every Day     Smokeless Tobacco Use: Never     Passive Exposure: Current      Objective   Vital Signs:   /57 (BP Location: Left arm, Patient Position: Sitting, Cuff Size: Adult)   Pulse 59   Temp 97.5 °F (36.4 °C)   Ht 147.3 cm (58\")   Wt 74.4 kg (164 lb 1.6 oz)   SpO2 99%   BMI 34.30 kg/m²       Current Outpatient Medications:     albuterol (ACCUNEB) 1.25 MG/3ML nebulizer solution, Take 3 mL by nebulization Every 6 (Six) Hours As Needed for Wheezing., Disp: 50 each, Rfl: 0    albuterol sulfate  (90 Base) " MCG/ACT inhaler, Inhale 2 puffs Every 6 (Six) Hours As Needed for Wheezing., Disp: 18 g, Rfl: 0    amLODIPine (NORVASC) 5 MG tablet, Take 1 tablet by mouth Daily., Disp: 90 tablet, Rfl: 1    atorvastatin (LIPITOR) 20 MG tablet, TAKE 1 TABLET BY MOUTH DAILY, Disp: 90 tablet, Rfl: 1    candesartan (ATACAND) 8 MG tablet, Take 1 tablet by mouth 2 (Two) Times a Day. Indications: High Blood Pressure Disorder, Disp: 180 tablet, Rfl: 1    cetirizine (zyrTEC) 10 MG tablet, Take 1 tablet by mouth At Night As Needed for Allergies. Indications: Hayfever, Disp: 30 tablet, Rfl: 2    clopidogrel (PLAVIX) 75 MG tablet, TAKE 1 TABLET BY MOUTH DAILY, Disp: 90 tablet, Rfl: 1    divalproex (DEPAKOTE ER) 500 MG 24 hr tablet, TAKE 1 TABLET BY MOUTH DAILY, Disp: 90 tablet, Rfl: 1    folic acid (FOLVITE) 1 MG tablet, Take 1 tablet by mouth Daily., Disp: , Rfl:     montelukast (SINGULAIR) 10 MG tablet, TAKE 1 TABLET BY MOUTH EVERY NIGHT, Disp: 90 tablet, Rfl: 1    oxybutynin XL (DITROPAN-XL) 10 MG 24 hr tablet, TAKE 1 TABLET BY MOUTH DAILY, Disp: 90 tablet, Rfl: 1    traMADol (ULTRAM) 50 MG tablet, Take 1 tablet by mouth Every 8 (Eight) Hours As Needed for Moderate Pain., Disp: 30 tablet, Rfl: 1   Past Medical History:   Diagnosis Date    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    Acid reflux disease     Allergic Unknown    Allergy     unspecified    Anxiety     Asthma Unknown    Back pain     Bilateral carpal tunnel syndrome 08/17/2017    Bipolar disorder     Bursitis of left shoulder 08/09/2016    Cataract Unknown    Deep vein thrombosis All my life    Depression     Eczema     Gall stones     H/O psychiatric care     Heart attack     Heart murmur     Hyperlipidemia 11/22    Hypertension     Incomplete tear of left rotator cuff 01/25/2017    Left shoulder pain     Loss of appetite     Lumbago 05/08/2015    low back pain     Memory loss     forgetfulness    Migraine headache     Need for hepatitis C screening test     Obesity     Palpitation      Ringing in ears     Seasonal allergies     Shortness of breath     Sinus trouble     unspecified     Stroke (cerebrum)     Subacromial impingement, left 10/13/2016    Superior glenoid labrum lesion of left shoulder 10/13/2016    subsequent encounter    Trouble swallowing     Urinary tract infection Unknown      Physical Exam  Vitals reviewed.   Constitutional:       Appearance: Normal appearance. She is well-developed. She is obese.   Neck:      Thyroid: No thyroid mass, thyromegaly or thyroid tenderness.   Cardiovascular:      Rate and Rhythm: Normal rate and regular rhythm.      Heart sounds: No murmur heard.     No friction rub. No gallop.   Pulmonary:      Effort: Pulmonary effort is normal.      Breath sounds: Normal breath sounds. No wheezing or rhonchi.   Lymphadenopathy:      Cervical: No cervical adenopathy.   Skin:     General: Skin is warm and dry.   Neurological:      Mental Status: She is alert and oriented to person, place, and time.      Cranial Nerves: No cranial nerve deficit.   Psychiatric:         Mood and Affect: Mood and affect normal.         Behavior: Behavior normal.         Thought Content: Thought content normal. Thought content does not include homicidal or suicidal ideation.         Judgment: Judgment normal.        Result Review :   {The following data was reviewed by HERMAN Menjivar    MRI Brain With & Without Contrast    Result Date: 2/2/2024    1. No acute intracranial abnormality 2. Sequela of prior infarcts 3. Stable white matter changes compatible small-vessel ischemic disease 4. Bilateral mastoid effusions      KIRA DALAL MD       Electronically Signed and Approved By: KIRA DALAL MD on 2/02/2024 at 11:54             CT Head Without Contrast    Result Date: 1/12/2024   No acute brain abnormality is seen.    Please note that portions of this note were completed with a voice recognition program.  ONIEL VALDEZ JR, MD       Electronically Signed and Approved By:  ONIEL VALDEZ JR, MD on 1/12/2024 at 0:42              XR Chest 1 View    Result Date: 1/11/2024    1. No acute process       KIRA DALAL MD       Electronically Signed and Approved By: KIRA DALAL MD on 1/11/2024 at 20:50             XR Foot 3+ View Right    Result Date: 11/7/2023    1. Plantar calcaneal spur. 2. Enthesophyte along the posterior calcaneus. 3. Evidence for some osteoarthritis.      RHYS ARMENDARIZ MD       Electronically Signed and Approved By: RHYS ARMENDARIZ MD on 11/07/2023 at 12:16               Common Labs   Common labs          1/11/2024    20:22 2/2/2024    10:00 2/2/2024    12:39 2/15/2024    07:38   Common Labs   Glucose 131   114     BUN 17   14     Creatinine 1.17   0.74     Sodium 141   141     Potassium 4.2   3.9     Chloride 106   104     Calcium 8.8   9.3     Albumin 4.0   4.1     Total Bilirubin 0.2   0.3     Alkaline Phosphatase 112   95     AST (SGOT) 18   14     ALT (SGPT) 14   11     WBC 10.68  11.58      Hemoglobin 13.4  13.8      Hematocrit 40.0  42.6      Platelets 252  243      Hemoglobin A1C    5.90                 Assessment and Plan    Diagnoses and all orders for this visit:    1. Nonruptured cerebral aneurysm (Primary)  Assessment & Plan:  I advised her of the importance that she should have this checked every year as recommended by neurosurgery.  Handout provided, see AVS.  I also provided handout on health risk of smoking, see AVS.      2. Primary hypertension  Assessment & Plan:  Hypertension is stable and controlled  Continue current treatment regimen.  Blood pressure will be reassessed in 3 months.      3. MTHFR gene mutation  Assessment & Plan:  I explained to her why she needed to be on folic acid per recommendations by hematology.          Follow Up   Return in about 3 months (around 6/4/2024) for Next scheduled follow up.  Patient was given instructions and counseling regarding her condition or for health maintenance advice. Please see specific information  pulled into the AVS if appropriate.     Parts of this note are electronic transcriptions/translations of spoken language to printed text using the Dragon Dictation system.      Gale Negrete, HERMAN  03/04/2024

## 2024-03-04 NOTE — ASSESSMENT & PLAN NOTE
I advised her of the importance that she should have this checked every year as recommended by neurosurgery.  Handout provided, see AVS.  I also provided handout on health risk of smoking, see AVS.

## 2024-03-10 NOTE — PROGRESS NOTES
"Chief Complaint  Bleeding/Bruising (Bruising post op ) and Med Refill (Needs atorvastatin refilled)    Subjective          Audra Montejo, 63 y.o. female presents to [facility]  History of Present Illness   She is here today with complaints of a bruise on her left arm.  She states that this bruise is from where they attempted an IV 2-3 times in her left forearm.    Hyperlipidemia: She states she only has 1 atorvastatin pill left and needs a refill.  She is on this due to having a history of a cerebral infarction.    She states that she will do the Cologuard test but she is not going to do a colonoscopy.  I did explain to her that if the Cologuard test is positive that she would need to get a colonoscopy.     Tobacco Use: High Risk (3/11/2024)    Patient History     Smoking Tobacco Use: Every Day     Smokeless Tobacco Use: Never     Passive Exposure: Current      E-cigarette/Vaping    E-cigarette/Vaping Use Never User     Passive Exposure No     Counseling Given Yes      E-cigarette/Vaping Substances    Nicotine No     THC No     CBD No     Flavoring No      E-cigarette/Vaping Devices    Disposable No     Pre-filled or Refillable Cartridge No     Refillable Tank No     Pre-filled Pod No           Objective   Vital Signs:   /76 (BP Location: Left arm, Patient Position: Sitting, Cuff Size: Adult)   Pulse 54   Temp 97.5 °F (36.4 °C)   Ht 147.3 cm (58\")   Wt 74.2 kg (163 lb 8 oz)   SpO2 99%   BMI 34.17 kg/m²       Current Outpatient Medications:     albuterol (ACCUNEB) 1.25 MG/3ML nebulizer solution, Take 3 mL by nebulization Every 6 (Six) Hours As Needed for Wheezing., Disp: 50 each, Rfl: 0    albuterol sulfate  (90 Base) MCG/ACT inhaler, Inhale 2 puffs Every 6 (Six) Hours As Needed for Wheezing., Disp: 18 g, Rfl: 0    amLODIPine (NORVASC) 5 MG tablet, Take 1 tablet by mouth Daily., Disp: 90 tablet, Rfl: 1    atorvastatin (LIPITOR) 20 MG tablet, Take 1 tablet by " mouth Every Night. Indications: High Amount of Fats in the Blood, Disp: 90 tablet, Rfl: 1    candesartan (ATACAND) 8 MG tablet, Take 1 tablet by mouth 2 (Two) Times a Day. Indications: High Blood Pressure Disorder, Disp: 180 tablet, Rfl: 1    cetirizine (zyrTEC) 10 MG tablet, Take 1 tablet by mouth At Night As Needed for Allergies. Indications: Hayfever, Disp: 30 tablet, Rfl: 2    clopidogrel (PLAVIX) 75 MG tablet, TAKE 1 TABLET BY MOUTH DAILY, Disp: 90 tablet, Rfl: 1    divalproex (DEPAKOTE ER) 500 MG 24 hr tablet, TAKE 1 TABLET BY MOUTH DAILY, Disp: 90 tablet, Rfl: 1    folic acid (FOLVITE) 1 MG tablet, Take 1 tablet by mouth Daily., Disp: , Rfl:     montelukast (SINGULAIR) 10 MG tablet, TAKE 1 TABLET BY MOUTH EVERY NIGHT, Disp: 90 tablet, Rfl: 1    oxybutynin XL (DITROPAN-XL) 10 MG 24 hr tablet, TAKE 1 TABLET BY MOUTH DAILY, Disp: 90 tablet, Rfl: 1    traMADol (ULTRAM) 50 MG tablet, Take 1 tablet by mouth Every 8 (Eight) Hours As Needed for Moderate Pain., Disp: 30 tablet, Rfl: 1   Past Medical History:   Diagnosis Date    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    Acid reflux disease     Allergic Unknown    Allergy     unspecified    Anxiety     Asthma Unknown    Back pain     Bilateral carpal tunnel syndrome 08/17/2017    Bipolar disorder     Bursitis of left shoulder 08/09/2016    Cataract Unknown    Deep vein thrombosis All my life    Depression     Eczema     Gall stones     H/O psychiatric care     Heart attack     Heart murmur     Hyperlipidemia 11/22    Hypertension     Incomplete tear of left rotator cuff 01/25/2017    Left shoulder pain     Loss of appetite     Lumbago 05/08/2015    low back pain     Memory loss     forgetfulness    Migraine headache     Need for hepatitis C screening test     Obesity     Palpitation     Ringing in ears     Seasonal allergies     Shortness of breath     Sinus trouble     unspecified     Stroke (cerebrum)     Subacromial impingement, left 10/13/2016    Superior glenoid labrum  lesion of left shoulder 10/13/2016    subsequent encounter    Trouble swallowing     Urinary tract infection Unknown      Physical Exam  Vitals reviewed.   Constitutional:       Appearance: Normal appearance. She is well-developed. She is obese.   Neck:      Thyroid: No thyroid mass, thyromegaly or thyroid tenderness.   Cardiovascular:      Rate and Rhythm: Normal rate and regular rhythm.      Heart sounds: No murmur heard.     No friction rub. No gallop.   Pulmonary:      Effort: Pulmonary effort is normal.      Breath sounds: Normal breath sounds. No wheezing or rhonchi.   Lymphadenopathy:      Cervical: No cervical adenopathy.   Skin:     General: Skin is warm and dry.      Findings: Ecchymosis present.          Neurological:      Mental Status: She is alert and oriented to person, place, and time.      Cranial Nerves: No cranial nerve deficit.   Psychiatric:         Mood and Affect: Mood and affect normal.         Behavior: Behavior normal.         Thought Content: Thought content normal. Thought content does not include homicidal or suicidal ideation.         Judgment: Judgment normal.     Left forearm    Result Review :   {The following data was reviewed by HERMAN Menjivar    MRI Brain With & Without Contrast    Result Date: 2/2/2024    1. No acute intracranial abnormality 2. Sequela of prior infarcts 3. Stable white matter changes compatible small-vessel ischemic disease 4. Bilateral mastoid effusions      KIRA DALAL MD       Electronically Signed and Approved By: KIRA DALAL MD on 2/02/2024 at 11:54             CT Head Without Contrast    Result Date: 1/12/2024   No acute brain abnormality is seen.    Please note that portions of this note were completed with a voice recognition program.  ONIEL VALDEZ JR, MD       Electronically Signed and Approved By: ONIEL VALDEZ JR, MD on 1/12/2024 at 0:42              XR Chest 1 View    Result Date: 1/11/2024    1. No acute process       KIRA  KATLIN WATTS       Electronically Signed and Approved By: KIRA DALAL MD on 1/11/2024 at 20:50               CMP   CMP          12/6/2023    14:26 1/11/2024    20:22 2/2/2024    12:39   CMP   Glucose 94  131  114    BUN 17  17  14    Creatinine 0.83  1.17  0.74    EGFR 80.3  53.2  92.2    Sodium 142  141  141    Potassium 4.4  4.2  3.9    Chloride 106  106  104    Calcium 9.2  8.8  9.3    Total Protein 6.8  6.4  6.9    Albumin 4.3  4.0  4.1    Globulin 2.5  2.4  2.8    Total Bilirubin 0.3  0.2  0.3    Alkaline Phosphatase 109  112  95    AST (SGOT) 16  18  14    ALT (SGPT) 10  14  11    Albumin/Globulin Ratio 1.7  1.7  1.5    BUN/Creatinine Ratio 20.5  14.5  18.9    Anion Gap 8.8  11.2  9.3      CBC   CBC          9/5/2023    07:50 1/11/2024    20:22 2/2/2024    10:00   CBC   WBC 11.01  10.68  11.58    RBC 4.71  4.44  4.68    Hemoglobin 14.5  13.4  13.8    Hematocrit 42.7  40.0  42.6    MCV 90.7  90.1  91.0    MCH 30.8  30.2  29.5    MCHC 34.0  33.5  32.4    RDW 12.6  13.6  13.4    Platelets 295  252  243      LIPID   Lipid Panel          4/17/2023    11:19 5/15/2023    05:14 12/6/2023    14:26   Lipid Panel   Total Cholesterol 129  121  123    Triglycerides 45  61  47    HDL Cholesterol 78  76  71    VLDL Cholesterol 11  13  11    LDL Cholesterol  40  32  41    LDL/HDL Ratio 0.54  0.43  0.60             Assessment and Plan    Diagnoses and all orders for this visit:    1. Bruise (Primary)  Comments:  Advised that bruise will resolve on its own.  Advised to use alternating heat/cold as needed.    2. Pure hypercholesterolemia  Assessment & Plan:   Lipid abnormalities are stable    Plan:  Continue same medication/s without change.      Discussed medication dosage, use, side effects, and goals of treatment in detail.      Patient Treatment Goals:   LDL goal is less than 70    Followup at the next regular appointment.    Orders:  -     atorvastatin (LIPITOR) 20 MG tablet; Take 1 tablet by mouth Every Night.  Indications: High Amount of Fats in the Blood  Dispense: 90 tablet; Refill: 1        Follow Up   Return for Keep Scheduled Follow-up.  Patient was given instructions and counseling regarding her condition or for health maintenance advice. Please see specific information pulled into the AVS if appropriate.     Parts of this note are electronic transcriptions/translations of spoken language to printed text using the Dragon Dictation system.      Gale Negrete, HERMAN  03/11/2024

## 2024-03-11 ENCOUNTER — OFFICE VISIT (OUTPATIENT)
Dept: FAMILY MEDICINE CLINIC | Facility: CLINIC | Age: 64
End: 2024-03-11
Payer: COMMERCIAL

## 2024-03-11 VITALS
HEIGHT: 58 IN | WEIGHT: 163.5 LBS | DIASTOLIC BLOOD PRESSURE: 76 MMHG | TEMPERATURE: 97.5 F | SYSTOLIC BLOOD PRESSURE: 131 MMHG | OXYGEN SATURATION: 99 % | BODY MASS INDEX: 34.32 KG/M2 | HEART RATE: 54 BPM

## 2024-03-11 DIAGNOSIS — E78.00 PURE HYPERCHOLESTEROLEMIA: ICD-10-CM

## 2024-03-11 DIAGNOSIS — T14.8XXA BRUISE: Primary | ICD-10-CM

## 2024-03-11 RX ORDER — ATORVASTATIN CALCIUM 20 MG/1
20 TABLET, FILM COATED ORAL NIGHTLY
Qty: 90 TABLET | Refills: 1 | Status: SHIPPED | OUTPATIENT
Start: 2024-03-11

## 2024-03-11 NOTE — ASSESSMENT & PLAN NOTE
Lipid abnormalities are stable    Plan:  Continue same medication/s without change.      Discussed medication dosage, use, side effects, and goals of treatment in detail.      Patient Treatment Goals:   LDL goal is less than 70    Followup at the next regular appointment.

## 2024-03-13 ENCOUNTER — TRANSCRIBE ORDERS (OUTPATIENT)
Dept: ADMINISTRATIVE | Facility: HOSPITAL | Age: 64
End: 2024-03-13
Payer: COMMERCIAL

## 2024-03-13 DIAGNOSIS — Z12.31 SCREENING MAMMOGRAM FOR BREAST CANCER: Primary | ICD-10-CM

## 2024-03-14 ENCOUNTER — TELEPHONE (OUTPATIENT)
Dept: FAMILY MEDICINE CLINIC | Facility: CLINIC | Age: 64
End: 2024-03-14

## 2024-03-14 DIAGNOSIS — Z12.11 SCREENING FOR MALIGNANT NEOPLASM OF COLON: Primary | ICD-10-CM

## 2024-03-14 NOTE — TELEPHONE ENCOUNTER
Caller: Audra Montejo    Relationship: Self    Best call back number: [phone]    What is the medical concern/diagnosis: ROUTINE COLONOSCOPY    Any additional details:   PATIENT WOULD LIKE AN APPOINTMENT TO HAVE ROUTINE COLONOSCOPY PREFERABLY ON 03/21/2024. SHE IS REQUESTING CALL REGARDING WHERE SHE IS BEING REFERRED TO SO SHE IS AWARE.

## 2024-03-15 NOTE — TELEPHONE ENCOUNTER
Order placed but this is not an urgent referral and I doubt that they can get her in by next week.

## 2024-03-15 NOTE — TELEPHONE ENCOUNTER
Who does she want to be referred to?  She can pick one of the general surgeons (Dr. Clark or Dr. Vega) or one of the gastroenterologist such as Dr. Sheppard, Dr. Villafana, Dr. Yin or Dr. Baron.

## 2024-03-18 ENCOUNTER — TELEPHONE (OUTPATIENT)
Dept: GASTROENTEROLOGY | Facility: CLINIC | Age: 64
End: 2024-03-18
Payer: COMMERCIAL

## 2024-03-18 NOTE — TELEPHONE ENCOUNTER
Attempted to contact patient in regards to a referral we received from Gale Negrete for Screening for malignant neoplasm of colon. Left a voice message for patient to return call.

## 2024-03-21 ENCOUNTER — HOSPITAL ENCOUNTER (OUTPATIENT)
Dept: CT IMAGING | Facility: HOSPITAL | Age: 64
Discharge: HOME | End: 2024-03-21
Admitting: INTERNAL MEDICINE
Payer: COMMERCIAL

## 2024-03-21 ENCOUNTER — TRANSCRIBE ORDERS (OUTPATIENT)
Dept: CT IMAGING | Facility: HOSPITAL | Age: 64
End: 2024-03-21
Payer: COMMERCIAL

## 2024-03-21 DIAGNOSIS — R10.10 PAIN OF UPPER ABDOMEN: ICD-10-CM

## 2024-03-21 DIAGNOSIS — D69.9 BLEEDING DISORDER: ICD-10-CM

## 2024-03-21 PROCEDURE — 25510000001 IOPAMIDOL PER 1 ML: Performed by: INTERNAL MEDICINE

## 2024-03-21 PROCEDURE — 71250 CT THORAX DX C-: CPT

## 2024-03-21 PROCEDURE — 74177 CT ABD & PELVIS W/CONTRAST: CPT

## 2024-03-21 RX ADMIN — IOPAMIDOL 100 ML: 755 INJECTION, SOLUTION INTRAVENOUS at 11:31

## 2024-04-01 NOTE — PROGRESS NOTES
"[facility]  Initial Evaluation    Referring Provider:  Gale Negrete, APRN  [address] [place] [address]    Chief Complaint: \"My PCP sent me here, I am concerned that my anti-seizure medication reacts with plavix\"    History of Present Illness: Audra Montejo is a 64 y.o. female who presents today for initial evaluation regarding . Audra presents unaccompanied in no acute distress and engages with me appropriately. Psychotropic regimen with which patient presents is described as nothing. Patient is not taking Depakote because patient says it interacts with Plavix.   Patient voiced concerns and frustrations with medical establishment. We discussed missed opportunities for clarification in her care. We discussed the perceived limitations on consulting providers, and the desire for a more transparent exchange of prognostic information. We spent the majority of the encounter building rapport. We agreed to meet again in two weeks to discuss research findings and discuss post-stroke psychiatric symptoms. Ms. Montejo strongly desires to be evaluated by the [facility]. I also encouraged patient to get a Genesight test done if not already.   History is positive for signs/symptoms suggestive of alternating symptoms of elevated (moods) and depressed moods that do not conform to temporal parameters of bipolar disorder and are not sufficiently encapsulated by MDD.    Psychiatric screening is negative for pathognomonic history of: violence, suicidality, and PTSD.     I have counseled the patient with regard to diagnoses and the recommended treatment regimen as documented below: I will assume prescriptive responsibility for nothing at this time. Patient acknowledges the diagnoses per my rendered interpretation. Patient demonstrates awareness/understanding of viable alternatives for treatment as well as potential risks, benefits, and side effects " "associated with this regimen and is amenable to proceed in this fashion.     Recommended lifestyle changes: 30 minutes of activity to increase HR 2-3 days weekly.    Psychiatric History:  Diagnoses: bipolar disorder, mixed, moderate  Outpatient history: therapy \"did no good'  Inpatient history:  none  Medication trials: sertraline, xanax  Other treatment modalities: Psychotherapy  Self harm: No history  Suicide attempts: No  Abuse or neglect: None    Substance Abuse History:   Types/methods/frequency: * none  Transtheoretical stage: none    Social History:  Residence: lives apartment, 3 cats  Vocation: yes  Source of income: Employed, hx of medical background former EMT  Last grade completed: high  Pertinent developmental history: none  Pertinent legal history: none  Hobbies/interests: not much  Latter-day: N/A  Exercise:walking at work   Dietary habits: no pertinent issues   Sleep hygiene:  poor due to pain issues  Social habits:  isolation  Sunlight: There are concern for under-exposure.  Caffeine intake:  no issue  Hydration habits: no pertinent issues    history: No    Social History     Socioeconomic History    Marital status: Single   Tobacco Use    Smoking status: Every Day     Current packs/day: 1.00     Average packs/day: 1 pack/day for 30.0 years (30.0 ttl pk-yrs)     Types: Cigarettes     Passive exposure: Current    Smokeless tobacco: Never    Tobacco comments:     Not your business   Vaping Use    Vaping status: Never Used   Substance and Sexual Activity    Alcohol use: Not Currently     Comment: rarely drinks    Drug use: Never    Sexual activity: Not Currently     Birth control/protection: None, Hysterectomy     Access to Firearms: no    Tobacco use counseling/intervention: N/A, patient does not use tobacco; patient was counseled with regard to risks of tobacco use and encouraged to consider quitting, with or without the use of adjunctive medication, by first setting a prospective quit date. " Currently Precontemplation by transtheoretical model.     PHQ-9 Depression Screening  PHQ-9 Total Score: 14    Little interest or pleasure in doing things? 3-->nearly every day   Feeling down, depressed, or hopeless? 1-->several days   Trouble falling or staying asleep, or sleeping too much? 3-->nearly every day   Feeling tired or having little energy? 3-->nearly every day   Poor appetite or overeating? 1-->several days   Feeling bad about yourself - or that you are a failure or have let yourself or your family down? 0-->not at all   Trouble concentrating on things, such as reading the newspaper or watching television? 3-->nearly every day   Moving or speaking so slowly that other people could have noticed? Or the opposite - being so fidgety or restless that you have been moving around a lot more than usual? 0-->not at all   Thoughts that you would be better off dead, or of hurting yourself in some way? 0-->not at all   PHQ-9 Total Score 14     SARY-7  Feeling nervous, anxious or on edge: Not at all  Not being able to stop or control worrying: Not at all  Worrying too much about different things: Not at all  Trouble Relaxing: Not at all  Being so restless that it is hard to sit still: Not at all  Feeling afraid as if something awful might happen: Not at all  Becoming easily annoyed or irritable: Nearly every day  SARY 7 Total Score: 3  If you checked any problems, how difficult have these problems made it for you to do your work, take care of things at home, or get along with other people: Not difficult at all    Problem List:  Patient Active Problem List   Diagnosis    Primary hypertension    Mild intermittent asthma without complication    Acute left-sided low back pain with left-sided sciatica    Bipolar disorder, current episode mixed, moderate    Gall stones    Seasonal allergic rhinitis    Superior glenoid labrum lesion of left shoulder    Impaired fasting glucose    Mixed hyperlipidemia    Tobacco use disorder     Elevated factor VIII level    History of CVA (cerebrovascular accident)    Well adult exam    TIA (transient ischemic attack)    Class 1 obesity with serious comorbidity and body mass index (BMI) of 34.0 to 34.9 in adult    Slow transit constipation    Mixed stress and urge urinary incontinence    Lumbar radiculopathy    Factor VIII inhibitor disorder    New onset of headaches after age 52    Stenosis of cerebral artery    Small aneurysm of supraclinoid carotid artery    Neuropathy    Prediabetes    Nonruptured cerebral aneurysm    Asymptomatic stenosis of posterior cerebral artery    Cerebellar infarction    MTHFR gene mutation    Pure hypercholesterolemia     Allergy:   Allergies   Allergen Reactions    Lisinopril Unknown - High Severity     Pt states it almost killed her last time she was on this medication     Doxazosin Other (See Comments)     Feeling like she was going to pass out    Nsaids Other (See Comments)     HX CVA    Adhesive Tape Itching    Tape Itching        Discontinued Medications:  Medications Discontinued During This Encounter   Medication Reason    divalproex (DEPAKOTE ER) 500 MG 24 hr tablet Historical Med - Therapy completed       Current Medications:   Current Outpatient Medications   Medication Sig Dispense Refill    albuterol (ACCUNEB) 1.25 MG/3ML nebulizer solution Take 3 mL by nebulization Every 6 (Six) Hours As Needed for Wheezing. 50 each 0    albuterol sulfate  (90 Base) MCG/ACT inhaler Inhale 2 puffs Every 6 (Six) Hours As Needed for Wheezing. 18 g 0    amLODIPine (NORVASC) 5 MG tablet Take 1 tablet by mouth Daily. 90 tablet 1    atorvastatin (LIPITOR) 20 MG tablet Take 1 tablet by mouth Every Night. Indications: High Amount of Fats in the Blood 90 tablet 1    candesartan (ATACAND) 8 MG tablet Take 1 tablet by mouth 2 (Two) Times a Day. Indications: High Blood Pressure Disorder 180 tablet 1    cetirizine (zyrTEC) 10 MG tablet Take 1 tablet by mouth At Night As Needed for  Allergies. Indications: Hayfever 30 tablet 2    clopidogrel (PLAVIX) 75 MG tablet TAKE 1 TABLET BY MOUTH DAILY 90 tablet 1    folic acid (FOLVITE) 1 MG tablet Take 1 tablet by mouth Daily.      gabapentin (NEURONTIN) 100 MG capsule 1 capsule Daily.      montelukast (SINGULAIR) 10 MG tablet TAKE 1 TABLET BY MOUTH EVERY NIGHT 90 tablet 1    oxybutynin XL (DITROPAN-XL) 10 MG 24 hr tablet TAKE 1 TABLET BY MOUTH DAILY 90 tablet 1    pregabalin (LYRICA) 75 MG capsule TAKE 1 CAPSULE BY MOUTH TWICE DAILY AS DIRECTED      traMADol (ULTRAM) 50 MG tablet Take 1 tablet by mouth Every 8 (Eight) Hours As Needed for Moderate Pain. 30 tablet 1     No current facility-administered medications for this visit.     Past Medical History:  Past Medical History:   Diagnosis Date    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    Acid reflux disease     Allergic Unknown    Allergy     unspecified    Anxiety     Asthma Unknown    Back pain     Bilateral carpal tunnel syndrome 08/17/2017    Bipolar disorder     Bursitis of left shoulder 08/09/2016    Cataract Unknown    Chronic pain disorder     Deep vein thrombosis All my life    Depression     Eczema     Gall stones     H/O psychiatric care     Heart attack     Heart murmur     Hyperlipidemia 11/22    Hypertension     Incomplete tear of left rotator cuff 01/25/2017    Left shoulder pain     Loss of appetite     Lumbago 05/08/2015    low back pain     Memory loss     forgetfulness    Migraine headache     Need for hepatitis C screening test     Obesity     Palpitation     Panic disorder     Ringing in ears     Seasonal allergies     Shortness of breath     Sinus trouble     unspecified     Stroke (cerebrum)     Subacromial impingement, left 10/13/2016    Superior glenoid labrum lesion of left shoulder 10/13/2016    subsequent encounter    Trouble swallowing     Urinary tract infection Unknown     Past Surgical History:  Past Surgical History:   Procedure Laterality Date    ABDOMINAL SURGERY       "APPENDECTOMY      BACK SURGERY      CARPAL TUNNEL RELEASE      CHOLECYSTECTOMY      COLONOSCOPY  2014    ENDOSCOPY  2014    FRACTURE SURGERY      HYSTERECTOMY  1999    SHOULDER SURGERY Left 10/03/2016    arthroscopic, left shoulder scope, RTC repair- Dr. Flores    SKIN BIOPSY       Family History:   Family History   Problem Relation Age of Onset    Stroke Mother     Diabetes Mother     Arthritis Mother     COPD Mother     Hyperlipidemia Mother     Vision loss Mother     Heart disease Mother     Heart disease Father     Cancer Father         Lukimia    Heart disease Sister     Cancer Sister     Diabetes Sister     Arthritis Sister     Heart disease Brother     Diabetes Brother     Stroke Brother     Hypertension Other     Rheum arthritis Other     Stroke Son     Heart disease Son     Cancer Son     Other Son         blood disease    Arthritis Son     Osteoporosis Son     Arthritis Son     Cancer Son     Heart disease Son     Stroke Son     Other Son        Mental Status Exam:   Observations:  Appearance: Neat  Speech: Normal  Eye Contact: Normal  Motor Activity: Normal  Affect:Full  Comments:  Mood:Mood: Angry  Cognition  Orientation Impairment: None  Memory Impairment: None  Attention: Normal  Comments:  Perception  Hallucinations:None  Other:   Comments:  Thoughts:  Suicidality:None  Homicidality:None  Delusions:  None  Comments:  Behavior:Behavior: Cooperative  Insight: Insight: Good  Judgement:Insight: Good    Vital Signs:   /60   Pulse 63   Ht 147.3 cm (58\")   Wt 74.8 kg (165 lb)   SpO2 100%   BMI 34.49 kg/m²    Lab Results:   Office Visit on 02/15/2024   Component Date Value Ref Range Status    Valproic Acid 02/15/2024 49.0 (L)  50.0 - 125.0 mcg/mL Final    Hemoglobin A1C 02/15/2024 5.90 (H)  4.80 - 5.60 % Final    Iron 02/15/2024 51  37 - 145 mcg/dL Final    Iron Saturation (TSAT) 02/15/2024 14 (L)  20 - 50 % Final    Transferrin 02/15/2024 251  200 - 360 mg/dL Final    TIBC 02/15/2024 374  298 - " 536 mcg/dL Final    Ferritin 02/15/2024 316.00 (H)  13.00 - 150.00 ng/mL Final    Folate 02/15/2024 >20.00  4.78 - 24.20 ng/mL Final    Vitamin B-12 02/15/2024 1,334 (H)  211 - 946 pg/mL Final    TSH 02/15/2024 2.850  0.270 - 4.200 uIU/mL Final    Free T4 02/15/2024 1.07  0.93 - 1.70 ng/dL Final    T4 results may be falsely increased if patient taking Biotin.    25 Hydroxy, Vitamin D 02/15/2024 35.1  30.0 - 100.0 ng/ml Final    Methylmalonic Acid 02/15/2024 227  0 - 378 nmol/L Final   Admission on 02/02/2024, Discharged on 02/02/2024   Component Date Value Ref Range Status    QT Interval 02/02/2024 442  ms Final    QTC Interval 02/02/2024 425  ms Final    Glucose 02/02/2024 114 (H)  65 - 99 mg/dL Final    BUN 02/02/2024 14  8 - 23 mg/dL Final    Creatinine 02/02/2024 0.74  0.57 - 1.00 mg/dL Final    Sodium 02/02/2024 141  136 - 145 mmol/L Final    Potassium 02/02/2024 3.9  3.5 - 5.2 mmol/L Final    Slight hemolysis detected by analyzer. Result may be falsely elevated.    Chloride 02/02/2024 104  98 - 107 mmol/L Final    CO2 02/02/2024 27.7  22.0 - 29.0 mmol/L Final    Calcium 02/02/2024 9.3  8.6 - 10.5 mg/dL Final    Total Protein 02/02/2024 6.9  6.0 - 8.5 g/dL Final    Albumin 02/02/2024 4.1  3.5 - 5.2 g/dL Final    ALT (SGPT) 02/02/2024 11  1 - 33 U/L Final    AST (SGOT) 02/02/2024 14  1 - 32 U/L Final    Alkaline Phosphatase 02/02/2024 95  39 - 117 U/L Final    Total Bilirubin 02/02/2024 0.3  0.0 - 1.2 mg/dL Final    Globulin 02/02/2024 2.8  gm/dL Final    A/G Ratio 02/02/2024 1.5  g/dL Final    BUN/Creatinine Ratio 02/02/2024 18.9  7.0 - 25.0 Final    Anion Gap 02/02/2024 9.3  5.0 - 15.0 mmol/L Final    eGFR 02/02/2024 92.2  >60.0 mL/min/1.73 Final    proBNP 02/02/2024 132.6  0.0 - 900.0 pg/mL Final    HS Troponin T 02/02/2024 7  <14 ng/L Final    Extra Tube 02/02/2024 Hold for add-ons.   Final    Auto resulted.    Extra Tube 02/02/2024 hold for add-on   Final    Auto resulted    Extra Tube 02/02/2024 Hold for  add-ons.   Final    Auto resulted.    Extra Tube 02/02/2024 Hold for add-ons.   Final    Auto resulted    WBC 02/02/2024 11.58 (H)  3.40 - 10.80 10*3/mm3 Final    RBC 02/02/2024 4.68  3.77 - 5.28 10*6/mm3 Final    Hemoglobin 02/02/2024 13.8  12.0 - 15.9 g/dL Final    Hematocrit 02/02/2024 42.6  34.0 - 46.6 % Final    MCV 02/02/2024 91.0  79.0 - 97.0 fL Final    MCH 02/02/2024 29.5  26.6 - 33.0 pg Final    MCHC 02/02/2024 32.4  31.5 - 35.7 g/dL Final    RDW 02/02/2024 13.4  12.3 - 15.4 % Final    RDW-SD 02/02/2024 45.1  37.0 - 54.0 fl Final    MPV 02/02/2024 11.2  6.0 - 12.0 fL Final    Platelets 02/02/2024 243  140 - 450 10*3/mm3 Final    Neutrophil % 02/02/2024 48.1  42.7 - 76.0 % Final    Lymphocyte % 02/02/2024 38.3  19.6 - 45.3 % Final    Monocyte % 02/02/2024 9.9  5.0 - 12.0 % Final    Eosinophil % 02/02/2024 2.9  0.3 - 6.2 % Final    Basophil % 02/02/2024 0.5  0.0 - 1.5 % Final    Immature Grans % 02/02/2024 0.3  0.0 - 0.5 % Final    Neutrophils, Absolute 02/02/2024 5.56  1.70 - 7.00 10*3/mm3 Final    Lymphocytes, Absolute 02/02/2024 4.43 (H)  0.70 - 3.10 10*3/mm3 Final    Monocytes, Absolute 02/02/2024 1.15 (H)  0.10 - 0.90 10*3/mm3 Final    Eosinophils, Absolute 02/02/2024 0.34  0.00 - 0.40 10*3/mm3 Final    Basophils, Absolute 02/02/2024 0.06  0.00 - 0.20 10*3/mm3 Final    Immature Grans, Absolute 02/02/2024 0.04  0.00 - 0.05 10*3/mm3 Final    nRBC 02/02/2024 0.0  0.0 - 0.2 /100 WBC Final    COVID19 02/02/2024 Not Detected  Not Detected - Ref. Range Final    Influenza A PCR 02/02/2024 Not Detected  Not Detected Final    Influenza B PCR 02/02/2024 Not Detected  Not Detected Final    RSV, PCR 02/02/2024 Not Detected  Not Detected Final   Admission on 01/11/2024, Discharged on 01/12/2024   Component Date Value Ref Range Status    QT Interval 01/11/2024 427  ms Final    QTC Interval 01/11/2024 389  ms Final    Glucose 01/11/2024 131 (H)  65 - 99 mg/dL Final    BUN 01/11/2024 17  8 - 23 mg/dL Final    Creatinine  01/11/2024 1.17 (H)  0.57 - 1.00 mg/dL Final    Sodium 01/11/2024 141  136 - 145 mmol/L Final    Potassium 01/11/2024 4.2  3.5 - 5.2 mmol/L Final    Slight hemolysis detected by analyzer. Result may be falsely elevated.    Chloride 01/11/2024 106  98 - 107 mmol/L Final    CO2 01/11/2024 23.8  22.0 - 29.0 mmol/L Final    Calcium 01/11/2024 8.8  8.6 - 10.5 mg/dL Final    Total Protein 01/11/2024 6.4  6.0 - 8.5 g/dL Final    Albumin 01/11/2024 4.0  3.5 - 5.2 g/dL Final    ALT (SGPT) 01/11/2024 14  1 - 33 U/L Final    AST (SGOT) 01/11/2024 18  1 - 32 U/L Final    Alkaline Phosphatase 01/11/2024 112  39 - 117 U/L Final    Total Bilirubin 01/11/2024 0.2  0.0 - 1.2 mg/dL Final    Globulin 01/11/2024 2.4  gm/dL Final    A/G Ratio 01/11/2024 1.7  g/dL Final    BUN/Creatinine Ratio 01/11/2024 14.5  7.0 - 25.0 Final    Anion Gap 01/11/2024 11.2  5.0 - 15.0 mmol/L Final    eGFR 01/11/2024 53.2 (L)  >60.0 mL/min/1.73 Final    HS Troponin T 01/11/2024 7  <14 ng/L Final    Magnesium 01/11/2024 1.9  1.6 - 2.4 mg/dL Final    Color, UA 01/11/2024 Yellow  Yellow, Straw Final    Appearance, UA 01/11/2024 Clear  Clear Final    pH, UA 01/11/2024 7.0  5.0 - 8.0 Final    Specific Gravity, UA 01/11/2024 >1.030 (H)  1.005 - 1.030 Final    Glucose, UA 01/11/2024 Negative  Negative Final    Ketones, UA 01/11/2024 Negative  Negative Final    Bilirubin, UA 01/11/2024 Negative  Negative Final    Blood, UA 01/11/2024 Negative  Negative Final    Protein, UA 01/11/2024 Negative  Negative Final    Leuk Esterase, UA 01/11/2024 Negative  Negative Final    Nitrite, UA 01/11/2024 Negative  Negative Final    Urobilinogen, UA 01/11/2024 0.2 E.U./dL  0.2 - 1.0 E.U./dL Final    Extra Tube 01/11/2024 Hold for add-ons.   Final    Auto resulted.    Extra Tube 01/11/2024 hold for add-on   Final    Auto resulted    Extra Tube 01/11/2024 Hold for add-ons.   Final    Auto resulted.    Extra Tube 01/11/2024 Hold for add-ons.   Final    Auto resulted    WBC  01/11/2024 10.68  3.40 - 10.80 10*3/mm3 Final    RBC 01/11/2024 4.44  3.77 - 5.28 10*6/mm3 Final    Hemoglobin 01/11/2024 13.4  12.0 - 15.9 g/dL Final    Hematocrit 01/11/2024 40.0  34.0 - 46.6 % Final    MCV 01/11/2024 90.1  79.0 - 97.0 fL Final    MCH 01/11/2024 30.2  26.6 - 33.0 pg Final    MCHC 01/11/2024 33.5  31.5 - 35.7 g/dL Final    RDW 01/11/2024 13.6  12.3 - 15.4 % Final    RDW-SD 01/11/2024 45.0  37.0 - 54.0 fl Final    MPV 01/11/2024 11.8  6.0 - 12.0 fL Final    Platelets 01/11/2024 252  140 - 450 10*3/mm3 Final    Neutrophil % 01/11/2024 41.9 (L)  42.7 - 76.0 % Final    Lymphocyte % 01/11/2024 44.9  19.6 - 45.3 % Final    Monocyte % 01/11/2024 7.9  5.0 - 12.0 % Final    Eosinophil % 01/11/2024 4.5  0.3 - 6.2 % Final    Basophil % 01/11/2024 0.6  0.0 - 1.5 % Final    Immature Grans % 01/11/2024 0.2  0.0 - 0.5 % Final    Neutrophils, Absolute 01/11/2024 4.49  1.70 - 7.00 10*3/mm3 Final    Lymphocytes, Absolute 01/11/2024 4.79 (H)  0.70 - 3.10 10*3/mm3 Final    Monocytes, Absolute 01/11/2024 0.84  0.10 - 0.90 10*3/mm3 Final    Eosinophils, Absolute 01/11/2024 0.48 (H)  0.00 - 0.40 10*3/mm3 Final    Basophils, Absolute 01/11/2024 0.06  0.00 - 0.20 10*3/mm3 Final    Immature Grans, Absolute 01/11/2024 0.02  0.00 - 0.05 10*3/mm3 Final    nRBC 01/11/2024 0.0  0.0 - 0.2 /100 WBC Final    Protime 01/11/2024 12.8  11.8 - 14.9 Seconds Final    INR 01/11/2024 0.95  0.86 - 1.15 Final    PTT 01/11/2024 27.4 (L)  78.0 - 95.9 seconds Final    Valproic Acid 01/11/2024 40.8 (L)  50.0 - 125.0 mcg/mL Final    COVID19 01/11/2024 Not Detected  Not Detected - Ref. Range Final    Influenza A PCR 01/11/2024 Not Detected  Not Detected Final    Influenza B PCR 01/11/2024 Not Detected  Not Detected Final    RSV, PCR 01/11/2024 Not Detected  Not Detected Final   Lab on 12/06/2023   Component Date Value Ref Range Status    Glucose 12/06/2023 94  65 - 99 mg/dL Final    BUN 12/06/2023 17  8 - 23 mg/dL Final    Creatinine 12/06/2023  0.83  0.57 - 1.00 mg/dL Final    Sodium 12/06/2023 142  136 - 145 mmol/L Final    Potassium 12/06/2023 4.4  3.5 - 5.2 mmol/L Final    Chloride 12/06/2023 106  98 - 107 mmol/L Final    CO2 12/06/2023 27.2  22.0 - 29.0 mmol/L Final    Calcium 12/06/2023 9.2  8.6 - 10.5 mg/dL Final    Total Protein 12/06/2023 6.8  6.0 - 8.5 g/dL Final    Albumin 12/06/2023 4.3  3.5 - 5.2 g/dL Final    ALT (SGPT) 12/06/2023 10  1 - 33 U/L Final    AST (SGOT) 12/06/2023 16  1 - 32 U/L Final    Alkaline Phosphatase 12/06/2023 109  39 - 117 U/L Final    Total Bilirubin 12/06/2023 0.3  0.0 - 1.2 mg/dL Final    Globulin 12/06/2023 2.5  gm/dL Final    A/G Ratio 12/06/2023 1.7  g/dL Final    BUN/Creatinine Ratio 12/06/2023 20.5  7.0 - 25.0 Final    Anion Gap 12/06/2023 8.8  5.0 - 15.0 mmol/L Final    eGFR 12/06/2023 80.3  >60.0 mL/min/1.73 Final    Total Cholesterol 12/06/2023 123  0 - 200 mg/dL Final    Triglycerides 12/06/2023 47  0 - 150 mg/dL Final    HDL Cholesterol 12/06/2023 71 (H)  40 - 60 mg/dL Final    LDL Cholesterol  12/06/2023 41  0 - 100 mg/dL Final    VLDL Cholesterol 12/06/2023 11  5 - 40 mg/dL Final    LDL/HDL Ratio 12/06/2023 0.60   Final    Valproic Acid 12/06/2023 44.0 (L)  50.0 - 125.0 mcg/mL Final    Factor VIII Activity 12/06/2023 126  56 - 140 % Final    PTT 12/06/2023 26.7  24.2 - 34.2 seconds Final       ASSESSMENT AND PLAN:    ICD-10-CM ICD-9-CM   1. Mood disorder  F39 296.90   2. MTHFR gene mutation  Z15.89 V84.89       Audra who presents today for initial evaluation regarding . We have discussed the history and interpreted diagnoses as above as well as the treatment plan below, including potential R/B/SE of the recommended regimen of which the patient demonstrates understanding. Patient is agreeable to call 911 or go to the nearest ER should she become concerned for her own safety and/or the safety of those around her. There are not overt indices of acute davon/psychosis on evaluation today.     Medication regimen:  nothing for psychiatric care; patient is advised not to misuse prescribed medications or to use any exogenous substances that aren't disclosed to this provider as they may interact with the regimen to her detriment.   Risk Assessment: protracted risk is moderate, imminent risk is moderate.  Risk factors include: psychotic disorder, anxiety disorder, mood disorder, and recent/ongoing psychosocial stressors. Protective factors include: no known family history of suicidality, intact reality testing, no substance use disorder, no access to firearms, no present SI, no stated history of suicide attempts or self-harm, patient's exhibited future-orientation, strong social support, and patient's cooperation with care. Do note that this is subject to change with the Rastafari of new stressors, treatment non-adherence, use of substances, and/or new medical ails.  Monitoring: reviewed labs/imaging as populated above, PHQ-9 today is PHQ-9 Total Score: 14 /27, SARY-7 today is 3/21  Therapy: with HERMAN styles  Follow-up: two weeks  Communications: N/A    TREATMENT PLAN/GOALS: challenge patterns of living conducive to symptom burden, implement recommended regimen as above with augmentative, intermittent supportive psychotherapy to reduce symptom burden. Patient acknowledged and verbally consented to begin treatment as above. The importance of adherence to the recommended treatment and interval follow-up appointments was emphasized today. Patient was today advised to limit daily caffeine intake, hydrate appropriately, eat healthy and nutritious foods, engage sleep hygiene measures, engage appropriate exposure to sunlight, engage with hobbies in balance with life necessities, and exercise appropriate to their capacity to do so.     Billing: I have seen the patient today and considered her psychiatric complaints, rendered a diagnosis, and discussed treatment with the patient as above with which she consents.    Parts of this note are  electronic transcriptions/translations of spoken language to printed text using the Dragon Dictation system.    Electronically signed by HERMAN Arguello, 04/01/24,

## 2024-04-03 ENCOUNTER — OFFICE VISIT (OUTPATIENT)
Dept: PSYCHIATRY | Facility: CLINIC | Age: 64
End: 2024-04-03
Payer: COMMERCIAL

## 2024-04-03 VITALS
WEIGHT: 165 LBS | BODY MASS INDEX: 34.63 KG/M2 | OXYGEN SATURATION: 100 % | HEART RATE: 63 BPM | SYSTOLIC BLOOD PRESSURE: 159 MMHG | HEIGHT: 58 IN | DIASTOLIC BLOOD PRESSURE: 60 MMHG

## 2024-04-03 DIAGNOSIS — Z15.89 MTHFR GENE MUTATION: ICD-10-CM

## 2024-04-03 DIAGNOSIS — F39 MOOD DISORDER: Primary | ICD-10-CM

## 2024-04-03 RX ORDER — GABAPENTIN 100 MG/1
1 CAPSULE ORAL EVERY 24 HOURS
COMMUNITY

## 2024-04-03 RX ORDER — PREGABALIN 75 MG/1
CAPSULE ORAL
COMMUNITY
Start: 2024-03-27

## 2024-04-03 NOTE — TREATMENT PLAN
Multi-Disciplinary Problems (from Behavioral Health Treatment Plan)      Active Problems       Problem: Adjustment  Start Date: 04/03/24      Problem Details: The patient self-scales this problem as a 3 with 10 being the worst.          Goal Priority Start Date Expected End Date End Date    Patient will implement healthy coping strategies. -- 04/03/24 -- --    Goal Details: Progress toward goal:  The patient self-scales their progress related to this goal as a 3 with 10 being the worst.          Goal Intervention Frequency Start Date End Date    Assist patient to identify and develop healthy coping strategies Weekly 04/03/24 --    Intervention Details: Duration of treatment until until remission of symptoms.                          Reviewed By       Ida Parham APRN 04/03/24 4609                     I have discussed and reviewed this treatment plan with the patient.  It has been printed for signatures.

## 2024-04-04 ENCOUNTER — CLINICAL SUPPORT (OUTPATIENT)
Dept: FAMILY MEDICINE CLINIC | Facility: CLINIC | Age: 64
End: 2024-04-04
Payer: COMMERCIAL

## 2024-04-04 ENCOUNTER — TELEPHONE (OUTPATIENT)
Dept: FAMILY MEDICINE CLINIC | Facility: CLINIC | Age: 64
End: 2024-04-04
Payer: COMMERCIAL

## 2024-04-04 DIAGNOSIS — F31.62 BIPOLAR DISORDER, CURRENT EPISODE MIXED, MODERATE: Primary | ICD-10-CM

## 2024-04-04 NOTE — TELEPHONE ENCOUNTER
Psychiatry wanted to order a GeneSight test and patient will need to come here to get this done.  I am going to put in the order.  Please call the patient and have her come by here and get this done at her convenience.

## 2024-04-10 ENCOUNTER — TELEPHONE (OUTPATIENT)
Dept: FAMILY MEDICINE CLINIC | Facility: CLINIC | Age: 64
End: 2024-04-10

## 2024-04-10 DIAGNOSIS — F31.62 BIPOLAR DISORDER, CURRENT EPISODE MIXED, MODERATE: ICD-10-CM

## 2024-04-18 ENCOUNTER — PREP FOR SURGERY (OUTPATIENT)
Dept: OTHER | Facility: HOSPITAL | Age: 64
End: 2024-04-18
Payer: COMMERCIAL

## 2024-04-18 ENCOUNTER — CLINICAL SUPPORT (OUTPATIENT)
Dept: GASTROENTEROLOGY | Facility: CLINIC | Age: 64
End: 2024-04-18
Payer: COMMERCIAL

## 2024-04-18 DIAGNOSIS — Z12.11 COLON CANCER SCREENING: Primary | ICD-10-CM

## 2024-04-18 RX ORDER — SODIUM, POTASSIUM,MAG SULFATES 17.5-3.13G
1 SOLUTION, RECONSTITUTED, ORAL ORAL EVERY 12 HOURS
Qty: 354 ML | Refills: 0 | Status: SHIPPED | OUTPATIENT
Start: 2024-04-18

## 2024-04-18 NOTE — LETTER
4/18/2024    Dear Ms Caden,     Patient: Audra Montejo   YOB: 1960        This patient is waiting to have a Colonoscopy and/or Esophagogastroduodenoscopy which I will perform at [facility] on 5/15/24 at  1100.     Our records indicate this patient is currently taking plavix. This procedure requires the patient to suspend their anticoagulant medication prior to surgery.     Please respond to this request noting your recommendations. You may contact our office at [phone] Option 3 with any questions. I appreciate your prompt response in this matter.     Please return this form to our office no later than two weeks prior to the procedure date listed above. Please return form to [phone].     ____ I approve my patient to stop taking their Anticoagulant Therapy medication 5 days prior to the scheduled procedure.    ____ I do NOT approve my patient to stop taking their Anticoagulant Therapy medication at this time.      Please specify clearance expiration date:_____________________________    Approving physician name (please print):     _____________________________________________    Approving physician signature:     ________________________________    Date:________________        Sincerely,  [facility]   Gastroenterology -

## 2024-04-18 NOTE — PROGRESS NOTES
Audra JAVIER Montejo  1960  64 y.o.    Reason for call: Screening Colonoscopy  Prep prescribed: Suprep  Prep instructions reviewed with patient and sent to patient via [employer]  Is the patient currently on any injectable medications for weight loss or diabetes? No  Clearance needed? Yes  If yes, what clearance is needed? Blood thinner  Clearance has been requested from HERMAN Williamson  The patient has been scheduled for: Colonoscopy  After your procedure, you will be contacted with results. Please confirm the best phone # to reach the patient: [phone]  Family history of colon cancer? No  If yes, indicate relative: n/a  Family History   Problem Relation Age of Onset    Stroke Mother     Diabetes Mother     Arthritis Mother     COPD Mother     Hyperlipidemia Mother     Vision loss Mother     Heart disease Mother     Heart disease Father     Cancer Father         Lukimia    Heart disease Brother     Diabetes Brother     Stroke Brother     Cancer Maternal Aunt     Breast cancer Maternal Aunt     Pancreatic cancer Maternal Aunt     Stroke Son     Heart disease Son     Cancer Son     Other Son         blood disease    Arthritis Son     Osteoporosis Son     Arthritis Son     Cancer Son     Heart disease Son     Stroke Son     Other Son     Hypertension Other     Rheum arthritis Other      Past Medical History:   Diagnosis Date    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    Acid reflux disease     Allergic Unknown    Allergy     unspecified    Anxiety     Asthma Unknown    Back pain     Bilateral carpal tunnel syndrome 08/17/2017    Bipolar disorder     Bursitis of left shoulder 08/09/2016    Cataract Unknown    Chronic pain disorder     Deep vein thrombosis All my life    Depression     Eczema     Gall stones     H/O psychiatric care     Heart attack     Heart murmur     Hyperlipidemia 11/22    Hypertension     Incomplete tear of left rotator cuff 01/25/2017    Left shoulder pain     Loss of appetite     Lumbago  05/08/2015    low back pain     Memory loss     forgetfulness    Migraine headache     Need for hepatitis C screening test     Obesity     Palpitation     Panic disorder     Ringing in ears     Seasonal allergies     Shortness of breath     Sinus trouble     unspecified     Stroke (cerebrum)     Subacromial impingement, left 10/13/2016    Superior glenoid labrum lesion of left shoulder 10/13/2016    subsequent encounter    Trouble swallowing     Urinary tract infection Unknown     Allergies   Allergen Reactions    Lisinopril Unknown - High Severity     Pt states it almost killed her last time she was on this medication     Doxazosin Other (See Comments)     Feeling like she was going to pass out    Nsaids Other (See Comments)     HX CVA    Adhesive Tape Itching    Tape Itching     Past Surgical History:   Procedure Laterality Date    ABDOMINAL SURGERY      APPENDECTOMY      BACK SURGERY      CARPAL TUNNEL RELEASE      CHOLECYSTECTOMY      COLONOSCOPY  2014    ENDOSCOPY  2014    FRACTURE SURGERY      HYSTERECTOMY  1999    SHOULDER SURGERY Left 10/03/2016    arthroscopic, left shoulder scope, RTC repair- Dr. Flores    SKIN BIOPSY       Social History     Socioeconomic History    Marital status: Single   Tobacco Use    Smoking status: Every Day     Current packs/day: 1.00     Average packs/day: 1 pack/day for 30.0 years (30.0 ttl pk-yrs)     Types: Cigarettes     Passive exposure: Current    Smokeless tobacco: Never    Tobacco comments:     Not your business   Vaping Use    Vaping status: Never Used   Substance and Sexual Activity    Alcohol use: Not Currently     Comment: rarely drinks    Drug use: Never    Sexual activity: Not Currently     Birth control/protection: None, Hysterectomy       Current Outpatient Medications:     albuterol (ACCUNEB) 1.25 MG/3ML nebulizer solution, Take 3 mL by nebulization Every 6 (Six) Hours As Needed for Wheezing., Disp: 50 each, Rfl: 0    albuterol sulfate  (90 Base) MCG/ACT  inhaler, Inhale 2 puffs Every 6 (Six) Hours As Needed for Wheezing., Disp: 18 g, Rfl: 0    amLODIPine (NORVASC) 5 MG tablet, Take 1 tablet by mouth Daily., Disp: 90 tablet, Rfl: 1    atorvastatin (LIPITOR) 20 MG tablet, Take 1 tablet by mouth Every Night. Indications: High Amount of Fats in the Blood, Disp: 90 tablet, Rfl: 1    candesartan (ATACAND) 8 MG tablet, Take 1 tablet by mouth 2 (Two) Times a Day. Indications: High Blood Pressure Disorder, Disp: 180 tablet, Rfl: 1    cetirizine (zyrTEC) 10 MG tablet, Take 1 tablet by mouth At Night As Needed for Allergies. Indications: Hayfever, Disp: 30 tablet, Rfl: 2    clopidogrel (PLAVIX) 75 MG tablet, TAKE 1 TABLET BY MOUTH DAILY, Disp: 90 tablet, Rfl: 1    folic acid (FOLVITE) 1 MG tablet, Take 1 tablet by mouth Daily., Disp: , Rfl:     montelukast (SINGULAIR) 10 MG tablet, TAKE 1 TABLET BY MOUTH EVERY NIGHT, Disp: 90 tablet, Rfl: 1    oxybutynin XL (DITROPAN-XL) 10 MG 24 hr tablet, TAKE 1 TABLET BY MOUTH DAILY, Disp: 90 tablet, Rfl: 1    pregabalin (LYRICA) 75 MG capsule, TAKE 1 CAPSULE BY MOUTH TWICE DAILY AS DIRECTED, Disp: , Rfl:     traMADol (ULTRAM) 50 MG tablet, Take 1 tablet by mouth Every 8 (Eight) Hours As Needed for Moderate Pain., Disp: 30 tablet, Rfl: 1    gabapentin (NEURONTIN) 100 MG capsule, 1 capsule Daily., Disp: , Rfl:      - - -

## 2024-04-22 ENCOUNTER — TELEPHONE (OUTPATIENT)
Dept: FAMILY MEDICINE CLINIC | Facility: CLINIC | Age: 64
End: 2024-04-22
Payer: COMMERCIAL

## 2024-04-22 ENCOUNTER — TELEPHONE (OUTPATIENT)
Dept: GASTROENTEROLOGY | Facility: CLINIC | Age: 64
End: 2024-04-22
Payer: COMMERCIAL

## 2024-04-22 NOTE — PROGRESS NOTES
"[facility]  Follow-up Visit    Chief Complaint:  \"My PCP sent me here, I am concerned that my anti-seizure medication reacts with plavix\"     History of Present Illness: Audra Montejo is a 64 y.o. female who presents today for initial evaluation regarding . Audra presents unaccompanied in no acute distress and engages with me appropriately. Psychotropic regimen with which patient presents is described as nothing. Patient is not taking Depakote because patient says it interacts with Plavix.   Patient voiced concerns and frustrations with medical establishment. We discussed missed opportunities for clarification in her care. We discussed the perceived limitations on consulting providers, and the desire for a more transparent exchange of prognostic information. We spent the majority of the encounter building rapport. We agreed to meet again in two weeks to discuss research findings and discuss post-stroke psychiatric symptoms. Ms. Montejo strongly desires to be evaluated by the [facility]. I also encouraged patient to get a Genesight test done if not already.   History is positive for signs/symptoms suggestive of alternating symptoms of elevated (moods) and depressed moods that do not conform to temporal parameters of bipolar disorder and are not sufficiently encapsulated by MDD.     Psychiatric screening is negative for pathognomonic history of: violence, suicidality, and PTSD.      I have counseled the patient with regard to diagnoses and the recommended treatment regimen as documented below: I will assume prescriptive responsibility for nothing at this time. Patient acknowledges the diagnoses per my rendered interpretation. Patient demonstrates awareness/understanding of viable alternatives for treatment as well as potential risks, benefits, and side effects associated with this regimen and is amenable to proceed in this fashion.    " "  Recommended lifestyle changes: 30 minutes of activity to increase HR 2-3 days weekly.     Psychiatric History:  Diagnoses: bipolar disorder, mixed, moderate  Outpatient history: therapy \"did no good'  Inpatient history:  none  Medication trials: sertraline, xanax  Other treatment modalities: Psychotherapy  Self harm: No history  Suicide attempts: No  Abuse or neglect: None     Substance Abuse History:   Types/methods/frequency: * none  Transtheoretical stage: none     Social History:  Residence: lives apartment, 3 cats  Vocation: yes  Source of income: Employed, hx of medical background former EMT  Last grade completed: high  Pertinent developmental history: none  Pertinent legal history: none  Hobbies/interests: not much  Adventism: N/A  Exercise:walking at work   Dietary habits: no pertinent issues   Sleep hygiene:  poor due to pain issues  Social habits:  isolation  Sunlight: There are concern for under-exposure.  Caffeine intake:  no issue  Hydration habits: no pertinent issues    history: No            Interval History Audra is a 64 y.o. female who presents today for follow-up regarding medication management . Audra presents unaccompanied in no acute distress and engages with me appropriately.     Current treatment regimen includes:   - no psychiatric medications at this time        Today the patient feels frustrated. Ms. Montejo is irritated with the medical establishment, and how things are done. She shares her displeasure in the process of the medical diagnostic protocols. Thought process and content are devoid of overt aberration suggestive of acute davon/psychosis. The patient denies SI/HI/AVH. There are not changes on exam today compared to most recent evaluation.    - sleep: works third shift, and has some sleep disturbances due to this  - appetite: moderately controlled    I have counseled the patient with regard to diagnoses and the recommended treatment regimen as documented below. Patient " acknowledges the diagnoses per my rendered interpretation. Patient demonstrates understanding of potential risks/benefits/side effects associated with this regimen and is amenable to proceed in this fashion.     Assignment: begin journaling instances of overwhelm, response thereto, ideal response to cultivate insight and begin breaking a pattern of stimulus/response.  Psychotherapy  - Time:  15 minutes  - interventions employed: the therapeutic alliance was strengthened to encourage the patient to express their thoughts and feelings freely. Esteem building was enhanced through praise, reassurance, normalizing/challenging, and encouragement as appropriate. General coping skills were enhanced to build distress tolerance skills and emotional regulation. Allowed patient to freely discuss issues without interruption or judgement with unconditional positive regard, active listening skills, and empathy. Provided a safe, confidential environment to facilitate the development of a positive therapeutic relationship and encourage open, honest communication. Assisted patient in identifying risk factors which would indicate the need for higher level of care including thoughts to harm self or others. Assisted patient in processing session content; acknowledged and normalized/addressed, as appropriate, patient’s thoughts, feelings, and concerns by utilizing a person-centered approach in efforts to build appropriate rapport and a positive therapeutic relationship.   - Diagnoses: see assessment and plan below  - Symptoms: see subjective above  - Goals   - challenge patterns of living contributing to symptom burden, strengthen defenses, promote problem solving skills, restore adaptive functioning, and provide symptom relief.  - Treatment plan: continue supportive psychotherapy in subsequent appointments to provide symptom relief; see assessment and plan below for additional details       Social History     Socioeconomic History     Marital status: Single   Tobacco Use    Smoking status: Every Day     Current packs/day: 1.00     Average packs/day: 1 pack/day for 30.0 years (30.0 ttl pk-yrs)     Types: Cigarettes     Passive exposure: Current    Smokeless tobacco: Never    Tobacco comments:     Not your business   Vaping Use    Vaping status: Never Used   Substance and Sexual Activity    Alcohol use: Not Currently     Comment: rarely drinks    Drug use: Never    Sexual activity: Not Currently     Birth control/protection: None, Hysterectomy       Tobacco use counseling/intervention: patient has been counseled with regard to risks of tobacco use and encouraged to consider quitting, with or without the use of adjunctive medication. Currently Precontemplation by transtheoretical model.     Problem List:  Patient Active Problem List   Diagnosis    Primary hypertension    Mild intermittent asthma without complication    Acute left-sided low back pain with left-sided sciatica    Bipolar disorder, current episode mixed, moderate    Gall stones    Seasonal allergic rhinitis    Superior glenoid labrum lesion of left shoulder    Impaired fasting glucose    Mixed hyperlipidemia    Tobacco use disorder    Elevated factor VIII level    History of CVA (cerebrovascular accident)    Well adult exam    TIA (transient ischemic attack)    Class 1 obesity with serious comorbidity and body mass index (BMI) of 34.0 to 34.9 in adult    Slow transit constipation    Mixed stress and urge urinary incontinence    Lumbar radiculopathy    Factor VIII inhibitor disorder    New onset of headaches after age 52    Stenosis of cerebral artery    Small aneurysm of supraclinoid carotid artery    Neuropathy    Prediabetes    Nonruptured cerebral aneurysm    Asymptomatic stenosis of posterior cerebral artery    Cerebellar infarction    MTHFR gene mutation    Pure hypercholesterolemia    Colon cancer screening     Allergy:   Allergies   Allergen Reactions    Lisinopril Unknown - High  Severity     Pt states it almost killed her last time she was on this medication     Doxazosin Other (See Comments)     Feeling like she was going to pass out    Nsaids Other (See Comments)     HX CVA    Adhesive Tape Itching    Tape Itching        Discontinued Medications:  Medications Discontinued During This Encounter   Medication Reason    gabapentin (NEURONTIN) 100 MG capsule Historical Med - Therapy completed       Current Medications:   Current Outpatient Medications   Medication Sig Dispense Refill    albuterol (ACCUNEB) 1.25 MG/3ML nebulizer solution Take 3 mL by nebulization Every 6 (Six) Hours As Needed for Wheezing. 50 each 0    albuterol sulfate  (90 Base) MCG/ACT inhaler Inhale 2 puffs Every 6 (Six) Hours As Needed for Wheezing. 18 g 0    amLODIPine (NORVASC) 5 MG tablet Take 1 tablet by mouth Daily. 90 tablet 1    atorvastatin (LIPITOR) 20 MG tablet Take 1 tablet by mouth Every Night. Indications: High Amount of Fats in the Blood 90 tablet 1    candesartan (ATACAND) 8 MG tablet Take 1 tablet by mouth 2 (Two) Times a Day. Indications: High Blood Pressure Disorder 180 tablet 1    cetirizine (zyrTEC) 10 MG tablet Take 1 tablet by mouth At Night As Needed for Allergies. Indications: Hayfever 30 tablet 2    clopidogrel (PLAVIX) 75 MG tablet TAKE 1 TABLET BY MOUTH DAILY 90 tablet 1    folic acid (FOLVITE) 1 MG tablet Take 1 tablet by mouth Daily.      montelukast (SINGULAIR) 10 MG tablet TAKE 1 TABLET BY MOUTH EVERY NIGHT 90 tablet 1    oxybutynin XL (DITROPAN-XL) 10 MG 24 hr tablet TAKE 1 TABLET BY MOUTH DAILY 90 tablet 1    pregabalin (LYRICA) 75 MG capsule TAKE 1 CAPSULE BY MOUTH TWICE DAILY AS DIRECTED      sodium-potassium-magnesium sulfates (Suprep Bowel Prep Kit) 17.5-3.13-1.6 GM/177ML solution oral solution Take 1 bottle by mouth Every 12 (Twelve) Hours. 354 mL 0    traMADol (ULTRAM) 50 MG tablet Take 1 tablet by mouth Every 8 (Eight) Hours As Needed for Moderate Pain. 30 tablet 1     No  current facility-administered medications for this visit.     Past Medical History:  Past Medical History:   Diagnosis Date    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    Acid reflux disease     Allergic Unknown    Allergy     unspecified    Anxiety     Asthma Unknown    Back pain     Bilateral carpal tunnel syndrome 08/17/2017    Bipolar disorder     Bursitis of left shoulder 08/09/2016    Cataract Unknown    Chronic pain disorder     Deep vein thrombosis All my life    Depression     Eczema     Gall stones     H/O psychiatric care     Heart attack     Heart murmur     Hyperlipidemia 11/22    Hypertension     Incomplete tear of left rotator cuff 01/25/2017    Left shoulder pain     Loss of appetite     Lumbago 05/08/2015    low back pain     Memory loss     forgetfulness    Migraine headache     Need for hepatitis C screening test     Obesity     Palpitation     Panic disorder     Ringing in ears     Seasonal allergies     Shortness of breath     Sinus trouble     unspecified     Stroke (cerebrum)     Subacromial impingement, left 10/13/2016    Superior glenoid labrum lesion of left shoulder 10/13/2016    subsequent encounter    Trouble swallowing     Urinary tract infection Unknown     Past Surgical History:  Past Surgical History:   Procedure Laterality Date    ABDOMINAL SURGERY      APPENDECTOMY      BACK SURGERY      CARPAL TUNNEL RELEASE      CHOLECYSTECTOMY      COLONOSCOPY  2014    ENDOSCOPY  2014    FRACTURE SURGERY      HYSTERECTOMY  1999    SHOULDER SURGERY Left 10/03/2016    arthroscopic, left shoulder scope, RTC repair- Dr. Florse    SKIN BIOPSY         MENTAL STATUS EXAM   General Appearance:  Cleanly groomed and dressed and other  Eye Contact:  Good eye contact  Attitude:  Suspicious and candid  Motor Activity:  Normal gait, posture and fidgety  Muscle Strength:  Normal  Speech:  Normal rate, tone, volume  Language:  Spontaneous  Mood and affect:  Normal, pleasant  Thought Process:  Logical  Associations/  "Thought Content:  No delusions  Hallucinations:  None  Suicidal Ideations:  Not present  Homicidal Ideation:  Not present  Sensorium:  Alert and clear  Orientation:  Person  Immediate Recall, Recent, and Remote Memory:  Intact  Attention Span/ Concentration:  Good  Fund of Knowledge:  Appropriate for age and educational level  Intellectual Functioning:  Above average  Insight:  Fair  Judgement:  Fair  Reliability:  Fair  Impulse Control:  Fair      Vital Signs:   /55   Pulse 59   Ht 147.3 cm (58\")   Wt 76.2 kg (168 lb)   BMI 35.11 kg/m²    Lab Results:   Office Visit on 02/15/2024   Component Date Value Ref Range Status    Valproic Acid 02/15/2024 49.0 (L)  50.0 - 125.0 mcg/mL Final    Hemoglobin A1C 02/15/2024 5.90 (H)  4.80 - 5.60 % Final    Iron 02/15/2024 51  37 - 145 mcg/dL Final    Iron Saturation (TSAT) 02/15/2024 14 (L)  20 - 50 % Final    Transferrin 02/15/2024 251  200 - 360 mg/dL Final    TIBC 02/15/2024 374  298 - 536 mcg/dL Final    Ferritin 02/15/2024 316.00 (H)  13.00 - 150.00 ng/mL Final    Folate 02/15/2024 >20.00  4.78 - 24.20 ng/mL Final    Vitamin B-12 02/15/2024 1,334 (H)  211 - 946 pg/mL Final    TSH 02/15/2024 2.850  0.270 - 4.200 uIU/mL Final    Free T4 02/15/2024 1.07  0.93 - 1.70 ng/dL Final    T4 results may be falsely increased if patient taking Biotin.    25 Hydroxy, Vitamin D 02/15/2024 35.1  30.0 - 100.0 ng/ml Final    Methylmalonic Acid 02/15/2024 227  0 - 378 nmol/L Final   Admission on 02/02/2024, Discharged on 02/02/2024   Component Date Value Ref Range Status    QT Interval 02/02/2024 442  ms Final    QTC Interval 02/02/2024 425  ms Final    Glucose 02/02/2024 114 (H)  65 - 99 mg/dL Final    BUN 02/02/2024 14  8 - 23 mg/dL Final    Creatinine 02/02/2024 0.74  0.57 - 1.00 mg/dL Final    Sodium 02/02/2024 141  136 - 145 mmol/L Final    Potassium 02/02/2024 3.9  3.5 - 5.2 mmol/L Final    Slight hemolysis detected by analyzer. Result may be falsely elevated.    Chloride " 02/02/2024 104  98 - 107 mmol/L Final    CO2 02/02/2024 27.7  22.0 - 29.0 mmol/L Final    Calcium 02/02/2024 9.3  8.6 - 10.5 mg/dL Final    Total Protein 02/02/2024 6.9  6.0 - 8.5 g/dL Final    Albumin 02/02/2024 4.1  3.5 - 5.2 g/dL Final    ALT (SGPT) 02/02/2024 11  1 - 33 U/L Final    AST (SGOT) 02/02/2024 14  1 - 32 U/L Final    Alkaline Phosphatase 02/02/2024 95  39 - 117 U/L Final    Total Bilirubin 02/02/2024 0.3  0.0 - 1.2 mg/dL Final    Globulin 02/02/2024 2.8  gm/dL Final    A/G Ratio 02/02/2024 1.5  g/dL Final    BUN/Creatinine Ratio 02/02/2024 18.9  7.0 - 25.0 Final    Anion Gap 02/02/2024 9.3  5.0 - 15.0 mmol/L Final    eGFR 02/02/2024 92.2  >60.0 mL/min/1.73 Final    proBNP 02/02/2024 132.6  0.0 - 900.0 pg/mL Final    HS Troponin T 02/02/2024 7  <14 ng/L Final    Extra Tube 02/02/2024 Hold for add-ons.   Final    Auto resulted.    Extra Tube 02/02/2024 hold for add-on   Final    Auto resulted    Extra Tube 02/02/2024 Hold for add-ons.   Final    Auto resulted.    Extra Tube 02/02/2024 Hold for add-ons.   Final    Auto resulted    WBC 02/02/2024 11.58 (H)  3.40 - 10.80 10*3/mm3 Final    RBC 02/02/2024 4.68  3.77 - 5.28 10*6/mm3 Final    Hemoglobin 02/02/2024 13.8  12.0 - 15.9 g/dL Final    Hematocrit 02/02/2024 42.6  34.0 - 46.6 % Final    MCV 02/02/2024 91.0  79.0 - 97.0 fL Final    MCH 02/02/2024 29.5  26.6 - 33.0 pg Final    MCHC 02/02/2024 32.4  31.5 - 35.7 g/dL Final    RDW 02/02/2024 13.4  12.3 - 15.4 % Final    RDW-SD 02/02/2024 45.1  37.0 - 54.0 fl Final    MPV 02/02/2024 11.2  6.0 - 12.0 fL Final    Platelets 02/02/2024 243  140 - 450 10*3/mm3 Final    Neutrophil % 02/02/2024 48.1  42.7 - 76.0 % Final    Lymphocyte % 02/02/2024 38.3  19.6 - 45.3 % Final    Monocyte % 02/02/2024 9.9  5.0 - 12.0 % Final    Eosinophil % 02/02/2024 2.9  0.3 - 6.2 % Final    Basophil % 02/02/2024 0.5  0.0 - 1.5 % Final    Immature Grans % 02/02/2024 0.3  0.0 - 0.5 % Final    Neutrophils, Absolute 02/02/2024 5.56   1.70 - 7.00 10*3/mm3 Final    Lymphocytes, Absolute 02/02/2024 4.43 (H)  0.70 - 3.10 10*3/mm3 Final    Monocytes, Absolute 02/02/2024 1.15 (H)  0.10 - 0.90 10*3/mm3 Final    Eosinophils, Absolute 02/02/2024 0.34  0.00 - 0.40 10*3/mm3 Final    Basophils, Absolute 02/02/2024 0.06  0.00 - 0.20 10*3/mm3 Final    Immature Grans, Absolute 02/02/2024 0.04  0.00 - 0.05 10*3/mm3 Final    nRBC 02/02/2024 0.0  0.0 - 0.2 /100 WBC Final    COVID19 02/02/2024 Not Detected  Not Detected - Ref. Range Final    Influenza A PCR 02/02/2024 Not Detected  Not Detected Final    Influenza B PCR 02/02/2024 Not Detected  Not Detected Final    RSV, PCR 02/02/2024 Not Detected  Not Detected Final   Admission on 01/11/2024, Discharged on 01/12/2024   Component Date Value Ref Range Status    QT Interval 01/11/2024 427  ms Final    QTC Interval 01/11/2024 389  ms Final    Glucose 01/11/2024 131 (H)  65 - 99 mg/dL Final    BUN 01/11/2024 17  8 - 23 mg/dL Final    Creatinine 01/11/2024 1.17 (H)  0.57 - 1.00 mg/dL Final    Sodium 01/11/2024 141  136 - 145 mmol/L Final    Potassium 01/11/2024 4.2  3.5 - 5.2 mmol/L Final    Slight hemolysis detected by analyzer. Result may be falsely elevated.    Chloride 01/11/2024 106  98 - 107 mmol/L Final    CO2 01/11/2024 23.8  22.0 - 29.0 mmol/L Final    Calcium 01/11/2024 8.8  8.6 - 10.5 mg/dL Final    Total Protein 01/11/2024 6.4  6.0 - 8.5 g/dL Final    Albumin 01/11/2024 4.0  3.5 - 5.2 g/dL Final    ALT (SGPT) 01/11/2024 14  1 - 33 U/L Final    AST (SGOT) 01/11/2024 18  1 - 32 U/L Final    Alkaline Phosphatase 01/11/2024 112  39 - 117 U/L Final    Total Bilirubin 01/11/2024 0.2  0.0 - 1.2 mg/dL Final    Globulin 01/11/2024 2.4  gm/dL Final    A/G Ratio 01/11/2024 1.7  g/dL Final    BUN/Creatinine Ratio 01/11/2024 14.5  7.0 - 25.0 Final    Anion Gap 01/11/2024 11.2  5.0 - 15.0 mmol/L Final    eGFR 01/11/2024 53.2 (L)  >60.0 mL/min/1.73 Final    HS Troponin T 01/11/2024 7  <14 ng/L Final    Magnesium  01/11/2024 1.9  1.6 - 2.4 mg/dL Final    Color, UA 01/11/2024 Yellow  Yellow, Straw Final    Appearance, UA 01/11/2024 Clear  Clear Final    pH, UA 01/11/2024 7.0  5.0 - 8.0 Final    Specific Gravity, UA 01/11/2024 >1.030 (H)  1.005 - 1.030 Final    Glucose, UA 01/11/2024 Negative  Negative Final    Ketones, UA 01/11/2024 Negative  Negative Final    Bilirubin, UA 01/11/2024 Negative  Negative Final    Blood, UA 01/11/2024 Negative  Negative Final    Protein, UA 01/11/2024 Negative  Negative Final    Leuk Esterase, UA 01/11/2024 Negative  Negative Final    Nitrite, UA 01/11/2024 Negative  Negative Final    Urobilinogen, UA 01/11/2024 0.2 E.U./dL  0.2 - 1.0 E.U./dL Final    Extra Tube 01/11/2024 Hold for add-ons.   Final    Auto resulted.    Extra Tube 01/11/2024 hold for add-on   Final    Auto resulted    Extra Tube 01/11/2024 Hold for add-ons.   Final    Auto resulted.    Extra Tube 01/11/2024 Hold for add-ons.   Final    Auto resulted    WBC 01/11/2024 10.68  3.40 - 10.80 10*3/mm3 Final    RBC 01/11/2024 4.44  3.77 - 5.28 10*6/mm3 Final    Hemoglobin 01/11/2024 13.4  12.0 - 15.9 g/dL Final    Hematocrit 01/11/2024 40.0  34.0 - 46.6 % Final    MCV 01/11/2024 90.1  79.0 - 97.0 fL Final    MCH 01/11/2024 30.2  26.6 - 33.0 pg Final    MCHC 01/11/2024 33.5  31.5 - 35.7 g/dL Final    RDW 01/11/2024 13.6  12.3 - 15.4 % Final    RDW-SD 01/11/2024 45.0  37.0 - 54.0 fl Final    MPV 01/11/2024 11.8  6.0 - 12.0 fL Final    Platelets 01/11/2024 252  140 - 450 10*3/mm3 Final    Neutrophil % 01/11/2024 41.9 (L)  42.7 - 76.0 % Final    Lymphocyte % 01/11/2024 44.9  19.6 - 45.3 % Final    Monocyte % 01/11/2024 7.9  5.0 - 12.0 % Final    Eosinophil % 01/11/2024 4.5  0.3 - 6.2 % Final    Basophil % 01/11/2024 0.6  0.0 - 1.5 % Final    Immature Grans % 01/11/2024 0.2  0.0 - 0.5 % Final    Neutrophils, Absolute 01/11/2024 4.49  1.70 - 7.00 10*3/mm3 Final    Lymphocytes, Absolute 01/11/2024 4.79 (H)  0.70 - 3.10 10*3/mm3 Final     Monocytes, Absolute 01/11/2024 0.84  0.10 - 0.90 10*3/mm3 Final    Eosinophils, Absolute 01/11/2024 0.48 (H)  0.00 - 0.40 10*3/mm3 Final    Basophils, Absolute 01/11/2024 0.06  0.00 - 0.20 10*3/mm3 Final    Immature Grans, Absolute 01/11/2024 0.02  0.00 - 0.05 10*3/mm3 Final    nRBC 01/11/2024 0.0  0.0 - 0.2 /100 WBC Final    Protime 01/11/2024 12.8  11.8 - 14.9 Seconds Final    INR 01/11/2024 0.95  0.86 - 1.15 Final    PTT 01/11/2024 27.4 (L)  78.0 - 95.9 seconds Final    Valproic Acid 01/11/2024 40.8 (L)  50.0 - 125.0 mcg/mL Final    COVID19 01/11/2024 Not Detected  Not Detected - Ref. Range Final    Influenza A PCR 01/11/2024 Not Detected  Not Detected Final    Influenza B PCR 01/11/2024 Not Detected  Not Detected Final    RSV, PCR 01/11/2024 Not Detected  Not Detected Final   Lab on 12/06/2023   Component Date Value Ref Range Status    Glucose 12/06/2023 94  65 - 99 mg/dL Final    BUN 12/06/2023 17  8 - 23 mg/dL Final    Creatinine 12/06/2023 0.83  0.57 - 1.00 mg/dL Final    Sodium 12/06/2023 142  136 - 145 mmol/L Final    Potassium 12/06/2023 4.4  3.5 - 5.2 mmol/L Final    Chloride 12/06/2023 106  98 - 107 mmol/L Final    CO2 12/06/2023 27.2  22.0 - 29.0 mmol/L Final    Calcium 12/06/2023 9.2  8.6 - 10.5 mg/dL Final    Total Protein 12/06/2023 6.8  6.0 - 8.5 g/dL Final    Albumin 12/06/2023 4.3  3.5 - 5.2 g/dL Final    ALT (SGPT) 12/06/2023 10  1 - 33 U/L Final    AST (SGOT) 12/06/2023 16  1 - 32 U/L Final    Alkaline Phosphatase 12/06/2023 109  39 - 117 U/L Final    Total Bilirubin 12/06/2023 0.3  0.0 - 1.2 mg/dL Final    Globulin 12/06/2023 2.5  gm/dL Final    A/G Ratio 12/06/2023 1.7  g/dL Final    BUN/Creatinine Ratio 12/06/2023 20.5  7.0 - 25.0 Final    Anion Gap 12/06/2023 8.8  5.0 - 15.0 mmol/L Final    eGFR 12/06/2023 80.3  >60.0 mL/min/1.73 Final    Total Cholesterol 12/06/2023 123  0 - 200 mg/dL Final    Triglycerides 12/06/2023 47  0 - 150 mg/dL Final    HDL Cholesterol 12/06/2023 71 (H)  40 - 60  mg/dL Final    LDL Cholesterol  12/06/2023 41  0 - 100 mg/dL Final    VLDL Cholesterol 12/06/2023 11  5 - 40 mg/dL Final    LDL/HDL Ratio 12/06/2023 0.60   Final    Valproic Acid 12/06/2023 44.0 (L)  50.0 - 125.0 mcg/mL Final    Factor VIII Activity 12/06/2023 126  56 - 140 % Final    PTT 12/06/2023 26.7  24.2 - 34.2 seconds Final       ASSESSMENT AND PLAN:    ICD-10-CM ICD-9-CM   1. Mood disorder  F39 296.90   2. MTHFR gene mutation  Z15.89 V84.89       Audra is a 64 y.o. female who presents today for follow-up regarding medication management. We have discussed the interval history and the treatment plan below, including potential R/B/SE of the recommended regimen of which the patient demonstrates understanding. Patient is agreeable to call 911 or go to the nearest ER should she become concerned for her own safety and/or the safety of those around her. There are are no overt indices of acute davon/psychosis on exam today. ALBERTO reviewed and is as expected.    Medication regimen: nothing at this time, awaiting Genesight testing results; patient is advised not to misuse prescribed medications or to use them with any exogenous substances that aren't disclosed to this provider as they may interact with the regimen to the patient's detriment.   Risk Assessment: protracted risk is moderate, imminent risk is moderate/severe.  Do note that this is subject to change with the Bahai of new stressors, treatment non-adherence, use of substances, and/or new medical ails.   Monitoring: reviewed labs/imaging as populated above; ordered  Therapy: with me  Follow-up: one month  Communications: N/A    TREATMENT PLAN/GOALS: challenge patterns of living conducive to symptom burden, implement recommended regimen as above with augmentative, intermittent supportive psychotherapy to reduce symptom burden. Patient acknowledged and verbally consented to continue treatment. The importance of adherence to the recommended treatment and  interval follow-up appointments was again emphasized today: patient has fair treatment adherence per given history. Patient was today reminded to limit daily caffeine intake, hydrate appropriately, eat healthy and nutritious foods, engage sleep hygiene measures, engage appropriate exposure to sunlight, engage with hobbies in balance with life necessities, and exercise appropriate to their capacity to do so.     Billing:  Additionally, I provided 15 minutes minutes of dedicated psychotherapy to the patient as documented above.    Parts of this note are electronic transcriptions/translations of spoken language to printed text using the [employer] system.    Electronically signed by HERMAN Arguello, 04/22/24

## 2024-04-22 NOTE — TELEPHONE ENCOUNTER
S/w pt verified pt reports she does not currently have neurologist. Advised pt her pcp has denied to comment on clearance at this time and wants her to get clearance from neurology. Pt was made aware to contact her pcp today to find out if she can get referred to a new neurologist pt is aware without clearance we cannot proceed. Patient is aware if we do not have clearance by 5/6/2024 we will need to reschedule or cancel.

## 2024-04-22 NOTE — TELEPHONE ENCOUNTER
Patient has seen neurosurgeon office at [facility].  She last seen HERMAN Landaverde on 2/23/2024.  Gastroenterology can forward this letter to their office to see if they will give them clearance for the colonoscopy.

## 2024-04-22 NOTE — TELEPHONE ENCOUNTER
Pt states that she does not have a neurologist. She was put on plavix when she was in the hospital 2 years ago and dr schwartz just refilled it.

## 2024-04-22 NOTE — TELEPHONE ENCOUNTER
----- Message from Laura Jameson MA sent at 4/22/2024 11:05 AM EDT -----    ----- Message -----  From: Vivi Villafana MD  Sent: 4/22/2024  10:27 AM EDT  To: Laura Jameson MA      ----- Message -----  From: Erika Quiñonez RegSched Rep  Sent: 4/19/2024   4:15 PM EDT  To: Vivi Villafana MD    We received this letter from your office and it needs to go to the Neuorology office.  We do not prescribe this medication.  Please see Gale's note below.    Erika Woodard  ----- Message -----  From: Gale Negrete APRN  Sent: 4/18/2024   4:34 PM EDT  To: [facility]    I received a letter from [facility] requesting for patient to stop her anticoagulant prior to colonoscopy.  She will need to see neurology to get this approved.  Please have her call and schedule an appointment with her neurologist.  ----- Message -----  From: Laura Jameson MA  Sent: 4/18/2024   4:15 PM EDT  To: HERMAN Menjivar

## 2024-04-23 ENCOUNTER — OFFICE VISIT (OUTPATIENT)
Dept: PSYCHIATRY | Facility: CLINIC | Age: 64
End: 2024-04-23
Payer: COMMERCIAL

## 2024-04-23 VITALS
WEIGHT: 168 LBS | HEIGHT: 58 IN | HEART RATE: 59 BPM | DIASTOLIC BLOOD PRESSURE: 55 MMHG | SYSTOLIC BLOOD PRESSURE: 143 MMHG | BODY MASS INDEX: 35.26 KG/M2

## 2024-04-23 DIAGNOSIS — Z15.89 MTHFR GENE MUTATION: ICD-10-CM

## 2024-04-23 DIAGNOSIS — F39 MOOD DISORDER: Primary | ICD-10-CM

## 2024-04-23 PROCEDURE — 99214 OFFICE O/P EST MOD 30 MIN: CPT

## 2024-04-24 ENCOUNTER — HOSPITAL ENCOUNTER (OUTPATIENT)
Dept: MAMMOGRAPHY | Facility: HOSPITAL | Age: 64
Discharge: HOME | End: 2024-04-24
Admitting: NURSE PRACTITIONER
Payer: COMMERCIAL

## 2024-04-24 DIAGNOSIS — Z12.31 SCREENING MAMMOGRAM FOR BREAST CANCER: ICD-10-CM

## 2024-04-24 PROCEDURE — 77063 BREAST TOMOSYNTHESIS BI: CPT

## 2024-04-24 PROCEDURE — 77067 SCR MAMMO BI INCL CAD: CPT

## 2024-04-25 ENCOUNTER — TELEPHONE (OUTPATIENT)
Dept: FAMILY MEDICINE CLINIC | Facility: CLINIC | Age: 64
End: 2024-04-25
Payer: COMMERCIAL

## 2024-04-25 NOTE — TELEPHONE ENCOUNTER
WANTS TO KNOW IF YOU ONLY TESTED HER TO PSYCH DRUGS ON THE [facility] TEST - SHE WANTED TO FIND OUT ABOUT THE OTHER MEDS THAT SHE TAKES EVERYDAY

## 2024-04-25 NOTE — TELEPHONE ENCOUNTER
Yes, the [employer] is just for psych meds.  There is not a test about the other drugs she takes.

## 2024-04-29 NOTE — TELEPHONE ENCOUNTER
Patient contacted the office and stated that we attempted to call her again, Did not return call due to being about a clearance.

## 2024-04-29 NOTE — TELEPHONE ENCOUNTER
Detail Level: Zone Pt left voicemail returning my call, returned pts call and advised we needed her neurologist name she states she saw Sidra Celis. Clearance was sent to  on 4/24/2024. Contacted her office at [phone] s/w rachel she requested I refax the clearance request. Refaxed to [phone]    Detail Level: Generalized Quality 137: Melanoma: Continuity Of Care - Recall System: Patient information entered into a recall system that includes: target date for the next exam specified AND a process to follow up with patients regarding missed or unscheduled appointments Detail Level: Detailed Quality 224: Stage 0-Iic Melanoma: Overutilization Of Imaging Studies For Only Stage 0-Iic Melanoma: None of the following diagnostic imaging studies ordered: chest X-ray, CT, Ultrasound, MRI, PET, or nuclear medicine scans (ML)

## 2024-04-29 NOTE — TELEPHONE ENCOUNTER
Clearance received, pt clear to suspend plavix for 5 days, attempted to contact pt no voicemail set up.

## 2024-05-06 ENCOUNTER — TELEPHONE (OUTPATIENT)
Dept: GASTROENTEROLOGY | Facility: CLINIC | Age: 64
End: 2024-05-06
Payer: COMMERCIAL

## 2024-05-13 NOTE — PRE-PROCEDURE INSTRUCTIONS
"Instructed on date and arrival time of 1030. Come to entrance \"C\". Must have  over age 18 to drive home.  May have two visitors; however, children under 12 must stay in waiting room.  Discussed clear liquid diet (no red or purple), bowel prep, and NPO.  May take medications as usual except for blood thinners, diabetic medications, and weight loss medications.  Verbalized understanding of instructions given.  Instructed to call for questions or concerns.  Hold Plavix for 5 days prior to procedure.  "

## 2024-05-14 ENCOUNTER — ANESTHESIA EVENT (OUTPATIENT)
Dept: GASTROENTEROLOGY | Facility: HOSPITAL | Age: 64
End: 2024-05-14
Payer: COMMERCIAL

## 2024-05-14 NOTE — ANESTHESIA PREPROCEDURE EVALUATION
Anesthesia Evaluation     Patient summary reviewed and Nursing notes reviewed   no history of anesthetic complications:   NPO Solid Status: > 8 hours  NPO Liquid Status: > 2 hours           Airway   Mallampati: II  TM distance: >3 FB  Neck ROM: full  No difficulty expected  Dental    (+) poor dentition        Pulmonary - normal exam    breath sounds clear to auscultation  (+) a smoker Current, Smoked day of surgery, cigarettes, asthma (on inhalers),shortness of breath  Cardiovascular - normal exam  Exercise tolerance: good (4-7 METS)    Rhythm: regular  Rate: normal    (+) hypertension, valvular problems/murmurs murmur and MR, past MI  >12 months, DVT, hyperlipidemia,  carotid artery disease      Neuro/Psych  (+) TIA, CVA (no residual symptoms), headaches, numbness, psychiatric history Anxiety, Depression and Bipolar  GI/Hepatic/Renal/Endo    (+) obesity, morbid obesity, GERD    Musculoskeletal     (+) back pain  Abdominal    Substance History - negative use     OB/GYN negative ob/gyn ROS         Other - negative ROS       ROS/Med Hx Other: Echo 7/8/22  ·Calculated left ventricular EF = 65% Estimated left ventricular EF was in agreement with the calculated left ventricular EF. Left ventricular systolic function is normal.  ·Mild mitral regurgitation  ·No evidence of thrombi in the left atrium left atrial appendage and the left ventricle  ·Bubble study negative    EKG 2/2/2024:  NSR  HR 55 bpm    Cardiac clearance 4/29/2024.  Plavix to be stopped 5 days prior to procedure. Last dose - 5/9/2024.                Anesthesia Plan    ASA 3     general   total IV anesthesia  (Total IV Anesthesia    Patient understands anesthesia not responsible for dental damage.  )  intravenous induction     Anesthetic plan, risks, benefits, and alternatives have been provided, discussed and informed consent has been obtained with: patient.    Plan discussed with CRNA.      CODE STATUS:

## 2024-05-15 ENCOUNTER — HOSPITAL ENCOUNTER (OUTPATIENT)
Facility: HOSPITAL | Age: 64
Setting detail: HOSPITAL OUTPATIENT SURGERY
Discharge: HOME | End: 2024-05-15
Attending: INTERNAL MEDICINE | Admitting: INTERNAL MEDICINE
Payer: COMMERCIAL

## 2024-05-15 ENCOUNTER — ANESTHESIA (OUTPATIENT)
Dept: GASTROENTEROLOGY | Facility: HOSPITAL | Age: 64
End: 2024-05-15
Payer: COMMERCIAL

## 2024-05-15 VITALS
OXYGEN SATURATION: 97 % | WEIGHT: 164.9 LBS | BODY MASS INDEX: 34.47 KG/M2 | SYSTOLIC BLOOD PRESSURE: 114 MMHG | TEMPERATURE: 97.6 F | RESPIRATION RATE: 23 BRPM | DIASTOLIC BLOOD PRESSURE: 59 MMHG | HEART RATE: 63 BPM

## 2024-05-15 DIAGNOSIS — Z12.11 COLON CANCER SCREENING: ICD-10-CM

## 2024-05-15 PROCEDURE — 25010000002 PROPOFOL 10 MG/ML EMULSION: Performed by: NURSE ANESTHETIST, CERTIFIED REGISTERED

## 2024-05-15 PROCEDURE — 45385 COLONOSCOPY W/LESION REMOVAL: CPT | Performed by: INTERNAL MEDICINE

## 2024-05-15 PROCEDURE — 88305 TISSUE EXAM BY PATHOLOGIST: CPT | Performed by: INTERNAL MEDICINE

## 2024-05-15 PROCEDURE — 25810000003 LACTATED RINGERS PER 1000 ML

## 2024-05-15 RX ORDER — SODIUM CHLORIDE, SODIUM LACTATE, POTASSIUM CHLORIDE, CALCIUM CHLORIDE 600; 310; 30; 20 MG/100ML; MG/100ML; MG/100ML; MG/100ML
30 INJECTION, SOLUTION INTRAVENOUS CONTINUOUS
Status: DISCONTINUED | OUTPATIENT
Start: 2024-05-15 | End: 2024-05-15 | Stop reason: HOSPADM

## 2024-05-15 RX ORDER — LIDOCAINE HYDROCHLORIDE 20 MG/ML
INJECTION, SOLUTION EPIDURAL; INFILTRATION; INTRACAUDAL; PERINEURAL AS NEEDED
Status: DISCONTINUED | OUTPATIENT
Start: 2024-05-15 | End: 2024-05-15 | Stop reason: SURG

## 2024-05-15 RX ORDER — PROPOFOL 10 MG/ML
VIAL (ML) INTRAVENOUS AS NEEDED
Status: DISCONTINUED | OUTPATIENT
Start: 2024-05-15 | End: 2024-05-15 | Stop reason: SURG

## 2024-05-15 RX ADMIN — PROPOFOL 100 MG: 10 INJECTION, EMULSION INTRAVENOUS at 12:26

## 2024-05-15 RX ADMIN — SODIUM CHLORIDE, POTASSIUM CHLORIDE, SODIUM LACTATE AND CALCIUM CHLORIDE 30 ML/HR: 600; 310; 30; 20 INJECTION, SOLUTION INTRAVENOUS at 11:30

## 2024-05-15 RX ADMIN — LIDOCAINE HYDROCHLORIDE 40 MG: 20 INJECTION, SOLUTION INTRAVENOUS at 12:26

## 2024-05-15 RX ADMIN — PROPOFOL 175 MCG/KG/MIN: 10 INJECTION, EMULSION INTRAVENOUS at 12:26

## 2024-05-15 NOTE — H&P
Pre Procedure History & Physical    Chief Complaint:   Screening colonoscopy    Subjective     HPI:   65 yo F here for screening colonoscopy.    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    Acid reflux disease     Allergic Unknown    Allergy     unspecified    Anxiety     Asthma Unknown    Back pain     Bilateral carpal tunnel syndrome 08/17/2017    Bipolar disorder     Bursitis of left shoulder 08/09/2016    Cataract Unknown    Chronic pain disorder     Deep vein thrombosis All my life    Depression     Eczema     Gall stones     H/O psychiatric care     Heart attack     Heart murmur     Hyperlipidemia 11/22    Hypertension     Incomplete tear of left rotator cuff 01/25/2017    Left shoulder pain     Loss of appetite     Lumbago 05/08/2015    low back pain     Memory loss     forgetfulness    Migraine headache     Need for hepatitis C screening test     Obesity     Palpitation     Panic disorder     Ringing in ears     Seasonal allergies     Shortness of breath     Sinus trouble     unspecified     Stroke (cerebrum)     Subacromial impingement, left 10/13/2016    Superior glenoid labrum lesion of left shoulder 10/13/2016    subsequent encounter    Trouble swallowing     Urinary tract infection Unknown       Past Surgical History:  Past Surgical History:   Procedure Laterality Date    ABDOMINAL SURGERY      APPENDECTOMY      BACK SURGERY      CARPAL TUNNEL RELEASE      CHOLECYSTECTOMY      COLONOSCOPY  2014    ENDOSCOPY  2014    FRACTURE SURGERY      HYSTERECTOMY  1999    SHOULDER SURGERY Left 10/03/2016    arthroscopic, left shoulder scope, RTC repair- Dr. Flores    SKIN BIOPSY         Family History:  Family History   Problem Relation Age of Onset    Stroke Mother     Diabetes Mother     Arthritis Mother     COPD Mother     Hyperlipidemia Mother     Vision loss Mother     Heart disease Mother     Heart disease Father     Cancer Father         Lukimia     Heart disease Brother     Diabetes Brother     Stroke Brother     Cancer Maternal Aunt     Breast cancer Maternal Aunt     Pancreatic cancer Maternal Aunt     Stroke Son     Heart disease Son     Cancer Son     Other Son         blood disease    Arthritis Son     Osteoporosis Son     Arthritis Son     Cancer Son     Heart disease Son     Stroke Son     Other Son     Hypertension Other     Rheum arthritis Other        Social History:   reports that she has been smoking cigarettes. She has a 30 pack-year smoking history. She has been exposed to tobacco smoke. She has never used smokeless tobacco. She reports that she does not currently use alcohol. She reports that she does not use drugs.    Medications:   Medications Prior to Admission   Medication Sig Dispense Refill Last Dose    albuterol (ACCUNEB) 1.25 MG/3ML nebulizer solution Take 3 mL by nebulization Every 6 (Six) Hours As Needed for Wheezing. 50 each 0 Past Month    albuterol sulfate  (90 Base) MCG/ACT inhaler Inhale 2 puffs Every 6 (Six) Hours As Needed for Wheezing. 18 g 0 Past Month    amLODIPine (NORVASC) 5 MG tablet Take 1 tablet by mouth Daily. 90 tablet 1 5/14/2024    atorvastatin (LIPITOR) 20 MG tablet Take 1 tablet by mouth Every Night. Indications: High Amount of Fats in the Blood 90 tablet 1 5/14/2024    candesartan (ATACAND) 8 MG tablet Take 1 tablet by mouth 2 (Two) Times a Day. Indications: High Blood Pressure Disorder 180 tablet 1 5/14/2024    cetirizine (zyrTEC) 10 MG tablet Take 1 tablet by mouth At Night As Needed for Allergies. Indications: Hayfever 30 tablet 2 5/14/2024    clopidogrel (PLAVIX) 75 MG tablet TAKE 1 TABLET BY MOUTH DAILY 90 tablet 1 5/10/2024    folic acid (FOLVITE) 1 MG tablet Take 1 tablet by mouth Daily.   5/14/2024    montelukast (SINGULAIR) 10 MG tablet TAKE 1 TABLET BY MOUTH EVERY NIGHT 90 tablet 1 Past Week    oxybutynin XL (DITROPAN-XL) 10 MG 24 hr tablet TAKE 1 TABLET BY MOUTH DAILY 90 tablet 1 5/14/2024     pregabalin (LYRICA) 75 MG capsule TAKE 1 CAPSULE BY MOUTH TWICE DAILY AS DIRECTED   5/14/2024    traMADol (ULTRAM) 50 MG tablet Take 1 tablet by mouth Every 8 (Eight) Hours As Needed for Moderate Pain. 30 tablet 1 5/14/2024       Allergies:  Lisinopril, Doxazosin, Nsaids, Adhesive tape, and Tape    ROS:    Pertinent items are noted in HPI     Objective     Blood pressure 175/58, pulse 73, temperature 98 °F (36.7 °C), temperature source Temporal, resp. rate 16, weight 74.8 kg (164 lb 14.5 oz), SpO2 96%, not currently breastfeeding.    Physical Exam   Constitutional: Pt is oriented to person, place, and time and well-developed, well-nourished, and in no distress.   Mouth/Throat: Oropharynx is clear and moist.   Neck: Normal range of motion.   Cardiovascular: Normal rate, regular rhythm and normal heart sounds.    Pulmonary/Chest: Effort normal and breath sounds normal.   Abdominal: Soft. Nontender  Skin: Skin is warm and dry.   Psychiatric: Mood, memory, affect and judgment normal.     Assessment & Plan     Diagnosis:  Screening colonoscopy    Anticipated Surgical Procedure:  Colonoscopy    The risks, benefits, and alternatives of this procedure have been discussed with the patient or the responsible party- the patient understands and agrees to proceed.

## 2024-05-16 LAB
CYTO UR: NORMAL
LAB AP CASE REPORT: NORMAL
LAB AP CLINICAL INFORMATION: NORMAL
PATH REPORT.FINAL DX SPEC: NORMAL
PATH REPORT.GROSS SPEC: NORMAL

## 2024-05-28 DIAGNOSIS — J30.2 SEASONAL ALLERGIC RHINITIS, UNSPECIFIED TRIGGER: ICD-10-CM

## 2024-05-28 RX ORDER — OXYBUTYNIN CHLORIDE 10 MG/1
10 TABLET, EXTENDED RELEASE ORAL DAILY
Qty: 90 TABLET | Refills: 1 | OUTPATIENT
Start: 2024-05-28

## 2024-05-28 RX ORDER — CETIRIZINE HYDROCHLORIDE 10 MG/1
TABLET ORAL
Qty: 30 TABLET | Refills: 2 | OUTPATIENT
Start: 2024-05-28

## 2024-06-10 ENCOUNTER — TELEPHONE (OUTPATIENT)
Dept: GASTROENTEROLOGY | Facility: CLINIC | Age: 64
End: 2024-06-10
Payer: COMMERCIAL

## 2024-06-10 NOTE — TELEPHONE ENCOUNTER
Hub staff attempted to follow warm transfer process and was unsuccessful     Caller: Audra Montejo    Relationship to patient: Self    Best call back number:  [phone]    Patient is needing: PT HAS HAD VOMITING, DIARRHEA AND WEIGHT LOSS SINCE HAVE HER COLONOSCOPY 05/15/2024. PT HAS NOT BEEN ABLE TO EAT SINCE 06/06/2024. PLEASE CALL AND ADVISE.

## 2024-06-11 ENCOUNTER — PATIENT ROUNDING (BHMG ONLY) (OUTPATIENT)
Dept: URGENT CARE | Facility: CLINIC | Age: 64
End: 2024-06-11
Payer: COMMERCIAL

## 2024-06-11 DIAGNOSIS — J30.2 SEASONAL ALLERGIC RHINITIS, UNSPECIFIED TRIGGER: ICD-10-CM

## 2024-06-11 RX ORDER — CETIRIZINE HYDROCHLORIDE 10 MG/1
TABLET ORAL
Qty: 90 TABLET | Refills: 1 | Status: SHIPPED | OUTPATIENT
Start: 2024-06-11

## 2024-06-11 RX ORDER — MONTELUKAST SODIUM 10 MG/1
10 TABLET ORAL NIGHTLY
Qty: 90 TABLET | Refills: 1 | Status: SHIPPED | OUTPATIENT
Start: 2024-06-11

## 2024-06-11 NOTE — ED NOTES
Thank you for letting us care for you in your recent visit to our urgent care center. We would love to hear about your experience with us. Was this the first time you have visited our location?    We’re always looking for ways to make our patients’ experiences even better. Do you have any recommendations on ways we may improve?     I appreciate you taking the time to respond. Please be on the lookout for a survey about your recent visit from [employer] via text or email. We would greatly appreciate if you could fill that out and turn it back in. We want your voice to be heard and we value your feedback.   Thank you for choosing [facility] for your healthcare needs.

## 2024-06-21 ENCOUNTER — OFFICE VISIT (OUTPATIENT)
Dept: FAMILY MEDICINE CLINIC | Facility: CLINIC | Age: 64
End: 2024-06-21
Payer: COMMERCIAL

## 2024-06-21 VITALS
BODY MASS INDEX: 33.81 KG/M2 | TEMPERATURE: 97.7 F | WEIGHT: 161.1 LBS | OXYGEN SATURATION: 98 % | HEART RATE: 51 BPM | SYSTOLIC BLOOD PRESSURE: 132 MMHG | HEIGHT: 58 IN | DIASTOLIC BLOOD PRESSURE: 72 MMHG

## 2024-06-21 DIAGNOSIS — N32.81 OVERACTIVE BLADDER: ICD-10-CM

## 2024-06-21 DIAGNOSIS — R73.03 PREDIABETES: ICD-10-CM

## 2024-06-21 DIAGNOSIS — I10 PRIMARY HYPERTENSION: Primary | ICD-10-CM

## 2024-06-21 DIAGNOSIS — J45.40 MODERATE PERSISTENT ASTHMA WITHOUT COMPLICATION: ICD-10-CM

## 2024-06-21 DIAGNOSIS — Z86.73 HISTORY OF CVA (CEREBROVASCULAR ACCIDENT): ICD-10-CM

## 2024-06-21 PROCEDURE — 99214 OFFICE O/P EST MOD 30 MIN: CPT | Performed by: NURSE PRACTITIONER

## 2024-06-21 RX ORDER — OXYBUTYNIN CHLORIDE 10 MG/1
10 TABLET, EXTENDED RELEASE ORAL DAILY
Qty: 90 TABLET | Refills: 1 | Status: SHIPPED | OUTPATIENT
Start: 2024-06-21

## 2024-06-21 RX ORDER — CANDESARTAN 8 MG/1
8 TABLET ORAL 2 TIMES DAILY
Qty: 180 TABLET | Refills: 1 | Status: SHIPPED | OUTPATIENT
Start: 2024-06-21

## 2024-06-21 RX ORDER — ALBUTEROL SULFATE 1.25 MG/3ML
1 SOLUTION RESPIRATORY (INHALATION) EVERY 6 HOURS PRN
Qty: 50 EACH | Refills: 0 | Status: SHIPPED | OUTPATIENT
Start: 2024-06-21

## 2024-06-21 RX ORDER — ALBUTEROL SULFATE 90 UG/1
2 AEROSOL, METERED RESPIRATORY (INHALATION) EVERY 6 HOURS PRN
Qty: 8 G | Refills: 2 | Status: SHIPPED | OUTPATIENT
Start: 2024-06-21

## 2024-06-21 RX ORDER — AMLODIPINE BESYLATE 5 MG/1
5 TABLET ORAL DAILY
Qty: 90 TABLET | Refills: 1 | Status: SHIPPED | OUTPATIENT
Start: 2024-06-21

## 2024-06-21 RX ORDER — CLOPIDOGREL BISULFATE 75 MG/1
75 TABLET ORAL DAILY
Qty: 90 TABLET | Refills: 1 | Status: SHIPPED | OUTPATIENT
Start: 2024-06-21

## 2024-06-21 NOTE — PROGRESS NOTES
Answers submitted by the patient for this visit:  Other (Submitted on 6/19/2024)  Please describe your symptoms.: Follow up  Have you had these symptoms before?: No  How long have you been having these symptoms?: 1-4 days  Primary Reason for Visit (Submitted on 6/19/2024)  What is the primary reason for your visit?: Other  Chief Complaint  Hypertension and Hyperlipidemia    Subjective            Audra Montejo is a 64 y.o. female who presents to [facility]   History of Present Illness  3-month follow-up.    She was seen at [facility] urgent care on 6/11/2024 and treated for a sinus infection.  She is currently on Augmentin but should be finished with it today.    She has borderline diabetes.  Her last A1c was 5.9% 4 months ago.    Hypertension: Her blood pressure is stable on candesartan 8 mg twice daily and amlodipine 5 mg daily.     She has history of CVA and cerebellar infarction.  She is on Plavix 75 mg daily and atorvastatin 20 mg nightly.    She has overactive bladder and is currently stable on oxybutynin XL 10 mg daily.    She has bipolar.  She canceled her last appointment with psychiatry but she has an appointment coming up next month.      She is currently on Cymbalta 30 mg daily for pain mgmt. She states she had side effects to Lyrica. She is also on tramadol as needed, follows with pain mgmt.     Asthma: She is on Singulair and she has albuterol inhalers.  She states she needs refills on her albuterol inhaler and nebulizers. She is still smoking and is not interested in quitting.      Tobacco Use: High Risk (6/21/2024)    Patient History     Smoking Tobacco Use: Every Day     Smokeless Tobacco Use: Never     Passive Exposure: Current      E-cigarette/Vaping    E-cigarette/Vaping Use Never User     Passive Exposure No     Counseling Given Yes      E-cigarette/Vaping Substances    Nicotine No     THC No     CBD No     Flavoring No      E-cigarette/Vaping  "Devices    Disposable No     Pre-filled or Refillable Cartridge No     Refillable Tank No     Pre-filled Pod No        Alcohol Use: Not At Risk (5/15/2023)    AUDIT-C     Frequency of Alcohol Consumption: Never     Average Number of Drinks: Patient does not drink     Frequency of Binge Drinking: Never         Objective   Vital Signs:   Vitals:    06/21/24 1436 06/21/24 1444   BP: 155/63 132/72   BP Location: Left arm    Patient Position: Sitting    Cuff Size: Adult    Pulse: 51    Temp: 97.7 °F (36.5 °C)    SpO2: 98%    Weight: 73.1 kg (161 lb 1.6 oz)    Height: 147.3 cm (58\")      Body mass index is 33.67 kg/m².    Wt Readings from Last 3 Encounters:   06/21/24 73.1 kg (161 lb 1.6 oz)   06/11/24 73 kg (161 lb)   06/10/24 73 kg (161 lb)     BP Readings from Last 3 Encounters:   06/21/24 132/72   06/11/24 154/75   06/10/24 148/70       Health Maintenance   Topic Date Due    ANNUAL PHYSICAL  04/21/2024    RSV Vaccine - Adults (1 - 1-dose 60+ series) 02/15/2025 (Originally 3/15/2022)    COVID-19 Vaccine (1 - 2023-24 season) 03/04/2025 (Originally 9/1/2023)    LIPID PANEL  12/06/2024    BMI FOLLOWUP  02/15/2025    LUNG CANCER SCREENING  03/21/2025    MAMMOGRAM  04/24/2026    TDAP/TD VACCINES (3 - Td or Tdap) 06/01/2028    COLORECTAL CANCER SCREENING  05/15/2034    HEPATITIS C SCREENING  Completed    ZOSTER VACCINE  Completed    Pneumococcal Vaccine 0-64  Discontinued    INFLUENZA VACCINE  Discontinued       /72   Pulse 51   Temp 97.7 °F (36.5 °C)   Ht 147.3 cm (58\")   Wt 73.1 kg (161 lb 1.6 oz)   SpO2 98%   BMI 33.67 kg/m²       Current Outpatient Medications:     albuterol (ACCUNEB) 1.25 MG/3ML nebulizer solution, Take 3 mL by nebulization Every 6 (Six) Hours As Needed for Wheezing or Shortness of Air. Indications: asthma, Disp: 50 each, Rfl: 0    albuterol sulfate  (90 Base) MCG/ACT inhaler, Inhale 2 puffs Every 6 (Six) Hours As Needed for Wheezing or Shortness of Air. Indications: Asthma, Disp: 8 " g, Rfl: 2    amLODIPine (NORVASC) 5 MG tablet, Take 1 tablet by mouth Daily. Indications: High Blood Pressure Disorder, Disp: 90 tablet, Rfl: 1    amoxicillin-clavulanate (AUGMENTIN) 875-125 MG per tablet, Take 1 tablet by mouth 2 (Two) Times a Day for 10 days., Disp: 20 tablet, Rfl: 0    atorvastatin (LIPITOR) 20 MG tablet, Take 1 tablet by mouth Every Night. Indications: High Amount of Fats in the Blood, Disp: 90 tablet, Rfl: 1    candesartan (ATACAND) 8 MG tablet, Take 1 tablet by mouth 2 (Two) Times a Day. Indications: High Blood Pressure Disorder, Disp: 180 tablet, Rfl: 1    cetirizine (zyrTEC) 10 MG tablet, TAKE 1 TABLET BY MOUTH EVERY NIGHT AT BEDTIME AS NEEDED FOR ALLERGIES, Disp: 90 tablet, Rfl: 1    clopidogrel (PLAVIX) 75 MG tablet, Take 1 tablet by mouth Daily., Disp: 90 tablet, Rfl: 1    DULoxetine (CYMBALTA) 30 MG capsule, , Disp: , Rfl:     folic acid (FOLVITE) 1 MG tablet, Take 1 tablet by mouth Daily., Disp: , Rfl:     montelukast (SINGULAIR) 10 MG tablet, TAKE 1 TABLET BY MOUTH EVERY NIGHT, Disp: 90 tablet, Rfl: 1    ondansetron (ZOFRAN) 8 MG tablet, Take 1 tablet by mouth Every 8 (Eight) Hours As Needed for Nausea or Vomiting., Disp: 10 tablet, Rfl: 0    oxybutynin XL (DITROPAN-XL) 10 MG 24 hr tablet, Take 1 tablet by mouth Daily. 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    Acid reflux disease     Allergic Unknown    Allergy     unspecified    Anxiety     Asthma Unknown    Back pain     Bilateral carpal tunnel syndrome 08/17/2017    Bipolar disorder     Bursitis of left shoulder 08/09/2016    Cataract Unknown    Chronic pain disorder     Clotting disorder     Deep vein thrombosis All my life    Depression     Eczema     Gall stones     H/O psychiatric care     Heart attack      Heart murmur     Hyperlipidemia 11/22    Hypertension     Incomplete tear of left rotator cuff 01/25/2017    Left shoulder pain     Loss of appetite     Lumbago 05/08/2015    low back pain     Memory loss     forgetfulness    Migraine headache     Need for hepatitis C screening test     Obesity     Palpitation     Panic disorder     Ringing in ears     Seasonal allergies     Shortness of breath     Sinus trouble     unspecified     Stroke (cerebrum)     Subacromial impingement, left 10/13/2016    Superior glenoid labrum lesion of left shoulder 10/13/2016    subsequent encounter    Trouble swallowing     Urinary tract infection Unknown        Physical Exam  Vitals reviewed.   Constitutional:       Appearance: Normal appearance. She is well-developed. She is obese.   Neck:      Thyroid: No thyroid mass, thyromegaly or thyroid tenderness.   Cardiovascular:      Rate and Rhythm: Normal rate and regular rhythm.      Heart sounds: No murmur heard.     No friction rub. No gallop.   Pulmonary:      Effort: Pulmonary effort is normal.      Breath sounds: Normal breath sounds. No wheezing or rhonchi.   Lymphadenopathy:      Cervical: No cervical adenopathy.   Skin:     General: Skin is warm and dry.   Neurological:      Mental Status: She is alert and oriented to person, place, and time.      Cranial Nerves: No cranial nerve deficit.   Psychiatric:         Mood and Affect: Mood and affect normal.         Behavior: Behavior normal.         Thought Content: Thought content normal. Thought content does not include homicidal or suicidal ideation.         Judgment: Judgment normal.          Result Review :    The following data was reviewed by: HERMAN Menjivar on 06/21/2024:  Common Labs   Common labs          1/11/2024    20:22 2/2/2024    10:00 2/2/2024    12:39 2/15/2024    07:38   Common Labs   Glucose 131   114     BUN 17   14     Creatinine 1.17   0.74     Sodium 141   141     Potassium 4.2   3.9     Chloride  106   104     Calcium 8.8   9.3     Albumin 4.0   4.1     Total Bilirubin 0.2   0.3     Alkaline Phosphatase 112   95     AST (SGOT) 18   14     ALT (SGPT) 14   11     WBC 10.68  11.58      Hemoglobin 13.4  13.8      Hematocrit 40.0  42.6      Platelets 252  243      Hemoglobin A1C    5.90      TSH   TSH          9/5/2023    07:50 2/15/2024    07:38   TSH   TSH 1.480  2.850      VITD   Lab Results   Component Value Date    TYXC74CE 35.1 02/15/2024     FREET4   Lab Results   Component Value Date    FREET4 1.07 02/15/2024     VITB12   Lab Results   Component Value Date    BMQXDUUN47 1,334 (H) 02/15/2024     IRON    Iron   Date Value Ref Range Status   02/15/2024 51 37 - 145 mcg/dL Final     Iron Saturation (TSAT)   Date Value Ref Range Status   02/15/2024 14 (L) 20 - 50 % Final     Transferrin   Date Value Ref Range Status   02/15/2024 251 200 - 360 mg/dL Final     TIBC   Date Value Ref Range Status   02/15/2024 374 298 - 536 mcg/dL Final     Assessment & Plan  Primary hypertension  Hypertension is stable and controlled  Continue current treatment regimen.  Blood pressure will be reassessed in 6 months.  Moderate persistent asthma without complication  Asthma is stable.    Plan: Continue same medication/s without change.    Discussed medication dosage, use, side effects, and goals of treatment in detail.    Discussed distinction between quick-relief and maintenance control medications.  Discussed monitoring symptoms and use of quick-relief medications and contacting provider early in the course of exacerbations.  Warning signs of respiratory distress were reviewed with the patient.     Patient Treatment Goals: symptom prevention, minimizing limitation in activity, prevention of exacerbations and use of ER/inpatient care, maintenance of optimal pulmonary function, and minimization of adverse effects of treatment.    Followup in 6 months.    Overactive bladder  She is currently stable on oxybutynin XL 10 mg, will  continue current dose.  History of CVA (cerebrovascular accident)  She is currently stable, on Plavix and atorvastatin.  She is not fasting today but will get fasting labs in the next few weeks.  Prediabetes  She is currently stable, will check A1c when she gets her fasting labs in the next few weeks.    Orders Placed This Encounter   Procedures    CBC Auto Differential    Comprehensive Metabolic Panel    Lipid Panel    Hemoglobin A1c     New Medications Ordered This Visit   Medications    candesartan (ATACAND) 8 MG tablet     Sig: Take 1 tablet by mouth 2 (Two) Times a Day. Indications: High Blood Pressure Disorder     Dispense:  180 tablet     Refill:  1    amLODIPine (NORVASC) 5 MG tablet     Sig: Take 1 tablet by mouth Daily. Indications: High Blood Pressure Disorder     Dispense:  90 tablet     Refill:  1    clopidogrel (PLAVIX) 75 MG tablet     Sig: Take 1 tablet by mouth Daily.     Dispense:  90 tablet     Refill:  1    oxybutynin XL (DITROPAN-XL) 10 MG 24 hr tablet     Sig: Take 1 tablet by mouth Daily. Indications: Overactive Bladder     Dispense:  90 tablet     Refill:  1    albuterol (ACCUNEB) 1.25 MG/3ML nebulizer solution     Sig: Take 3 mL by nebulization Every 6 (Six) Hours As Needed for Wheezing or Shortness of Air. Indications: asthma     Dispense:  50 each     Refill:  0    albuterol sulfate  (90 Base) MCG/ACT inhaler     Sig: Inhale 2 puffs Every 6 (Six) Hours As Needed for Wheezing or Shortness of Air. Indications: Asthma     Dispense:  8 g     Refill:  2         Diagnosis Plan   1. Primary hypertension  candesartan (ATACAND) 8 MG tablet    amLODIPine (NORVASC) 5 MG tablet    CBC Auto Differential    Comprehensive Metabolic Panel    Lipid Panel      2. Moderate persistent asthma without complication  albuterol (ACCUNEB) 1.25 MG/3ML nebulizer solution    albuterol sulfate  (90 Base) MCG/ACT inhaler      3. Overactive bladder  oxybutynin XL (DITROPAN-XL) 10 MG 24 hr tablet      4.  History of CVA (cerebrovascular accident)  clopidogrel (PLAVIX) 75 MG tablet      5. Prediabetes  Hemoglobin A1c            FOLLOW UP  Return in about 6 months (around 12/21/2024) for Next scheduled follow up.  Patient was given instructions and counseling regarding her condition or for health maintenance advice. Please see specific information pulled into the AVS if appropriate.       CURRENT & DISCONTINUED MEDICATIONS  Current Outpatient Medications   Medication Instructions    albuterol (ACCUNEB) 1.25 mg, Nebulization, Every 6 Hours PRN    albuterol sulfate  (90 Base) MCG/ACT inhaler 2 puffs, Inhalation, Every 6 Hours PRN    amLODIPine (NORVASC) 5 mg, Oral, Daily    amoxicillin-clavulanate (AUGMENTIN) 875-125 MG per tablet 1 tablet, Oral, 2 Times Daily    atorvastatin (LIPITOR) 20 mg, Oral, Nightly    candesartan (ATACAND) 8 mg, Oral, 2 Times Daily    cetirizine (zyrTEC) 10 MG tablet TAKE 1 TABLET BY MOUTH EVERY NIGHT AT BEDTIME AS NEEDED FOR ALLERGIES    clopidogrel (PLAVIX) 75 mg, Oral, Daily    DULoxetine (CYMBALTA) 30 MG capsule     folic acid (FOLVITE) 1 mg, Oral, Daily    montelukast (SINGULAIR) 10 mg, Oral, Nightly    ondansetron (ZOFRAN) 8 mg, Oral, Every 8 Hours PRN    oxybutynin XL (DITROPAN-XL) 10 mg, Oral, Daily    traMADol (ULTRAM) 50 mg, Oral, Every 8 Hours PRN       Medications Discontinued During This Encounter   Medication Reason    pregabalin (LYRICA) 75 MG capsule Discontinued by another clinician    albuterol sulfate  (90 Base) MCG/ACT inhaler Reorder    albuterol (ACCUNEB) 1.25 MG/3ML nebulizer solution Reorder    oxybutynin XL (DITROPAN-XL) 10 MG 24 hr tablet Reorder    clopidogrel (PLAVIX) 75 MG tablet Reorder    amLODIPine (NORVASC) 5 MG tablet Reorder    candesartan (ATACAND) 8 MG tablet Reorder        Parts of this note are electronic transcriptions/translations of spoken language to printed text using the Dragon Dictation system.    Gale Negrete,  APRN  06/21/24  15:23 EDT

## 2024-06-21 NOTE — ASSESSMENT & PLAN NOTE
She is currently stable, on Plavix and atorvastatin.  She is not fasting today but will get fasting labs in the next few weeks.

## 2024-06-26 ENCOUNTER — PATIENT ROUNDING (BHMG ONLY) (OUTPATIENT)
Dept: FAMILY MEDICINE CLINIC | Facility: CLINIC | Age: 64
End: 2024-06-26
Payer: COMMERCIAL

## 2024-06-26 NOTE — PROGRESS NOTES
A My-Chart message has been sent to the patient for PATIENT ROUNDING with [facility] [place].

## 2024-07-05 ENCOUNTER — LAB (OUTPATIENT)
Dept: LAB | Facility: HOSPITAL | Age: 64
End: 2024-07-05
Payer: COMMERCIAL

## 2024-07-05 DIAGNOSIS — I10 PRIMARY HYPERTENSION: ICD-10-CM

## 2024-07-05 DIAGNOSIS — R73.03 PREDIABETES: ICD-10-CM

## 2024-07-05 LAB
ALBUMIN SERPL-MCNC: 4.2 G/DL (ref 3.5–5.2)
ALBUMIN/GLOB SERPL: 1.7 G/DL
ALP SERPL-CCNC: 125 U/L (ref 39–117)
ALT SERPL W P-5'-P-CCNC: 14 U/L (ref 1–33)
ANION GAP SERPL CALCULATED.3IONS-SCNC: 9 MMOL/L (ref 5–15)
AST SERPL-CCNC: 19 U/L (ref 1–32)
BASOPHILS # BLD AUTO: 0.06 10*3/MM3 (ref 0–0.2)
BASOPHILS NFR BLD AUTO: 0.6 % (ref 0–1.5)
BILIRUB SERPL-MCNC: 0.3 MG/DL (ref 0–1.2)
BUN SERPL-MCNC: 12 MG/DL (ref 8–23)
BUN/CREAT SERPL: 16 (ref 7–25)
CALCIUM SPEC-SCNC: 9.3 MG/DL (ref 8.6–10.5)
CHLORIDE SERPL-SCNC: 107 MMOL/L (ref 98–107)
CHOLEST SERPL-MCNC: 113 MG/DL (ref 0–200)
CO2 SERPL-SCNC: 26 MMOL/L (ref 22–29)
CREAT SERPL-MCNC: 0.75 MG/DL (ref 0.57–1)
DEPRECATED RDW RBC AUTO: 40.4 FL (ref 37–54)
EGFRCR SERPLBLD CKD-EPI 2021: 90.1 ML/MIN/1.73
EOSINOPHIL # BLD AUTO: 0.25 10*3/MM3 (ref 0–0.4)
EOSINOPHIL NFR BLD AUTO: 2.5 % (ref 0.3–6.2)
ERYTHROCYTE [DISTWIDTH] IN BLOOD BY AUTOMATED COUNT: 12.6 % (ref 12.3–15.4)
GLOBULIN UR ELPH-MCNC: 2.5 GM/DL
GLUCOSE SERPL-MCNC: 102 MG/DL (ref 65–99)
HBA1C MFR BLD: 6.2 % (ref 4.8–5.6)
HCT VFR BLD AUTO: 46.8 % (ref 34–46.6)
HDLC SERPL-MCNC: 72 MG/DL (ref 40–60)
HGB BLD-MCNC: 15.6 G/DL (ref 12–15.9)
IMM GRANULOCYTES # BLD AUTO: 0.03 10*3/MM3 (ref 0–0.05)
IMM GRANULOCYTES NFR BLD AUTO: 0.3 % (ref 0–0.5)
LDLC SERPL CALC-MCNC: 28 MG/DL (ref 0–100)
LDLC/HDLC SERPL: 0.42 {RATIO}
LYMPHOCYTES # BLD AUTO: 3.72 10*3/MM3 (ref 0.7–3.1)
LYMPHOCYTES NFR BLD AUTO: 36.5 % (ref 19.6–45.3)
MCH RBC QN AUTO: 29.7 PG (ref 26.6–33)
MCHC RBC AUTO-ENTMCNC: 33.3 G/DL (ref 31.5–35.7)
MCV RBC AUTO: 89.1 FL (ref 79–97)
MONOCYTES # BLD AUTO: 0.74 10*3/MM3 (ref 0.1–0.9)
MONOCYTES NFR BLD AUTO: 7.3 % (ref 5–12)
NEUTROPHILS NFR BLD AUTO: 5.38 10*3/MM3 (ref 1.7–7)
NEUTROPHILS NFR BLD AUTO: 52.8 % (ref 42.7–76)
NRBC BLD AUTO-RTO: 0 /100 WBC (ref 0–0.2)
PLATELET # BLD AUTO: 237 10*3/MM3 (ref 140–450)
PMV BLD AUTO: 12.6 FL (ref 6–12)
POTASSIUM SERPL-SCNC: 4.5 MMOL/L (ref 3.5–5.2)
PROT SERPL-MCNC: 6.7 G/DL (ref 6–8.5)
RBC # BLD AUTO: 5.25 10*6/MM3 (ref 3.77–5.28)
SODIUM SERPL-SCNC: 142 MMOL/L (ref 136–145)
TRIGL SERPL-MCNC: 55 MG/DL (ref 0–150)
VLDLC SERPL-MCNC: 13 MG/DL (ref 5–40)
WBC NRBC COR # BLD AUTO: 10.18 10*3/MM3 (ref 3.4–10.8)

## 2024-07-05 PROCEDURE — 80053 COMPREHEN METABOLIC PANEL: CPT

## 2024-07-05 PROCEDURE — 80061 LIPID PANEL: CPT

## 2024-07-05 PROCEDURE — 36415 COLL VENOUS BLD VENIPUNCTURE: CPT

## 2024-07-05 PROCEDURE — 83036 HEMOGLOBIN GLYCOSYLATED A1C: CPT

## 2024-07-05 PROCEDURE — 85025 COMPLETE CBC W/AUTO DIFF WBC: CPT

## 2024-07-12 ENCOUNTER — TELEPHONE (OUTPATIENT)
Dept: FAMILY MEDICINE CLINIC | Facility: CLINIC | Age: 64
End: 2024-07-12

## 2024-07-12 ENCOUNTER — OFFICE VISIT (OUTPATIENT)
Dept: PSYCHIATRY | Facility: CLINIC | Age: 64
End: 2024-07-12
Payer: COMMERCIAL

## 2024-07-12 VITALS
HEART RATE: 54 BPM | HEIGHT: 58 IN | DIASTOLIC BLOOD PRESSURE: 58 MMHG | BODY MASS INDEX: 33 KG/M2 | SYSTOLIC BLOOD PRESSURE: 148 MMHG | WEIGHT: 157.2 LBS

## 2024-07-12 DIAGNOSIS — Z53.21 PATIENT LEFT WITHOUT BEING SEEN: Primary | ICD-10-CM

## 2024-07-12 NOTE — PROGRESS NOTES
"[facility]  Follow-up Visit    Chief Complaint:  \"My PCP sent me here, I am concerned that my anti-seizure medication reacts with plavix\"     Initial History of Present Illness: Audra Montejo is a 64 y.o. female who presents today for initial evaluation regarding . Audra presents unaccompanied in no acute distress and engages with me appropriately. Psychotropic regimen with which patient presents is described as nothing. Patient is not taking Depakote because patient says it interacts with Plavix.   Patient voiced concerns and frustrations with medical establishment. We discussed missed opportunities for clarification in her care. We discussed the perceived limitations on consulting providers, and the desire for a more transparent exchange of prognostic information. We spent the majority of the encounter building rapport. We agreed to meet again in two weeks to discuss research findings and discuss post-stroke psychiatric symptoms. Ms. Montejo strongly desires to be evaluated by the [facility]. I also encouraged patient to get a Genesight test done if not already.   History is positive for signs/symptoms suggestive of alternating symptoms of elevated (moods) and depressed moods that do not conform to temporal parameters of bipolar disorder and are not sufficiently encapsulated by MDD.     Psychiatric screening is negative for pathognomonic history of: violence, suicidality, and PTSD.      I have counseled the patient with regard to diagnoses and the recommended treatment regimen as documented below: I will assume prescriptive responsibility for nothing at this time. Patient acknowledges the diagnoses per my rendered interpretation. Patient demonstrates awareness/understanding of viable alternatives for treatment as well as potential risks, benefits, and side effects associated with this regimen and is amenable to proceed in this fashion.    " "  Recommended lifestyle changes: 30 minutes of activity to increase HR 2-3 days weekly.     Psychiatric History:  Diagnoses: bipolar disorder, mixed, moderate  Outpatient history: therapy \"did no good'  Inpatient history:  none  Medication trials: sertraline, xanax  Other treatment modalities: Psychotherapy  Self harm: No history  Suicide attempts: No  Abuse or neglect: None     Substance Abuse History:   Types/methods/frequency: * none  Transtheoretical stage: none     Social History:  Residence: lives apartment, 3 cats  Vocation: yes  Source of income: Employed, hx of medical background former EMT  Last grade completed: high  Pertinent developmental history: none  Pertinent legal history: none  Hobbies/interests: not much  Jehovah's witness: N/A  Exercise:walking at work   Dietary habits: no pertinent issues   Sleep hygiene:  poor due to pain issues  Social habits:  isolation  Sunlight: There are concern for under-exposure.  Caffeine intake:  no issue  Hydration habits: no pertinent issues    history: No            Interval History Audra is a 64 y.o. female who presents today for follow-up medication management.  Audra presents unaccompanied in no acute distress and engages with me inappropriately.   Current treatment regimen includes:   - ***  Side-effects per given history: ***.      Today the patient feels {[id number]:28821}. Thought process and content are devoid of overt aberration suggestive of acute davon/psychosis. The patient denies SI/HI/AVH. There {Actions; are/are no:57989} changes on exam today compared to most recent evaluation.    - sleep: {BD SLEEP:70911}  - appetite: {poor/mod/well:60917}    I have counseled the patient with regard to diagnoses and the recommended treatment regimen as documented below. Patient acknowledges the diagnoses per my rendered interpretation. Patient demonstrates understanding of potential risks/benefits/side effects associated with this regimen and is amenable to proceed in " this fashion.     Assignment: begin journaling instances of overwhelm, response thereto, ideal response to cultivate insight and begin breaking a pattern of stimulus/response.    Psychotherapy  - Time: {TVjfgb04:11603}  minutes  - interventions employed: the therapeutic alliance was strengthened to encourage the patient to express their thoughts and feelings freely. Esteem building was enhanced through praise, reassurance, normalizing/challenging, and encouragement as appropriate. General coping skills were enhanced to build distress tolerance skills and emotional regulation. Allowed patient to freely discuss issues without interruption or judgement with unconditional positive regard, active listening skills, and empathy. Provided a safe, confidential environment to facilitate the development of a positive therapeutic relationship and encourage open, honest communication. Assisted patient in identifying risk factors which would indicate the need for higher level of care including thoughts to harm self or others. Assisted patient in processing session content; acknowledged and normalized/addressed, as appropriate, patient’s thoughts, feelings, and concerns by utilizing a person-centered approach in efforts to build appropriate rapport and a positive therapeutic relationship.   - Diagnoses: see assessment and plan below  - Symptoms: see subjective above  - Goals   - challenge patterns of living contributing to symptom burden, strengthen defenses, promote problem solving skills, restore adaptive functioning, and provide symptom relief.  - Treatment plan: continue supportive psychotherapy in subsequent appointments to provide symptom relief; see assessment and plan below for additional details    Social History     Socioeconomic History    Marital status: Single   Tobacco Use    Smoking status: Every Day     Current packs/day: 1.00     Average packs/day: 1 pack/day for 30.0 years (30.0 ttl pk-yrs)     Types: Cigarettes      Passive exposure: Current    Smokeless tobacco: Never    Tobacco comments:     Not your business   Vaping Use    Vaping status: Never Used   Substance and Sexual Activity    Alcohol use: Not Currently     Comment: rarely drinks    Drug use: Never    Sexual activity: Not Currently     Birth control/protection: None, Hysterectomy       Tobacco use counseling/intervention: {Hbeqxg73:84888}. Currently {ENTransther:21929} by transtheoretical model.     Problem List:  Patient Active Problem List   Diagnosis    Primary hypertension    Mild intermittent asthma without complication    Acute left-sided low back pain with left-sided sciatica    Bipolar disorder, current episode mixed, moderate    Gall stones    Seasonal allergic rhinitis    Superior glenoid labrum lesion of left shoulder    Impaired fasting glucose    Mixed hyperlipidemia    Tobacco use disorder    Elevated factor VIII level    History of CVA (cerebrovascular accident)    Well adult exam    TIA (transient ischemic attack)    Class 1 obesity with serious comorbidity and body mass index (BMI) of 34.0 to 34.9 in adult    Slow transit constipation    Mixed stress and urge urinary incontinence    Lumbar radiculopathy    Factor VIII inhibitor disorder    New onset of headaches after age 52    Stenosis of cerebral artery    Small aneurysm of supraclinoid carotid artery    Neuropathy    Prediabetes    Nonruptured cerebral aneurysm    Asymptomatic stenosis of posterior cerebral artery    Cerebellar infarction    MTHFR gene mutation    Pure hypercholesterolemia    Colon cancer screening     Allergy:   Allergies   Allergen Reactions    Adhesive Tape Itching    Doxazosin Other (See Comments)     Feeling like she was going to pass out    Lisinopril Anaphylaxis     Pt states it almost killed her last time she was on this medication     Nsaids Other (See Comments)     On Plavix    Tape Itching        Discontinued Medications:  There are no discontinued  medications.    Current Medications:   Current Outpatient Medications   Medication Sig Dispense Refill    albuterol (ACCUNEB) 1.25 MG/3ML nebulizer solution Take 3 mL by nebulization Every 6 (Six) Hours As Needed for Wheezing or Shortness of Air. Indications: asthma 50 each 0    albuterol sulfate  (90 Base) MCG/ACT inhaler Inhale 2 puffs Every 6 (Six) Hours As Needed for Wheezing or Shortness of Air. Indications: Asthma 8 g 2    amLODIPine (NORVASC) 5 MG tablet Take 1 tablet by mouth Daily. Indications: High Blood Pressure Disorder 90 tablet 1    atorvastatin (LIPITOR) 20 MG tablet Take 1 tablet by mouth Every Night. Indications: High Amount of Fats in the Blood 90 tablet 1    candesartan (ATACAND) 8 MG tablet Take 1 tablet by mouth 2 (Two) Times a Day. Indications: High Blood Pressure Disorder 180 tablet 1    cetirizine (zyrTEC) 10 MG tablet TAKE 1 TABLET BY MOUTH EVERY NIGHT AT BEDTIME AS NEEDED FOR ALLERGIES 90 tablet 1    clopidogrel (PLAVIX) 75 MG tablet Take 1 tablet by mouth Daily. 90 tablet 1    DULoxetine (CYMBALTA) 30 MG capsule       folic acid (FOLVITE) 1 MG tablet Take 1 tablet by mouth Daily.      montelukast (SINGULAIR) 10 MG tablet TAKE 1 TABLET BY MOUTH EVERY NIGHT 90 tablet 1    ondansetron (ZOFRAN) 8 MG tablet Take 1 tablet by mouth Every 8 (Eight) Hours As Needed for Nausea or Vomiting. 10 tablet 0    oxybutynin XL (DITROPAN-XL) 10 MG 24 hr tablet Take 1 tablet by mouth Daily. Indications: Overactive Bladder 90 tablet 1    traMADol (ULTRAM) 50 MG tablet Take 1 tablet by mouth Every 8 (Eight) Hours As Needed for Moderate Pain. 30 tablet 1     No current facility-administered medications for this visit.     Past Medical History:  Past Medical History:   Diagnosis Date    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    Acid reflux disease     Allergic Unknown    Allergy     unspecified    Anxiety     Asthma Unknown    Back pain     Bilateral carpal tunnel syndrome 08/17/2017    Bipolar disorder      Bursitis of left shoulder 08/09/2016    Cataract Unknown    Chronic pain disorder     Clotting disorder     Deep vein thrombosis All my life    Depression     Eczema     Gall stones     H/O psychiatric care     Heart attack     Heart murmur     Hyperlipidemia 11/22    Hypertension     Incomplete tear of left rotator cuff 01/25/2017    Left shoulder pain     Loss of appetite     Lumbago 05/08/2015    low back pain     Memory loss     forgetfulness    Migraine headache     Need for hepatitis C screening test     Obesity     Palpitation     Panic disorder     Ringing in ears     Seasonal allergies     Shortness of breath     Sinus trouble     unspecified     Stroke (cerebrum)     Subacromial impingement, left 10/13/2016    Superior glenoid labrum lesion of left shoulder 10/13/2016    subsequent encounter    Trouble swallowing     Urinary tract infection Unknown     Past Surgical History:  Past Surgical History:   Procedure Laterality Date    ABDOMINAL SURGERY      APPENDECTOMY      BACK SURGERY      CARPAL TUNNEL RELEASE      CHOLECYSTECTOMY      COLONOSCOPY  2014    COLONOSCOPY N/A 5/15/2024    Procedure: COLONOSCOPY with cold snare polypectomy;  Surgeon: Vivi Villafana MD;  Location: [facility] ENDOSCOPY;  Service: Gastroenterology;  Laterality: N/A;  colon polyp, hemporrhoid    ENDOSCOPY  2014    FRACTURE SURGERY      HYSTERECTOMY  1999    SHOULDER SURGERY Left 10/03/2016    arthroscopic, left shoulder scope, RTC repair- Dr. Flores    SKIN BIOPSY         MENTAL STATUS EXAM    Vital Signs:   There were no vitals taken for this visit.   Lab Results:   Lab on 07/05/2024   Component Date Value Ref Range Status    WBC 07/05/2024 10.18  3.40 - 10.80 10*3/mm3 Final    RBC 07/05/2024 5.25  3.77 - 5.28 10*6/mm3 Final    Hemoglobin 07/05/2024 15.6  12.0 - 15.9 g/dL Final    Hematocrit 07/05/2024 46.8 (H)  34.0 - 46.6 % Final    MCV 07/05/2024 89.1  79.0 - 97.0 fL Final    MCH 07/05/2024 29.7  26.6 - 33.0 pg Final     MCHC 07/05/2024 33.3  31.5 - 35.7 g/dL Final    RDW 07/05/2024 12.6  12.3 - 15.4 % Final    RDW-SD 07/05/2024 40.4  37.0 - 54.0 fl Final    MPV 07/05/2024 12.6 (H)  6.0 - 12.0 fL Final    Platelets 07/05/2024 237  140 - 450 10*3/mm3 Final    Neutrophil % 07/05/2024 52.8  42.7 - 76.0 % Final    Lymphocyte % 07/05/2024 36.5  19.6 - 45.3 % Final    Monocyte % 07/05/2024 7.3  5.0 - 12.0 % Final    Eosinophil % 07/05/2024 2.5  0.3 - 6.2 % Final    Basophil % 07/05/2024 0.6  0.0 - 1.5 % Final    Immature Grans % 07/05/2024 0.3  0.0 - 0.5 % Final    Neutrophils, Absolute 07/05/2024 5.38  1.70 - 7.00 10*3/mm3 Final    Lymphocytes, Absolute 07/05/2024 3.72 (H)  0.70 - 3.10 10*3/mm3 Final    Monocytes, Absolute 07/05/2024 0.74  0.10 - 0.90 10*3/mm3 Final    Eosinophils, Absolute 07/05/2024 0.25  0.00 - 0.40 10*3/mm3 Final    Basophils, Absolute 07/05/2024 0.06  0.00 - 0.20 10*3/mm3 Final    Immature Grans, Absolute 07/05/2024 0.03  0.00 - 0.05 10*3/mm3 Final    nRBC 07/05/2024 0.0  0.0 - 0.2 /100 WBC Final    Glucose 07/05/2024 102 (H)  65 - 99 mg/dL Final    BUN 07/05/2024 12  8 - 23 mg/dL Final    Creatinine 07/05/2024 0.75  0.57 - 1.00 mg/dL Final    Sodium 07/05/2024 142  136 - 145 mmol/L Final    Potassium 07/05/2024 4.5  3.5 - 5.2 mmol/L Final    Chloride 07/05/2024 107  98 - 107 mmol/L Final    CO2 07/05/2024 26.0  22.0 - 29.0 mmol/L Final    Calcium 07/05/2024 9.3  8.6 - 10.5 mg/dL Final    Total Protein 07/05/2024 6.7  6.0 - 8.5 g/dL Final    Albumin 07/05/2024 4.2  3.5 - 5.2 g/dL Final    ALT (SGPT) 07/05/2024 14  1 - 33 U/L Final    AST (SGOT) 07/05/2024 19  1 - 32 U/L Final    Alkaline Phosphatase 07/05/2024 125 (H)  39 - 117 U/L Final    Total Bilirubin 07/05/2024 0.3  0.0 - 1.2 mg/dL Final    Globulin 07/05/2024 2.5  gm/dL Final    A/G Ratio 07/05/2024 1.7  g/dL Final    BUN/Creatinine Ratio 07/05/2024 16.0  7.0 - 25.0 Final    Anion Gap 07/05/2024 9.0  5.0 - 15.0 mmol/L Final    eGFR 07/05/2024 90.1  >60.0  mL/min/1.73 Final    Total Cholesterol 07/05/2024 113  0 - 200 mg/dL Final    Triglycerides 07/05/2024 55  0 - 150 mg/dL Final    HDL Cholesterol 07/05/2024 72 (H)  40 - 60 mg/dL Final    LDL Cholesterol  07/05/2024 28  0 - 100 mg/dL Final    VLDL Cholesterol 07/05/2024 13  5 - 40 mg/dL Final    LDL/HDL Ratio 07/05/2024 0.42   Final    Hemoglobin A1C 07/05/2024 6.20 (H)  4.80 - 5.60 % Final   Admission on 06/10/2024, Discharged on 06/10/2024   Component Date Value Ref Range Status    SARS Antigen 06/10/2024 Not Detected  Not Detected, Presumptive Negative Final    Internal Control 06/10/2024 Passed  Passed Final    Lot Number 06/10/2024 [id number]   Final    Expiration Date 06/10/2024 7/11/24   Final    Rapid Influenza A Ag 06/10/2024 Negative  Negative Final    Rapid Influenza B Ag 06/10/2024 Negative  Negative Final    Internal Control 06/10/2024 Passed  Passed Final    Lot Number 06/10/2024 [id number]   Final    Expiration Date 06/10/2024 12/12/25   Final   Admission on 05/15/2024, Discharged on 05/15/2024   Component Date Value Ref Range Status    Case Report 05/15/2024    Final                    Value:Surgical Pathology Report                         Case: [id number]                                  Authorizing Provider:  Vivi Villafana MD Collected:           05/15/2024 12:42 PM          Ordering Location:     [facility] Received:            05/15/2024 01:40 PM                                 SUITES                                                                       Pathologist:           Holli Arguello MD                                                     Specimen:    Large Intestine, Rectum, rectal polyp cold snare                                           Clinical Information 05/15/2024    Final                    Value:This result contains rich text formatting which cannot be displayed here.    Final Diagnosis 05/15/2024    Final                    Value:This result  contains rich text formatting which cannot be displayed here.    Gross Description 05/15/2024    Final                    Value:This result contains rich text formatting which cannot be displayed here.    Microscopic Description 05/15/2024    Final                    Value:This result contains rich text formatting which cannot be displayed here.   Office Visit on 02/15/2024   Component Date Value Ref Range Status    Valproic Acid 02/15/2024 49.0 (L)  50.0 - 125.0 mcg/mL Final    Hemoglobin A1C 02/15/2024 5.90 (H)  4.80 - 5.60 % Final    Iron 02/15/2024 51  37 - 145 mcg/dL Final    Iron Saturation (TSAT) 02/15/2024 14 (L)  20 - 50 % Final    Transferrin 02/15/2024 251  200 - 360 mg/dL Final    TIBC 02/15/2024 374  298 - 536 mcg/dL Final    Ferritin 02/15/2024 316.00 (H)  13.00 - 150.00 ng/mL Final    Folate 02/15/2024 >20.00  4.78 - 24.20 ng/mL Final    Vitamin B-12 02/15/2024 1,334 (H)  211 - 946 pg/mL Final    TSH 02/15/2024 2.850  0.270 - 4.200 uIU/mL Final    Free T4 02/15/2024 1.07  0.93 - 1.70 ng/dL Final    T4 results may be falsely increased if patient taking Biotin.    25 Hydroxy, Vitamin D 02/15/2024 35.1  30.0 - 100.0 ng/ml Final    Methylmalonic Acid 02/15/2024 227  0 - 378 nmol/L Final   Admission on 02/02/2024, Discharged on 02/02/2024   Component Date Value Ref Range Status    QT Interval 02/02/2024 442  ms Final    QTC Interval 02/02/2024 425  ms Final    Glucose 02/02/2024 114 (H)  65 - 99 mg/dL Final    BUN 02/02/2024 14  8 - 23 mg/dL Final    Creatinine 02/02/2024 0.74  0.57 - 1.00 mg/dL Final    Sodium 02/02/2024 141  136 - 145 mmol/L Final    Potassium 02/02/2024 3.9  3.5 - 5.2 mmol/L Final    Slight hemolysis detected by analyzer. Result may be falsely elevated.    Chloride 02/02/2024 104  98 - 107 mmol/L Final    CO2 02/02/2024 27.7  22.0 - 29.0 mmol/L Final    Calcium 02/02/2024 9.3  8.6 - 10.5 mg/dL Final    Total Protein 02/02/2024 6.9  6.0 - 8.5 g/dL Final    Albumin 02/02/2024 4.1  3.5 -  5.2 g/dL Final    ALT (SGPT) 02/02/2024 11  1 - 33 U/L Final    AST (SGOT) 02/02/2024 14  1 - 32 U/L Final    Alkaline Phosphatase 02/02/2024 95  39 - 117 U/L Final    Total Bilirubin 02/02/2024 0.3  0.0 - 1.2 mg/dL Final    Globulin 02/02/2024 2.8  gm/dL Final    A/G Ratio 02/02/2024 1.5  g/dL Final    BUN/Creatinine Ratio 02/02/2024 18.9  7.0 - 25.0 Final    Anion Gap 02/02/2024 9.3  5.0 - 15.0 mmol/L Final    eGFR 02/02/2024 92.2  >60.0 mL/min/1.73 Final    proBNP 02/02/2024 132.6  0.0 - 900.0 pg/mL Final    HS Troponin T 02/02/2024 7  <14 ng/L Final    Extra Tube 02/02/2024 Hold for add-ons.   Final    Auto resulted.    Extra Tube 02/02/2024 hold for add-on   Final    Auto resulted    Extra Tube 02/02/2024 Hold for add-ons.   Final    Auto resulted.    Extra Tube 02/02/2024 Hold for add-ons.   Final    Auto resulted    WBC 02/02/2024 11.58 (H)  3.40 - 10.80 10*3/mm3 Final    RBC 02/02/2024 4.68  3.77 - 5.28 10*6/mm3 Final    Hemoglobin 02/02/2024 13.8  12.0 - 15.9 g/dL Final    Hematocrit 02/02/2024 42.6  34.0 - 46.6 % Final    MCV 02/02/2024 91.0  79.0 - 97.0 fL Final    MCH 02/02/2024 29.5  26.6 - 33.0 pg Final    MCHC 02/02/2024 32.4  31.5 - 35.7 g/dL Final    RDW 02/02/2024 13.4  12.3 - 15.4 % Final    RDW-SD 02/02/2024 45.1  37.0 - 54.0 fl Final    MPV 02/02/2024 11.2  6.0 - 12.0 fL Final    Platelets 02/02/2024 243  140 - 450 10*3/mm3 Final    Neutrophil % 02/02/2024 48.1  42.7 - 76.0 % Final    Lymphocyte % 02/02/2024 38.3  19.6 - 45.3 % Final    Monocyte % 02/02/2024 9.9  5.0 - 12.0 % Final    Eosinophil % 02/02/2024 2.9  0.3 - 6.2 % Final    Basophil % 02/02/2024 0.5  0.0 - 1.5 % Final    Immature Grans % 02/02/2024 0.3  0.0 - 0.5 % Final    Neutrophils, Absolute 02/02/2024 5.56  1.70 - 7.00 10*3/mm3 Final    Lymphocytes, Absolute 02/02/2024 4.43 (H)  0.70 - 3.10 10*3/mm3 Final    Monocytes, Absolute 02/02/2024 1.15 (H)  0.10 - 0.90 10*3/mm3 Final    Eosinophils, Absolute 02/02/2024 0.34  0.00 - 0.40  10*3/mm3 Final    Basophils, Absolute 02/02/2024 0.06  0.00 - 0.20 10*3/mm3 Final    Immature Grans, Absolute 02/02/2024 0.04  0.00 - 0.05 10*3/mm3 Final    nRBC 02/02/2024 0.0  0.0 - 0.2 /100 WBC Final    COVID19 02/02/2024 Not Detected  Not Detected - Ref. Range Final    Influenza A PCR 02/02/2024 Not Detected  Not Detected Final    Influenza B PCR 02/02/2024 Not Detected  Not Detected Final    RSV, PCR 02/02/2024 Not Detected  Not Detected Final       ASSESSMENT AND PLAN:  No diagnosis found.    Audra is a 64 y.o. female who presents today for follow-up regarding ***. We have discussed the interval history and the treatment plan below, including potential R/B/SE of the recommended regimen of which the patient demonstrates understanding. Patient is agreeable to call 911 or go to the nearest ER should she become concerned for her own safety and/or the safety of those around her. There are {ARE / ARE NO:92776} overt indices of acute davon/psychosis on exam today. ALBERTO reviewed and {IS/IS NOT:93215} as expected.    Medication regimen: ***; patient is advised not to misuse prescribed medications or to use them with any exogenous substances that aren't disclosed to this provider as they may interact with the regimen to the patient's detriment.   Risk Assessment: protracted risk is ***, imminent risk is *** Do note that this is subject to change with the [employer] of new stressors, treatment non-adherence, use of substances, and/or new medical ails.   Monitoring: reviewed labs/imaging as populated above; ordered  Therapy: ***  Follow-up: {Lcyrmk11:03435}  Communications: N/A    TREATMENT PLAN/GOALS: challenge patterns of living conducive to symptom burden, implement recommended regimen as above with augmentative, intermittent supportive psychotherapy to reduce symptom burden. Patient acknowledged and verbally consented to continue treatment. The importance of adherence to the recommended treatment and interval follow-up  appointments was again emphasized today: patient has {good/fair/poor:013404311} treatment adherence per given history. Patient was today reminded to limit daily caffeine intake, hydrate appropriately, eat healthy and nutritious foods, engage sleep hygiene measures, engage appropriate exposure to sunlight, engage with hobbies in balance with life necessities, and exercise appropriate to their capacity to do so.     Billing:  Additionally, I provided {OElicd86:74811} minutes of dedicated psychotherapy to the patient as documented above.    Parts of this note are electronic transcriptions/translations of spoken language to printed text using the Dragon Dictation system.    Electronically signed by HERMAN Arguello, 07/12/24

## 2024-07-12 NOTE — PATIENT INSTRUCTIONS
Heart Failure  Heart failure is a condition in which the heart has trouble pumping blood. This means your heart does not pump blood efficiently for your body to work well. In some cases of heart failure, fluid may back up into your lungs or you may have swelling (edema) in your lower legs. Heart failure is usually a long-term (chronic) condition. It is important for you to take good care of yourself and follow your health care provider's treatment plan.    How to cope with Congestive Heart Failure:  Congestive Heart Failure (CHF) is not an unchanging condition.  Heart Failure may deteriorate for a variety of reasons.  For instance:  excessive salt or fluid intake, illness such as flu or pneumonia, cardiac arrhythmias, and heart attack all may worsen heart failure.  Sometimes the patient with heart failure worsens for no apparent reason.  The educated patient must know how to anticipate deterioration, and to know how to react to it in order to correct the deterioration before it becomes serious.  Just as when steering a car, the heart failure patient must adjust to changes in their condition in order to stay on course.  A little too wet and they become congested and short of breathe.  A little too dry and they become weak, fatigued and dizzy.    When your provider examines your neck, he/she is looking at your veins to assess how much fluid is in the circulatory system.  You can also help monitor your fluid status by paying attention to your condition,(particularly how you feel), by noting how much swelling is present at the ankles and monitoring your body weight daily. A little bit of swelling of the ankles at the end of the day is normal and indicates sufficient fluid in the circulatory system to allow a weakened heart to pump normally.  More a than a trace of swelling at the ankles indicates fluid excess.  This fluid may re-enter the central circulation when you lie down, awakening you with shortness of breath or  forcing you to sleep on several pillows for comfort.  Similarly if you weight goes up by more than 2-3 pounds in 1-2 days or by 5 pounds over a week, the body may be retaining too much fluid and worsening heart failure may ensue.  CAUSES   Some health conditions can cause heart failure. Those health conditions include:  High blood pressure (hypertension). Hypertension causes the heart muscle to work harder than normal. When pressure in the blood vessels is high, the heart needs to pump (contract) with more force in order to circulate blood throughout the body. High blood pressure eventually causes the heart to become stiff and weak.  Coronary artery disease (CAD). CAD is the buildup of cholesterol and fat (plaque) in the arteries of the heart. The blockage in the arteries deprives the heart muscle of oxygen and blood. This can cause chest pain and may lead to a heart attack. High blood pressure can also contribute to CAD.  Heart attack (myocardial infarction). A heart attack occurs when one or more arteries in the heart become blocked. The loss of oxygen damages the muscle tissue of the heart. When this happens, part of the heart muscle dies. The injured tissue does not contract as well and weakens the heart's ability to pump blood.  Abnormal heart valves. When the heart valves do not open and close properly, it can cause heart failure. This makes the heart muscle pump harder to keep the blood flowing.  Heart muscle disease (cardiomyopathy or myocarditis). Heart muscle disease is damage to the heart muscle from a variety of causes. These can include drug or alcohol abuse, infections, or unknown reasons. These can increase the risk of heart failure.  Lung disease. Lung disease makes the heart work harder because the lungs do not work properly. This can cause a strain on the heart, leading it to fail.  Diabetes. Diabetes increases the risk of heart failure. High blood sugar contributes to high fat (lipid) levels in  the blood. Diabetes can also cause slow damage to tiny blood vessels that carry important nutrients to the heart muscle. When the heart does not get enough oxygen and food, it can cause the heart to become weak and stiff. This leads to a heart that does not contract efficiently.  Other conditions can contribute to heart failure. These include abnormal heart rhythms, thyroid problems, and low blood counts (anemia).  Certain unhealthy behaviors can increase the risk of heart failure, including:  Being overweight.  Smoking or chewing tobacco.  Eating foods high in fat and cholesterol.  Abusing illicit drugs or alcohol.  Lacking physical activity.  SYMPTOMS   Heart failure symptoms may vary and can be hard to detect. Symptoms may include:  Shortness of breath with activity, such as climbing stairs.  Persistent cough.  Swelling of the feet, ankles, legs, or abdomen.  Unexplained weight gain.  Difficulty breathing when lying flat (orthopnea).  Waking from sleep because of the need to sit up and get more air.  Rapid heartbeat.  Fatigue and loss of energy.  Feeling light-headed, dizzy, or close to fainting.  Loss of appetite.  Nausea.  Increased urination during the night (nocturia).      DIAGNOSIS   A diagnosis of heart failure is based on your history, symptoms, physical examination, and diagnostic tests. Diagnostic tests for heart failure may include:  Echocardiography.  Electrocardiography.  Chest X-ray.  Blood tests.  Exercise stress test.  Cardiac angiography.  Radionuclide scans.  TREATMENT   Treatment is aimed at managing the symptoms of heart failure. Medicines, behavioral changes, or surgical intervention may be necessary to treat heart failure.  Medicines to help treat heart failure may include:  Angiotensin-converting enzyme (ACE) inhibitors. This type of medicine blocks the effects of a blood protein called angiotensin-converting enzyme. ACE inhibitors relax (dilate) the blood vessels and help lower blood  pressure.  Angiotensin receptor blockers (ARBs). This type of medicine blocks the actions of a blood protein called angiotensin. Angiotensin receptor blockers dilate the blood vessels and help lower blood pressure.  Water pills (diuretics). Diuretics cause the kidneys to remove salt and water from the blood. The extra fluid is removed through urination. This loss of extra fluid lowers the volume of blood the heart pumps.  Beta blockers. These prevent the heart from beating too fast and improve heart muscle strength.  Digitalis. This increases the force of the heartbeat.  Healthy behavior changes include:  Obtaining and maintaining a healthy weight.  Stopping smoking or chewing tobacco.  Eating heart-healthy foods.  Limiting or avoiding alcohol.  Stopping illicit drug use.  Physical activity as directed by your health care provider.  Surgical treatment for heart failure may include:  A procedure to open blocked arteries, repair damaged heart valves, or remove damaged heart muscle tissue.  A pacemaker to improve heart muscle function and control certain abnormal heart rhythms.  An internal cardioverter defibrillator to treat certain serious abnormal heart rhythms.  A left ventricular assist device (LVAD) to assist the pumping ability of the heart.        HOME CARE INSTRUCTIONS   Take medicines only as directed by your health care provider. Medicines are important in reducing the workload of your heart, slowing the progression of heart failure, and improving your symptoms.  Do not stop taking your medicine unless directed by your health care provider.  Do not skip any dose of medicine.  Refill your prescriptions before you run out of medicine. Your medicines are needed every day.  Engage in moderate physical activity if directed by your health care provider. Moderate physical activity can benefit some people. The elderly and people with severe heart failure should consult with a health care provider for physical  activity recommendations.  Eat heart-healthy foods. Food choices should be free of trans fat and low in saturated fat, cholesterol, and salt (sodium). Healthy choices include fresh or frozen fruits and vegetables, fish, lean meats, legumes, fat-free or low-fat dairy products, and whole grain or high fiber foods. Talk to a dietitian to learn more about heart-healthy foods.  Limit sodium if directed by your health care provider. Sodium restriction may reduce symptoms of heart failure in some people. Talk to a dietitian to learn more about heart-healthy seasonings.  Use healthy cooking methods. Healthy cooking methods include roasting, grilling, broiling, baking, poaching, steaming, or stir-frying. Talk to a dietitian to learn more about healthy cooking methods.  Limit fluids if directed by your health care provider. Fluid restriction may reduce symptoms of heart failure in some people.  Weigh yourself every day. Daily weights are important in the early recognition of excess fluid. You should weigh yourself every morning after you urinate and before you eat breakfast. Wear the same amount of clothing each time you weigh yourself. Record your daily weight. Provide your health care provider with your weight record.  Monitor and record your blood pressure if directed by your health care provider.  Check your pulse if directed by your health care provider.  Lose weight if directed by your health care provider. Weight loss may reduce symptoms of heart failure in some people.  Stop smoking or chewing tobacco. Nicotine makes your heart work harder by causing your blood vessels to constrict. Do not use nicotine gum or patches before talking to your health care provider.  Keep all follow-up visits as directed by your health care provider. This is important.  Limit alcohol intake to no more than 1 drink per day for nonpregnant women and 2 drinks per day for men. One drink equals 12 ounces of beer, 5 ounces of wine, or 1½ ounces  of hard liquor. Drinking more than that is harmful to your heart. Tell your health care provider if you drink alcohol several times a week. Talk with your health care provider about whether alcohol is safe for you. If your heart has already been damaged by alcohol or you have severe heart failure, drinking alcohol should be stopped completely.  Stop illicit drug use.  Stay up-to-date with immunizations. It is especially important to prevent respiratory infections through current pneumococcal and influenza immunizations.  Manage other health conditions such as hypertension, diabetes, thyroid disease, or abnormal heart rhythms as directed by your health care provider.  Learn to manage stress.  Plan rest periods when fatigued.  Learn strategies to manage high temperatures. If the weather is extremely hot:  Avoid vigorous physical activity.  Use air conditioning or fans or seek a cooler location.  Avoid caffeine and alcohol.  Wear loose-fitting, lightweight, and light-colored clothing.  Learn strategies to manage cold temperatures. If the weather is extremely cold:  Avoid vigorous physical activity.  Layer clothes.  Wear mittens or gloves, a hat, and a scarf when going outside.  Avoid alcohol.  Obtain ongoing education and support as needed.  Participate in or seek rehabilitation as needed to maintain or improve independence and quality of life.      Medication Type Medication Name/Dose How much to take When to take it                                                                                                           To Monitor Your Own Fluid Status at Home:   1.Weigh yourself daily   2. Weigh yourself at the same time every day-before breakfast is best   3. Use the same scale all the time   4. Wear the same amount of clothes when you weigh yourself   5. Empty your bladder before weighing   6. Record your weight on the daily record below   7. The weight at which there is just a little bit of swelling in the ankles  at the end of       the day is your ideal weight- try and maintain it   8. When taking diuretics, avoid drinking too much in the way of fluids, even if your      mouth is dry and you feel thirsty.  This could counter the effect of the diuretic           and dilute the body's salts causing weakness and confusion   9. You should drink no more than 2000 ml (8 glasses or cups) of fluid per day, or          whatever amount is prescribed for you      Date Weight Exercise Duration Symptoms Better/Worse/Same Swelling Better/Worse/Same                                                                                                                                                                Congestive Heart Failure Management Guide      Green Zone: All Clear Green Zone: Actions   Your Goal Weight____________  *No Shortness of Breath  *No Swelling  *No Weight Gain  *No Chest Pain  *No Decrease in your ability to    maintain your activity level *Your symptoms are under control  *Continue taking your medications as              ordered  *Continue daily weights  *Follow low salt diet  *Keep all physician/provider     appointments     Yellow Zone: Caution Yellow Zone: Actions   If you have any of the following signs or symptoms;    *weight gain of 3 or more pounds in 2 days  *increased cough  *increased swelling  *increased shortness of breath with activity  *increased in the number of pillows to          sleep comfortably    Call your provider's medical assistant or home health nurse or nurse coordinator *Your symptoms may indicate that you           need an adjustment of your medications  *Call your provider's medical         assistant, home health nurse, or      nurse coordinator  *NAME______________________    *NUMBER___________________    *INSTRUCTIONS_______________________________________________________________________________________________________       Red Zone: Medical Alert Red Zone: Actions   *Unrelieved shortness  "of breath   *Shortness of breath at rest  *Unrelieved chest pain  *Wheezing or chest tightness at rest  *Need to sit in chair to sleep  *Weight gain or loss of more than 5       pounds in 2 days  *Confusion This indicates that you need to be evaluated by a physician right away  Call your Doctor immediately if you are going into the red zone    Doctor:______________________________  Number:_____________________________  If unable to reach Doctor, then call 911                      Low-Sodium Eating Plan  Salt (sodium) helps you keep a healthy balance of fluids in your body. Too much sodium can raise your blood pressure. It can also cause fluid and waste to be held in your body.  Your health care provider or dietitian may recommend a low-sodium eating plan if you have high blood pressure (hypertension), kidney disease, liver disease, or heart failure. Eating less sodium can help lower your blood pressure and reduce swelling. It can also protect your heart, liver, and kidneys.  What are tips for following this plan?  Reading food labels    Check food labels for the amount of sodium per serving. If you eat more than one serving, you must multiply the listed amount by the number of servings.  Choose foods with less than 140 milligrams (mg) of sodium per serving.  Avoid foods with 300 mg of sodium or more per serving.  Always check how much sodium is in a product, even if the label says \"unsalted\" or \"no salt added.\"  Shopping    Buy products labeled as \"low-sodium\" or \"no salt added.\"  Buy fresh foods.  Avoid canned foods and pre-made or frozen meals.  Avoid canned, cured, or processed meats.  Buy breads that have less than 80 mg of sodium per slice.  Cooking    Eat more home-cooked food. Try to eat less restaurant, buffet, and fast food.  Try not to add salt when you cook. Use salt-free seasonings or herbs instead of table salt or sea salt. Check with your provider or pharmacist before using salt substitutes.  Cook with " "plant-based oils, such as canola, sunflower, or olive oil.  Meal planning  When eating at a restaurant, ask if your food can be made with less salt or no salt. Avoid dishes labeled as brined, pickled, cured, or smoked. Avoid dishes made with soy sauce, miso, or teriyaki sauce.  Avoid foods that have monosodium glutamate (MSG) in them. MSG may be added to some restaurant food, sauces, soups, bouillon, and canned foods.  Make meals that can be grilled, baked, poached, roasted, or steamed. These are often made with less sodium.  General information  Try to limit your sodium intake to 1,500-2,300 mg each day, or the amount told by your provider.  What foods should I eat?  Fruits  Fresh, frozen, or canned fruit. Fruit juice.  Vegetables  Fresh or frozen vegetables. \"No salt added\" canned vegetables. \"No salt added\" tomato sauce and paste. Low-sodium or reduced-sodium tomato and vegetable juice.  Grains  Low-sodium cereals, such as oats, puffed wheat and rice, and shredded wheat. Low-sodium crackers. Unsalted rice. Unsalted pasta. Low-sodium bread. Whole grain breads and whole grain pasta.  Meats and other proteins  Fresh or frozen meat, poultry, seafood, and fish. These should have no added salt. Low-sodium canned tuna and salmon. Unsalted nuts. Dried peas, beans, and lentils without added salt. Unsalted canned beans. Eggs. Unsalted nut butters.  Dairy  Milk. Soy milk. Cheese that is naturally low in sodium, such as ricotta cheese, fresh mozzarella, or Swiss cheese. Low-sodium or reduced-sodium cheese. Cream cheese. Yogurt.  Seasonings and condiments  Fresh and dried herbs and spices. Salt-free seasonings. Low-sodium mustard and ketchup. Sodium-free salad dressing. Sodium-free light mayonnaise. Fresh or refrigerated horseradish. Lemon juice. Vinegar.  Other foods  Homemade, reduced-sodium, or low-sodium soups. Unsalted popcorn and pretzels. Low-salt or salt-free chips.  The items listed above may not be all the foods and " drinks you can have. Talk to a dietitian to learn more.  What foods should I avoid?  Vegetables  Sauerkraut, pickled vegetables, and relishes. Olives. French fries. Onion rings. Regular canned vegetables, except low-sodium or reduced-sodium items. Regular canned tomato sauce and paste. Regular tomato and vegetable juice. Frozen vegetables in sauces.  Grains  Instant hot cereals. Bread stuffing, pancake, and biscuit mixes. Croutons. Seasoned rice or pasta mixes. Noodle soup cups. Boxed or frozen macaroni and cheese. Regular salted crackers. Self-rising flour.  Meats and other proteins  Meat or fish that is salted, canned, smoked, spiced, or pickled. Precooked or cured meat, such as sausages or meat loaves. Claire. Ham. Pepperoni. Hot dogs. Corned beef. Chipped beef. Salt pork. Jerky. Pickled herring, anchovies, and sardines. Regular canned tuna. Salted nuts.  Dairy  Processed cheese and cheese spreads. Hard cheeses. Cheese curds. Blue cheese. Feta cheese. String cheese. Regular cottage cheese. Buttermilk. Canned milk.  Fats and oils  Salted butter. Regular margarine. Ghee. Claire fat.  Seasonings and condiments  Onion salt, garlic salt, seasoned salt, table salt, and sea salt. Canned and packaged gravies. Worcestershire sauce. Tartar sauce. Barbecue sauce. Teriyaki sauce. Soy sauce, including reduced-sodium soy sauce. Steak sauce. Fish sauce. Oyster sauce. Cocktail sauce. Horseradish that you find on the shelf. Regular ketchup and mustard. Meat flavorings and tenderizers. Bouillon cubes. Hot sauce. Pre-made or packaged marinades. Pre-made or packaged taco seasonings. Relishes. Regular salad dressings. Salsa.  Other foods  Salted popcorn and pretzels. Corn chips and puffs. Potato and tortilla chips. Canned or dried soups. Pizza. Frozen entrees and pot pies.  The items listed above may not be all the foods and drinks you should avoid. Talk to a dietitian to learn more.  This information is not intended to replace advice  given to you by your health care provider. Make sure you discuss any questions you have with your health care provider.  Document Revised: 01/04/2024 Document Reviewed: 01/04/2024  Elsevier Patient Education © 2024 [employer]

## 2024-07-12 NOTE — TELEPHONE ENCOUNTER
Caller: Audra Montejo    Relationship: Self    Best call back number: [phone]     What is the best time to reach you: ANYTIME     Who are you requesting to speak with (clinical staff, provider,  specific staff member): CLINICAL     What was the call regarding: JUST FYI, PATIENT HAS AN APPOINTMENT COMING UP ON 07/18/2024 CC, FOR DEPRESSION, AND MOOD SWINGS, PATIENT DID STATE NO THOUGHTS OF SELF HARM, OR SELF HARM TO OTHERS.

## 2024-07-12 NOTE — TELEPHONE ENCOUNTER
I am confused with this message.  She is not having any thoughts of self-harm?  She is okay until I see her on 7/18/2024?

## 2024-07-15 ENCOUNTER — TELEPHONE (OUTPATIENT)
Dept: PSYCHIATRY | Facility: CLINIC | Age: 64
End: 2024-07-15
Payer: COMMERCIAL

## 2024-07-15 NOTE — TELEPHONE ENCOUNTER
ATTEMPTED TO CONTACT PT(PATIENT) PER PROVIDER'S INSTRUCTIONS     Ida Parham APRN   to [facility]  7/15/24  1:05 PM  Please call patient later in the afternoons (she works nights)- Let Audra know if she would like -I can restart lamotrigine at 25 mg po q day ramp up protocol (risks are rash, sedation, weight gain) Or she can try low dose ziprasidone for mood (risks are rash, movement disorder, less known for weight and sedation.) There are other options as well, but, would need to see her in office or online.     UNABLE TO LEAVE A  VOICEMAIL WITH INSTRUCTIONS TO RETURN CALL TO OFFICE AT [phone]

## 2024-07-16 NOTE — TELEPHONE ENCOUNTER
PT PHONED IN AND LVM.    PT RETURNING OUR CALL THOUGH STATED SHE WAS GOING TO BE GOING TO BED. 10:22 AM.

## 2024-07-16 NOTE — TELEPHONE ENCOUNTER
ATTEMPTED TO CONTACT PT(PATIENT) PER PROVIDER'S INSTRUCTIONS     Ida Parham APRN   to [facility]  7/15/24  1:05 PM  Please call patient later in the afternoons (she works nights)- Let Audra know if she would like -I can restart lamotrigine at 25 mg po q day ramp up protocol (risks are rash, sedation, weight gain) Or she can try low dose ziprasidone for mood (risks are rash, movement disorder, less known for weight and sedation.) There are other options as well, but, would need to see her in office or online.     UNABLE TO LEAVE A  VOICEMAIL WITH INSTRUCTIONS TO RETURN CALL TO OFFICE AT [phone]

## 2024-07-16 NOTE — TELEPHONE ENCOUNTER
ATTEMPTED TO CONTACT PT(PATIENT) PER PROVIDER'S INSTRUCTIONS     Ida Parham APRN   to [facility] [place] [facility] Clinical [facility]  7/15/24  1:05 PM  Please call patient later in the afternoons (she works nights)- Let Audra know if she would like -I can restart lamotrigine at 25 mg po q day ramp up protocol (risks are rash, sedation, weight gain) Or she can try low dose ziprasidone for mood (risks are rash, movement disorder, less known for weight and sedation.) There are other options as well, but, would need to see her in office or online.     UNABLE TO LEAVE A  VOICEMAIL WITH INSTRUCTIONS TO RETURN CALL TO OFFICE AT [phone]

## 2024-07-17 DIAGNOSIS — F39 MOOD DISORDER: Primary | ICD-10-CM

## 2024-07-17 RX ORDER — ZIPRASIDONE HYDROCHLORIDE 20 MG/1
CAPSULE ORAL
Qty: 106 CAPSULE | Refills: 0 | Status: SHIPPED | OUTPATIENT
Start: 2024-07-17 | End: 2024-07-18 | Stop reason: SDDI

## 2024-07-17 NOTE — TELEPHONE ENCOUNTER
PT(PATIENT) VERIFIED     CONTACTED PT(PATIENT) PER PROVIDER'S INSTRUCTIONS     PT(PATIENT) STATES SHE FEELS LIKE SHE NEEDS TO GET ON SOMETHING TO HELP HER     PT(PATIENT) IS OPEN TO TRYING THE MEDICATIONS LISTED BY PROVIDER     PT(PATIENT) VERBALIZED UNDERSTANDING AND HAD NO FURTHER QUESTIONS AT THIS TIME

## 2024-07-18 ENCOUNTER — OFFICE VISIT (OUTPATIENT)
Dept: FAMILY MEDICINE CLINIC | Facility: CLINIC | Age: 64
End: 2024-07-18
Payer: COMMERCIAL

## 2024-07-18 VITALS
SYSTOLIC BLOOD PRESSURE: 139 MMHG | HEIGHT: 58 IN | WEIGHT: 158.6 LBS | OXYGEN SATURATION: 98 % | HEART RATE: 50 BPM | DIASTOLIC BLOOD PRESSURE: 54 MMHG | BODY MASS INDEX: 33.29 KG/M2 | TEMPERATURE: 97 F

## 2024-07-18 DIAGNOSIS — F31.62 BIPOLAR DISORDER, CURRENT EPISODE MIXED, MODERATE: Primary | ICD-10-CM

## 2024-07-18 DIAGNOSIS — F31.62 BIPOLAR DISORDER, CURRENT EPISODE MIXED, MODERATE: ICD-10-CM

## 2024-07-18 DIAGNOSIS — Z51.81 MEDICATION MONITORING ENCOUNTER: ICD-10-CM

## 2024-07-18 PROBLEM — G89.29 CHRONIC PAIN: Status: ACTIVE | Noted: 2024-05-22

## 2024-07-18 PROCEDURE — 99213 OFFICE O/P EST LOW 20 MIN: CPT | Performed by: NURSE PRACTITIONER

## 2024-07-18 RX ORDER — DIVALPROEX SODIUM 250 MG/1
250 TABLET, EXTENDED RELEASE ORAL DAILY
Qty: 90 TABLET | OUTPATIENT
Start: 2024-07-18

## 2024-07-18 RX ORDER — DIVALPROEX SODIUM 250 MG/1
250 TABLET, EXTENDED RELEASE ORAL DAILY
Qty: 30 TABLET | Refills: 0 | Status: SHIPPED | OUTPATIENT
Start: 2024-07-18

## 2024-07-18 NOTE — ASSESSMENT & PLAN NOTE
Psychological condition is worsening due to being off of medication.  She refuses to see psychiatry anymore.  She refuses to try Geodon.  I will restart her on Depakote  mg daily.  We discussed potential side effects and that she needs to have lab workup done in 3 to 4 weeks.  Psychological condition  will be reassessed in 4 weeks.

## 2024-07-18 NOTE — TELEPHONE ENCOUNTER
ATTEMPTED TO CONTACT PT(PATIENT) PER PROVIDER'S INSTRUCTIONS     Ida Parham APRN  [facility] Pool14 hours ago (6:10 PM)     Called in [facility] to call to report any problems. Hoping this helps with aggression/mood.     UNABLE TO LEAVE A VOICEMAIL WITH INSTRUCTIONS TO RETURN CALL TO OFFICE AT [phone]

## 2024-07-18 NOTE — PROGRESS NOTES
Chief Complaint  Irritable (Mood Swings ) and Depression    Subjective            Audra Montejo is a 64 y.o. female who presents to [facility]   History of Present Illness  She is here today with complaints of mood swings and depression.  She is following with psychiatry HERMAN Mcgee.  She was supposed to see Ida Parham on 7/12/2024 but she left the office before she was seen that day.  Psychiatry has reached out to her and sent in a prescription for ziprasodone on 7/15/2024 but she has not picked up the prescription yet.  She has read the side effects and is concerned about taking this medication.  She states she is not going to try the this medication because she is concerned about potential side effects.  She wants to be restarted on Depakote ER.  She said she did fine with that medicine before but it did not get up to high enough dose to be helpful.  She states she does not want to see another provider for psychiatry, that she has too many doctors to see now.  She states she is working long hours and does not have time.  She wants me to prescribe her Depakote.      Tobacco Use: High Risk (7/18/2024)    Patient History     Smoking Tobacco Use: Every Day     Smokeless Tobacco Use: Never     Passive Exposure: Current      E-cigarette/Vaping    E-cigarette/Vaping Use Never User     Passive Exposure No     Counseling Given Yes      E-cigarette/Vaping Substances    Nicotine No     THC No     CBD No     Flavoring No      E-cigarette/Vaping Devices    Disposable No     Pre-filled or Refillable Cartridge No     Refillable Tank No     Pre-filled Pod No        Alcohol Use: Not At Risk (5/15/2023)    AUDIT-C     Frequency of Alcohol Consumption: Never     Average Number of Drinks: Patient does not drink     Frequency of Binge Drinking: Never         Objective   Vital Signs:   Vitals:    07/18/24 0745   BP: 139/54   BP Location: Left arm   Patient Position: Sitting   Cuff Size:  "Adult   Pulse: 50   Temp: 97 °F (36.1 °C)   SpO2: 98%   Weight: 71.9 kg (158 lb 9.6 oz)   Height: 147.3 cm (58\")     Body mass index is 33.15 kg/m².    Wt Readings from Last 3 Encounters:   07/18/24 71.9 kg (158 lb 9.6 oz)   07/12/24 71.3 kg (157 lb 3.2 oz)   06/21/24 73.1 kg (161 lb 1.6 oz)     BP Readings from Last 3 Encounters:   07/18/24 139/54   07/12/24 148/58   06/21/24 132/72       Health Maintenance   Topic Date Due    ANNUAL PHYSICAL  04/21/2024    COVID-19 Vaccine (1 - 2023-24 season) 03/04/2025 (Originally 9/1/2023)    BMI FOLLOWUP  02/15/2025    LUNG CANCER SCREENING  03/21/2025    LIPID PANEL  07/05/2025    MAMMOGRAM  04/24/2026    TDAP/TD VACCINES (3 - Td or Tdap) 06/01/2028    COLORECTAL CANCER SCREENING  05/15/2034    HEPATITIS C SCREENING  Completed    ZOSTER VACCINE  Completed    Pneumococcal Vaccine 0-64  Discontinued    INFLUENZA VACCINE  Discontinued       /54 (BP Location: Left arm, Patient Position: Sitting, Cuff Size: Adult)   Pulse 50   Temp 97 °F (36.1 °C)   Ht 147.3 cm (58\")   Wt 71.9 kg (158 lb 9.6 oz)   SpO2 98%   BMI 33.15 kg/m²       Current Outpatient Medications:     albuterol (ACCUNEB) 1.25 MG/3ML nebulizer solution, Take 3 mL by nebulization Every 6 (Six) Hours As Needed for Wheezing or Shortness of Air. Indications: asthma, Disp: 50 each, Rfl: 0    albuterol sulfate  (90 Base) MCG/ACT inhaler, Inhale 2 puffs Every 6 (Six) Hours As Needed for Wheezing or Shortness of Air. Indications: Asthma, Disp: 8 g, Rfl: 2    amLODIPine (NORVASC) 5 MG tablet, Take 1 tablet by mouth Daily. Indications: High Blood Pressure Disorder, Disp: 90 tablet, Rfl: 1    atorvastatin (LIPITOR) 20 MG tablet, Take 1 tablet by mouth Every Night. Indications: High Amount of Fats in the Blood, Disp: 90 tablet, Rfl: 1    candesartan (ATACAND) 8 MG tablet, Take 1 tablet by mouth 2 (Two) Times a Day. Indications: High Blood Pressure Disorder, Disp: 180 tablet, Rfl: 1    cetirizine (zyrTEC) 10 MG " tablet, TAKE 1 TABLET BY MOUTH EVERY NIGHT AT BEDTIME AS NEEDED FOR ALLERGIES, Disp: 90 tablet, Rfl: 1    clopidogrel (PLAVIX) 75 MG tablet, Take 1 tablet by mouth Daily., Disp: 90 tablet, Rfl: 1    DULoxetine (CYMBALTA) 30 MG capsule, , Disp: , Rfl:     folic acid (FOLVITE) 1 MG tablet, Take 1 tablet by mouth Daily., Disp: , Rfl:     montelukast (SINGULAIR) 10 MG tablet, TAKE 1 TABLET BY MOUTH EVERY NIGHT, Disp: 90 tablet, Rfl: 1    ondansetron (ZOFRAN) 8 MG tablet, Take 1 tablet by mouth Every 8 (Eight) Hours As Needed for Nausea or Vomiting., Disp: 10 tablet, Rfl: 0    oxybutynin XL (DITROPAN-XL) 10 MG 24 hr tablet, Take 1 tablet by mouth Daily. Indications: Overactive Bladder, Disp: 90 tablet, Rfl: 1    traMADol (ULTRAM) 50 MG tablet, Take 1 tablet by mouth Every 8 (Eight) Hours As Needed for Moderate Pain., Disp: 30 tablet, Rfl: 1    divalproex (DEPAKOTE ER) 250 MG 24 hr tablet, Take 1 tablet by mouth Daily. 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    Acid reflux disease     Allergic Unknown    Allergy     unspecified    Anxiety     Asthma Unknown    Back pain     Bilateral carpal tunnel syndrome 08/17/2017    Bipolar disorder     Bursitis of left shoulder 08/09/2016    Cataract Unknown    Chronic pain disorder     Clotting disorder     Deep vein thrombosis All my life    Depression     Eczema     Gall stones     H/O psychiatric care     Heart attack     Heart murmur     Hyperlipidemia 11/22    Hypertension     Incomplete tear of left rotator cuff 01/25/2017    Left shoulder pain     Loss of appetite     Lumbago 05/08/2015    low back pain     Memory loss     forgetfulness    Migraine headache     Need for hepatitis C screening test     Obesity     Palpitation     Panic disorder     Ringing in ears     Seasonal allergies     Shortness of breath     Sinus trouble     unspecified     Stroke (cerebrum)      Subacromial impingement, left 10/13/2016    Superior glenoid labrum lesion of left shoulder 10/13/2016    subsequent encounter    Trouble swallowing     Urinary tract infection Unknown        Physical Exam  Vitals reviewed.   Constitutional:       Appearance: Normal appearance. She is well-developed. She is obese.   Neck:      Thyroid: No thyroid mass, thyromegaly or thyroid tenderness.   Cardiovascular:      Rate and Rhythm: Normal rate and regular rhythm.      Heart sounds: No murmur heard.     No friction rub. No gallop.   Pulmonary:      Effort: Pulmonary effort is normal.      Breath sounds: Normal breath sounds. No wheezing or rhonchi.   Lymphadenopathy:      Cervical: No cervical adenopathy.   Skin:     General: Skin is warm and dry.   Neurological:      Mental Status: She is alert and oriented to person, place, and time.      Cranial Nerves: No cranial nerve deficit.   Psychiatric:         Mood and Affect: Mood is depressed. Affect is angry.         Behavior: Behavior is agitated.         Thought Content: Thought content normal. Thought content does not include homicidal or suicidal ideation.          Result Review :    The following data was reviewed by: HERMAN Menjivar on 07/18/2024:  CMP   CMP          1/11/2024    20:22 2/2/2024    12:39 7/5/2024    14:54   CMP   Glucose 131  114  102    BUN 17  14  12    Creatinine 1.17  0.74  0.75    EGFR 53.2  92.2  90.1    Sodium 141  141  142    Potassium 4.2  3.9  4.5    Chloride 106  104  107    Calcium 8.8  9.3  9.3    Total Protein 6.4  6.9  6.7    Albumin 4.0  4.1  4.2    Globulin 2.4  2.8  2.5    Total Bilirubin 0.2  0.3  0.3    Alkaline Phosphatase 112  95  125    AST (SGOT) 18  14  19    ALT (SGPT) 14  11  14    Albumin/Globulin Ratio 1.7  1.5  1.7    BUN/Creatinine Ratio 14.5  18.9  16.0    Anion Gap 11.2  9.3  9.0        Assessment & Plan  Bipolar disorder, current episode mixed, moderate  Psychological condition is worsening due to being off of  medication.  She refuses to see psychiatry anymore.  She refuses to try Geodon.  I will restart her on Depakote  mg daily.  We discussed potential side effects and that she needs to have lab workup done in 3 to 4 weeks.  Psychological condition  will be reassessed in 4 weeks.  Medication monitoring encounter      Orders Placed This Encounter   Procedures    Hepatic Function Panel    Valproic Acid Level, Total     New Medications Ordered This Visit   Medications    divalproex (DEPAKOTE ER) 250 MG 24 hr tablet     Sig: Take 1 tablet by mouth Daily. Indications: Depressive Phase of Manic-Depression     Dispense:  30 tablet     Refill:  0          Diagnosis Plan   1. Bipolar disorder, current episode mixed, moderate  divalproex (DEPAKOTE ER) 250 MG 24 hr tablet    Hepatic Function Panel    Valproic Acid Level, Total      2. Medication monitoring encounter  Hepatic Function Panel    Valproic Acid Level, Total            FOLLOW UP  Return in about 4 weeks (around 8/15/2024) for Next scheduled follow up mood swings.  Patient was given instructions and counseling regarding her condition or for health maintenance advice. Please see specific information pulled into the AVS if appropriate.       CURRENT & DISCONTINUED MEDICATIONS  Current Outpatient Medications   Medication Instructions    albuterol (ACCUNEB) 1.25 mg, Nebulization, Every 6 Hours PRN    albuterol sulfate  (90 Base) MCG/ACT inhaler 2 puffs, Inhalation, Every 6 Hours PRN    amLODIPine (NORVASC) 5 mg, Oral, Daily    atorvastatin (LIPITOR) 20 mg, Oral, Nightly    candesartan (ATACAND) 8 mg, Oral, 2 Times Daily    cetirizine (zyrTEC) 10 MG tablet TAKE 1 TABLET BY MOUTH EVERY NIGHT AT BEDTIME AS NEEDED FOR ALLERGIES    clopidogrel (PLAVIX) 75 mg, Oral, Daily    divalproex (DEPAKOTE ER) 250 mg, Oral, Daily    DULoxetine (CYMBALTA) 30 MG capsule     folic acid (FOLVITE) 1 mg, Oral, Daily    montelukast (SINGULAIR) 10 mg, Oral, Nightly    ondansetron  (ZOFRAN) 8 mg, Oral, Every 8 Hours PRN    oxybutynin XL (DITROPAN-XL) 10 mg, Oral, Daily    traMADol (ULTRAM) 50 mg, Oral, Every 8 Hours PRN       Medications Discontinued During This Encounter   Medication Reason    ziprasidone (GEODON) 20 MG capsule Non-compliance        Parts of this note are electronic transcriptions/translations of spoken language to printed text using the Dragon Dictation system.    Gale Negrete, HERMAN  07/18/24  08:46 EDT

## 2024-07-19 NOTE — TELEPHONE ENCOUNTER
ATTEMPTED TO CONTACT PT(PATIENT) PER PROVIDER'S INSTRUCTIONS     Ida Parham APRN  [facility] Clinical Pool14 hours ago (6:10 PM)     Called in [place] to call to report any problems. Hoping this helps with aggression/mood.     UNABLE TO LEAVE A VOICEMAIL WITH INSTRUCTIONS TO RETURN CALL TO OFFICE AT [phone]

## 2024-07-22 ENCOUNTER — LAB (OUTPATIENT)
Dept: LAB | Facility: HOSPITAL | Age: 64
End: 2024-07-22
Payer: COMMERCIAL

## 2024-07-22 DIAGNOSIS — Z51.81 MEDICATION MONITORING ENCOUNTER: ICD-10-CM

## 2024-07-22 DIAGNOSIS — F31.62 BIPOLAR DISORDER, CURRENT EPISODE MIXED, MODERATE: ICD-10-CM

## 2024-07-22 LAB
ALBUMIN SERPL-MCNC: 4.3 G/DL (ref 3.5–5.2)
ALP SERPL-CCNC: 130 U/L (ref 39–117)
ALT SERPL W P-5'-P-CCNC: 15 U/L (ref 1–33)
AST SERPL-CCNC: 16 U/L (ref 1–32)
BILIRUB CONJ SERPL-MCNC: <0.2 MG/DL (ref 0–0.3)
BILIRUB INDIRECT SERPL-MCNC: ABNORMAL MG/DL
BILIRUB SERPL-MCNC: 0.2 MG/DL (ref 0–1.2)
PROT SERPL-MCNC: 6.9 G/DL (ref 6–8.5)
VALPROATE SERPL-MCNC: <2.8 MCG/ML (ref 50–125)

## 2024-07-22 PROCEDURE — 80076 HEPATIC FUNCTION PANEL: CPT

## 2024-07-22 PROCEDURE — 36415 COLL VENOUS BLD VENIPUNCTURE: CPT

## 2024-07-22 PROCEDURE — 80164 ASSAY DIPROPYLACETIC ACD TOT: CPT

## 2024-07-22 NOTE — TELEPHONE ENCOUNTER
I have attempted to contact this patient by phone with the following results: no answer.No voicemail option

## 2024-07-26 NOTE — TELEPHONE ENCOUNTER
Caller: Emma Montejoa JAVIER    Relationship: Self    Best call back number: [phone]      Requested Prescriptions:   Requested Prescriptions     Pending Prescriptions Disp Refills   • montelukast (SINGULAIR) 10 MG tablet 90 tablet 0   • oxybutynin XL (DITROPAN-XL) 10 MG 24 hr tablet       Sig: Take 1 tablet by mouth Daily.        Pharmacy where request should be sent: [id number] - [facility] [place], [place] - [address] AT [facility] [phone] [facility] [phone]      Additional details provided by patient: PATIENT IS NEEDING A REFILL. PLEASE CALL AND ADVISE.     Does the patient have less than a 3 day supply:  [x] Yes  [] No    Kiel Fitzgerald Rep   05/13/22 13:47 EDT                  Conjuntivae and eyelids appear normal,  Sclerae : White without injection

## 2024-08-22 ENCOUNTER — OFFICE VISIT (OUTPATIENT)
Dept: FAMILY MEDICINE CLINIC | Facility: CLINIC | Age: 64
End: 2024-08-22
Payer: COMMERCIAL

## 2024-08-22 VITALS
OXYGEN SATURATION: 99 % | WEIGHT: 158 LBS | SYSTOLIC BLOOD PRESSURE: 160 MMHG | HEIGHT: 58 IN | BODY MASS INDEX: 33.17 KG/M2 | HEART RATE: 55 BPM | DIASTOLIC BLOOD PRESSURE: 72 MMHG | TEMPERATURE: 96.7 F

## 2024-08-22 DIAGNOSIS — F31.62 BIPOLAR DISORDER, CURRENT EPISODE MIXED, MODERATE: ICD-10-CM

## 2024-08-22 DIAGNOSIS — J12.82 PNEUMONIA DUE TO COVID-19 VIRUS: Primary | ICD-10-CM

## 2024-08-22 DIAGNOSIS — U07.1 PNEUMONIA DUE TO COVID-19 VIRUS: Primary | ICD-10-CM

## 2024-08-22 PROCEDURE — 99214 OFFICE O/P EST MOD 30 MIN: CPT | Performed by: NURSE PRACTITIONER

## 2024-08-22 RX ORDER — DIVALPROEX SODIUM 250 MG/1
250 TABLET, EXTENDED RELEASE ORAL DAILY
Qty: 90 TABLET | Refills: 1 | Status: SHIPPED | OUTPATIENT
Start: 2024-08-22

## 2024-08-22 NOTE — LETTER
August 22, 2024     Patient: Audra Montejo   YOB: 1960   Date of Visit: 8/22/2024       To Whom It May Concern:    It is my medical opinion that Audra Montejo may return to work on 8/26/2024. Please excuse her from work from 8/16/2024 until 8/26/2024.           Sincerely,        HERMAN Menjivar    CC: No Recipients

## 2024-08-22 NOTE — PROGRESS NOTES
Chief Complaint  Pneumonia    Subjective            Audra Montejo is a 64 y.o. female who presents to [facility]   History of Present Illness  1 month follow-up on mood swings.  She did not want to see psychiatry anymore and refuses to try Geodon.  She wanted to go back on Depakote.  I restarted her on Depakote  mg daily.  She feels she is doing well on this dose.  She is also on Cymbalta 30 mg per pain management.  She feels that the 2 of these medications are working well together.    She was seen at [facility] on 8/16/2024 and diagnosed with Covid Pneumonia.  She was supposed to go back to work this week but she states she still does not feel like going back to work.  She does not feel well and she is short of breath.  Her O2 sats are good at 99% on room air.  She is still currently taking her antibiotics, states she is going to finish her Z-Yuri today and is still on Augmentin as well.  She has been using her albuterol nebulizer as needed.      Tobacco Use: High Risk (8/22/2024)    Patient History     Smoking Tobacco Use: Every Day     Smokeless Tobacco Use: Never     Passive Exposure: Current      E-cigarette/Vaping    E-cigarette/Vaping Use Never User     Passive Exposure No     Counseling Given Yes      E-cigarette/Vaping Substances    Nicotine No     THC No     CBD No     Flavoring No      E-cigarette/Vaping Devices    Disposable No     Pre-filled or Refillable Cartridge No     Refillable Tank No     Pre-filled Pod No        Alcohol Use: Not At Risk (5/15/2023)    AUDIT-C     Frequency of Alcohol Consumption: Never     Average Number of Drinks: Patient does not drink     Frequency of Binge Drinking: Never         Objective   Vital Signs:   Vitals:    08/22/24 0718 08/22/24 0722   BP: 164/71 160/72   BP Location: Left arm    Patient Position: Sitting    Cuff Size: Adult    Pulse: 55    Temp: 96.7 °F (35.9 °C)    SpO2: 99%    Weight: 71.7 kg (158 lb)    Height: 147.3 cm  "(58\")      Body mass index is 33.02 kg/m².    Wt Readings from Last 3 Encounters:   08/22/24 71.7 kg (158 lb)   08/16/24 71.7 kg (158 lb)   07/18/24 71.9 kg (158 lb 9.6 oz)     BP Readings from Last 3 Encounters:   08/22/24 160/72   08/16/24 147/77   07/18/24 139/54       Health Maintenance   Topic Date Due    ANNUAL PHYSICAL  04/21/2024    COVID-19 Vaccine (1 - 2023-24 season) 03/04/2025 (Originally 9/1/2023)    BMI FOLLOWUP  02/15/2025    LUNG CANCER SCREENING  03/21/2025    LIPID PANEL  07/05/2025    MAMMOGRAM  04/24/2026    TDAP/TD VACCINES (3 - Td or Tdap) 06/01/2028    COLORECTAL CANCER SCREENING  05/15/2034    HEPATITIS C SCREENING  Completed    ZOSTER VACCINE  Completed    Pneumococcal Vaccine 0-64  Discontinued    INFLUENZA VACCINE  Discontinued       /72   Pulse 55   Temp 96.7 °F (35.9 °C)   Ht 147.3 cm (58\")   Wt 71.7 kg (158 lb)   SpO2 99%   BMI 33.02 kg/m²       Current Outpatient Medications:     albuterol (ACCUNEB) 1.25 MG/3ML nebulizer solution, Take 3 mL by nebulization Every 6 (Six) Hours As Needed for Wheezing or Shortness of Air. Indications: asthma, Disp: 50 each, Rfl: 0    albuterol sulfate  (90 Base) MCG/ACT inhaler, Inhale 2 puffs Every 6 (Six) Hours As Needed for Wheezing or Shortness of Air. Indications: Asthma, Disp: 8 g, Rfl: 2    amLODIPine (NORVASC) 5 MG tablet, Take 1 tablet by mouth Daily. Indications: High Blood Pressure Disorder, Disp: 90 tablet, Rfl: 1    amoxicillin-clavulanate (AUGMENTIN) 875-125 MG per tablet, Take 1 tablet by mouth 2 (Two) Times a Day for 10 days., Disp: 20 tablet, Rfl: 0    atorvastatin (LIPITOR) 20 MG tablet, Take 1 tablet by mouth Every Night. Indications: High Amount of Fats in the Blood, Disp: 90 tablet, Rfl: 1    azithromycin (Zithromax Z-Yuri) 250 MG tablet, Take 2 PO today then take 1 daily on days 2-5, Disp: 6 tablet, Rfl: 0    candesartan (ATACAND) 8 MG tablet, Take 1 tablet by mouth 2 (Two) Times a Day. Indications: High Blood " Pressure Disorder, Disp: 180 tablet, Rfl: 1    cetirizine (zyrTEC) 10 MG tablet, TAKE 1 TABLET BY MOUTH EVERY NIGHT AT BEDTIME AS NEEDED FOR ALLERGIES, Disp: 90 tablet, Rfl: 1    clopidogrel (PLAVIX) 75 MG tablet, Take 1 tablet by mouth Daily., Disp: 90 tablet, Rfl: 1    divalproex (DEPAKOTE ER) 250 MG 24 hr tablet, Take 1 tablet by mouth Daily. Indications: Depressive Phase of Manic-Depression, Disp: 90 tablet, Rfl: 1    DULoxetine (CYMBALTA) 30 MG capsule, , Disp: , Rfl:     folic acid (FOLVITE) 1 MG tablet, Take 1 tablet by mouth Daily., Disp: , Rfl:     montelukast (SINGULAIR) 10 MG tablet, TAKE 1 TABLET BY MOUTH EVERY NIGHT, Disp: 90 tablet, Rfl: 1    ondansetron (ZOFRAN) 8 MG tablet, Take 1 tablet by mouth Every 8 (Eight) Hours As Needed for Nausea or Vomiting., Disp: 10 tablet, Rfl: 0    oxybutynin XL (DITROPAN-XL) 10 MG 24 hr tablet, Take 1 tablet by mouth Daily. Indications: Overactive Bladder, Disp: 90 tablet, Rfl: 1    traMADol (ULTRAM) 50 MG tablet, Take 1 tablet by mouth Every 8 (Eight) Hours As Needed for Moderate Pain., Disp: 30 tablet, Rfl: 1    ziprasidone (GEODON) 20 MG capsule, , Disp: , Rfl:    Past Medical History:   Diagnosis Date    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    Acid reflux disease     Allergic Unknown    Allergy     unspecified    Anxiety     Asthma Unknown    Back pain     Bilateral carpal tunnel syndrome 08/17/2017    Bipolar disorder     Bursitis of left shoulder 08/09/2016    Cataract Unknown    Chronic pain disorder     Clotting disorder     Deep vein thrombosis All my life    Depression     Eczema     Gall stones     H/O psychiatric care     Heart attack     Heart murmur     Hyperlipidemia 11/22    Hypertension     Incomplete tear of left rotator cuff 01/25/2017    Left shoulder pain     Loss of appetite     Lumbago 05/08/2015    low back pain     Memory loss     forgetfulness    Migraine headache     Need for hepatitis C screening test     Obesity     Palpitation     Panic  disorder     Ringing in ears     Seasonal allergies     Shortness of breath     Sinus trouble     unspecified     Stroke (cerebrum)     Subacromial impingement, left 10/13/2016    Superior glenoid labrum lesion of left shoulder 10/13/2016    subsequent encounter    Trouble swallowing     Urinary tract infection Unknown        Physical Exam  Vitals reviewed.   Constitutional:       Appearance: Normal appearance. She is well-developed. She is obese.   Neck:      Thyroid: No thyroid mass, thyromegaly or thyroid tenderness.   Cardiovascular:      Rate and Rhythm: Normal rate and regular rhythm.      Heart sounds: No murmur heard.     No friction rub. No gallop.   Pulmonary:      Effort: Pulmonary effort is normal.      Breath sounds: Normal breath sounds. No wheezing or rhonchi.   Lymphadenopathy:      Cervical: No cervical adenopathy.   Skin:     General: Skin is warm and dry.   Neurological:      Mental Status: She is alert and oriented to person, place, and time.      Cranial Nerves: No cranial nerve deficit.   Psychiatric:         Mood and Affect: Mood and affect normal.         Behavior: Behavior normal.         Thought Content: Thought content normal. Thought content does not include homicidal or suicidal ideation.         Judgment: Judgment normal.          Result Review :    The following data was reviewed by: HEMRAN Menjivar on 08/19/2024:           XR Chest 2 View    Result Date: 8/16/2024  Impression: Faint patchy right upper lobe airspace opacities. Electronically Signed: Lalo Diehl MD  8/16/2024 3:42 PM EDT  Workstation ID: [id number]    Mammo Screening Digital Tomosynthesis Bilateral With CAD    Result Date: 4/26/2024  Benign findings. No mammographic evidence of malignancy. Recommend routine mammographic screening.  BI-RADS ASSESSMENT: BI-RADS 2. Benign  The patient's information is entered into a computerized reminder system with a targeted due date for the next mammogram.  Note:  It has been  reported that there is approximately a 15% false negative in mammography.  Therefore, management of a palpable abnormality should not be deferred because of a negative mammogram.     Electronically Signed By-Tyree Sharma MD On:4/26/2024 9:55 AM        Assessment & Plan  Pneumonia due to COVID-19 virus  She is slowly improving.  Advised to finish all of her antibiotics and to continue using albuterol nebulizer as needed.  I will give her a work note to not return until Monday but advised if she needs longer she can give me a call.  Advised to return to the clinic or go to the ER if she has any worsening of symptoms.  Bipolar disorder, current episode mixed, moderate  Psychological condition is improving with treatment.  Continue current treatment regimen.  Psychological condition  will be reassessed at the next regular appointment.     New Medications Ordered This Visit   Medications    divalproex (DEPAKOTE ER) 250 MG 24 hr tablet     Sig: Take 1 tablet by mouth Daily. Indications: Depressive Phase of Manic-Depression     Dispense:  90 tablet     Refill:  1        Diagnosis Plan   1. Pneumonia due to COVID-19 virus        2. Bipolar disorder, current episode mixed, moderate  divalproex (DEPAKOTE ER) 250 MG 24 hr tablet            FOLLOW UP  Return for Keep Scheduled Follow-up.  Patient was given instructions and counseling regarding her condition or for health maintenance advice. Please see specific information pulled into the AVS if appropriate.       CURRENT & DISCONTINUED MEDICATIONS  Current Outpatient Medications   Medication Instructions    albuterol (ACCUNEB) 1.25 mg, Nebulization, Every 6 Hours PRN    albuterol sulfate  (90 Base) MCG/ACT inhaler 2 puffs, Inhalation, Every 6 Hours PRN    amLODIPine (NORVASC) 5 mg, Oral, Daily    amoxicillin-clavulanate (AUGMENTIN) 875-125 MG per tablet 1 tablet, Oral, 2 Times Daily    atorvastatin (LIPITOR) 20 mg, Oral, Nightly    azithromycin (Zithromax Z-Yuri) 250  MG tablet Take 2 PO today then take 1 daily on days 2-5    candesartan (ATACAND) 8 mg, Oral, 2 Times Daily    cetirizine (zyrTEC) 10 MG tablet TAKE 1 TABLET BY MOUTH EVERY NIGHT AT BEDTIME AS NEEDED FOR ALLERGIES    clopidogrel (PLAVIX) 75 mg, Oral, Daily    divalproex (DEPAKOTE ER) 250 mg, Oral, Daily    DULoxetine (CYMBALTA) 30 MG capsule     folic acid (FOLVITE) 1 mg, Oral, Daily    montelukast (SINGULAIR) 10 mg, Oral, Nightly    ondansetron (ZOFRAN) 8 mg, Oral, Every 8 Hours PRN    oxybutynin XL (DITROPAN-XL) 10 mg, Oral, Daily    traMADol (ULTRAM) 50 mg, Oral, Every 8 Hours PRN    ziprasidone (GEODON) 20 MG capsule        Medications Discontinued During This Encounter   Medication Reason    divalproex (DEPAKOTE ER) 250 MG 24 hr tablet Reorder        Parts of this note are electronic transcriptions/translations of spoken language to printed text using the Dragon Dictation system.    Gale Negrete, APRN  08/22/24  08:16 EDT

## 2024-08-24 DIAGNOSIS — J45.40 MODERATE PERSISTENT ASTHMA WITHOUT COMPLICATION: ICD-10-CM

## 2024-08-26 RX ORDER — ALBUTEROL SULFATE 90 UG/1
AEROSOL, METERED RESPIRATORY (INHALATION)
Qty: 8.5 G | Refills: 0 | Status: SHIPPED | OUTPATIENT
Start: 2024-08-26

## 2024-08-27 NOTE — TELEPHONE ENCOUNTER
ATTEMPTED TO CONTACT PT(PATIENT)      UNABLE TO LEAVE A VOICEMAIL WITH INSTRUCTIONS TO RETURN CALL TO OFFICE AT [phone]

## 2024-08-27 NOTE — TELEPHONE ENCOUNTER
NEXT VISIT WITH PROVIDER   NONE IN [facility]     LAST SEEN BY PROVIDER   Office Visit with Ida Parham APRN (07/12/2024)     LAST MED REFILL  ziprasidone (GEODON) 20 MG capsule

## 2024-08-28 NOTE — TELEPHONE ENCOUNTER
ATTEMPTED TO CONTACT PT(PATIENT)      UNABLE TO LEAVE A VOICEMAIL WITH INSTRUCTIONS TO RETURN CALL TO OFFICE AT [phone]

## 2024-08-29 RX ORDER — ZIPRASIDONE HYDROCHLORIDE 20 MG/1
CAPSULE ORAL
Qty: 106 CAPSULE | OUTPATIENT
Start: 2024-08-29

## 2024-08-29 NOTE — TELEPHONE ENCOUNTER
PT(PATIENT) VERIFIED     PT(PATIENT) STATES SHE IS NO LONGER TAKING THE ziprasidone (GEODON) 20 MG capsule     PT(PATIENT) STATES SHE IS CONCERNED ABOUT SIDE EFFECTS     PT(PATIENT) DECLINED TO SCHEDULE AN APPT     PLEASE ADVISE

## 2024-09-15 DIAGNOSIS — J45.40 MODERATE PERSISTENT ASTHMA WITHOUT COMPLICATION: ICD-10-CM

## 2024-09-16 RX ORDER — ALBUTEROL SULFATE 90 UG/1
AEROSOL, METERED RESPIRATORY (INHALATION)
Qty: 8.5 G | Refills: 0 | Status: SHIPPED | OUTPATIENT
Start: 2024-09-16

## 2024-09-20 DIAGNOSIS — N32.81 OVERACTIVE BLADDER: ICD-10-CM

## 2024-09-20 RX ORDER — OXYBUTYNIN CHLORIDE 10 MG/1
TABLET, EXTENDED RELEASE ORAL
Qty: 90 TABLET | Refills: 0 | Status: SHIPPED | OUTPATIENT
Start: 2024-09-20

## 2024-09-21 DIAGNOSIS — I10 PRIMARY HYPERTENSION: ICD-10-CM

## 2024-09-23 RX ORDER — AMLODIPINE BESYLATE 5 MG/1
5 TABLET ORAL DAILY
Qty: 90 TABLET | Refills: 1 | Status: SHIPPED | OUTPATIENT
Start: 2024-09-23

## 2024-10-09 DIAGNOSIS — J45.40 MODERATE PERSISTENT ASTHMA WITHOUT COMPLICATION: ICD-10-CM

## 2024-10-10 RX ORDER — ALBUTEROL SULFATE 90 UG/1
INHALANT RESPIRATORY (INHALATION)
Qty: 8.5 G | Refills: 0 | Status: SHIPPED | OUTPATIENT
Start: 2024-10-10

## 2024-10-15 NOTE — TELEPHONE ENCOUNTER
"PER PROVIDER:  Ida Parham APRN to [facility] Clinical Pool  \"Please call patient later in the afternoons (she works nights)- Let Audra know if she would like -I can restart lamotrigine at 25 mg po q day ramp up protocol (risks are rash, sedation, weight gain) Or she can try low dose ziprasidone for mood (risks are rash, movement disorder, less known for weight and sedation.) There are other options as well, but, would need to see her in office or online.\"  " ----- Message from Shayna Zelaya sent at 10/15/2024  7:40 AM CDT -----  Very poorly controlled diabetes. Evidence of kidney dysfunction as well  We discussed decreasing Metformin but given CKD, he should discontinue the Metformin. We need to work on titration of long acting insulin (see instructions given at office visit). Call with FBS in 1 week  He needs to see endocrinologist when he gets to [place]. I would also recommend seeing nephrologist

## 2024-11-04 ENCOUNTER — HOSPITAL ENCOUNTER (EMERGENCY)
Facility: HOSPITAL | Age: 64
Discharge: LEFT BEFORE DISCHARGE | End: 2024-11-04
Attending: EMERGENCY MEDICINE | Admitting: EMERGENCY MEDICINE
Payer: COMMERCIAL

## 2024-11-04 ENCOUNTER — APPOINTMENT (OUTPATIENT)
Dept: GENERAL RADIOLOGY | Facility: HOSPITAL | Age: 64
End: 2024-11-04
Payer: COMMERCIAL

## 2024-11-04 ENCOUNTER — APPOINTMENT (OUTPATIENT)
Dept: CT IMAGING | Facility: HOSPITAL | Age: 64
End: 2024-11-04
Payer: COMMERCIAL

## 2024-11-04 VITALS
WEIGHT: 158.29 LBS | RESPIRATION RATE: 16 BRPM | BODY MASS INDEX: 33.23 KG/M2 | OXYGEN SATURATION: 100 % | TEMPERATURE: 98.7 F | HEIGHT: 58 IN | DIASTOLIC BLOOD PRESSURE: 81 MMHG | HEART RATE: 65 BPM | SYSTOLIC BLOOD PRESSURE: 190 MMHG

## 2024-11-04 DIAGNOSIS — G45.9 TIA (TRANSIENT ISCHEMIC ATTACK): Primary | ICD-10-CM

## 2024-11-04 DIAGNOSIS — R42 DIZZINESS: ICD-10-CM

## 2024-11-04 LAB
ALBUMIN SERPL-MCNC: 4.3 G/DL (ref 3.5–5.2)
ALBUMIN/GLOB SERPL: 1.4 G/DL
ALP SERPL-CCNC: 141 U/L (ref 39–117)
ALT SERPL W P-5'-P-CCNC: 12 U/L (ref 1–33)
ANION GAP SERPL CALCULATED.3IONS-SCNC: 11.6 MMOL/L (ref 5–15)
AST SERPL-CCNC: 19 U/L (ref 1–32)
BASOPHILS # BLD AUTO: 0.06 10*3/MM3 (ref 0–0.2)
BASOPHILS NFR BLD AUTO: 0.5 % (ref 0–1.5)
BILIRUB SERPL-MCNC: 0.3 MG/DL (ref 0–1.2)
BUN SERPL-MCNC: 16 MG/DL (ref 8–23)
BUN/CREAT SERPL: 19.8 (ref 7–25)
CALCIUM SPEC-SCNC: 9.3 MG/DL (ref 8.6–10.5)
CHLORIDE SERPL-SCNC: 103 MMOL/L (ref 98–107)
CO2 SERPL-SCNC: 25.4 MMOL/L (ref 22–29)
CREAT SERPL-MCNC: 0.81 MG/DL (ref 0.57–1)
DEPRECATED RDW RBC AUTO: 43.9 FL (ref 37–54)
EGFRCR SERPLBLD CKD-EPI 2021: 82.2 ML/MIN/1.73
EOSINOPHIL # BLD AUTO: 0.14 10*3/MM3 (ref 0–0.4)
EOSINOPHIL NFR BLD AUTO: 1.1 % (ref 0.3–6.2)
ERYTHROCYTE [DISTWIDTH] IN BLOOD BY AUTOMATED COUNT: 13.2 % (ref 12.3–15.4)
GLOBULIN UR ELPH-MCNC: 3.1 GM/DL
GLUCOSE SERPL-MCNC: 107 MG/DL (ref 65–99)
HCT VFR BLD AUTO: 42.6 % (ref 34–46.6)
HGB BLD-MCNC: 14.2 G/DL (ref 12–15.9)
HOLD SPECIMEN: NORMAL
HOLD SPECIMEN: NORMAL
IMM GRANULOCYTES # BLD AUTO: 0.05 10*3/MM3 (ref 0–0.05)
IMM GRANULOCYTES NFR BLD AUTO: 0.4 % (ref 0–0.5)
LYMPHOCYTES # BLD AUTO: 3.71 10*3/MM3 (ref 0.7–3.1)
LYMPHOCYTES NFR BLD AUTO: 29.9 % (ref 19.6–45.3)
MAGNESIUM SERPL-MCNC: 2.1 MG/DL (ref 1.6–2.4)
MCH RBC QN AUTO: 30.2 PG (ref 26.6–33)
MCHC RBC AUTO-ENTMCNC: 33.3 G/DL (ref 31.5–35.7)
MCV RBC AUTO: 90.6 FL (ref 79–97)
MONOCYTES # BLD AUTO: 1.02 10*3/MM3 (ref 0.1–0.9)
MONOCYTES NFR BLD AUTO: 8.2 % (ref 5–12)
NEUTROPHILS NFR BLD AUTO: 59.9 % (ref 42.7–76)
NEUTROPHILS NFR BLD AUTO: 7.43 10*3/MM3 (ref 1.7–7)
NRBC BLD AUTO-RTO: 0 /100 WBC (ref 0–0.2)
PLATELET # BLD AUTO: 257 10*3/MM3 (ref 140–450)
PMV BLD AUTO: 11.4 FL (ref 6–12)
POTASSIUM SERPL-SCNC: 4 MMOL/L (ref 3.5–5.2)
PROT SERPL-MCNC: 7.4 G/DL (ref 6–8.5)
QT INTERVAL: 427 MS
QTC INTERVAL: 417 MS
RBC # BLD AUTO: 4.7 10*6/MM3 (ref 3.77–5.28)
SODIUM SERPL-SCNC: 140 MMOL/L (ref 136–145)
TROPONIN T SERPL HS-MCNC: 7 NG/L
VALPROATE SERPL-MCNC: <2.8 MCG/ML (ref 50–125)
WBC NRBC COR # BLD AUTO: 12.41 10*3/MM3 (ref 3.4–10.8)
WHOLE BLOOD HOLD COAG: NORMAL
WHOLE BLOOD HOLD SPECIMEN: NORMAL

## 2024-11-04 PROCEDURE — 84484 ASSAY OF TROPONIN QUANT: CPT | Performed by: EMERGENCY MEDICINE

## 2024-11-04 PROCEDURE — 71045 X-RAY EXAM CHEST 1 VIEW: CPT

## 2024-11-04 PROCEDURE — 80053 COMPREHEN METABOLIC PANEL: CPT | Performed by: EMERGENCY MEDICINE

## 2024-11-04 PROCEDURE — 99284 EMERGENCY DEPT VISIT MOD MDM: CPT

## 2024-11-04 PROCEDURE — 85025 COMPLETE CBC W/AUTO DIFF WBC: CPT

## 2024-11-04 PROCEDURE — 99214 OFFICE O/P EST MOD 30 MIN: CPT

## 2024-11-04 PROCEDURE — 36415 COLL VENOUS BLD VENIPUNCTURE: CPT

## 2024-11-04 PROCEDURE — 93005 ELECTROCARDIOGRAM TRACING: CPT | Performed by: EMERGENCY MEDICINE

## 2024-11-04 PROCEDURE — 70450 CT HEAD/BRAIN W/O DYE: CPT

## 2024-11-04 PROCEDURE — 80164 ASSAY DIPROPYLACETIC ACD TOT: CPT | Performed by: EMERGENCY MEDICINE

## 2024-11-04 PROCEDURE — 83735 ASSAY OF MAGNESIUM: CPT | Performed by: EMERGENCY MEDICINE

## 2024-11-04 RX ORDER — SODIUM CHLORIDE 0.9 % (FLUSH) 0.9 %
10 SYRINGE (ML) INJECTION AS NEEDED
Status: DISCONTINUED | OUTPATIENT
Start: 2024-11-04 | End: 2024-11-04 | Stop reason: HOSPADM

## 2024-11-04 NOTE — ED PROVIDER NOTES
Time: 6:06 AM EST  Date of encounter:  11/4/2024  Independent Historian/Clinical History and Information was obtained by:   Patient    History is limited by: N/A    Chief Complaint: dizziness      History of Present Illness:  Patient is a 64 y.o. year old female who presents to the emergency department for evaluation of Dizziness and feeling off balance.  Patient states she was at work when she had a sudden onset of feeling dizzy.  She states she felt unsteady on her feet and was falling towards the left.  This lasted for about 30 minutes and resolved.  She does have a history of previous stroke.  She has some mild balance issues from previous stroke.  She feels as if she is back to baseline.  No other complaints.      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    Acid reflux disease     Allergic Unknown    Allergy     unspecified    Anxiety     Asthma Unknown    Back pain     Bilateral carpal tunnel syndrome 08/17/2017    Bipolar disorder     Bursitis of left shoulder 08/09/2016    Cataract Unknown    Chronic pain disorder     Clotting disorder     Deep vein thrombosis All my life    Depression     Eczema     Gall stones     H/O psychiatric care     Heart attack     Heart murmur     Hyperlipidemia 11/22    Hypertension     Incomplete tear of left rotator cuff 01/25/2017    Left shoulder pain     Loss of appetite     Lumbago 05/08/2015    low back pain     Memory loss     forgetfulness    Migraine headache     Need for hepatitis C screening test     Obesity     Palpitation     Panic disorder      Ringing in ears     Seasonal allergies     Shortness of breath     Sinus trouble     unspecified     Stroke (cerebrum)     Subacromial impingement, left 10/13/2016    Superior glenoid labrum lesion of left shoulder 10/13/2016    subsequent encounter    Trouble swallowing     Urinary tract infection Unknown     Past Surgical History:   Procedure Laterality Date    ABDOMINAL SURGERY      APPENDECTOMY      BACK SURGERY      CARPAL TUNNEL RELEASE      CHOLECYSTECTOMY      COLON SURGERY  Removed polip    COLONOSCOPY  2014    COLONOSCOPY N/A 05/15/2024    Procedure: COLONOSCOPY with cold snare polypectomy;  Surgeon: Vivi Villafana MD;  Location: [facility];  Service: Gastroenterology;  Laterality: N/A;  colon polyp, hemporrhoid    ENDOSCOPY  2014    EYE SURGERY      FRACTURE SURGERY      HYSTERECTOMY  1999    SHOULDER SURGERY Left 10/03/2016    arthroscopic, left shoulder scope, RTC repair- Dr. Flores    SKIN BIOPSY       Family History   Problem Relation Age of Onset    Stroke Mother     Diabetes Mother     Arthritis Mother     COPD Mother     Hyperlipidemia Mother     Vision loss Mother     Heart disease Mother     Heart disease Father     Cancer Father         Lukimia    Heart disease Brother     Diabetes Brother     Stroke Brother     Cancer Maternal Aunt     Breast cancer Maternal Aunt     Pancreatic cancer Maternal Aunt     Stroke Son     Heart disease Son     Cancer Son     Arthritis Son     Osteoporosis Son     Arthritis Son     Cancer Son     Heart disease Son     Stroke Son     Hypertension Other     Rheum arthritis Other        Home Medications:  Prior to Admission medications    Medication Sig Start Date End Date Taking? Authorizing Provider   albuterol (ACCUNEB) 1.25 MG/3ML nebulizer solution Take 3 mL by nebulization Every 6 (Six) Hours As Needed for Wheezing or Shortness of Air. Indications: asthma 6/21/24   Gale Negrete APRN   albuterol sulfate  (90 Base) MCG/ACT inhaler  INHALE 2 PUFFS BY MOUTH EVERY 6 HOURS AS NEEDED FOR SHORTNESS OF BREATH OR WHEEZING 10/10/24   Gale Negrete APRN   amLODIPine (NORVASC) 5 MG tablet TAKE 1 TABLET BY MOUTH DAILY FOR HIGH BLOOD PRESSURE 9/23/24   Gale Negrete APRN   atorvastatin (LIPITOR) 20 MG tablet Take 1 tablet by mouth Every Night. Indications: High Amount of Fats in the Blood 3/11/24   Gale Negrete APRN   azithromycin (Zithromax Z-Yuri) 250 MG tablet Take 2 PO today then take 1 daily on days 2-5 8/16/24   Kalie Yancey APRN   candesartan (ATACAND) 8 MG tablet Take 1 tablet by mouth 2 (Two) Times a Day. Indications: High Blood Pressure Disorder 6/21/24   Gale Negrete APRN   cetirizine (zyrTEC) 10 MG tablet TAKE 1 TABLET BY MOUTH EVERY NIGHT AT BEDTIME AS NEEDED FOR ALLERGIES 6/11/24   Gale Negrete APRN   clopidogrel (PLAVIX) 75 MG tablet Take 1 tablet by mouth Daily. 6/21/24   Gale Negrete APRN   divalproex (DEPAKOTE ER) 250 MG 24 hr tablet Take 1 tablet by mouth Daily. Indications: Depressive Phase of Manic-Depression 8/22/24   Gale Negrete APRN   DULoxetine (CYMBALTA) 30 MG capsule  5/28/24   Israel Tarango MD   folic acid (FOLVITE) 1 MG tablet Take 1 tablet by mouth Daily. 2/28/24   Israel Tarango MD   montelukast (SINGULAIR) 10 MG tablet TAKE 1 TABLET BY MOUTH EVERY NIGHT 6/11/24   Gale Negrete APRN   ondansetron (ZOFRAN) 8 MG tablet Take 1 tablet by mouth Every 8 (Eight) Hours As Needed for Nausea or Vomiting. 6/10/24   Roz Moulton MD   oxybutynin XL (DITROPAN-XL) 10 MG 24 hr tablet TAKE 1 TABLET BY MOUTH DAILY FOR OVERACTIVE BLADDER 9/20/24   Gale Negrete APRN   traMADol (ULTRAM) 50 MG tablet Take 1 tablet by mouth Every 8 (Eight) Hours As Needed for Moderate Pain. 9/14/23   Lowell Alex MD   ziprasidone (GEODON) 20 MG capsule     Provider, MD [place]        Social History:   Social History     Tobacco Use    Smoking  "status: Every Day     Current packs/day: 1.00     Average packs/day: 1 pack/day for 30.0 years (30.0 ttl pk-yrs)     Types: Cigarettes     Passive exposure: Current    Smokeless tobacco: Never    Tobacco comments:     Not your business   Vaping Use    Vaping status: Never Used   Substance Use Topics    Alcohol use: Not Currently     Comment: rarely drinks    Drug use: Never         Review of Systems:  Review of Systems   Constitutional:  Negative for chills and fever.   HENT:  Negative for congestion, ear pain and sore throat.    Eyes:  Negative for pain.   Respiratory:  Negative for cough, chest tightness and shortness of breath.    Cardiovascular:  Negative for chest pain.   Gastrointestinal:  Negative for abdominal pain, diarrhea, nausea and vomiting.   Genitourinary:  Negative for flank pain and hematuria.   Musculoskeletal:  Negative for joint swelling.   Skin:  Negative for pallor.   Neurological:  Positive for dizziness. Negative for seizures and headaches.   All other systems reviewed and are negative.       Physical Exam:  BP (!) 190/81 (BP Location: Left arm, Patient Position: Sitting)   Pulse 65   Temp 98.7 °F (37.1 °C) (Oral)   Resp 16   Ht 147.3 cm (57.99\")   Wt 71.8 kg (158 lb 4.6 oz)   LMP  (LMP Unknown)   SpO2 100%   BMI 33.09 kg/m²     Physical Exam  Constitutional:       Appearance: Normal appearance.   HENT:      Head: Normocephalic and atraumatic.      Nose: Nose normal.      Mouth/Throat:      Mouth: Mucous membranes are moist.   Eyes:      Extraocular Movements: Extraocular movements intact.      Conjunctiva/sclera: Conjunctivae normal.      Pupils: Pupils are equal, round, and reactive to light.   Cardiovascular:      Rate and Rhythm: Normal rate and regular rhythm.      Pulses: Normal pulses.      Heart sounds: Normal heart sounds.   Pulmonary:      Effort: Pulmonary effort is normal.      Breath sounds: Normal breath sounds.   Abdominal:      General: There is no distension.      " Palpations: Abdomen is soft.      Tenderness: There is no abdominal tenderness.   Musculoskeletal:         General: Normal range of motion.      Cervical back: Normal range of motion.   Skin:     General: Skin is warm and dry.      Capillary Refill: Capillary refill takes less than 2 seconds.   Neurological:      General: No focal deficit present.      Mental Status: She is alert and oriented to person, place, and time. Mental status is at baseline.      Cranial Nerves: No dysarthria or facial asymmetry.      Sensory: No sensory deficit.      Motor: No weakness.   Psychiatric:         Mood and Affect: Mood normal.         Behavior: Behavior normal.                  Procedures:  Procedures      Medical Decision Making:      Comorbidities that affect care:    bipolar, Asthma, Obesity    External Notes reviewed:    Previous Clinic Note: Patient seen by her PCP on 9/22/2024 for pneumonia due to COVID-19, bipolar disorder      The following orders were placed and all results were independently analyzed by me:  Orders Placed This Encounter   Procedures    XR Chest 1 View    CT Head Without Contrast    [place] Draw    Comprehensive Metabolic Panel    Single High Sensitivity Troponin T    Magnesium    CBC Auto Differential    Valproic Acid Level, Total    Undress & Gown    Continuous Pulse Oximetry    Vital Signs    ECG 12 Lead ED Triage Standing Order; Weak / Dizzy / AMS    CBC & Differential    Green Top (Gel)    Lavender Top    Gold Top - SST    Light Blue Top       Medications Given in the Emergency Department:  Medications - No data to display       ED Course:    ED Course as of 11/04/24 2114 Mon Nov 04, 2024 2114 ECG 12 Lead ED Triage Standing Order; Weak / Dizzy / AMS  Sinus rhythm with rate of 57.  No acute ST elevation.  Normal MN and QTc.  EKG interpreted by me. [LD]      ED Course User Index  [LD] Lacy Morales MD       Labs:    Lab Results (last 24 hours)       Procedure Component Value Units  Date/Time    CBC & Differential [[id number]]  (Abnormal) Collected: 11/04/24 0406    Specimen: Blood Updated: 11/04/24 0415    Narrative:      The following orders were created for panel order CBC & Differential.  Procedure                               Abnormality         Status                     ---------                               -----------         ------                     CBC Auto Differential[[id number]]        Abnormal            Final result                 Please view results for these tests on the individual orders.    Comprehensive Metabolic Panel [[id number]]  (Abnormal) Collected: 11/04/24 0406    Specimen: Blood Updated: 11/04/24 0434     Glucose 107 mg/dL      BUN 16 mg/dL      Creatinine 0.81 mg/dL      Sodium 140 mmol/L      Potassium 4.0 mmol/L      Comment: Slight hemolysis detected by analyzer. Result may be falsely elevated.        Chloride 103 mmol/L      CO2 25.4 mmol/L      Calcium 9.3 mg/dL      Total Protein 7.4 g/dL      Albumin 4.3 g/dL      ALT (SGPT) 12 U/L      AST (SGOT) 19 U/L      Comment: Slight hemolysis detected by analyzer. Result may be falsely elevated.        Alkaline Phosphatase 141 U/L      Total Bilirubin 0.3 mg/dL      Globulin 3.1 gm/dL      A/G Ratio 1.4 g/dL      BUN/Creatinine Ratio 19.8     Anion Gap 11.6 mmol/L      eGFR 82.2 mL/min/1.73     Narrative:      GFR Normal >60  Chronic Kidney Disease <60  Kidney Failure <15      Single High Sensitivity Troponin T [[id number]]  (Normal) Collected: 11/04/24 0406    Specimen: Blood Updated: 11/04/24 0430     HS Troponin T 7 ng/L     Narrative:      High Sensitive Troponin T Reference Range:  <14.0 ng/L- Negative Female for AMI  <22.0 ng/L- Negative Male for AMI  >=14 - Abnormal Female indicating possible myocardial injury.  >=22 - Abnormal Male indicating possible myocardial injury.   Clinicians would have to utilize clinical acumen, EKG, Troponin, and serial changes to determine if it is an Acute Myocardial Infarction  or myocardial injury due to an underlying chronic condition.         Magnesium [[id number]]  (Normal) Collected: 11/04/24 0406    Specimen: Blood Updated: 11/04/24 0434     Magnesium 2.1 mg/dL     CBC Auto Differential [[id number]]  (Abnormal) Collected: 11/04/24 0406    Specimen: Blood Updated: 11/04/24 0415     WBC 12.41 10*3/mm3      RBC 4.70 10*6/mm3      Hemoglobin 14.2 g/dL      Hematocrit 42.6 %      MCV 90.6 fL      MCH 30.2 pg      MCHC 33.3 g/dL      RDW 13.2 %      RDW-SD 43.9 fl      MPV 11.4 fL      Platelets 257 10*3/mm3      Neutrophil % 59.9 %      Lymphocyte % 29.9 %      Monocyte % 8.2 %      Eosinophil % 1.1 %      Basophil % 0.5 %      Immature Grans % 0.4 %      Neutrophils, Absolute 7.43 10*3/mm3      Lymphocytes, Absolute 3.71 10*3/mm3      Monocytes, Absolute 1.02 10*3/mm3      Eosinophils, Absolute 0.14 10*3/mm3      Basophils, Absolute 0.06 10*3/mm3      Immature Grans, Absolute 0.05 10*3/mm3      nRBC 0.0 /100 WBC     Valproic Acid Level, Total [[id number]]  (Abnormal) Collected: 11/04/24 0406    Specimen: Blood Updated: 11/04/24 0607     Valproic Acid <2.8 mcg/mL     Narrative:      Therapeutic Ranges for Valproic Acid    Epilepsy:       mcg/ml  Bipolar/Amarilis  up to 125 mcg/ml               Imaging:    CT Head Without Contrast    Result Date: 11/4/2024  CT HEAD WO CONTRAST-  Date of exam: 11/4/2024, 4:55 A.M.  Indications: dizziness; HA  Comparison: 1/12/2024.  TECHNIQUE: Axial CT images were obtained of the head/brain without contrast administration. Reconstructed 2D (two-dimensional) coronal and sagittal images were also obtained. Automated exposure control and iterative construction methods were used. Additionally, the radiation dose reduction device was turned on for each scan per the ALARA (As Low as Reasonably Achievable) protocol. Please see the electronic medical record (EMR; Epic) for the recorded radiation dose.  FINDINGS: A routine nonenhanced head CT was performed. No acute  brain abnormality is identified. No acute intracranial hemorrhage. No acute infarct is seen. The gray-white matter differentiation is preserved. No midline shift or acute intracranial mass effect is seen. The ventricular size and configuration are within normal limits. The extra-axial spaces are mildly prominent, suggesting mild central atrophy. Mild chronic small vessel disease is suggested. Encephalomalacia is present within the right cerebellar hemisphere and suggests an old infarct(s), seen previously. It is probably in the right posterior inferior cerebellar artery (PICA) territory. To a lesser extent, chronic infarcts are suggested involving the brainstem, left cerebellar hemisphere, and right lenticulocapsular region, as seen previously. No acute skull fracture is suggested. Additionally, mild age-indeterminate mucosal thickening and/or retained secretions involve(s) the imaged paranasal sinuses. No air-fluid interfaces are seen within the imaged paranasal sinuses. No significant acute findings are seen with regard to the imaged orbits or middle ear clefts.       No acute brain abnormality is seen. Specifically, no acute intracranial hemorrhage, no acute infarct, no significant intracranial mass lesion, and no acute intracranial mass effect are appreciated. Please see above comments for further detail.    Portions of this note were completed with a voice recognition program.  Electronically Signed By-Jt Levy MD On:11/4/2024 5:21 AM      XR Chest 1 View    Result Date: 11/4/2024  XR CHEST 1 VW-  Date of Exam: 11/4/2024, 4:05 a.m.  Indication: Weak/Dizzy/AMS triage protocol.  Comparison: 8/16/2024.  FINDINGS: A single AP (or PA) upright portable chest radiograph was performed. No cardiac enlargement is seen. No acute infiltrate is appreciated. No pleural effusion or pneumothorax is identified. Chronic calcified granulomatous disease involves the chest. External artifacts obscure detail. The thoracic  aorta is atherosclerotic and ectatic. Nonspecific lucencies involve the proximal left humerus. They may represent subchondral cysts. They are thought less likely to represent lytic osseous metastases. No significant interval change is seen since the prior study (or studies).       No acute infiltrate is appreciated.    Portions of this note were completed with a voice recognition program.  Electronically Signed By-Jt Levy MD On:11/4/2024 4:08 AM         Differential Diagnosis and Discussion:    Dizziness: Based on the patient's history, signs, and symptoms, the diffential diagnosis includes but is not limited to meningitis, stroke, sepsis, subarachnoid hemorrhage, intracranial bleeding, encephalitis, vertigo, electrolyte imbalance, and metabolic disorders.  Stroke: Differential diagnosis in this patient with signs of possible ischemic stroke include TIA or ischemic stroke, hemorrhagic stroke, hypoglycemic episode, toxic or metabolic encephalopathy, paresthesias.    All labs were reviewed and interpreted by me.  All X-rays impressions were independently interpreted by me.  EKG was interpreted by me.  CT scan radiology impression was interpreted by me.    MDM  Number of Diagnoses or Management Options  TIA (transient ischemic attack)  Diagnosis management comments: Patient presented to the emergency department with episode of dizziness and problems with balance.  She does have a history of previous stroke.  Labs and imaging showed no acute findings.  I discussed case with teleneurology who started to evaluate patient but patient refused any further treatment and did not want to talk to him.  They did discuss reasons for more imaging and evaluation.  We discussed with patient reasons for doing this she then decided to talk to teleneurology.  We consulted them again.  Prior to them evaluating her for the second time patient decided to leave AGAINST MEDICAL ADVICE.       Amount and/or Complexity of Data  Reviewed  Clinical lab tests: reviewed  Tests in the radiology section of CPT®: reviewed  Review and summarize past medical records: yes  Independent visualization of images, tracings, or specimens: yes    Risk of Complications, Morbidity, and/or Mortality  Presenting problems: moderate  Management options: moderate                       Patient Care Considerations:    SEPSIS was considered but is NOT present in the emergency department as SIRS criteria is not present.      Consultants/Shared Management Plan:    Consultant: I have discussed the case with Teleneurology who agrees to consult on the patient.    Social Determinants of Health:    Patient is independent, reliable, and has access to care.       Disposition and Care Coordination:    AMA: I was informed that patient wishes to leave AGAINST MEDICAL ADVICE.    The patient has decision-making capacity. The patient understands the medical recommendations for further testing and evaluation.    The risks of leaving including death, permanent neurological disability, cardiovascular collapse, shock, or worsening of their condition. The patient understands these risks and was able to verbalize them back to me.    Although I disagree with the patient's decision to leave, I do not feel that it is appropriate to hold the patient against their will.  Several health care personal have tried to convince the patient to stay.  The patient is still electing to leave.     The patient was advised and encouraged to return at any time to complete evaluation.  The patient will be discharged per patient request.  The patient is to return immediately for worsening of their condition or other concerns.  The patient was given discharge instructions.  Expeditious follow up was strongly encouraged.          Final diagnoses:   TIA (transient ischemic attack)   Dizziness        ED Disposition       ED Disposition   AMA    Condition   --    Comment   Patient left AMA.               This  medical record created using voice recognition software.             Lacy Morales MD  11/04/24 [id number]

## 2024-11-04 NOTE — Clinical Note
[facility] EMERGENCY ROOM  [address] [place] [address]  Phone: [phone]  Fax: [phone]    Audra Montejo was seen and treated in our emergency department on 11/4/2024.  She may return to work on 11/04/2024.         Thank you for choosing [facility].    Lacy Morales MD

## 2024-11-05 PROCEDURE — 87086 URINE CULTURE/COLONY COUNT: CPT

## 2024-11-05 PROCEDURE — 82948 REAGENT STRIP/BLOOD GLUCOSE: CPT

## 2024-11-08 ENCOUNTER — OFFICE VISIT (OUTPATIENT)
Dept: FAMILY MEDICINE CLINIC | Facility: CLINIC | Age: 64
End: 2024-11-08
Payer: COMMERCIAL

## 2024-11-08 VITALS
HEIGHT: 58 IN | WEIGHT: 160.7 LBS | DIASTOLIC BLOOD PRESSURE: 53 MMHG | TEMPERATURE: 97.4 F | HEART RATE: 61 BPM | SYSTOLIC BLOOD PRESSURE: 129 MMHG | BODY MASS INDEX: 33.73 KG/M2 | OXYGEN SATURATION: 98 %

## 2024-11-08 DIAGNOSIS — F31.62 BIPOLAR DISORDER, CURRENT EPISODE MIXED, MODERATE: ICD-10-CM

## 2024-11-08 DIAGNOSIS — R51.9 INTERMITTENT HEADACHE: ICD-10-CM

## 2024-11-08 DIAGNOSIS — Z86.73 HISTORY OF CVA (CEREBROVASCULAR ACCIDENT): ICD-10-CM

## 2024-11-08 DIAGNOSIS — J45.40 MODERATE PERSISTENT ASTHMA WITHOUT COMPLICATION: ICD-10-CM

## 2024-11-08 DIAGNOSIS — R26.89 BALANCE PROBLEM: ICD-10-CM

## 2024-11-08 DIAGNOSIS — R42 DIZZINESS: Primary | ICD-10-CM

## 2024-11-08 DIAGNOSIS — I10 PRIMARY HYPERTENSION: ICD-10-CM

## 2024-11-08 PROCEDURE — 99214 OFFICE O/P EST MOD 30 MIN: CPT | Performed by: NURSE PRACTITIONER

## 2024-11-08 RX ORDER — DIVALPROEX SODIUM 500 MG/1
500 TABLET, FILM COATED, EXTENDED RELEASE ORAL DAILY
Qty: 90 TABLET | Refills: 0 | Status: SHIPPED | OUTPATIENT
Start: 2024-11-08

## 2024-11-08 RX ORDER — ALBUTEROL SULFATE 90 UG/1
2 INHALANT RESPIRATORY (INHALATION) EVERY 4 HOURS PRN
Qty: 8.5 G | Refills: 2 | Status: SHIPPED | OUTPATIENT
Start: 2024-11-08

## 2024-11-08 RX ORDER — ALBUTEROL SULFATE 1.25 MG/3ML
1 SOLUTION RESPIRATORY (INHALATION) EVERY 6 HOURS PRN
Qty: 50 EACH | Refills: 5 | Status: SHIPPED | OUTPATIENT
Start: 2024-11-08

## 2024-11-08 NOTE — ASSESSMENT & PLAN NOTE
Asthma is stable.    Plan: Continue same medication/s without change.    Discussed medication dosage, use, side effects, and goals of treatment in detail.    Discussed distinction between quick-relief and maintenance control medications.  Discussed monitoring symptoms and use of quick-relief medications and contacting provider early in the course of exacerbations.  Warning signs of respiratory distress were reviewed with the patient.     Patient Treatment Goals: symptom prevention, minimizing limitation in activity, prevention of exacerbations and use of ER/inpatient care, maintenance of optimal pulmonary function, and minimization of adverse effects of treatment.    Followup at the next regular appointment.    Orders:    albuterol (ACCUNEB) 1.25 MG/3ML nebulizer solution; Take 3 mL by nebulization Every 6 (Six) Hours As Needed for Wheezing or Shortness of Air. Indications: asthma    albuterol sulfate  (90 Base) MCG/ACT inhaler; Inhale 2 puffs Every 4 (Four) Hours As Needed for Wheezing. Indications: Asthma

## 2024-11-08 NOTE — ASSESSMENT & PLAN NOTE
Bipolar disorder is not well-controlled, valproic acid is low, I will increase Depakote ER to 500 mg daily.    Orders:    divalproex (DEPAKOTE ER) 500 MG 24 hr tablet; Take 1 tablet by mouth Daily. Indications: Depressive Phase of Manic-Depression

## 2024-11-08 NOTE — ASSESSMENT & PLAN NOTE
Hypertension is stable and controlled  Continue current treatment regimen.  Blood pressure will be reassessed in 1 month.

## 2024-11-08 NOTE — PROGRESS NOTES
Chief Complaint  Dizziness and Balance Issues    Subjective            Audra Montejo is a 64 y.o. female who presents to [facility]   History of Present Illness  She is here today with complaints of feeling dizzy and off balance.  She went to [facility] emergency room on 11/4/2024 with the same complaints.  She was at work when she suddenly felt onset of dizziness.  She felt her feet was falling towards the left.  The symptoms lasted about 30 minutes.  CT head was obtained which did not show any abnormalities, no acute intracranial hemorrhage and no acute infarct.  Encephalomalacia is present within the right cerebellar hemisphere and suggests an old infarct(s), seen previously.  She was diagnosed with a TIA.  They did consult teleneurology who started to evaluate patient but patient refused any further treatment and did not want to talk to the teleneurologist.  She left AGAINST MEDICAL ADVICE.  She went to [facility] urgent care on 11/5/2024 with complaint of headache and dizziness.  She states dated she started feeling shaky and had been having the symptoms for a week.  She had came to the urgent care requesting an MRI and a CT scan and was told that they do not do that at the urgent care and recommended she go to the ER but she refused.  She was upset because she did not want to see a neurologist on an iPad.  She also reported that she had cough and nasal congestion with some chills.  They did a COVID and flu test in the clinic but both were negative.  Urinalysis showed slightly cloudy appearance urine.  Urine culture was sent and showed mixed xenia.  She denies any dysuria, frequency or urgency.  Denies any flank pain.  Glucose level drawn in the urgent care was 153. She does have prediabetes.  Her last A1c was 6.2% 4 months ago. Patient refused to have EKG done at the urgent care clinic.  She was treated for a viral illness and given a prescription for  "Tessalon Perles but she is not taking those now.    She states that she has dizziness often and feels imbalance.  She states that \"next time she has an episode at work, she is not going to say anything to anyone.\"  She states that they sent her to the emergency room.  She does admit to an intermittent headache on the right side that sometimes is sharp and sometimes is nagging but does not last for very long.    She was supposed to follow-up with neurology in July but she was a no-show.  She states she did not know about this appointment.     She has asthma and needs refills on her albuterol inhaler and nebulizer.  She is a current smoker and refuses to quit smoking.    Bipolar depression: She is currently on Depakote  mg daily.  The ER did check of valproic acid level which was low.  She states that she probably could use an increase in dose because she \"scared someone at work\" by yelling at them.    Hypertension: Her blood pressure is controlled today.  She is on candesartan 8 mg twice daily and amlodipine 5 mg daily.      Tobacco Use: High Risk (11/8/2024)    Patient History     Smoking Tobacco Use: Every Day     Smokeless Tobacco Use: Never     Passive Exposure: Current      E-cigarette/Vaping    E-cigarette/Vaping Use Never User     Passive Exposure No     Counseling Given Yes      E-cigarette/Vaping Substances    Nicotine No     THC No     CBD No     Flavoring No      E-cigarette/Vaping Devices    Disposable No     Pre-filled or Refillable Cartridge No     Refillable Tank No     Pre-filled Pod No        Alcohol Use: Not At Risk (5/15/2023)    AUDIT-C     Frequency of Alcohol Consumption: Never     Average Number of Drinks: Patient does not drink     Frequency of Binge Drinking: Never         Objective   Vital Signs:   Vitals:    11/08/24 0721   BP: 129/53   BP Location: Left arm   Patient Position: Sitting   Cuff Size: Adult   Pulse: 61   Temp: 97.4 °F (36.3 °C)   SpO2: 98%   Weight: 72.9 kg (160 lb 11.2 " "oz)   Height: 147.3 cm (57.99\")   PainSc:   3   PainLoc: Back     Body mass index is 33.6 kg/m².    Wt Readings from Last 3 Encounters:   11/08/24 72.9 kg (160 lb 11.2 oz)   11/05/24 71.7 kg (158 lb)   11/04/24 71.8 kg (158 lb 4.6 oz)     BP Readings from Last 3 Encounters:   11/08/24 129/53   11/05/24 147/69   11/04/24 (!) 190/81       Health Maintenance   Topic Date Due    ANNUAL PHYSICAL  04/21/2024    COVID-19 Vaccine (1 - 2024-25 season) 11/08/2025 (Originally 9/1/2024)    BMI FOLLOWUP  02/15/2025    LUNG CANCER SCREENING  03/21/2025    LIPID PANEL  07/05/2025    MAMMOGRAM  04/24/2026    TDAP/TD VACCINES (3 - Td or Tdap) 06/01/2028    COLORECTAL CANCER SCREENING  05/15/2034    HEPATITIS C SCREENING  Completed    ZOSTER VACCINE  Completed    Pneumococcal Vaccine 0-64  Discontinued    INFLUENZA VACCINE  Discontinued       /53 (BP Location: Left arm, Patient Position: Sitting, Cuff Size: Adult)   Pulse 61   Temp 97.4 °F (36.3 °C)   Ht 147.3 cm (57.99\")   Wt 72.9 kg (160 lb 11.2 oz)   SpO2 98%   BMI 33.60 kg/m²       Current Outpatient Medications:     albuterol (ACCUNEB) 1.25 MG/3ML nebulizer solution, Take 3 mL by nebulization Every 6 (Six) Hours As Needed for Wheezing or Shortness of Air. Indications: asthma, Disp: 50 each, Rfl: 5    albuterol sulfate  (90 Base) MCG/ACT inhaler, Inhale 2 puffs Every 4 (Four) Hours As Needed for Wheezing. Indications: Asthma, Disp: 8.5 g, Rfl: 2    amLODIPine (NORVASC) 5 MG tablet, TAKE 1 TABLET BY MOUTH DAILY FOR HIGH BLOOD PRESSURE, Disp: 90 tablet, Rfl: 1    atorvastatin (LIPITOR) 20 MG tablet, Take 1 tablet by mouth Every Night. Indications: High Amount of Fats in the Blood, Disp: 90 tablet, Rfl: 1    candesartan (ATACAND) 8 MG tablet, Take 1 tablet by mouth 2 (Two) Times a Day. Indications: High Blood Pressure Disorder, Disp: 180 tablet, Rfl: 1    cetirizine (zyrTEC) 10 MG tablet, TAKE 1 TABLET BY MOUTH EVERY NIGHT AT BEDTIME AS NEEDED FOR ALLERGIES, Disp: " 90 tablet, Rfl: 1    clopidogrel (PLAVIX) 75 MG tablet, Take 1 tablet by mouth Daily., Disp: 90 tablet, Rfl: 1    divalproex (DEPAKOTE ER) 500 MG 24 hr tablet, Take 1 tablet by mouth Daily. Indications: Depressive Phase of Manic-Depression, Disp: 90 tablet, Rfl: 0    DULoxetine (CYMBALTA) 30 MG capsule, , Disp: , Rfl:     folic acid (FOLVITE) 1 MG tablet, Take 1 tablet by mouth Daily., Disp: , Rfl:     montelukast (SINGULAIR) 10 MG tablet, TAKE 1 TABLET BY MOUTH EVERY NIGHT, Disp: 90 tablet, Rfl: 1    ondansetron (ZOFRAN) 8 MG tablet, Take 1 tablet by mouth Every 8 (Eight) Hours As Needed for Nausea or Vomiting., Disp: 10 tablet, Rfl: 0    oxybutynin XL (DITROPAN-XL) 10 MG 24 hr tablet, TAKE 1 TABLET BY MOUTH DAILY FOR OVERACTIVE BLADDER, Disp: 90 tablet, Rfl: 0    traMADol (ULTRAM) 50 MG tablet, Take 1 tablet by mouth Every 8 (Eight) Hours As Needed for Moderate Pain., Disp: 30 tablet, Rfl: 1   Past Medical History:   Diagnosis Date    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    Acid reflux disease     Allergic Unknown    Allergy     unspecified    Anxiety     Asthma Unknown    Back pain     Bilateral carpal tunnel syndrome 08/17/2017    Bipolar disorder     Bursitis of left shoulder 08/09/2016    Cataract Unknown    Chronic pain disorder     Clotting disorder     Deep vein thrombosis All my life    Depression     Eczema     Gall stones     H/O psychiatric care     Heart attack     Heart murmur     Hyperlipidemia 11/22    Hypertension     Incomplete tear of left rotator cuff 01/25/2017    Left shoulder pain     Loss of appetite     Lumbago 05/08/2015    low back pain     Memory loss     forgetfulness    Migraine headache     Need for hepatitis C screening test     Obesity     Palpitation     Panic disorder     Ringing in ears     Seasonal allergies     Shortness of breath     Sinus trouble     unspecified     Stroke (cerebrum)     Subacromial impingement, left 10/13/2016    Superior glenoid labrum lesion of left shoulder  10/13/2016    subsequent encounter    Trouble swallowing     Urinary tract infection Unknown        Physical Exam  Vitals reviewed.   Constitutional:       Appearance: Normal appearance. She is well-developed.   Eyes:      Extraocular Movements: Extraocular movements intact.      Pupils: Pupils are equal.      Right eye: Pupil is sluggish.      Left eye: Pupil is sluggish.   Neck:      Thyroid: No thyroid mass, thyromegaly or thyroid tenderness.   Cardiovascular:      Rate and Rhythm: Normal rate and regular rhythm.      Heart sounds: No murmur heard.     No friction rub. No gallop.   Pulmonary:      Effort: Pulmonary effort is normal.      Breath sounds: Normal breath sounds. No wheezing or rhonchi.   Lymphadenopathy:      Cervical: No cervical adenopathy.   Skin:     General: Skin is warm and dry.   Neurological:      General: No focal deficit present.      Mental Status: She is alert and oriented to person, place, and time.      Cranial Nerves: No cranial nerve deficit or facial asymmetry.      Motor: No weakness, tremor, atrophy, abnormal muscle tone or pronator drift.      Coordination: Romberg sign negative.      Gait: Gait is intact.   Psychiatric:         Mood and Affect: Mood and affect normal.         Behavior: Behavior normal.         Thought Content: Thought content normal. Thought content does not include homicidal or suicidal ideation.         Judgment: Judgment normal.          Result Review :    The following data was reviewed by: HERMAN Menjivar on 11/08/2024:  Common Labs   Common labs          7/5/2024    14:54 7/22/2024    08:04 11/4/2024    04:06   Common Labs   Glucose 102   107    BUN 12   16    Creatinine 0.75   0.81    Sodium 142   140    Potassium 4.5   4.0    Chloride 107   103    Calcium 9.3   9.3    Albumin 4.2  4.3  4.3    Total Bilirubin 0.3  0.2  0.3    Alkaline Phosphatase 125  130  141    AST (SGOT) 19  16  19    ALT (SGPT) 14  15  12    WBC 10.18   12.41    Hemoglobin  15.6   14.2    Hematocrit 46.8   42.6    Platelets 237   257    Total Cholesterol 113      Triglycerides 55      HDL Cholesterol 72      LDL Cholesterol  28      Hemoglobin A1C 6.20        Data reviewed : Recent [facility] emergency room notes and urgent care notes.      CT Head Without Contrast    Result Date: 11/4/2024  No acute brain abnormality is seen. Specifically, no acute intracranial hemorrhage, no acute infarct, no significant intracranial mass lesion, and no acute intracranial mass effect are appreciated. Please see above comments for further detail.    Portions of this note were completed with a voice recognition program.  Electronically Signed ByKassandra Levy MD On:11/4/2024 5:21 AM      XR Chest 1 View    Result Date: 11/4/2024  No acute infiltrate is appreciated.    Portions of this note were completed with a voice recognition program.  Electronically Signed ByKassandra Levy MD On:11/4/2024 4:08 AM      XR Chest 2 View    Result Date: 8/16/2024  Impression: Faint patchy right upper lobe airspace opacities. Electronically Signed: Lalo Diehl MD  8/16/2024 3:42 PM EDT  Workstation ID: [id number]    Assessment & Plan  Dizziness  I am going to refer her back to neurology.  Advised her if she has any new or worsening of symptoms to go to the emergency room.  Orders:    Ambulatory Referral to Neurology    Moderate persistent asthma without complication  Asthma is stable.    Plan: Continue same medication/s without change.    Discussed medication dosage, use, side effects, and goals of treatment in detail.    Discussed distinction between quick-relief and maintenance control medications.  Discussed monitoring symptoms and use of quick-relief medications and contacting provider early in the course of exacerbations.  Warning signs of respiratory distress were reviewed with the patient.     Patient Treatment Goals: symptom prevention, minimizing limitation in activity, prevention of exacerbations  and use of ER/inpatient care, maintenance of optimal pulmonary function, and minimization of adverse effects of treatment.    Followup at the next regular appointment.    Orders:    albuterol (ACCUNEB) 1.25 MG/3ML nebulizer solution; Take 3 mL by nebulization Every 6 (Six) Hours As Needed for Wheezing or Shortness of Air. Indications: asthma    albuterol sulfate  (90 Base) MCG/ACT inhaler; Inhale 2 puffs Every 4 (Four) Hours As Needed for Wheezing. Indications: Asthma    Bipolar disorder, current episode mixed, moderate  Bipolar disorder is not well-controlled, valproic acid is low, I will increase Depakote ER to 500 mg daily.    Orders:    divalproex (DEPAKOTE ER) 500 MG 24 hr tablet; Take 1 tablet by mouth Daily. Indications: Depressive Phase of Manic-Depression    Balance problem  I am going to refer her back to neurology.  Advised her if she has any new or worsening of symptoms to go to the emergency room.  Orders:    Ambulatory Referral to Neurology    Intermittent headache  I am going to refer her back to neurology.  Advised her if she has any new or worsening of symptoms to go to the emergency room.  Orders:    Ambulatory Referral to Neurology    Primary hypertension  Hypertension is stable and controlled  Continue current treatment regimen.  Blood pressure will be reassessed in 1 month.         History of CVA (cerebrovascular accident)    Orders:    Ambulatory Referral to Neurology        Diagnosis Plan   1. Dizziness  Ambulatory Referral to Neurology      2. Moderate persistent asthma without complication  albuterol (ACCUNEB) 1.25 MG/3ML nebulizer solution    albuterol sulfate  (90 Base) MCG/ACT inhaler      3. Bipolar disorder, current episode mixed, moderate  divalproex (DEPAKOTE ER) 500 MG 24 hr tablet      4. Balance problem  Ambulatory Referral to Neurology      5. Intermittent headache  Ambulatory Referral to Neurology      6. Primary hypertension        7. History of CVA  (cerebrovascular accident)  Ambulatory Referral to Neurology            FOLLOW UP  Return for Keep Scheduled Follow-up.  Patient was given instructions and counseling regarding her condition or for health maintenance advice. Please see specific information pulled into the AVS if appropriate.       CURRENT & DISCONTINUED MEDICATIONS  Current Outpatient Medications   Medication Instructions    albuterol (ACCUNEB) 1.25 mg, Nebulization, Every 6 Hours PRN    albuterol sulfate  (90 Base) MCG/ACT inhaler 2 puffs, Inhalation, Every 4 Hours PRN    amLODIPine (NORVASC) 5 mg, Oral, Daily, for high blood pressure    atorvastatin (LIPITOR) 20 mg, Oral, Nightly    candesartan (ATACAND) 8 mg, Oral, 2 Times Daily    cetirizine (zyrTEC) 10 MG tablet TAKE 1 TABLET BY MOUTH EVERY NIGHT AT BEDTIME AS NEEDED FOR ALLERGIES    clopidogrel (PLAVIX) 75 mg, Oral, Daily    divalproex (DEPAKOTE ER) 500 mg, Oral, Daily    DULoxetine (CYMBALTA) 30 MG capsule     folic acid (FOLVITE) 1 mg, Daily    montelukast (SINGULAIR) 10 mg, Oral, Nightly    ondansetron (ZOFRAN) 8 mg, Oral, Every 8 Hours PRN    oxybutynin XL (DITROPAN-XL) 10 MG 24 hr tablet TAKE 1 TABLET BY MOUTH DAILY FOR OVERACTIVE BLADDER    traMADol (ULTRAM) 50 mg, Oral, Every 8 Hours PRN       Medications Discontinued During This Encounter   Medication Reason    benzonatate (TESSALON) 100 MG capsule Non-compliance    albuterol (ACCUNEB) 1.25 MG/3ML nebulizer solution Reorder    divalproex (DEPAKOTE ER) 250 MG 24 hr tablet Reorder    albuterol sulfate  (90 Base) MCG/ACT inhaler Reorder    ziprasidone (GEODON) 20 MG capsule Non-compliance        Parts of this note are electronic transcriptions/translations of spoken language to printed text using the Dragon Dictation system.    Gale Negrete, APRN  11/08/24  08:53 EST

## 2024-11-25 ENCOUNTER — TELEPHONE (OUTPATIENT)
Dept: FAMILY MEDICINE CLINIC | Facility: CLINIC | Age: 64
End: 2024-11-25
Payer: COMMERCIAL

## 2024-11-25 NOTE — TELEPHONE ENCOUNTER
"Relay     \"CALLED PATIENT TO RESCHEDULE APPT DUE TO PROVIDER OUT OF OFFICE - SHE WILL CALL BACK WHEN SHE CHECKS HER OTHER APPTS - JUST NEEDS TO BE RESCHEDULED\"                "

## 2024-12-06 DIAGNOSIS — E78.00 PURE HYPERCHOLESTEROLEMIA: ICD-10-CM

## 2024-12-06 RX ORDER — ATORVASTATIN CALCIUM 20 MG/1
TABLET, FILM COATED ORAL
Qty: 30 TABLET | Refills: 0 | Status: SHIPPED | OUTPATIENT
Start: 2024-12-06

## 2024-12-06 RX ORDER — ATORVASTATIN CALCIUM 20 MG/1
TABLET, FILM COATED ORAL
Qty: 90 TABLET | OUTPATIENT
Start: 2024-12-06

## 2024-12-17 ENCOUNTER — OFFICE VISIT (OUTPATIENT)
Dept: FAMILY MEDICINE CLINIC | Facility: CLINIC | Age: 64
End: 2024-12-17
Payer: COMMERCIAL

## 2024-12-17 VITALS
OXYGEN SATURATION: 98 % | WEIGHT: 157.1 LBS | SYSTOLIC BLOOD PRESSURE: 131 MMHG | BODY MASS INDEX: 32.98 KG/M2 | HEIGHT: 58 IN | DIASTOLIC BLOOD PRESSURE: 58 MMHG | TEMPERATURE: 98.2 F | HEART RATE: 52 BPM

## 2024-12-17 DIAGNOSIS — E78.00 PURE HYPERCHOLESTEROLEMIA: ICD-10-CM

## 2024-12-17 DIAGNOSIS — J30.2 SEASONAL ALLERGIC RHINITIS, UNSPECIFIED TRIGGER: ICD-10-CM

## 2024-12-17 DIAGNOSIS — N32.81 OVERACTIVE BLADDER: ICD-10-CM

## 2024-12-17 DIAGNOSIS — F31.62 BIPOLAR DISORDER, CURRENT EPISODE MIXED, MODERATE: Primary | ICD-10-CM

## 2024-12-17 DIAGNOSIS — Z86.73 HISTORY OF CVA (CEREBROVASCULAR ACCIDENT): ICD-10-CM

## 2024-12-17 DIAGNOSIS — J45.40 MODERATE PERSISTENT ASTHMA WITHOUT COMPLICATION: ICD-10-CM

## 2024-12-17 DIAGNOSIS — I10 PRIMARY HYPERTENSION: ICD-10-CM

## 2024-12-17 PROCEDURE — 99214 OFFICE O/P EST MOD 30 MIN: CPT | Performed by: NURSE PRACTITIONER

## 2024-12-17 RX ORDER — CLOPIDOGREL BISULFATE 75 MG/1
75 TABLET ORAL DAILY
Qty: 90 TABLET | Refills: 1 | Status: SHIPPED | OUTPATIENT
Start: 2024-12-17

## 2024-12-17 RX ORDER — CETIRIZINE HYDROCHLORIDE 10 MG/1
10 TABLET ORAL DAILY
Qty: 90 TABLET | Refills: 1 | Status: SHIPPED | OUTPATIENT
Start: 2024-12-17

## 2024-12-17 RX ORDER — ATORVASTATIN CALCIUM 20 MG/1
20 TABLET, FILM COATED ORAL NIGHTLY
Qty: 90 TABLET | Refills: 1 | Status: SHIPPED | OUTPATIENT
Start: 2024-12-17

## 2024-12-17 RX ORDER — CANDESARTAN 8 MG/1
8 TABLET ORAL 2 TIMES DAILY
Qty: 180 TABLET | Refills: 1 | Status: SHIPPED | OUTPATIENT
Start: 2024-12-17

## 2024-12-17 RX ORDER — MONTELUKAST SODIUM 10 MG/1
10 TABLET ORAL NIGHTLY
Qty: 90 TABLET | Refills: 1 | Status: SHIPPED | OUTPATIENT
Start: 2024-12-17

## 2024-12-17 RX ORDER — OXYBUTYNIN CHLORIDE 10 MG/1
10 TABLET, EXTENDED RELEASE ORAL DAILY
Qty: 90 TABLET | Refills: 1 | Status: SHIPPED | OUTPATIENT
Start: 2024-12-17

## 2024-12-17 NOTE — ASSESSMENT & PLAN NOTE
Allergies are stable, will continue Zyrtec 10 mg daily.  Orders:    cetirizine (zyrTEC) 10 MG tablet; Take 1 tablet by mouth Daily. Indications: Perennial Allergic Rhinitis

## 2024-12-17 NOTE — PROGRESS NOTES
Chief Complaint  Hypertension, Hyperlipidemia, and Depression    Subjective            Audra Montejo is a 64 y.o. female who presents to [facility]   History of Present Illness  3-month follow-up.    She has bipolar. She states she is doing well on the increased dose of Depakote.  She refused to go to psychiatry.      She is currently on Cymbalta 30 mg daily for pain mgmt. She is now wanting to be weaned off of her Cymbalta that pain management prescribes for her because she heard is causes cancer. She is also on tramadol as needed, follows with pain mgmt.     She has borderline diabetes.  Her last A1c was 5.9% 4 months ago.    Hypertension: Her blood pressure is stable on candesartan 8 mg twice daily and amlodipine 5 mg daily.     She has history of CVA and cerebellar infarction.  She is on Plavix 75 mg daily and atorvastatin 20 mg nightly.    She has overactive bladder and is currently stable on oxybutynin XL 10 mg daily.    Asthma: She is on Singulair and she has albuterol inhalers.  She is still smoking and is not interested in quitting.      Tobacco Use: High Risk (12/17/2024)    Patient History     Smoking Tobacco Use: Every Day     Smokeless Tobacco Use: Never     Passive Exposure: Current      E-cigarette/Vaping    E-cigarette/Vaping Use Never User     Passive Exposure No     Counseling Given Yes      E-cigarette/Vaping Substances    Nicotine No     THC No     CBD No     Flavoring No      E-cigarette/Vaping Devices    Disposable No     Pre-filled or Refillable Cartridge No     Refillable Tank No     Pre-filled Pod No        Alcohol Use: Not At Risk (5/15/2023)    AUDIT-C     Frequency of Alcohol Consumption: Never     Average Number of Drinks: Patient does not drink     Frequency of Binge Drinking: Never         Objective   Vital Signs:   Vitals:    12/17/24 0704   BP: 131/58   BP Location: Left arm   Patient Position: Sitting   Cuff Size: Adult   Pulse: 52   Temp: 98.2  "°F (36.8 °C)   SpO2: 98%   Weight: 71.3 kg (157 lb 1.6 oz)   Height: 147.3 cm (57.99\")   PainSc:   5   PainLoc: Back     Body mass index is 32.85 kg/m².    Wt Readings from Last 3 Encounters:   12/17/24 71.3 kg (157 lb 1.6 oz)   11/08/24 72.9 kg (160 lb 11.2 oz)   11/05/24 71.7 kg (158 lb)     BP Readings from Last 3 Encounters:   12/17/24 131/58   11/08/24 129/53   11/05/24 147/69       Health Maintenance   Topic Date Due    ANNUAL PHYSICAL  04/21/2024    COVID-19 Vaccine (1 - 2024-25 season) 11/08/2025 (Originally 9/1/2024)    BMI FOLLOWUP  02/15/2025    LUNG CANCER SCREENING  03/21/2025    LIPID PANEL  07/05/2025    MAMMOGRAM  04/24/2026    TDAP/TD VACCINES (3 - Td or Tdap) 06/01/2028    COLORECTAL CANCER SCREENING  05/15/2034    HEPATITIS C SCREENING  Completed    ZOSTER VACCINE  Completed    Pneumococcal Vaccine 0-64  Discontinued    INFLUENZA VACCINE  Discontinued       /58 (BP Location: Left arm, Patient Position: Sitting, Cuff Size: Adult)   Pulse 52   Temp 98.2 °F (36.8 °C)   Ht 147.3 cm (57.99\")   Wt 71.3 kg (157 lb 1.6 oz)   SpO2 98%   BMI 32.85 kg/m²       Current Outpatient Medications:     albuterol (ACCUNEB) 1.25 MG/3ML nebulizer solution, Take 3 mL by nebulization Every 6 (Six) Hours As Needed for Wheezing or Shortness of Air. Indications: asthma, Disp: 50 each, Rfl: 5    albuterol sulfate  (90 Base) MCG/ACT inhaler, Inhale 2 puffs Every 4 (Four) Hours As Needed for Wheezing. Indications: Asthma, Disp: 8.5 g, Rfl: 2    amLODIPine (NORVASC) 5 MG tablet, TAKE 1 TABLET BY MOUTH DAILY FOR HIGH BLOOD PRESSURE, Disp: 90 tablet, Rfl: 1    atorvastatin (LIPITOR) 20 MG tablet, TAKE 1 TABLET BY MOUTH EVERY NIGHT FOR HIGH AMOUNT OF FATS IN THE BLOOD, Disp: 30 tablet, Rfl: 0    candesartan (ATACAND) 8 MG tablet, Take 1 tablet by mouth 2 (Two) Times a Day. Indications: High Blood Pressure Disorder, Disp: 180 tablet, Rfl: 1    cetirizine (zyrTEC) 10 MG tablet, TAKE 1 TABLET BY MOUTH EVERY NIGHT " AT BEDTIME AS NEEDED FOR ALLERGIES, Disp: 90 tablet, Rfl: 1    clopidogrel (PLAVIX) 75 MG tablet, Take 1 tablet by mouth Daily., Disp: 90 tablet, Rfl: 1    DULoxetine (CYMBALTA) 30 MG capsule, , Disp: , Rfl:     folic acid (FOLVITE) 1 MG tablet, Take 1 tablet by mouth Daily., Disp: , Rfl:     montelukast (SINGULAIR) 10 MG tablet, TAKE 1 TABLET BY MOUTH EVERY NIGHT, Disp: 90 tablet, Rfl: 1    ondansetron (ZOFRAN) 8 MG tablet, Take 1 tablet by mouth Every 8 (Eight) Hours As Needed for Nausea or Vomiting., Disp: 10 tablet, Rfl: 0    oxybutynin XL (DITROPAN-XL) 10 MG 24 hr tablet, TAKE 1 TABLET BY MOUTH DAILY FOR OVERACTIVE BLADDER, Disp: 90 tablet, Rfl: 0    traMADol (ULTRAM) 50 MG tablet, Take 1 tablet by mouth Every 8 (Eight) Hours As Needed for Moderate Pain., Disp: 30 tablet, Rfl: 1    divalproex (DEPAKOTE ER) 500 MG 24 hr tablet, Take 1 tablet by mouth Daily. 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    Acid reflux disease     Allergic Unknown    Allergy     unspecified    Anxiety     Asthma Unknown    Back pain     Bilateral carpal tunnel syndrome 08/17/2017    Bipolar disorder     Bursitis of left shoulder 08/09/2016    Cataract Unknown    Chronic pain disorder     Clotting disorder     Deep vein thrombosis All my life    Depression     Eczema     Gall stones     H/O psychiatric care     Heart attack     Heart murmur     Hyperlipidemia 11/22    Hypertension     Incomplete tear of left rotator cuff 01/25/2017    Left shoulder pain     Loss of appetite     Lumbago 05/08/2015    low back pain     Memory loss     forgetfulness    Migraine headache     Need for hepatitis C screening test     Obesity     Palpitation     Panic disorder     Ringing in ears     Seasonal allergies     Shortness of breath     Sinus trouble     unspecified     Stroke (cerebrum)      Subacromial impingement, left 10/13/2016    Superior glenoid labrum lesion of left shoulder 10/13/2016    subsequent encounter    Trouble swallowing     Urinary tract infection Unknown        Physical Exam  Vitals reviewed.   Constitutional:       Appearance: Normal appearance. She is well-developed. She is obese.   Neck:      Thyroid: No thyroid mass, thyromegaly or thyroid tenderness.   Cardiovascular:      Rate and Rhythm: Normal rate and regular rhythm.      Heart sounds: No murmur heard.     No friction rub. No gallop.   Pulmonary:      Effort: Pulmonary effort is normal.      Breath sounds: Normal breath sounds. No wheezing or rhonchi.   Lymphadenopathy:      Cervical: No cervical adenopathy.   Skin:     General: Skin is warm and dry.   Neurological:      Mental Status: She is alert and oriented to person, place, and time.      Cranial Nerves: No cranial nerve deficit.   Psychiatric:         Mood and Affect: Mood and affect normal.         Behavior: Behavior normal.         Thought Content: Thought content normal. Thought content does not include homicidal or suicidal ideation.         Judgment: Judgment normal.          Result Review :    The following data was reviewed by: HERMAN Menjivar on 12/17/2024:  Common Labs   Common labs          7/5/2024    14:54 7/22/2024    08:04 11/4/2024    04:06   Common Labs   Glucose 102   107    BUN 12   16    Creatinine 0.75   0.81    Sodium 142   140    Potassium 4.5   4.0    Chloride 107   103    Calcium 9.3   9.3    Albumin 4.2  4.3  4.3    Total Bilirubin 0.3  0.2  0.3    Alkaline Phosphatase 125  130  141    AST (SGOT) 19  16  19    ALT (SGPT) 14  15  12    WBC 10.18   12.41    Hemoglobin 15.6   14.2    Hematocrit 46.8   42.6    Platelets 237   257    Total Cholesterol 113      Triglycerides 55      HDL Cholesterol 72      LDL Cholesterol  28      Hemoglobin A1C 6.20           CT Head Without Contrast    Result Date: 11/4/2024  No acute brain abnormality is  seen. Specifically, no acute intracranial hemorrhage, no acute infarct, no significant intracranial mass lesion, and no acute intracranial mass effect are appreciated. Please see above comments for further detail.    Portions of this note were completed with a voice recognition program.  Electronically Signed ByKassandra Levy MD On:11/4/2024 5:21 AM      XR Chest 1 View    Result Date: 11/4/2024  No acute infiltrate is appreciated.    Portions of this note were completed with a voice recognition program.  Electronically Signed By-Jt Levy MD On:11/4/2024 4:08 AM      Assessment & Plan  Bipolar disorder, current episode mixed, moderate  Psychological condition is improving with treatment.  Continue current treatment regimen.  Psychological condition  will be reassessed in 6 months.    I discussed with her that Cymbalta is prescribed by pain management and she will have to discuss that with them to be weaned off of the medication.  Patient verbalizes understanding.       Primary hypertension  Hypertension is stable and controlled  Continue current treatment regimen.  Stop smoking.  Blood pressure will be reassessed in 6 months.    Orders:    candesartan (ATACAND) 8 MG tablet; Take 1 tablet by mouth 2 (Two) Times a Day. Indications: High Blood Pressure    Overactive bladder  She is currently stable on oxybutynin 10 mg daily, will continue current dose.  Orders:    oxybutynin XL (DITROPAN-XL) 10 MG 24 hr tablet; Take 1 tablet by mouth Daily. Indications: Overactive Bladder    Pure hypercholesterolemia   Lipid abnormalities are stable    Plan:  Continue same medication/s without change.      Discussed medication dosage, use, side effects, and goals of treatment in detail.    Counseled patient on lifestyle modifications to help control hyperlipidemia.     Patient Treatment Goals:   LDL goal is less than 70    Followup in 6 months.    Orders:    atorvastatin (LIPITOR) 20 MG tablet; Take 1 tablet by mouth Every  Night. Indications: Cerebrovascular Accident or Stroke    History of CVA (cerebrovascular accident)  She is currently taking Plavix.  Orders:    clopidogrel (PLAVIX) 75 MG tablet; Take 1 tablet by mouth Daily. Indications: Cerebrovascular Accident or Stroke    Seasonal allergic rhinitis, unspecified trigger  Allergies are stable, will continue Zyrtec 10 mg daily.  Orders:    cetirizine (zyrTEC) 10 MG tablet; Take 1 tablet by mouth Daily. Indications: Perennial Allergic Rhinitis    Moderate persistent asthma without complication  Asthma is stable.    Plan: Continue same medication/s without change.        Patient Treatment Goals: symptom prevention, minimizing limitation in activity, prevention of exacerbations and use of ER/inpatient care, maintenance of optimal pulmonary function, and minimization of adverse effects of treatment.    Followup in 6 months.      Orders:    montelukast (SINGULAIR) 10 MG tablet; Take 1 tablet by mouth Every Night. Indications: Asthma         Diagnosis Plan   1. History of CVA (cerebrovascular accident)        2. Overactive bladder        3. Primary hypertension        4. Pure hypercholesterolemia        5. Seasonal allergic rhinitis, unspecified trigger              FOLLOW UP  No follow-ups on file.  Patient was given instructions and counseling regarding her condition or for health maintenance advice. Please see specific information pulled into the AVS if appropriate.       CURRENT & DISCONTINUED MEDICATIONS  Current Outpatient Medications   Medication Instructions    albuterol (ACCUNEB) 1.25 mg, Nebulization, Every 6 Hours PRN    albuterol sulfate  (90 Base) MCG/ACT inhaler 2 puffs, Inhalation, Every 4 Hours PRN    amLODIPine (NORVASC) 5 mg, Oral, Daily, for high blood pressure    atorvastatin (LIPITOR) 20 MG tablet TAKE 1 TABLET BY MOUTH EVERY NIGHT FOR HIGH AMOUNT OF FATS IN THE BLOOD    candesartan (ATACAND) 8 mg, Oral, 2 Times Daily    cetirizine (zyrTEC) 10 MG tablet TAKE  1 TABLET BY MOUTH EVERY NIGHT AT BEDTIME AS NEEDED FOR ALLERGIES    clopidogrel (PLAVIX) 75 mg, Oral, Daily    divalproex (DEPAKOTE ER) 500 mg, Oral, Daily    DULoxetine (CYMBALTA) 30 MG capsule     folic acid (FOLVITE) 1 mg, Daily    montelukast (SINGULAIR) 10 mg, Oral, Nightly    ondansetron (ZOFRAN) 8 mg, Oral, Every 8 Hours PRN    oxybutynin XL (DITROPAN-XL) 10 MG 24 hr tablet TAKE 1 TABLET BY MOUTH DAILY FOR OVERACTIVE BLADDER    traMADol (ULTRAM) 50 mg, Oral, Every 8 Hours PRN       There are no discontinued medications.     Parts of this note are electronic transcriptions/translations of spoken language to printed text using the Dragon Dictation system.    Gale Negrete, HERMAN  12/17/24  07:13 EST

## 2024-12-17 NOTE — ASSESSMENT & PLAN NOTE
Asthma is stable.    Plan: Continue same medication/s without change.        Patient Treatment Goals: symptom prevention, minimizing limitation in activity, prevention of exacerbations and use of ER/inpatient care, maintenance of optimal pulmonary function, and minimization of adverse effects of treatment.    Followup in 6 months.      Orders:    montelukast (SINGULAIR) 10 MG tablet; Take 1 tablet by mouth Every Night. Indications: Asthma

## 2024-12-17 NOTE — ASSESSMENT & PLAN NOTE
Psychological condition is improving with treatment.  Continue current treatment regimen.  Psychological condition  will be reassessed in 6 months.    I discussed with her that Cymbalta is prescribed by pain management and she will have to discuss that with them to be weaned off of the medication.  Patient verbalizes understanding.

## 2024-12-17 NOTE — ASSESSMENT & PLAN NOTE
Hypertension is stable and controlled  Continue current treatment regimen.  Stop smoking.  Blood pressure will be reassessed in 6 months.    Orders:    candesartan (ATACAND) 8 MG tablet; Take 1 tablet by mouth 2 (Two) Times a Day. Indications: High Blood Pressure

## 2024-12-17 NOTE — ASSESSMENT & PLAN NOTE
She is currently taking Plavix.  Orders:    clopidogrel (PLAVIX) 75 MG tablet; Take 1 tablet by mouth Daily. Indications: Cerebrovascular Accident or Stroke

## 2024-12-17 NOTE — ASSESSMENT & PLAN NOTE
Lipid abnormalities are stable    Plan:  Continue same medication/s without change.      Discussed medication dosage, use, side effects, and goals of treatment in detail.    Counseled patient on lifestyle modifications to help control hyperlipidemia.     Patient Treatment Goals:   LDL goal is less than 70    Followup in 6 months.    Orders:    atorvastatin (LIPITOR) 20 MG tablet; Take 1 tablet by mouth Every Night. Indications: Cerebrovascular Accident or Stroke

## 2025-02-04 DIAGNOSIS — F31.62 BIPOLAR DISORDER, CURRENT EPISODE MIXED, MODERATE: ICD-10-CM

## 2025-02-04 RX ORDER — DIVALPROEX SODIUM 500 MG/1
500 TABLET, FILM COATED, EXTENDED RELEASE ORAL DAILY
Qty: 90 TABLET | Refills: 1 | Status: SHIPPED | OUTPATIENT
Start: 2025-02-04

## 2025-02-25 NOTE — ANESTHESIA POSTPROCEDURE EVALUATION
Patient: Audra Montejo    Procedure Summary       Date: 05/15/24 Room / Location: [facility] 2 / [facility]    Anesthesia Start: 1223 Anesthesia Stop: 1248    Procedure: COLONOSCOPY with cold snare polypectomy Diagnosis:       Colon cancer screening      (Colon cancer screening [Z12.11])    Surgeons: Vivi Villafana MD Provider: Dagoberto Gabriel CRNA    Anesthesia Type: general ASA Status: 3            Anesthesia Type: general    Vitals  Vitals Value Taken Time   /59 05/15/24 1303   Temp 36.4 °C (97.6 °F) 05/15/24 1303   Pulse 72 05/15/24 1304   Resp 23 05/15/24 1303   SpO2 96 % 05/15/24 1304   Vitals shown include unfiled device data.        Post Anesthesia Care and Evaluation    Post-procedure mental status: acceptable.  Pain management: satisfactory to patient    Airway patency: patent  Anesthetic complications: No anesthetic complications    Cardiovascular status: acceptable  Respiratory status: acceptable    Comments: Per chart review    
No

## 2025-03-24 DIAGNOSIS — I10 PRIMARY HYPERTENSION: ICD-10-CM

## 2025-03-24 RX ORDER — AMLODIPINE BESYLATE 5 MG/1
5 TABLET ORAL DAILY
Qty: 90 TABLET | Refills: 0 | Status: SHIPPED | OUTPATIENT
Start: 2025-03-24

## 2025-03-25 DIAGNOSIS — F31.62 BIPOLAR DISORDER, CURRENT EPISODE MIXED, MODERATE: ICD-10-CM

## 2025-03-26 RX ORDER — DIVALPROEX SODIUM 250 MG/1
250 TABLET, FILM COATED, EXTENDED RELEASE ORAL DAILY
Qty: 90 TABLET | Refills: 0 | OUTPATIENT
Start: 2025-03-26

## 2025-06-17 ENCOUNTER — RESULTS FOLLOW-UP (OUTPATIENT)
Dept: FAMILY MEDICINE CLINIC | Facility: CLINIC | Age: 65
End: 2025-06-17

## 2025-06-17 ENCOUNTER — HOSPITAL ENCOUNTER (OUTPATIENT)
Dept: GENERAL RADIOLOGY | Facility: HOSPITAL | Age: 65
Discharge: HOME | End: 2025-06-17
Admitting: NURSE PRACTITIONER
Payer: COMMERCIAL

## 2025-06-17 ENCOUNTER — OFFICE VISIT (OUTPATIENT)
Dept: FAMILY MEDICINE CLINIC | Facility: CLINIC | Age: 65
End: 2025-06-17
Payer: COMMERCIAL

## 2025-06-17 VITALS
HEART RATE: 61 BPM | TEMPERATURE: 96.7 F | HEIGHT: 58 IN | WEIGHT: 167.6 LBS | SYSTOLIC BLOOD PRESSURE: 170 MMHG | OXYGEN SATURATION: 98 % | DIASTOLIC BLOOD PRESSURE: 78 MMHG | BODY MASS INDEX: 35.18 KG/M2

## 2025-06-17 DIAGNOSIS — J30.2 SEASONAL ALLERGIC RHINITIS, UNSPECIFIED TRIGGER: ICD-10-CM

## 2025-06-17 DIAGNOSIS — R06.00 DYSPNEA, UNSPECIFIED TYPE: ICD-10-CM

## 2025-06-17 DIAGNOSIS — L81.9 DISCOLORATION OF SKIN OF FOOT: Primary | ICD-10-CM

## 2025-06-17 DIAGNOSIS — I10 PRIMARY HYPERTENSION: ICD-10-CM

## 2025-06-17 DIAGNOSIS — F31.62 BIPOLAR DISORDER, CURRENT EPISODE MIXED, MODERATE: ICD-10-CM

## 2025-06-17 DIAGNOSIS — R60.0 BILATERAL LOWER EXTREMITY EDEMA: ICD-10-CM

## 2025-06-17 DIAGNOSIS — R73.03 PREDIABETES: ICD-10-CM

## 2025-06-17 DIAGNOSIS — E78.00 PURE HYPERCHOLESTEROLEMIA: ICD-10-CM

## 2025-06-17 DIAGNOSIS — E53.8 FOLATE DEFICIENCY: ICD-10-CM

## 2025-06-17 DIAGNOSIS — N32.81 OVERACTIVE BLADDER: ICD-10-CM

## 2025-06-17 DIAGNOSIS — J45.40 MODERATE PERSISTENT ASTHMA WITHOUT COMPLICATION: ICD-10-CM

## 2025-06-17 DIAGNOSIS — Z86.73 HISTORY OF CVA (CEREBROVASCULAR ACCIDENT): ICD-10-CM

## 2025-06-17 DIAGNOSIS — G47.62 LEG CRAMPS, SLEEP RELATED: ICD-10-CM

## 2025-06-17 DIAGNOSIS — F17.219 CIGARETTE NICOTINE DEPENDENCE WITH NICOTINE-INDUCED DISORDER: ICD-10-CM

## 2025-06-17 PROBLEM — I63.9: Status: ACTIVE | Noted: 2025-01-03

## 2025-06-17 PROBLEM — Z79.899 OTHER LONG TERM (CURRENT) DRUG THERAPY: Status: ACTIVE | Noted: 2024-06-21

## 2025-06-17 PROBLEM — M12.9 ARTHROPATHY: Status: ACTIVE | Noted: 2025-06-17

## 2025-06-17 PROBLEM — D68.9 COAGULATION DISORDER: Status: RESOLVED | Noted: 2025-06-17 | Resolved: 2025-06-17

## 2025-06-17 PROBLEM — D72.829 LEUKOCYTOSIS: Status: RESOLVED | Noted: 2025-06-17 | Resolved: 2025-06-17

## 2025-06-17 PROBLEM — I67.1 CEREBRAL ANEURYSM: Status: RESOLVED | Noted: 2025-06-17 | Resolved: 2025-06-17

## 2025-06-17 PROBLEM — D64.9 ANEMIA: Status: ACTIVE | Noted: 2025-06-17

## 2025-06-17 LAB
ALBUMIN SERPL-MCNC: 4.3 G/DL (ref 3.5–5.2)
ALBUMIN/GLOB SERPL: 1.5 G/DL
ALP SERPL-CCNC: 133 U/L (ref 39–117)
ALT SERPL W P-5'-P-CCNC: 12 U/L (ref 1–33)
ANION GAP SERPL CALCULATED.3IONS-SCNC: 11.4 MMOL/L (ref 5–15)
AST SERPL-CCNC: 17 U/L (ref 1–32)
BASOPHILS # BLD AUTO: 0.07 10*3/MM3 (ref 0–0.2)
BASOPHILS NFR BLD AUTO: 0.6 % (ref 0–1.5)
BILIRUB SERPL-MCNC: 0.2 MG/DL (ref 0–1.2)
BUN SERPL-MCNC: 20.5 MG/DL (ref 8–23)
BUN/CREAT SERPL: 22.8 (ref 7–25)
CALCIUM SPEC-SCNC: 9.2 MG/DL (ref 8.6–10.5)
CHLORIDE SERPL-SCNC: 104 MMOL/L (ref 98–107)
CO2 SERPL-SCNC: 25.6 MMOL/L (ref 22–29)
CREAT SERPL-MCNC: 0.9 MG/DL (ref 0.57–1)
DEPRECATED RDW RBC AUTO: 44.8 FL (ref 37–54)
EGFRCR SERPLBLD CKD-EPI 2021: 72 ML/MIN/1.73
EOSINOPHIL # BLD AUTO: 0.39 10*3/MM3 (ref 0–0.4)
EOSINOPHIL NFR BLD AUTO: 3.3 % (ref 0.3–6.2)
ERYTHROCYTE [DISTWIDTH] IN BLOOD BY AUTOMATED COUNT: 13.2 % (ref 12.3–15.4)
FOLATE SERPL-MCNC: >20 NG/ML (ref 4.78–24.2)
GLOBULIN UR ELPH-MCNC: 2.9 GM/DL
GLUCOSE SERPL-MCNC: 113 MG/DL (ref 65–99)
HBA1C MFR BLD: 6.2 % (ref 4.8–5.6)
HCT VFR BLD AUTO: 42.2 % (ref 34–46.6)
HGB BLD-MCNC: 14 G/DL (ref 12–15.9)
IMM GRANULOCYTES # BLD AUTO: 0.04 10*3/MM3 (ref 0–0.05)
IMM GRANULOCYTES NFR BLD AUTO: 0.3 % (ref 0–0.5)
LYMPHOCYTES # BLD AUTO: 4.4 10*3/MM3 (ref 0.7–3.1)
LYMPHOCYTES NFR BLD AUTO: 37.5 % (ref 19.6–45.3)
MAGNESIUM SERPL-MCNC: 2 MG/DL (ref 1.6–2.4)
MCH RBC QN AUTO: 30.4 PG (ref 26.6–33)
MCHC RBC AUTO-ENTMCNC: 33.2 G/DL (ref 31.5–35.7)
MCV RBC AUTO: 91.7 FL (ref 79–97)
MONOCYTES # BLD AUTO: 1.27 10*3/MM3 (ref 0.1–0.9)
MONOCYTES NFR BLD AUTO: 10.8 % (ref 5–12)
NEUTROPHILS NFR BLD AUTO: 47.5 % (ref 42.7–76)
NEUTROPHILS NFR BLD AUTO: 5.56 10*3/MM3 (ref 1.7–7)
NRBC BLD AUTO-RTO: 0 /100 WBC (ref 0–0.2)
NT-PROBNP SERPL-MCNC: 199.2 PG/ML (ref 0–900)
PLATELET # BLD AUTO: 248 10*3/MM3 (ref 140–450)
PMV BLD AUTO: 12.1 FL (ref 6–12)
POTASSIUM SERPL-SCNC: 4.2 MMOL/L (ref 3.5–5.2)
PROT SERPL-MCNC: 7.2 G/DL (ref 6–8.5)
RBC # BLD AUTO: 4.6 10*6/MM3 (ref 3.77–5.28)
SODIUM SERPL-SCNC: 141 MMOL/L (ref 136–145)
WBC NRBC COR # BLD AUTO: 11.73 10*3/MM3 (ref 3.4–10.8)

## 2025-06-17 PROCEDURE — 83036 HEMOGLOBIN GLYCOSYLATED A1C: CPT | Performed by: NURSE PRACTITIONER

## 2025-06-17 PROCEDURE — 83880 ASSAY OF NATRIURETIC PEPTIDE: CPT | Performed by: NURSE PRACTITIONER

## 2025-06-17 PROCEDURE — 85025 COMPLETE CBC W/AUTO DIFF WBC: CPT | Performed by: NURSE PRACTITIONER

## 2025-06-17 PROCEDURE — 99215 OFFICE O/P EST HI 40 MIN: CPT | Performed by: NURSE PRACTITIONER

## 2025-06-17 PROCEDURE — 71046 X-RAY EXAM CHEST 2 VIEWS: CPT

## 2025-06-17 PROCEDURE — 80053 COMPREHEN METABOLIC PANEL: CPT | Performed by: NURSE PRACTITIONER

## 2025-06-17 PROCEDURE — 83735 ASSAY OF MAGNESIUM: CPT | Performed by: NURSE PRACTITIONER

## 2025-06-17 PROCEDURE — 82746 ASSAY OF FOLIC ACID SERUM: CPT | Performed by: NURSE PRACTITIONER

## 2025-06-17 RX ORDER — ALBUTEROL SULFATE 1.25 MG/3ML
1 SOLUTION RESPIRATORY (INHALATION) EVERY 6 HOURS PRN
Qty: 50 EACH | Refills: 5 | Status: SHIPPED | OUTPATIENT
Start: 2025-06-17

## 2025-06-17 RX ORDER — ALBUTEROL SULFATE 90 UG/1
2 INHALANT RESPIRATORY (INHALATION) EVERY 4 HOURS PRN
Qty: 8.5 G | Refills: 2 | Status: SHIPPED | OUTPATIENT
Start: 2025-06-17

## 2025-06-17 RX ORDER — CANDESARTAN 8 MG/1
8 TABLET ORAL 2 TIMES DAILY
Qty: 180 TABLET | Refills: 0 | Status: SHIPPED | OUTPATIENT
Start: 2025-06-17

## 2025-06-17 RX ORDER — FUROSEMIDE 20 MG/1
20 TABLET ORAL DAILY
Qty: 90 TABLET | OUTPATIENT
Start: 2025-06-17

## 2025-06-17 RX ORDER — AMLODIPINE BESYLATE 5 MG/1
5 TABLET ORAL DAILY
Qty: 90 TABLET | Refills: 0 | Status: SHIPPED | OUTPATIENT
Start: 2025-06-17

## 2025-06-17 RX ORDER — CETIRIZINE HYDROCHLORIDE 10 MG/1
10 TABLET ORAL DAILY
Qty: 90 TABLET | Refills: 1 | Status: SHIPPED | OUTPATIENT
Start: 2025-06-17

## 2025-06-17 RX ORDER — OXYBUTYNIN CHLORIDE 10 MG/1
10 TABLET, EXTENDED RELEASE ORAL DAILY
Qty: 90 TABLET | Refills: 1 | Status: SHIPPED | OUTPATIENT
Start: 2025-06-17

## 2025-06-17 RX ORDER — MONTELUKAST SODIUM 10 MG/1
10 TABLET ORAL NIGHTLY
Qty: 90 TABLET | Refills: 1 | Status: SHIPPED | OUTPATIENT
Start: 2025-06-17

## 2025-06-17 RX ORDER — FUROSEMIDE 20 MG/1
20 TABLET ORAL DAILY
Qty: 30 TABLET | Refills: 0 | Status: SHIPPED | OUTPATIENT
Start: 2025-06-17

## 2025-06-17 RX ORDER — CLOPIDOGREL BISULFATE 75 MG/1
75 TABLET ORAL DAILY
Qty: 90 TABLET | Refills: 1 | Status: SHIPPED | OUTPATIENT
Start: 2025-06-17

## 2025-06-17 RX ORDER — DIVALPROEX SODIUM 500 MG/1
500 TABLET, FILM COATED, EXTENDED RELEASE ORAL DAILY
Qty: 90 TABLET | Refills: 1 | Status: SHIPPED | OUTPATIENT
Start: 2025-06-17

## 2025-06-17 RX ORDER — ATORVASTATIN CALCIUM 20 MG/1
20 TABLET, FILM COATED ORAL NIGHTLY
Qty: 90 TABLET | Refills: 1 | Status: SHIPPED | OUTPATIENT
Start: 2025-06-17

## 2025-06-17 NOTE — ASSESSMENT & PLAN NOTE
Orders:    oxybutynin XL (DITROPAN-XL) 10 MG 24 hr tablet; Take 1 tablet by mouth Daily. Indications: Overactive Bladder

## 2025-06-17 NOTE — ASSESSMENT & PLAN NOTE
Orders:    divalproex (DEPAKOTE ER) 500 MG 24 hr tablet; Take 1 tablet by mouth Daily. Indications: bipolar
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
## 2025-06-17 NOTE — ASSESSMENT & PLAN NOTE
Orders:    atorvastatin (LIPITOR) 20 MG tablet; Take 1 tablet by mouth Every Night. Indications: Cerebrovascular Accident or Stroke

## 2025-06-17 NOTE — ASSESSMENT & PLAN NOTE
Orders:    XR Chest PA & Lateral; Future    furosemide (Lasix) 20 MG tablet; Take 1 tablet by mouth Daily. Indications: Edema

## 2025-06-17 NOTE — ASSESSMENT & PLAN NOTE
Orders:    albuterol (ACCUNEB) 1.25 MG/3ML nebulizer solution; Take 3 mL by nebulization Every 6 (Six) Hours As Needed for Wheezing or Shortness of Air. Indications: asthma    albuterol sulfate  (90 Base) MCG/ACT inhaler; Inhale 2 puffs Every 4 (Four) Hours As Needed for Wheezing. Indications: Asthma    montelukast (SINGULAIR) 10 MG tablet; Take 1 tablet by mouth Every Night. Indications: Asthma    Complete PFT - Pre & Post Bronchodilator; Future

## 2025-06-17 NOTE — ASSESSMENT & PLAN NOTE
Orders:    cetirizine (zyrTEC) 10 MG tablet; Take 1 tablet by mouth Daily. Indications: Perennial Allergic Rhinitis

## 2025-06-17 NOTE — ASSESSMENT & PLAN NOTE
Orders:    amLODIPine (NORVASC) 5 MG tablet; Take 1 tablet by mouth Daily. for high blood pressure  Indications: High Blood Pressure    candesartan (ATACAND) 8 MG tablet; Take 1 tablet by mouth 2 (Two) Times a Day. Indications: High Blood Pressure    CBC Auto Differential    Comprehensive Metabolic Panel

## 2025-06-17 NOTE — ASSESSMENT & PLAN NOTE
Orders:    clopidogrel (PLAVIX) 75 MG tablet; Take 1 tablet by mouth Daily. Indications: Cerebrovascular Accident or Stroke

## 2025-07-16 PROBLEM — E07.9 DISORDER OF THYROID GLAND: Status: ACTIVE | Noted: 2025-07-16

## 2025-07-16 PROBLEM — F45.42 PAIN DISORDER ASSOCIATED WITH PSYCHOLOGICAL FACTORS: Status: ACTIVE | Noted: 2024-12-21

## 2025-07-17 ENCOUNTER — OFFICE VISIT (OUTPATIENT)
Dept: FAMILY MEDICINE CLINIC | Facility: CLINIC | Age: 65
End: 2025-07-17
Payer: COMMERCIAL

## 2025-07-17 VITALS
BODY MASS INDEX: 34.66 KG/M2 | OXYGEN SATURATION: 95 % | DIASTOLIC BLOOD PRESSURE: 72 MMHG | SYSTOLIC BLOOD PRESSURE: 150 MMHG | HEART RATE: 61 BPM | WEIGHT: 165.1 LBS | TEMPERATURE: 97.5 F | HEIGHT: 58 IN

## 2025-07-17 DIAGNOSIS — E78.00 PURE HYPERCHOLESTEROLEMIA: ICD-10-CM

## 2025-07-17 DIAGNOSIS — Z00.00 ANNUAL PHYSICAL EXAM: Primary | ICD-10-CM

## 2025-07-17 DIAGNOSIS — R60.0 BILATERAL LOWER EXTREMITY EDEMA: ICD-10-CM

## 2025-07-17 DIAGNOSIS — R73.03 PREDIABETES: ICD-10-CM

## 2025-07-17 DIAGNOSIS — Z78.0 POSTMENOPAUSAL: ICD-10-CM

## 2025-07-17 DIAGNOSIS — Z23 NEED FOR VACCINATION: ICD-10-CM

## 2025-07-17 DIAGNOSIS — Z12.31 ENCOUNTER FOR SCREENING MAMMOGRAM FOR MALIGNANT NEOPLASM OF BREAST: ICD-10-CM

## 2025-07-17 DIAGNOSIS — I10 PRIMARY HYPERTENSION: ICD-10-CM

## 2025-07-17 DIAGNOSIS — F17.200 TOBACCO USE DISORDER: ICD-10-CM

## 2025-07-17 LAB
CHOLEST SERPL-MCNC: 124 MG/DL (ref 0–200)
HDLC SERPL-MCNC: 57 MG/DL (ref 40–60)
LDLC SERPL CALC-MCNC: 46 MG/DL (ref 0–100)
LDLC/HDLC SERPL: 0.77 {RATIO}
TRIGL SERPL-MCNC: 115 MG/DL (ref 0–150)
VLDLC SERPL-MCNC: 21 MG/DL (ref 5–40)

## 2025-07-17 PROCEDURE — 80061 LIPID PANEL: CPT | Performed by: NURSE PRACTITIONER

## 2025-07-17 RX ORDER — FOLIC ACID 1 MG/1
1 TABLET ORAL DAILY
COMMUNITY
Start: 2025-07-09

## 2025-07-17 RX ORDER — CANDESARTAN 16 MG/1
16 TABLET ORAL 2 TIMES DAILY
Qty: 180 TABLET | Refills: 0 | Status: SHIPPED | OUTPATIENT
Start: 2025-07-17

## 2025-07-17 NOTE — ASSESSMENT & PLAN NOTE
{Hypertension is (optional):[id number]}    Orders:    candesartan (ATACAND) 16 MG tablet; Take 1 tablet by mouth 2 (Two) Times a Day. Indications: High Blood Pressure

## 2025-07-17 NOTE — PROGRESS NOTES
Chief Complaint  Annual Exam    Patient or patient representative verbalized consent for the use of Ambient Listening during the visit with  HERMAN Menjivar for chart documentation. 7/17/2025  08:07 EDT    Subjective            Audra Montejo is a 65 y.o. female who presents to [facility]   History of Present Illness  History of Present Illness  The patient is here today for an annual physical.    She reports no current health concerns. She does not recall undergoing a bone density scan in the past. She has three tests scheduled for 08/20/2025. She has not consumed any food or drink today, except for some Mountain Dew. She does not take calcium supplements. She does not get routine dental exams twice a year. She usually gets influenza and pneumonia vaccines in October. She has received the RSV vaccine last year. She had a hysterectomy in 1999 with removal of ovaries. Prior to that, she had her left ovary removed due to recurrent cysts and was diagnosed with endometriosis, which caused trouble 10 years later. She wears ear plugs at work.    She continues to smoke, typically less than a pack a day. She uses a seatbelt while driving.    She experiences skin discoloration on her feet, which turn red, and a burning sensation in her toes. She has been diagnosed with varicose veins by Dr. López, a hematologist, who also noted leaky veins.    Her blood pressure remains elevated. She is currently taking amlodipine 5 mg and candesartan 8 mg twice daily for hypertension.    She has discontinued furosemide due to its diuretic effect, which was causing inconvenience at work. Despite this, she reports an improvement in her swelling.    Social History:  Tobacco: She continues to smoke, typically less than a pack a day.  Coffee/Tea/Caffeine-containing Drinks: She has consumed Mountain Dew today.    PAST SURGICAL HISTORY:  Hysterectomy in 1999 with removal of ovaries.  Left ovary  "removal due to recurrent cysts.    FAMILY HISTORY  Her whole family has a history of stroke.        Tobacco Use: High Risk (7/17/2025)    Patient History     Smoking Tobacco Use: Every Day     Smokeless Tobacco Use: Never     Passive Exposure: Current      E-cigarette/Vaping    E-cigarette/Vaping Use Never User     Passive Exposure No     Counseling Given Yes      E-cigarette/Vaping Substances    Nicotine No     THC No     CBD No     Flavoring No      E-cigarette/Vaping Devices    Disposable No     Pre-filled or Refillable Cartridge No     Refillable Tank No     Pre-filled Pod No        Alcohol Use: Not At Risk (5/15/2023)    AUDIT-C     Frequency of Alcohol Consumption: Never     Average Number of Drinks: Patient does not drink     Frequency of Binge Drinking: Never         Objective   Vital Signs:   Vitals:    07/17/25 0735 07/17/25 0741   BP: 152/76 150/72   BP Location: Left arm    Patient Position: Sitting    Cuff Size: Adult    Pulse: 61    Temp: 97.5 °F (36.4 °C)    SpO2: 95%    Weight: 74.9 kg (165 lb 1.6 oz)    Height: 147.3 cm (57.99\")    PainSc: 2     PainLoc: Back      Body mass index is 34.52 kg/m².    Wt Readings from Last 3 Encounters:   07/17/25 74.9 kg (165 lb 1.6 oz)   06/17/25 76 kg (167 lb 9.6 oz)   12/17/24 71.3 kg (157 lb 1.6 oz)     BP Readings from Last 3 Encounters:   07/17/25 150/72   06/17/25 170/78   12/17/24 131/58       Health Maintenance   Topic Date Due    ANNUAL PHYSICAL  04/21/2024    LUNG CANCER SCREENING  03/21/2025    MAMMOGRAM  04/24/2025    COVID-19 Vaccine (1 - 2024-25 season) 11/08/2025 (Originally 9/1/2024)    INFLUENZA VACCINE  10/01/2025    LIPID PANEL  07/17/2026    TDAP/TD VACCINES (3 - Td or Tdap) 06/01/2028    COLORECTAL CANCER SCREENING  05/15/2034    HEPATITIS C SCREENING  Completed    Pneumococcal Vaccine 50+  Completed    ZOSTER VACCINE  Completed       /72   Pulse 61   Temp 97.5 °F (36.4 °C)   Ht 147.3 cm (57.99\")   Wt 74.9 kg (165 lb 1.6 oz)   SpO2 95% "   BMI 34.52 kg/m²       Current Outpatient Medications:     albuterol (ACCUNEB) 1.25 MG/3ML nebulizer solution, Take 3 mL by nebulization Every 6 (Six) Hours As Needed for Wheezing or Shortness of Air. Indications: asthma, Disp: 50 each, Rfl: 5    albuterol sulfate  (90 Base) MCG/ACT inhaler, Inhale 2 puffs Every 4 (Four) Hours As Needed for Wheezing. Indications: Asthma, Disp: 8.5 g, Rfl: 2    amLODIPine (NORVASC) 5 MG tablet, Take 1 tablet by mouth Daily. for high blood pressure  Indications: High Blood Pressure, Disp: 90 tablet, Rfl: 0    atorvastatin (LIPITOR) 20 MG tablet, Take 1 tablet by mouth Every Night. Indications: Cerebrovascular Accident or Stroke, Disp: 90 tablet, Rfl: 1    candesartan (ATACAND) 16 MG tablet, Take 1 tablet by mouth 2 (Two) Times a Day. Indications: High Blood Pressure, Disp: 180 tablet, Rfl: 0    cetirizine (zyrTEC) 10 MG tablet, Take 1 tablet by mouth Daily. Indications: Perennial Allergic Rhinitis, Disp: 90 tablet, Rfl: 1    clopidogrel (PLAVIX) 75 MG tablet, Take 1 tablet by mouth Daily. Indications: Cerebrovascular Accident or Stroke, Disp: 90 tablet, Rfl: 1    divalproex (DEPAKOTE ER) 500 MG 24 hr tablet, Take 1 tablet by mouth Daily. Indications: bipolar, Disp: 90 tablet, Rfl: 1    folic acid (FOLVITE) 1 MG tablet, Take 1 tablet by mouth Daily., Disp: , Rfl:     montelukast (SINGULAIR) 10 MG tablet, Take 1 tablet by mouth Every Night. Indications: Asthma, Disp: 90 tablet, Rfl: 1    oxybutynin XL (DITROPAN-XL) 10 MG 24 hr tablet, Take 1 tablet by mouth Daily. Indications: Overactive Bladder, Disp: 90 tablet, Rfl: 1   Past Medical History:   Diagnosis Date    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    Acid reflux disease     Allergic Unknown    Allergy     unspecified    Anxiety     Asthma Unknown    Back pain     Bilateral carpal tunnel syndrome 08/17/2017    Bipolar disorder     Bursitis of left shoulder 08/09/2016    Cataract Unknown    Chronic pain disorder     Clotting  disorder     Deep vein thrombosis All my life    Depression     Disorder of thyroid gland 07/16/2025    Eczema     Gall stones     H/O psychiatric care     Heart attack     Heart murmur     Hyperlipidemia 11/22    Hypertension     Incomplete tear of left rotator cuff 01/25/2017    Injury of back Unknown    Lactose intolerance     Left shoulder pain     Leukocytosis 06/17/2025    Loss of appetite     Lumbago 05/08/2015    low back pain     Memory loss     forgetfulness    Migraine headache     Need for hepatitis C screening test     Obesity     Palpitation     Panic disorder     Ringing in ears     Seasonal allergies     Shortness of breath     Sinus trouble     unspecified     Stroke (cerebrum)     Subacromial impingement, left 10/13/2016    Superior glenoid labrum lesion of left shoulder 10/13/2016    subsequent encounter    Trouble swallowing     Urinary tract infection Unknown        Physical Exam  Vitals reviewed.   Constitutional:       Appearance: Normal appearance. She is well-developed. She is obese.   HENT:      Right Ear: Tympanic membrane, ear canal and external ear normal.      Left Ear: Tympanic membrane, ear canal and external ear normal.      Mouth/Throat:      Mouth: Mucous membranes are moist.      Pharynx: No pharyngeal swelling, oropharyngeal exudate or posterior oropharyngeal erythema.   Neck:      Thyroid: No thyroid mass, thyromegaly or thyroid tenderness.   Cardiovascular:      Rate and Rhythm: Normal rate and regular rhythm.      Heart sounds: No murmur heard.     No friction rub. No gallop.   Pulmonary:      Effort: Pulmonary effort is normal.      Breath sounds: Normal breath sounds. No wheezing or rhonchi.   Lymphadenopathy:      Cervical: No cervical adenopathy.   Skin:     General: Skin is warm and dry.   Neurological:      Mental Status: She is alert and oriented to person, place, and time.      Cranial Nerves: No cranial nerve deficit.   Psychiatric:         Mood and Affect: Mood and  affect normal.         Behavior: Behavior normal.         Thought Content: Thought content normal. Thought content does not include homicidal or suicidal ideation.         Judgment: Judgment normal.       Physical Exam        Result Review :    The following data was reviewed by: HERMAN Menjivar on 07/17/2025:           XR Chest PA & Lateral  Result Date: 6/17/2025  Impression: No evidence of CHF Electronically Signed: Emanuel Clark  6/17/2025 12:06 PM EDT  Workstation ID: [id number]      Results  Labs   - A1c: 06/2025, 6.2    Assessment & Plan  Annual physical exam    Primary hypertension      Orders:    candesartan (ATACAND) 16 MG tablet; Take 1 tablet by mouth 2 (Two) Times a Day. Indications: High Blood Pressure    Pure hypercholesterolemia       Orders:    Lipid Panel    Encounter for screening mammogram for malignant neoplasm of breast    Orders:    Mammo Screening Digital Tomosynthesis Bilateral With CAD; Future    Need for vaccination    Orders:    Pneumococcal Conjugate Vaccine 20-Valent All    Postmenopausal    Orders:    DEXA Bone Density Axial; Future    Tobacco use disorder    Prediabetes         Bilateral lower extremity edema              Assessment & Plan  1. Annual physical.  - A1c level from last month's lab work was 6.2, indicating prediabetes.  - Due for cholesterol check; last colonoscopy in 2024 with follow-up in 10 years; due for mammogram.  - Tetanus shot (TDAP) administered in 2018, valid for 10 years; received shingles and RSV vaccines; has not received pneumonia vaccine.  - Postmenopausal following hysterectomy in 1999 with ovary removal.  - Advised routine dental exams twice a year and annual vision exams due to stroke history.  - DEXA scan will be ordered to assess for osteoporosis; mammogram will be scheduled; pneumonia vaccine will be administered today; advised to incorporate calcium into diet but avoid taking it within 48 hours of bone density scan.    2. Hypertension.  -  Blood pressure is better but not at desired level; target systolic blood pressure is <135.  - Dosage of candesartan will be increased to 16 mg, taken as 2 tablets twice daily until current supply is exhausted.  - New prescription for candesartan 16 mg will be provided, to be taken as one tablet twice daily.    3. Varicose veins.  - Diagnosed with varicose veins and leaky veins by Dr. Moran.  - Improvement noted compared to last month; purple toes observed previously.    4. Smoking.  - Continues to smoke about a pack a day; smoking cessation strongly recommended due to increased risk of heart disease, COPD, cancer, cataracts, and exacerbation of stroke history.  - Advised on health risks and impact on vascular health.  - Patient refuses to quit smoking.     5. Edema.  - Discontinued furosemide due to inconvenience at work; reports improvement in swelling.  - No trial of furosemide due to her concerns about blood pressure management in heat and work conditions.    Follow-up  - Scheduled in 3 months to monitor blood pressure.          Diagnosis Plan   1. Annual physical exam        2. Primary hypertension  candesartan (ATACAND) 16 MG tablet      3. Pure hypercholesterolemia  Lipid Panel      4. Encounter for screening mammogram for malignant neoplasm of breast  Mammo Screening Digital Tomosynthesis Bilateral With CAD      5. Need for vaccination  Pneumococcal Conjugate Vaccine 20-Valent All      6. Postmenopausal  DEXA Bone Density Axial      7. Tobacco use disorder        8. Prediabetes        9. Bilateral lower extremity edema              FOLLOW UP  Return in about 3 months (around 10/17/2025) for Next scheduled follow up HTN.  Patient was given instructions and counseling regarding her condition or for health maintenance advice. Please see specific information pulled into the AVS if appropriate.       CURRENT & DISCONTINUED MEDICATIONS  Current Outpatient Medications   Medication Instructions    albuterol  (ACCUNEB) 1.25 mg, Nebulization, Every 6 Hours PRN    albuterol sulfate  (90 Base) MCG/ACT inhaler 2 puffs, Inhalation, Every 4 Hours PRN    amLODIPine (NORVASC) 5 mg, Oral, Daily, for high blood pressure    atorvastatin (LIPITOR) 20 mg, Oral, Nightly    candesartan (ATACAND) 16 mg, Oral, 2 Times Daily    cetirizine (ZYRTEC) 10 mg, Oral, Daily    clopidogrel (PLAVIX) 75 mg, Oral, Daily    divalproex (DEPAKOTE ER) 500 mg, Oral, Daily    folic acid (FOLVITE) 1 MG tablet 1 tablet, Daily    montelukast (SINGULAIR) 10 mg, Oral, Nightly    oxybutynin XL (DITROPAN-XL) 10 mg, Oral, Daily       Medications Discontinued During This Encounter   Medication Reason    furosemide (Lasix) 20 MG tablet Non-compliance    candesartan (ATACAND) 8 MG tablet Reorder        Parts of this note are electronic transcriptions/translations of spoken language to printed text using the Dragon Dictation system.    Gale Negrete, APRN  07/17/25  19:38 EDT

## 2025-07-24 ENCOUNTER — OFFICE VISIT (OUTPATIENT)
Dept: FAMILY MEDICINE CLINIC | Facility: CLINIC | Age: 65
End: 2025-07-24
Payer: COMMERCIAL

## 2025-07-24 VITALS
BODY MASS INDEX: 35.19 KG/M2 | WEIGHT: 168.3 LBS | OXYGEN SATURATION: 94 % | SYSTOLIC BLOOD PRESSURE: 150 MMHG | HEART RATE: 83 BPM | TEMPERATURE: 98.1 F | DIASTOLIC BLOOD PRESSURE: 58 MMHG

## 2025-07-24 DIAGNOSIS — J45.42 MODERATE PERSISTENT ASTHMA WITH STATUS ASTHMATICUS: Primary | ICD-10-CM

## 2025-07-24 PROCEDURE — 99214 OFFICE O/P EST MOD 30 MIN: CPT | Performed by: NURSE PRACTITIONER

## 2025-07-24 RX ORDER — BUDESONIDE AND FORMOTEROL FUMARATE DIHYDRATE 160; 4.5 UG/1; UG/1
2 AEROSOL RESPIRATORY (INHALATION)
Qty: 1 EACH | Refills: 2 | Status: SHIPPED | OUTPATIENT
Start: 2025-07-24

## 2025-07-24 NOTE — PROGRESS NOTES
Chief Complaint  Shortness of Breath    Patient or patient representative verbalized consent for the use of Ambient Listening during the visit with  HERMAN Menjivar for chart documentation. 7/24/2025  09:55 EDT    Subjective            Audra Montejo is a 65 y.o. female who presents to [facility]   History of Present Illness  History of Present Illness  The patient presents for evaluation of breathing difficulties.    She reports a progressive difficulty in breathing, which was particularly severe yesterday. She typically uses her nebulizer before work and carries a rescue inhaler. However, she noticed an unusual lack of coughing and forceful exhalation. Despite using her rescue inhaler twice during a work-related breathing episode, she found no relief. She sought urgent care yesterday where she received Solu-Medrol 80 mg and was prescribed prednisone 40 mg daily for 5 days. Her albuterol nebulizer dosage was also increased from 1.25 to 2.5. She has not previously used Advair or Symbicort. She believes that heat exacerbates her breathing difficulties. She is uncertain about having COPD and is scheduled for a pulmonary function test in 08/2025. She did not have asthma as a child but developed it in adulthood, with grass being a known trigger. She recalls a recent incident where she was mowing the lawn and the grass clippings were blown back towards her, causing her to struggle for breath. She finds it challenging to use the nebulizer every 4 hours at work. Her breathing difficulties are so severe that she cannot complete a sentence without becoming breathless and dizzy. She uses her inhaler when her throat begins to feel sore, as this is a sign that it is closing up. She stayed up all night using the 2.5 dosage every 4 hours and can tell when it starts wearing off as her volume decreases.    Tobacco: The patient smokes cigarettes.      Tobacco Use: High Risk (7/24/2025)     Patient History     Smoking Tobacco Use: Every Day     Smokeless Tobacco Use: Never     Passive Exposure: Current      E-cigarette/Vaping    E-cigarette/Vaping Use Never User     Passive Exposure No     Counseling Given Yes      E-cigarette/Vaping Substances    Nicotine No     THC No     CBD No     Flavoring No      E-cigarette/Vaping Devices    Disposable No     Pre-filled or Refillable Cartridge No     Refillable Tank No     Pre-filled Pod No        Alcohol Use: Not At Risk (5/15/2023)    AUDIT-C     Frequency of Alcohol Consumption: Never     Average Number of Drinks: Patient does not drink     Frequency of Binge Drinking: Never         Objective   Vital Signs:   Vitals:    07/24/25 0943 07/24/25 0947   BP: 153/59 150/58   Pulse: 83    Temp: 98.1 °F (36.7 °C)    SpO2: 94%    Weight: 76.3 kg (168 lb 4.8 oz)      Body mass index is 35.19 kg/m².    Wt Readings from Last 3 Encounters:   07/24/25 76.3 kg (168 lb 4.8 oz)   07/23/25 74.8 kg (165 lb)   07/17/25 74.9 kg (165 lb 1.6 oz)     BP Readings from Last 3 Encounters:   07/24/25 150/58   07/23/25 148/50   07/17/25 150/72       Health Maintenance   Topic Date Due    LUNG CANCER SCREENING  03/21/2025    MAMMOGRAM  04/24/2025    COVID-19 Vaccine (1 - 2024-25 season) 11/08/2025 (Originally 9/1/2024)    INFLUENZA VACCINE  10/01/2025    ANNUAL PHYSICAL  07/17/2026    LIPID PANEL  07/17/2026    TDAP/TD VACCINES (3 - Td or Tdap) 06/01/2028    COLORECTAL CANCER SCREENING  05/15/2034    HEPATITIS C SCREENING  Completed    Pneumococcal Vaccine 50+  Completed    ZOSTER VACCINE  Completed       /58   Pulse 83   Temp 98.1 °F (36.7 °C)   Wt 76.3 kg (168 lb 4.8 oz)   SpO2 94%   BMI 35.19 kg/m²       Current Outpatient Medications:     albuterol (PROVENTIL) (2.5 MG/3ML) 0.083% nebulizer solution, Take 2.5 mg by nebulization Every 4 (Four) Hours As Needed for Wheezing or Shortness of Air for up to 7 days., Disp: 50 each, Rfl: 0    albuterol sulfate  (90 Base)  MCG/ACT inhaler, Inhale 2 puffs Every 4 (Four) Hours As Needed for Wheezing. Indications: Asthma, Disp: 8.5 g, Rfl: 2    amLODIPine (NORVASC) 5 MG tablet, Take 1 tablet by mouth Daily. for high blood pressure  Indications: High Blood Pressure, Disp: 90 tablet, Rfl: 0    atorvastatin (LIPITOR) 20 MG tablet, Take 1 tablet by mouth Every Night. Indications: Cerebrovascular Accident or Stroke, Disp: 90 tablet, Rfl: 1    candesartan (ATACAND) 16 MG tablet, Take 1 tablet by mouth 2 (Two) Times a Day. Indications: High Blood Pressure, Disp: 180 tablet, Rfl: 0    cetirizine (zyrTEC) 10 MG tablet, Take 1 tablet by mouth Daily. Indications: Perennial Allergic Rhinitis, Disp: 90 tablet, Rfl: 1    clopidogrel (PLAVIX) 75 MG tablet, Take 1 tablet by mouth Daily. Indications: Cerebrovascular Accident or Stroke, Disp: 90 tablet, Rfl: 1    divalproex (DEPAKOTE ER) 500 MG 24 hr tablet, Take 1 tablet by mouth Daily. Indications: bipolar, Disp: 90 tablet, Rfl: 1    folic acid (FOLVITE) 1 MG tablet, Take 1 tablet by mouth Daily., Disp: , Rfl:     montelukast (SINGULAIR) 10 MG tablet, Take 1 tablet by mouth Every Night. Indications: Asthma, Disp: 90 tablet, Rfl: 1    oxybutynin XL (DITROPAN-XL) 10 MG 24 hr tablet, Take 1 tablet by mouth Daily. Indications: Overactive Bladder, Disp: 90 tablet, Rfl: 1    predniSONE (DELTASONE) 20 MG tablet, Take 2 tablets by mouth Daily for 5 days., Disp: 10 tablet, Rfl: 0    budesonide-formoterol (Symbicort) 160-4.5 MCG/ACT inhaler, Inhale 2 puffs 2 (Two) Times a Day. Indications: Asthma, Disp: 1 each, Rfl: 2    Spacer/Aero-Hold Chamber Mask misc, Use 1 applicator As Needed (with inhaler). Indications: Asthma, Disp: 1 each, Rfl: 0   Past Medical History:   Diagnosis Date    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    Acid reflux disease     Allergic Unknown    Allergy     unspecified    Anxiety     Asthma Unknown    Back pain     Bilateral carpal tunnel syndrome 08/17/2017    Bipolar disorder     Bursitis of left  shoulder 08/09/2016    Cataract Unknown    Chronic pain disorder     Clotting disorder     Deep vein thrombosis All my life    Depression     Disorder of thyroid gland 07/16/2025    Eczema     Gall stones     H/O psychiatric care     Heart attack     Heart murmur     Hyperlipidemia 11/22    Hypertension     Incomplete tear of left rotator cuff 01/25/2017    Injury of back Unknown    Lactose intolerance     Left shoulder pain     Leukocytosis 06/17/2025    Loss of appetite     Lumbago 05/08/2015    low back pain     Memory loss     forgetfulness    Migraine headache     Need for hepatitis C screening test     Obesity     Palpitation     Panic disorder     Ringing in ears     Seasonal allergies     Shortness of breath     Sinus trouble     unspecified     Stroke (cerebrum)     Subacromial impingement, left 10/13/2016    Superior glenoid labrum lesion of left shoulder 10/13/2016    subsequent encounter    Trouble swallowing     Urinary tract infection Unknown        Physical Exam  Vitals reviewed.   Constitutional:       Appearance: Normal appearance. She is well-developed. She is obese.   Neck:      Thyroid: No thyroid mass, thyromegaly or thyroid tenderness.   Cardiovascular:      Rate and Rhythm: Normal rate and regular rhythm.      Heart sounds: No murmur heard.     No friction rub. No gallop.   Pulmonary:      Effort: Pulmonary effort is normal.      Breath sounds: Normal breath sounds. No wheezing or rhonchi.   Lymphadenopathy:      Cervical: No cervical adenopathy.   Skin:     General: Skin is warm and dry.   Neurological:      Mental Status: She is alert and oriented to person, place, and time.      Cranial Nerves: No cranial nerve deficit.   Psychiatric:         Mood and Affect: Mood and affect normal.         Behavior: Behavior normal.         Thought Content: Thought content normal. Thought content does not include homicidal or suicidal ideation.         Judgment: Judgment normal.       Physical  Exam        Result Review :    The following data was reviewed by: HERMAN Menjivar on 07/24/2025:    Data reviewed : [facility] notes.     XR Chest PA & Lateral  Result Date: 6/17/2025  Impression: No evidence of CHF Electronically Signed: Emanuel Clark  6/17/2025 12:06 PM EDT  Workstation ID: [id number]      Results      Assessment & Plan  Moderate persistent asthma with status asthmaticus    Orders:    budesonide-formoterol (Symbicort) 160-4.5 MCG/ACT inhaler; Inhale 2 puffs 2 (Two) Times a Day. Indications: Asthma    Spacer/Aero-Hold Chamber Mask misc; Use 1 applicator As Needed (with inhaler). Indications: Asthma       Assessment & Plan  1. Breathing difficulties.  - Symptoms suggest a possible diagnosis of COPD, likely due to smoking history.  - Physical exam reveals clear lungs, but patient reports significant restriction previously noted by urgent care.  - Patient counseled on the use of Symbicort as a maintenance inhaler, including proper technique and the importance of rinsing mouth after use to prevent thrush.  - Symbicort prescribed for use as 2 puffs twice daily, along with a spacer for both Symbicort and albuterol inhalers. Continue prednisone 40 mg daily for 5 days. Contact office for work note if condition does not improve by tomorrow.    Follow-up: 10/2025 or sooner if needed.          Diagnosis Plan   1. Moderate persistent asthma with status asthmaticus  budesonide-formoterol (Symbicort) 160-4.5 MCG/ACT inhaler    Spacer/Aero-Hold Chamber Mask misc            FOLLOW UP  Return for Keep Scheduled Follow-up.  Patient was given instructions and counseling regarding her condition or for health maintenance advice. Please see specific information pulled into the AVS if appropriate.       CURRENT & DISCONTINUED MEDICATIONS  Current Outpatient Medications   Medication Instructions    albuterol (PROVENTIL) 2.5 mg, Nebulization, Every 4 Hours PRN    albuterol sulfate  (90 Base) MCG/ACT  inhaler 2 puffs, Inhalation, Every 4 Hours PRN    amLODIPine (NORVASC) 5 mg, Oral, Daily, for high blood pressure    atorvastatin (LIPITOR) 20 mg, Oral, Nightly    budesonide-formoterol (Symbicort) 160-4.5 MCG/ACT inhaler 2 puffs, Inhalation, 2 Times Daily - RT    candesartan (ATACAND) 16 mg, Oral, 2 Times Daily    cetirizine (ZYRTEC) 10 mg, Oral, Daily    clopidogrel (PLAVIX) 75 mg, Oral, Daily    divalproex (DEPAKOTE ER) 500 mg, Oral, Daily    folic acid (FOLVITE) 1 MG tablet 1 tablet, Daily    montelukast (SINGULAIR) 10 mg, Oral, Nightly    oxybutynin XL (DITROPAN-XL) 10 mg, Oral, Daily    predniSONE (DELTASONE) 40 mg, Oral, Daily    Spacer/Aero-Hold Chamber Mask misc 1 applicator, Not Applicable, As Needed       There are no discontinued medications.     Parts of this note are electronic transcriptions/translations of spoken language to printed text using the Dragon Dictation system.    Gale Negrete, APRN  07/24/25  22:27 EDT

## 2025-07-25 ENCOUNTER — TELEPHONE (OUTPATIENT)
Dept: FAMILY MEDICINE CLINIC | Facility: CLINIC | Age: 65
End: 2025-07-25

## 2025-08-20 ENCOUNTER — HOSPITAL ENCOUNTER (OUTPATIENT)
Dept: CT IMAGING | Facility: HOSPITAL | Age: 65
Discharge: HOME | End: 2025-08-20
Payer: COMMERCIAL

## 2025-08-20 ENCOUNTER — HOSPITAL ENCOUNTER (OUTPATIENT)
Dept: RESPIRATORY THERAPY | Facility: HOSPITAL | Age: 65
Discharge: HOME | End: 2025-08-20
Payer: COMMERCIAL

## 2025-08-20 ENCOUNTER — RESULTS FOLLOW-UP (OUTPATIENT)
Dept: FAMILY MEDICINE CLINIC | Facility: CLINIC | Age: 65
End: 2025-08-20
Payer: COMMERCIAL

## 2025-08-20 ENCOUNTER — HOSPITAL ENCOUNTER (OUTPATIENT)
Dept: CARDIOLOGY | Facility: HOSPITAL | Age: 65
Discharge: HOME | End: 2025-08-20
Payer: COMMERCIAL

## 2025-08-20 ENCOUNTER — HOSPITAL ENCOUNTER (OUTPATIENT)
Dept: MAMMOGRAPHY | Facility: HOSPITAL | Age: 65
Discharge: HOME | End: 2025-08-20
Payer: COMMERCIAL

## 2025-08-20 DIAGNOSIS — Z12.31 ENCOUNTER FOR SCREENING MAMMOGRAM FOR MALIGNANT NEOPLASM OF BREAST: ICD-10-CM

## 2025-08-20 DIAGNOSIS — F17.219 CIGARETTE NICOTINE DEPENDENCE WITH NICOTINE-INDUCED DISORDER: ICD-10-CM

## 2025-08-20 DIAGNOSIS — L81.9 DISCOLORATION OF SKIN OF FOOT: ICD-10-CM

## 2025-08-20 DIAGNOSIS — J45.40 MODERATE PERSISTENT ASTHMA WITHOUT COMPLICATION: ICD-10-CM

## 2025-08-20 LAB
BH CV LOWER ARTERIAL LEFT ABI RATIO: 1.15
BH CV LOWER ARTERIAL LEFT DORSALIS PEDIS SYS MAX: 217
BH CV LOWER ARTERIAL LEFT GREAT TOE SYS MAX: 141
BH CV LOWER ARTERIAL LEFT POST TIBIAL SYS MAX: 216
BH CV LOWER ARTERIAL LEFT TBI RATIO: 0.75
BH CV LOWER ARTERIAL RIGHT ABI RATIO: 1.22
BH CV LOWER ARTERIAL RIGHT DORSALIS PEDIS SYS MAX: 211
BH CV LOWER ARTERIAL RIGHT GREAT TOE SYS MAX: 155
BH CV LOWER ARTERIAL RIGHT POST TIBIAL SYS MAX: 230
BH CV LOWER ARTERIAL RIGHT TBI RATIO: 0.82
UPPER ARTERIAL LEFT ARM BRACHIAL SYS MAX: 186
UPPER ARTERIAL RIGHT ARM BRACHIAL SYS MAX: 188

## 2025-08-20 PROCEDURE — 94726 PLETHYSMOGRAPHY LUNG VOLUMES: CPT

## 2025-08-20 PROCEDURE — 71271 CT THORAX LUNG CANCER SCR C-: CPT

## 2025-08-20 PROCEDURE — 77063 BREAST TOMOSYNTHESIS BI: CPT

## 2025-08-20 PROCEDURE — 93922 UPR/L XTREMITY ART 2 LEVELS: CPT

## 2025-08-20 PROCEDURE — 77067 SCR MAMMO BI INCL CAD: CPT

## 2025-08-20 PROCEDURE — 94060 EVALUATION OF WHEEZING: CPT

## 2025-08-20 PROCEDURE — 94729 DIFFUSING CAPACITY: CPT

## 2025-08-20 RX ORDER — ALBUTEROL SULFATE 0.83 MG/ML
2.5 SOLUTION RESPIRATORY (INHALATION) ONCE
Status: COMPLETED | OUTPATIENT
Start: 2025-08-20 | End: 2025-08-20

## 2025-08-20 RX ADMIN — ALBUTEROL SULFATE 2.5 MG: 2.5 SOLUTION RESPIRATORY (INHALATION) at 11:19

## 2025-08-26 ENCOUNTER — HOSPITAL ENCOUNTER (OUTPATIENT)
Dept: BONE DENSITY | Facility: HOSPITAL | Age: 65
Discharge: HOME | End: 2025-08-26
Admitting: NURSE PRACTITIONER
Payer: COMMERCIAL

## 2025-08-26 DIAGNOSIS — Z78.0 POSTMENOPAUSAL: ICD-10-CM

## 2025-08-26 PROCEDURE — 77080 DXA BONE DENSITY AXIAL: CPT

## (undated) DEVICE — SOL IRRG H2O PL/BG 1000ML STRL

## (undated) DEVICE — Device: Brand: DEFENDO AIR/WATER/SUCTION AND BIOPSY VALVE

## (undated) DEVICE — CONN JET HYDRA H20 AUXILIARY DISP

## (undated) DEVICE — SOLIDIFIER LIQLOC PLS 1500CC BT

## (undated) DEVICE — Device

## (undated) DEVICE — LINER SURG CANSTR SXN S/RIGD 1500CC

## (undated) DEVICE — THE SINGLE USE ETRAP – POLYP TRAP IS USED FOR SUCTION RETRIEVAL OF ENDOSCOPICALLY REMOVED POLYPS.: Brand: ETRAP

## (undated) DEVICE — SNAR POLYP CAPTIFLEX XS/OVL 11X2.4MM 240CM 1P/U